# Patient Record
Sex: FEMALE | Race: WHITE | NOT HISPANIC OR LATINO | Employment: FULL TIME | URBAN - METROPOLITAN AREA
[De-identification: names, ages, dates, MRNs, and addresses within clinical notes are randomized per-mention and may not be internally consistent; named-entity substitution may affect disease eponyms.]

---

## 2017-01-24 ENCOUNTER — OFFICE VISIT (OUTPATIENT)
Dept: URGENT CARE | Facility: CLINIC | Age: 22
End: 2017-01-24
Payer: COMMERCIAL

## 2017-01-24 PROCEDURE — G0382 LEV 3 HOSP TYPE B ED VISIT: HCPCS

## 2017-01-24 PROCEDURE — 99283 EMERGENCY DEPT VISIT LOW MDM: CPT

## 2017-01-30 ENCOUNTER — HOSPITAL ENCOUNTER (EMERGENCY)
Facility: HOSPITAL | Age: 22
Discharge: HOME | End: 2017-01-31
Attending: EMERGENCY MEDICINE | Admitting: EMERGENCY MEDICINE
Payer: COMMERCIAL

## 2017-01-30 DIAGNOSIS — R19.7 DIARRHEA, UNSPECIFIED TYPE: ICD-10-CM

## 2017-01-30 DIAGNOSIS — R73.9 HYPERGLYCEMIA: Primary | ICD-10-CM

## 2017-01-30 LAB
ACETONE SERPL-MCNC: NEGATIVE MG/DL
ALBUMIN SERPL BCP-MCNC: 4 G/DL (ref 3.5–5)
ALP SERPL-CCNC: 109 U/L (ref 46–116)
ALT SERPL W P-5'-P-CCNC: 24 U/L (ref 12–78)
ANION GAP SERPL CALCULATED.3IONS-SCNC: 10 MMOL/L (ref 4–13)
AST SERPL W P-5'-P-CCNC: 11 U/L (ref 5–45)
BASOPHILS # BLD AUTO: 0.07 THOUSANDS/ΜL (ref 0–0.1)
BASOPHILS NFR BLD AUTO: 1 % (ref 0–1)
BILIRUB SERPL-MCNC: 0.5 MG/DL (ref 0.2–1)
BUN SERPL-MCNC: 15 MG/DL (ref 5–25)
CALCIUM SERPL-MCNC: 8.9 MG/DL (ref 8.3–10.1)
CHLORIDE SERPL-SCNC: 97 MMOL/L (ref 100–108)
CO2 SERPL-SCNC: 28 MMOL/L (ref 21–32)
CREAT SERPL-MCNC: 0.84 MG/DL (ref 0.6–1.3)
EOSINOPHIL # BLD AUTO: 0.08 THOUSAND/ΜL (ref 0–0.61)
EOSINOPHIL NFR BLD AUTO: 1 % (ref 0–6)
ERYTHROCYTE [DISTWIDTH] IN BLOOD BY AUTOMATED COUNT: 12.7 % (ref 11.6–15.1)
GFR SERPL CREATININE-BSD FRML MDRD: >60 ML/MIN/1.73SQ M
GLUCOSE SERPL-MCNC: 335 MG/DL (ref 65–140)
GLUCOSE SERPL-MCNC: 338 MG/DL (ref 65–140)
HCT VFR BLD AUTO: 44 % (ref 34.8–46.1)
HGB BLD-MCNC: 15.3 G/DL (ref 11.5–15.4)
LIPASE SERPL-CCNC: 173 U/L (ref 73–393)
LYMPHOCYTES # BLD AUTO: 3.37 THOUSANDS/ΜL (ref 0.6–4.47)
LYMPHOCYTES NFR BLD AUTO: 37 % (ref 14–44)
MCH RBC QN AUTO: 31.9 PG (ref 26.8–34.3)
MCHC RBC AUTO-ENTMCNC: 34.8 G/DL (ref 31.4–37.4)
MCV RBC AUTO: 92 FL (ref 82–98)
MONOCYTES # BLD AUTO: 0.53 THOUSAND/ΜL (ref 0.17–1.22)
MONOCYTES NFR BLD AUTO: 6 % (ref 4–12)
NEUTROPHILS # BLD AUTO: 5.12 THOUSANDS/ΜL (ref 1.85–7.62)
NEUTS SEG NFR BLD AUTO: 55 % (ref 43–75)
PLATELET # BLD AUTO: 331 THOUSANDS/UL (ref 149–390)
PMV BLD AUTO: 10.4 FL (ref 8.9–12.7)
POTASSIUM SERPL-SCNC: 3.8 MMOL/L (ref 3.5–5.3)
PROT SERPL-MCNC: 8.2 G/DL (ref 6.4–8.2)
RBC # BLD AUTO: 4.79 MILLION/UL (ref 3.81–5.12)
SODIUM SERPL-SCNC: 135 MMOL/L (ref 136–145)
WBC # BLD AUTO: 9.17 THOUSAND/UL (ref 4.31–10.16)

## 2017-01-30 PROCEDURE — 85025 COMPLETE CBC W/AUTO DIFF WBC: CPT | Performed by: EMERGENCY MEDICINE

## 2017-01-30 PROCEDURE — 82009 KETONE BODYS QUAL: CPT | Performed by: EMERGENCY MEDICINE

## 2017-01-30 PROCEDURE — 82948 REAGENT STRIP/BLOOD GLUCOSE: CPT

## 2017-01-30 PROCEDURE — 83690 ASSAY OF LIPASE: CPT | Performed by: EMERGENCY MEDICINE

## 2017-01-30 PROCEDURE — 36415 COLL VENOUS BLD VENIPUNCTURE: CPT | Performed by: EMERGENCY MEDICINE

## 2017-01-30 PROCEDURE — 96360 HYDRATION IV INFUSION INIT: CPT

## 2017-01-30 PROCEDURE — 80053 COMPREHEN METABOLIC PANEL: CPT | Performed by: EMERGENCY MEDICINE

## 2017-01-30 RX ADMIN — SODIUM CHLORIDE 2000 ML: 0.9 INJECTION, SOLUTION INTRAVENOUS at 21:37

## 2017-01-31 VITALS
HEART RATE: 90 BPM | WEIGHT: 120 LBS | SYSTOLIC BLOOD PRESSURE: 118 MMHG | RESPIRATION RATE: 18 BRPM | BODY MASS INDEX: 24.24 KG/M2 | OXYGEN SATURATION: 100 % | DIASTOLIC BLOOD PRESSURE: 68 MMHG | TEMPERATURE: 98.3 F

## 2017-01-31 PROCEDURE — 99283 EMERGENCY DEPT VISIT LOW MDM: CPT

## 2017-03-22 ENCOUNTER — GENERIC CONVERSION - ENCOUNTER (OUTPATIENT)
Dept: OTHER | Facility: OTHER | Age: 22
End: 2017-03-22

## 2017-03-22 LAB
LEFT EYE DIABETIC RETINOPATHY: NORMAL
RIGHT EYE DIABETIC RETINOPATHY: NORMAL

## 2017-05-30 ENCOUNTER — GENERIC CONVERSION - ENCOUNTER (OUTPATIENT)
Dept: OTHER | Facility: OTHER | Age: 22
End: 2017-05-30

## 2017-10-24 ENCOUNTER — HOSPITAL ENCOUNTER (EMERGENCY)
Facility: HOSPITAL | Age: 22
Discharge: HOME | End: 2017-10-24
Admitting: EMERGENCY MEDICINE
Payer: COMMERCIAL

## 2017-10-24 ENCOUNTER — APPOINTMENT (EMERGENCY)
Dept: CT IMAGING | Facility: HOSPITAL | Age: 22
End: 2017-10-24
Payer: COMMERCIAL

## 2017-10-24 ENCOUNTER — OFFICE VISIT (OUTPATIENT)
Dept: URGENT CARE | Facility: CLINIC | Age: 22
End: 2017-10-24
Payer: COMMERCIAL

## 2017-10-24 VITALS
DIASTOLIC BLOOD PRESSURE: 67 MMHG | HEIGHT: 59 IN | OXYGEN SATURATION: 100 % | RESPIRATION RATE: 18 BRPM | BODY MASS INDEX: 27.82 KG/M2 | WEIGHT: 138 LBS | TEMPERATURE: 97.9 F | HEART RATE: 80 BPM | SYSTOLIC BLOOD PRESSURE: 102 MMHG

## 2017-10-24 DIAGNOSIS — R51.9 HEADACHE: Primary | ICD-10-CM

## 2017-10-24 DIAGNOSIS — Z86.39 HISTORY OF HYPOGLYCEMIA: ICD-10-CM

## 2017-10-24 DIAGNOSIS — M79.10 MYALGIA: ICD-10-CM

## 2017-10-24 LAB
ACETONE SERPL-MCNC: NEGATIVE MG/DL
ALBUMIN SERPL BCP-MCNC: 3.9 G/DL (ref 3.5–5)
ALP SERPL-CCNC: 86 U/L (ref 46–116)
ALT SERPL W P-5'-P-CCNC: 23 U/L (ref 12–78)
ANION GAP SERPL CALCULATED.3IONS-SCNC: 10 MMOL/L (ref 4–13)
AST SERPL W P-5'-P-CCNC: 14 U/L (ref 5–45)
BASOPHILS # BLD AUTO: 0.04 THOUSANDS/ΜL (ref 0–0.1)
BASOPHILS NFR BLD AUTO: 1 % (ref 0–1)
BILIRUB SERPL-MCNC: 0.2 MG/DL (ref 0.2–1)
BUN SERPL-MCNC: 13 MG/DL (ref 5–25)
CALCIUM SERPL-MCNC: 8.7 MG/DL (ref 8.3–10.1)
CHLORIDE SERPL-SCNC: 101 MMOL/L (ref 100–108)
CLARITY, POC: CLEAR
CO2 SERPL-SCNC: 27 MMOL/L (ref 21–32)
COLOR, POC: YELLOW
CREAT SERPL-MCNC: 0.7 MG/DL (ref 0.6–1.3)
EOSINOPHIL # BLD AUTO: 0.13 THOUSAND/ΜL (ref 0–0.61)
EOSINOPHIL NFR BLD AUTO: 2 % (ref 0–6)
ERYTHROCYTE [DISTWIDTH] IN BLOOD BY AUTOMATED COUNT: 12.2 % (ref 11.6–15.1)
EXT BILIRUBIN, UA: NEGATIVE
EXT BLOOD URINE: NEGATIVE
EXT GLUCOSE, UA: NORMAL
EXT KETONES: NEGATIVE
EXT NITRITE, UA: NEGATIVE
EXT PH, UA: 7.5
EXT PREG TEST URINE: NEGATIVE
EXT PROTEIN, UA: NEGATIVE
EXT SPECIFIC GRAVITY, UA: 1
EXT UROBILINOGEN: 0.2
GFR SERPL CREATININE-BSD FRML MDRD: 122 ML/MIN/1.73SQ M
GLUCOSE SERPL-MCNC: 298 MG/DL (ref 65–140)
GLUCOSE SERPL-MCNC: 329 MG/DL (ref 65–140)
HCT VFR BLD AUTO: 41.6 % (ref 34.8–46.1)
HGB BLD-MCNC: 13.9 G/DL (ref 11.5–15.4)
LYMPHOCYTES # BLD AUTO: 2.84 THOUSANDS/ΜL (ref 0.6–4.47)
LYMPHOCYTES NFR BLD AUTO: 42 % (ref 14–44)
MCH RBC QN AUTO: 31.2 PG (ref 26.8–34.3)
MCHC RBC AUTO-ENTMCNC: 33.4 G/DL (ref 31.4–37.4)
MCV RBC AUTO: 93 FL (ref 82–98)
MONOCYTES # BLD AUTO: 0.43 THOUSAND/ΜL (ref 0.17–1.22)
MONOCYTES NFR BLD AUTO: 6 % (ref 4–12)
NEUTROPHILS # BLD AUTO: 3.34 THOUSANDS/ΜL (ref 1.85–7.62)
NEUTS SEG NFR BLD AUTO: 49 % (ref 43–75)
PLATELET # BLD AUTO: 283 THOUSANDS/UL (ref 149–390)
PMV BLD AUTO: 10.1 FL (ref 8.9–12.7)
POTASSIUM SERPL-SCNC: 4.1 MMOL/L (ref 3.5–5.3)
PROT SERPL-MCNC: 7.9 G/DL (ref 6.4–8.2)
RBC # BLD AUTO: 4.46 MILLION/UL (ref 3.81–5.12)
SODIUM SERPL-SCNC: 138 MMOL/L (ref 136–145)
WBC # BLD AUTO: 6.78 THOUSAND/UL (ref 4.31–10.16)
WBC # BLD EST: NEGATIVE 10*3/UL

## 2017-10-24 PROCEDURE — 82009 KETONE BODYS QUAL: CPT

## 2017-10-24 PROCEDURE — 80053 COMPREHEN METABOLIC PANEL: CPT

## 2017-10-24 PROCEDURE — 36415 COLL VENOUS BLD VENIPUNCTURE: CPT

## 2017-10-24 PROCEDURE — 70450 CT HEAD/BRAIN W/O DYE: CPT

## 2017-10-24 PROCEDURE — 99283 EMERGENCY DEPT VISIT LOW MDM: CPT

## 2017-10-24 PROCEDURE — 82948 REAGENT STRIP/BLOOD GLUCOSE: CPT

## 2017-10-24 PROCEDURE — 81025 URINE PREGNANCY TEST: CPT | Performed by: EMERGENCY MEDICINE

## 2017-10-24 PROCEDURE — 96374 THER/PROPH/DIAG INJ IV PUSH: CPT

## 2017-10-24 PROCEDURE — 81002 URINALYSIS NONAUTO W/O SCOPE: CPT | Performed by: EMERGENCY MEDICINE

## 2017-10-24 PROCEDURE — 99285 EMERGENCY DEPT VISIT HI MDM: CPT

## 2017-10-24 PROCEDURE — 85025 COMPLETE CBC W/AUTO DIFF WBC: CPT

## 2017-10-24 PROCEDURE — G0382 LEV 3 HOSP TYPE B ED VISIT: HCPCS

## 2017-10-24 RX ORDER — KETOROLAC TROMETHAMINE 30 MG/ML
30 INJECTION, SOLUTION INTRAMUSCULAR; INTRAVENOUS ONCE
Status: COMPLETED | OUTPATIENT
Start: 2017-10-24 | End: 2017-10-24

## 2017-10-24 RX ADMIN — KETOROLAC TROMETHAMINE 30 MG: 30 INJECTION, SOLUTION INTRAMUSCULAR at 18:45

## 2017-10-24 NOTE — DISCHARGE INSTRUCTIONS
Acute Headache   WHAT YOU NEED TO KNOW:   An acute headache is pain or discomfort that starts suddenly and gets worse quickly  You may have an acute headache only when you feel stress or eat certain foods  Other acute headache pain can happen every day, and sometimes several times a day  DISCHARGE INSTRUCTIONS:   Return to the emergency department if:   · You have severe pain  · You have numbness or weakness on one side of your face or body  · You have a headache that occurs after a blow to the head, a fall, or other trauma  · You have a headache, are forgetful or confused, or have trouble speaking  · You have a headache, stiff neck, and a fever  Contact your healthcare provider if:   · You have a constant headache and are vomiting  · You have a headache each day that does not get better, even after treatment  · You have changes in your headaches, or new symptoms that occur when you have a headache  · You have questions or concerns about your condition or care  Medicines: You may need any of the following:  · Prescription pain medicine  may be given  The medicine your healthcare provider recommends will depend on the kind of headaches you have  You will need to take prescription headache medicines as directed to prevent a problem called rebound headache  These headaches happen with regular use of pain relievers for headache disorders  · NSAIDs , such as ibuprofen, help decrease swelling, pain, and fever  This medicine is available with or without a doctor's order  NSAIDs can cause stomach bleeding or kidney problems in certain people  If you take blood thinner medicine, always ask your healthcare provider if NSAIDs are safe for you  Always read the medicine label and follow directions  · Acetaminophen  decreases pain and fever  It is available without a doctor's order  Ask how much to take and how often to take it  Follow directions   Read the labels of all other medicines you are using to see if they also contain acetaminophen, or ask your doctor or pharmacist  Acetaminophen can cause liver damage if not taken correctly  Do not use more than 3 grams (3,000 milligrams) total of acetaminophen in one day  · Antidepressants  may be given for some kinds of headaches  · Take your medicine as directed  Contact your healthcare provider if you think your medicine is not helping or if you have side effects  Tell him or her if you are allergic to any medicine  Keep a list of the medicines, vitamins, and herbs you take  Include the amounts, and when and why you take them  Bring the list or the pill bottles to follow-up visits  Carry your medicine list with you in case of an emergency  Manage your symptoms:   · Apply heat or ice  on the headache area  Use a heat or ice pack  For an ice pack, you can also put crushed ice in a plastic bag  Cover the pack or bag with a towel before you apply it to your skin  Ice and heat both help decrease pain, and heat also helps decrease muscle spasms  Apply heat for 20 to 30 minutes every 2 hours  Apply ice for 15 to 20 minutes every hour  Apply heat or ice for as long and for as many days as directed  You may alternate heat and ice  · Relax your muscles  Lie down in a comfortable position and close your eyes  Relax your muscles slowly  Start at your toes and work your way up your body  · Keep a record of your headaches  Write down when your headaches start and stop  Include your symptoms and what you were doing when the headache began  Record what you ate or drank for 24 hours before the headache started  Describe the pain and where it hurts  Keep track of what you did to treat your headache and if it worked  Prevent an acute headache:   · Avoid anything that triggers an acute headache  Examples include exposure to chemicals, going to high altitude, or not getting enough sleep  Create a regular sleep routine   Go to sleep at the same time and wake up at the same time each day  Do not use electronic devices before bedtime  These may trigger a headache or prevent you from sleeping well  · Do not smoke  Nicotine and other chemicals in cigarettes and cigars can trigger an acute headache or make it worse  Ask your healthcare provider for information if you currently smoke and need help to quit  E-cigarettes or smokeless tobacco still contain nicotine  Talk to your healthcare provider before you use these products  · Limit alcohol as directed  Alcohol can trigger an acute headache or make it worse  If you have cluster headaches, do not drink alcohol during an episode  For other types of headaches, ask your healthcare provider if it is safe for you to drink alcohol  Ask how much is safe for you to drink, and how often  · Exercise as directed  Exercise can reduce tension and help with headache pain  Aim for 30 minutes of physical activity on most days of the week  Your healthcare provider can help you create an exercise plan  · Eat a variety of healthy foods  Healthy foods include fruits, vegetables, low-fat dairy products, lean meats, fish, whole grains, and cooked beans  Your healthcare provider or dietitian can help you create meals plans if you need to avoid foods that trigger headaches  Follow up with your healthcare provider as directed:  Bring your headache record with you when you see your healthcare provider  Write down your questions so you remember to ask them during your visits  © 2017 [address] [facility] Information is for End User's use only and may not be sold, redistributed or otherwise used for commercial purposes  All illustrations and images included in CareNotes® are the copyrighted property of [employer]  or [employer]  The above information is an  only  It is not intended as medical advice for individual conditions or treatments   Talk to your doctor, nurse or pharmacist before following any medical regimen to see if it is safe and effective for you  Musculoskeletal Pain   WHAT YOU NEED TO KNOW:   Muscle pain can be dull, achy, or sharp  You may have pain and tenderness to the touch as well  It can occur anywhere on your body and is often brought on by exercise  Muscle pain may occur from an injury, such as a sprain, tendonitis, or bone fracture  Muscle pain can also be the result of medical conditions, such as polymyositis, fibromyalgia, and connective tissue disorders  DISCHARGE INSTRUCTIONS:   Self care:   · Rest  as directed and avoid activity that causes pain  You may be able to return to normal activity when you can move without pain  Follow directions for rest and activity  You are at risk for injury for 3 weeks after your symptoms go away  · Ice  your painful muscle area to decrease pain and swelling  Use an ice pack, or put ice in a plastic bag and cover it with a towel  Always  put a cloth between the ice and your skin  Apply the ice as often as directed for the first 24 to 48 hours  · Compression  with a splint, brace, or elastic bandage helps decrease pain and swelling  This may be needed for muscle pain in arms or legs  A splint, brace, or bandage will also help protect the painful area when you move around  · Elevate  a painful arm or leg to reduce swelling and pain  Elevate your limb while you are sitting or lying down  Prop a painful leg on pillows to keep it above the level of your heart  Medicines:   · NSAIDs  help decrease swelling and pain or fever  This medicine is available with or without a doctor's order  NSAIDs can cause stomach bleeding or kidney problems in certain people  If you take blood thinner medicine, always ask your healthcare provider if NSAIDs are safe for you  Always read the medicine label and follow directions  · Acetaminophen  is used to decrease pain  It is available without a doctor's order   Ask your healthcare provider how much to take and when to take it  Follow directions  Acetaminophen can cause liver damage if not taken correctly  Do not take more than one medicine that contains acetaminophen unless directed  · Muscle relaxers  help relax your muscles to decrease pain and muscle spasms  · Steroids  may be given to decrease redness, pain, and swelling  · Take your medicine as directed  Contact your healthcare provider if you think your medicine is not helping or if you have side effects  Tell him if you are allergic to any medicine  Keep a list of the medicines, vitamins, and herbs you take  Include the amounts, and when and why you take them  Bring the list or the pill bottles to follow-up visits  Carry your medicine list with you in case of an emergency  Follow up with your healthcare provider as directed: You may need more tests to help healthcare providers find the cause of your muscle pain  You may need physical therapy to learn muscle strengthening exercises  Write down your questions so you remember to ask them during your visits  Contact your healthcare provider if:   · You have a fever  · You have trouble sleeping because of your pain  · Your painful area becomes more tender, red, and warm to the touch  · You have decreased movement of the painful area  · You have questions or concerns about your condition or care  Return to the emergency department if:   · You have increased severe pain when you move the painful muscle area  · You lose feeling in your painful muscle area  · You have new or worse swelling in the painful area  Your skin may feel tight  · You have increased muscle pain or pain that does not improve with treatment  © 2017 [address] [employer] Information is for End User's use only and may not be sold, redistributed or otherwise used for commercial purposes   All illustrations and images included in CareNotes® are the copyrighted property of [employer]  or [employer]  The above information is an  only  It is not intended as medical advice for individual conditions or treatments  Talk to your doctor, nurse or pharmacist before following any medical regimen to see if it is safe and effective for you  Hypoglycemia in a Person with Diabetes   WHAT YOU NEED TO KNOW:   Hypoglycemia is a serious condition that happens when your blood glucose (sugar) level drops too low  The blood sugar level is usually too high in a person with diabetes, but the level can also drop too low  It is important to follow your diabetes management plan to keep your blood sugar level steady  DISCHARGE INSTRUCTIONS:   Call 911 for any of the following:   · You feel you are going to pass out  · You have a seizure or pass out  · You have trouble thinking clearly  Seek care immediately if:  · Your blood sugar is less than 50 mg/dL and does not respond to treatment  Contact your healthcare provider if:   · You have had symptoms of low blood sugar several times  · You have questions about the amount of insulin or diabetes medicine you are taking  · You have questions or concerns about your condition or care  Medicines:   · Insulin or diabetes medicine  help to keep your blood sugar under control  · Glucagon  may be needed if you have severe hypoglycemia  · Take your medicine as directed  Contact your healthcare provider if you think your medicine is not helping or if you have side effects  Tell him or her if you are allergic to any medicine  Keep a list of the medicines, vitamins, and herbs you take  Include the amounts, and when and why you take them  Bring the list or the pill bottles to follow-up visits  Carry your medicine list with you in case of an emergency  Follow up with your healthcare provider or specialist as directed: You may need dose changes to your insulin or oral diabetes medicine if you have hypoglycemia   Write down your questions so you remember to ask them during your visits  Manage hypoglycemia:   · Check your blood sugar level right away if you have symptoms of hypoglycemia  Hypoglycemia is usually 70 mg/dL or below  Ask your healthcare provider what blood sugar level is too low for you  · If your blood sugar level is too low, eat or drink 15 grams of fast-acting carbohydrate  Examples of this amount of fast-acting carbohydrate are 4 ounces (½ cup) of fruit juice or 4 ounces of regular soda  Other examples are 2 tablespoons of raisins or 3 to 4 glucose tablets  Check your blood sugar level 15 minutes later  If the level is still low (less than 100 mg/dL), have another 15 grams of carbohydrate  When the level returns to 100 mg/dL, eat a snack or meal that contains carbohydrates  This will help prevent another drop in blood sugar  Always carefully follow your healthcare provider's instructions on how to treat low blood sugar levels  · Always carry a source of fast-acting carbohydrate  If you have symptoms of hypoglycemia and you do not have a blood glucose meter, have a source of fast-acting carbohydrate anyway  Avoid carbohydrate foods that are high in fat  The fat content may make it take longer to increase your blood sugar level  Ask your healthcare provider if you should carry a glucagon kit  Glucagon is a medicine that is injected when you develop severe hypoglycemia and become unconscious  Check the expiration date every month and replace it before it expires  · Teach others how to help you if you have symptoms of hypoglycemia  Tell them about the symptoms of hypoglycemia  Ask them to give you a source of fast-acting carbohydrate if you cannot get it yourself  Ask them to give you a glucagon injection if you have symptoms of hypoglycemia and you become unconscious or have a seizure  Ask them to call 911   This is an emergency  Tell them never to try to make you swallow anything if you faint or have a seizure      · Wear medical alert jewelry  or carry a card that says you have diabetes  Ask where to get these items  Prevent hypoglycemia:   · Take diabetes medicine as directed  Take your medicine at the right time and in the right amount  Your healthcare provider may change your blood sugar goals if you get hypoglycemia often  · Eat regular meals and snacks  Talk to your dietitian or healthcare provider about a meal plan that is right for you  Do not skip meals  · Check your blood sugar level as directed  Ask your healthcare provider what your blood sugar levels should be before and after you eat  Ask when and how often to check your blood sugar level  You may need to check at least 3 times each day  Record your blood sugar level results and take the record with you when you see your healthcare provider  Your provider may use the record to make changes to your medicine, food, or exercise schedules  · Check your blood sugar level before you exercise  Exercise can decrease your blood sugar level  If your blood sugar level is less than 100 mg/dL, have a carbohydrate snack  Examples are 4 to 6 crackers, ½ banana, 8 ounces (1 cup) of nonfat or 1% milk, or 4 ounces (½ cup) of juice  If you will exercise for more than 1 hour, you may need to check your blood sugar level every 30 minutes  Your healthcare provider may also recommend that you check your blood sugar level after exercise  · Be aware of how alcohol affects your blood sugar level  Alcohol can cause your blood sugar level to drop for up to 12 hours after drinking  Ask your healthcare provider if alcohol is safe for you  If you drink alcohol, always have a snack or meal at the same time  Women should limit alcohol to 1 drink a day  Men should limit alcohol to 2 drinks a day  A drink of alcohol is 12 ounces of beer, 5 ounces of wine, or 1½ ounces of liquor    © 2017 [address] [employer] Information is for End User's use only and may not be sold,

## 2017-10-24 NOTE — ED PROVIDER NOTES
History  Chief Complaint   Patient presents with    Hypoglycemia - Symptomatic     Patient reports that she is a type 1 diabetic with an insulin pump and device that tests her blood sugar every 5 minutes  Patient reports that upon waking yesterday afternoon that she was diaphoretic, confused, with a "pounding headache " states that her glucose level did not rise above 50 until 1200  States that "blood sugar was between 40-50 " Reports continued headache and confusion today      Patient presents to the ED with a hypoglycemic episode that occurred during the night 2 nights ago  She states she has a monitor, the Dexacom, that monitors her BS every 5 minutes and alarms her if her BS is low  SHe states she did not hear the alarm, but woke up yesterday with a headache, muscle aches and nausea  Patient states she had a history of hypoglycemic seizure in the past   She states this morning her blood sugar was 68 in the morning and when she woke up it was 114  She states her BS normally runs around 280  Patient did call her endocrinologist and she was told to go to the ED for further evaluation  Patient state she has a generalized headache and took excedrin, but it didn't help  Patient denies any recent illnesses  She states she has only had the Dexacom for 2 weeks  Ezio Brown states she has had decreased concentration today           History provided by:  Patient  Hypoglycemia - Symptomatic   Initial blood sugar:  40  Blood sugar after intervention:  114  Severity:  Mild  Onset quality:  Sudden  Timing:  Intermittent  Progression:  Resolved  Chronicity:  Recurrent  Diabetic status:  Controlled with insulin  Current diabetic therapy:  Insulin pump and Dexacom monitor  Context: not decreased oral intake, not diet changes, not exercise and not new diabetes diagnosis    Associated symptoms: no altered mental status, no anxiety, no decreased responsiveness, no dizziness, no obesity, no shortness of breath, no speech difficulty, no tremors, no visual change and no vomiting        Prior to Admission Medications   Prescriptions Last Dose Informant Patient Reported? Taking?   insulin glulisine (APIDRA) 100 units/mL injection   Yes No   Sig: Up to 100 units daily via pump   levonorgestrel (MIRENA) 20 MCG/24HR IUD   Yes Yes   Si each by Intrauterine route once      Facility-Administered Medications: None       Past Medical History:   Diagnosis Date    Diabetes ([place] [address] )     Diabetes mellitus type 1 ([place] [address] )     Diabetic ketoacidosis without coma associated with type 1 diabetes mellitus ([place] [address] ) 2016    Gastroenteritis 2016    Psychiatric disorder        Past Surgical History:   Procedure Laterality Date    WISDOM TOOTH EXTRACTION Bilateral        Family History   Problem Relation Age of Onset    No Known Problems Mother     No Known Problems Father      I have reviewed and agree with the history as documented  Social History   Substance Use Topics    Smoking status: Former Smoker     Packs/day: 0 50     Years: 4 00     Types: Cigarettes    Smokeless tobacco: Never Used    Alcohol use Yes      Comment: rare         Review of Systems   Constitutional: Negative for chills, decreased responsiveness and fever  HENT: Negative for facial swelling and trouble swallowing  Eyes: Negative for visual disturbance  Respiratory: Negative for shortness of breath  Cardiovascular: Negative for chest pain and leg swelling  Gastrointestinal: Negative for diarrhea, nausea and vomiting  Endocrine: Negative for polydipsia, polyphagia and polyuria  Genitourinary: Negative for dysuria and urgency  Musculoskeletal: Positive for myalgias  Negative for back pain and neck pain  Skin: Negative for color change and rash  Neurological: Negative for dizziness, tremors and speech difficulty  Psychiatric/Behavioral: Positive for decreased concentration  The patient is not nervous/anxious      All other systems reviewed and are negative  Physical Exam  ED Triage Vitals [10/24/17 1812]   Temperature Pulse Respirations Blood Pressure SpO2   97 9 °F (36 6 °C) 82 18 126/76 100 %      Temp Source Heart Rate Source Patient Position - Orthostatic VS BP Location FiO2 (%)   Temporal Monitor Sitting Right arm --      Pain Score       7           Physical Exam   Constitutional: She is oriented to person, place, and time  She appears well-developed and well-nourished  She is active and cooperative  She does not appear ill  No distress  HENT:   Head: Normocephalic and atraumatic  Right Ear: Hearing and tympanic membrane normal    Left Ear: Hearing and tympanic membrane normal    Nose: Nose normal    Mouth/Throat: Oropharynx is clear and moist and mucous membranes are normal    Eyes: Conjunctivae, EOM and lids are normal  Pupils are equal, round, and reactive to light  Neck: Normal range of motion  Cardiovascular: Normal rate, regular rhythm and normal heart sounds  No murmur heard  Pulmonary/Chest: Effort normal and breath sounds normal  She has no wheezes  She has no rhonchi  She has no rales  Musculoskeletal: Normal range of motion  She exhibits no edema, tenderness or deformity  Neurological: She is alert and oriented to person, place, and time  She has normal strength  No cranial nerve deficit or sensory deficit  Gait normal    Skin: Skin is warm and dry  No rash noted  She is not diaphoretic  No pallor  Psychiatric: She has a normal mood and affect  Her speech is normal  Cognition and memory are normal    Nursing note and vitals reviewed        ED Medications  Medications   ketorolac (TORADOL) 30 mg/mL injection 30 mg (30 mg Intravenous Given 10/24/17 1845)       Diagnostic Studies  Labs Reviewed   COMPREHENSIVE METABOLIC PANEL - Abnormal        Result Value Ref Range Status    Glucose 329 (*) 65 - 140 mg/dL Final    Comment:   If the patient is fasting, the ADA then defines impaired fasting glucose as > 100 mg/dL and diabetes as > or equal to 123 mg/dL  Specimen collection should occur prior to Sulfasalazine administration due to the potential for falsely depressed results  Specimen collection should occur prior to Sulfapyridine administration due to the potential for falsely elevated results  Sodium 138  136 - 145 mmol/L Final    Potassium 4 1  3 5 - 5 3 mmol/L Final    Chloride 101  100 - 108 mmol/L Final    CO2 27  21 - 32 mmol/L Final    Anion Gap 10  4 - 13 mmol/L Final    BUN 13  5 - 25 mg/dL Final    Creatinine 0 70  0 60 - 1 30 mg/dL Final    Comment: Standardized to IDMS reference method    Calcium 8 7  8 3 - 10 1 mg/dL Final    AST 14  5 - 45 U/L Final    Comment:   Specimen collection should occur prior to Sulfasalazine administration due to the potential for falsely depressed results  ALT 23  12 - 78 U/L Final    Comment:   Specimen collection should occur prior to Sulfasalazine administration due to the potential for falsely depressed results  Alkaline Phosphatase 86  46 - 116 U/L Final    Total Protein 7 9  6 4 - 8 2 g/dL Final    Albumin 3 9  3 5 - 5 0 g/dL Final    Total Bilirubin 0 20  0 20 - 1 00 mg/dL Final    eGFR 122  ml/min/1 73sq m Final    Narrative:     National Kidney Disease Education Program recommendations are as follows:  GFR calculation is accurate only with a steady state creatinine  Chronic Kidney disease less than 60 ml/min/1 73 sq  meters  Kidney failure less than 15 ml/min/1 73 sq  meters     POCT GLUCOSE - Abnormal     POC Glucose 298 (*) 65 - 140 mg/dl Final   CBC AND DIFFERENTIAL - Normal    WBC 6 78  4 31 - 10 16 Thousand/uL Final    RBC 4 46  3 81 - 5 12 Million/uL Final    Hemoglobin 13 9  11 5 - 15 4 g/dL Final    Hematocrit 41 6  34 8 - 46 1 % Final    MCV 93  82 - 98 fL Final    MCH 31 2  26 8 - 34 3 pg Final    MCHC 33 4  31 4 - 37 4 g/dL Final    RDW 12 2  11 6 - 15 1 % Final    MPV 10 1  8 9 - 12 7 fL Final    Platelets 649  584 - 390 Thousands/uL Final    Neutrophils Relative 49  43 - 75 % Final    Lymphocytes Relative 42  14 - 44 % Final    Monocytes Relative 6  4 - 12 % Final    Eosinophils Relative 2  0 - 6 % Final    Basophils Relative 1  0 - 1 % Final    Neutrophils Absolute 3 34  1 85 - 7 62 Thousands/µL Final    Lymphocytes Absolute 2 84  0 60 - 4 47 Thousands/µL Final    Monocytes Absolute 0 43  0 17 - 1 22 Thousand/µL Final    Eosinophils Absolute 0 13  0 00 - 0 61 Thousand/µL Final    Basophils Absolute 0 04  0 00 - 0 10 Thousands/µL Final   ACETONE - Normal    Acetone, Bld Negative  Negative Final   POCT URINALYSIS DIPSTICK - Normal    Color, UA yellow   Final    Clarity, UA clear   Final    EXT Glucose, UA 2000 +++   Final    EXT Bilirubin, UA (Ref: Negative) negative   Final    EXT Ketones, UA (Ref: Negative) negative   Final    EXT Spec Grav, UA 1 005   Final    EXT Blood, UA (Ref: Negative) negative   Final    EXT pH, UA 7 5   Final    EXT Protein, UA (Ref: Negative) negative   Final    EXT Urobilinogen, UA (Ref: 0 2- 1 0) 0 2   Final    EXT Leukocytes, UA (Ref: Negative) negative   Final    EXT Nitrite, UA (Ref: Negative) negative   Final   POCT PREGNANCY, URINE - Normal    EXT PREG TEST UR (Ref: Negative) negative   Final       CT head without contrast   Final Result      No acute intracranial abnormality  Workstation performed: [id number]             Procedures  Procedures      Phone Contacts  ED Phone Contact    ED Course  ED Course                                MDM  Number of Diagnoses or Management Options  Headache: new and requires workup  History of hypoglycemia: new and requires workup  Myalgia: new and requires workup  Diagnosis management comments: Patient with history of hypoglycemia, will check labs  SHe also c/o headache that is not relieved with excedrin and has been constant for the past 2 days, will CT to r/o brain hemorrhage or tumor          Amount and/or Complexity of Data Reviewed  Clinical lab tests: ordered and reviewed  Tests in the radiology section of CPT®: ordered and reviewed    Patient Progress  Patient progress: stable    CritCare Time    Disposition  Final diagnoses:   Headache   Myalgia   History of hypoglycemia     ED Disposition     ED Disposition Condition Comment    Discharge  Ana Haynes discharge to home/self care  Condition at discharge: Stable        Follow-up Information     Follow up With Specialties Details Why Contact Info    your endocrinologist  Call in 1 day For recheck         Discharge Medication List as of 10/24/2017  7:20 PM      CONTINUE these medications which have NOT CHANGED    Details   levonorgestrel (MIRENA) 20 MCG/24HR IUD 1 each by Intrauterine route once, Historical Med      insulin glulisine (APIDRA) 100 units/mL injection Up to 100 units daily via pump, Historical Med           No discharge procedures on file      ED Provider  Electronically Signed by       Marta Turner PA-C  10/24/17 1945

## 2017-10-24 NOTE — ED NOTES
Discharge instructions reviewed with patient  All questions and concerns addressed        Waylon Varner RN  10/24/17 3998

## 2017-10-24 NOTE — ED NOTES
Patient turned insulin pump off located on left arm while in ED per instruction      Kike Hannon RN  10/24/17 4594

## 2017-10-24 NOTE — ED NOTES
Patient returned from [address] [place] [address], [address] [place] [address]  10/24/17 2847

## 2017-10-25 NOTE — PROGRESS NOTES
Assessment  1  Change in mental status (780 97) (R41 82)    Discussion/Summary  Discussion Summary:   Please get to the emergency room now for further evaluation  Chief Complaint  1  Headache  Chief Complaint Free Text Note Form: Pt c/o headache, confusion and slurred speech X 24 hours  States her blood sugar has fluctuated between 40 and 285 the last 24 hours  Her endocrinologist wanted her to be checked out  Pt states blood sugar on arrival is 285  History of Present Illness  HPI: Patient is a 70-year-old female who has previously had a seizure due to low blood sugars  She is a diabetic  She has had over 24 hours of severe headache which she rates as a 12  Her speech slurred worse yesterday  She's been confused and forgetful   she is alert and speech is not slurred  She did agree to go to the emergency room  Hospital Based Practices Required Assessment:   Pain Assessment   the patient states they have pain  (on a scale of 0 to 10, the patient rates the pain at 10 )   Abuse And Domestic Violence Screen    Yes, the patient is safe at home  Depression And Suicide Screen  No, the patient has not had thoughts of hurting themself  No, the patient has not felt depressed in the past 7 days  Prefered Language is  [place]  Primary Language is  English  Review of Systems  Focused-Female:   Constitutional: No fever, no chills, feels well, no tiredness, no recent weight gain or loss  Respiratory: no complaints of shortness of breath, no wheezing, no dyspnea on exertion, no orthopnea or PND  Musculoskeletal: no complaints of arthralgia, no myalgia, no joint swelling or stiffness, no limb pain or swelling  Integumentary: no complaints of skin rash or lesion, no itching or dry skin, no skin wounds  Neurological: as noted in HPI  Active Problems  1  Allergic rhinitis (477 9) (J30 9)   2  Anxiety, generalized (300 02) (F41 1)   3  Bursitis (727 3) (M71 9)   4   Bursitis, shoulder, right (726 10) (M75 51)   5  Cellulitis (682 9) (L03 90)   6  Diabetic ketoacidosis, type I (250 13) (E10 10)   7  DM type 1 (diabetes mellitus, type 1) (250 01) (E10 9)   8  Hyperlipidemia (272 4) (E78 5)   9  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)   10  Mild Intermittent Asthma (493 90)   11  Right shoulder pain (719 41) (M25 511)   12  Status post peripherally inserted central catheter (PICC) central line placement (V45 89)    (Z95 828)   13  Subluxation of right shoulder joint, initial encounter (831 00) (S43 001A)    Past Medical History  1  Acute bronchitis (466 0) (J20 9)   2  History of Acute bronchitis (466 0) (J20 9)   3  History of Acute bronchitis, unspecified organism (466 0) (J20 9)   4  History of Allergic dermatitis (692 9) (L23 9)   5  History of Asthma with acute exacerbation (493 92) (J45 901)   6  History of sinusitis (V12 69) (Z87 09)   7  History of varicella (V12 09) (Z86 19)   8  History of viral gastroenteritis (V12 09) (Z86 19)   9  History of Left ankle sprain (845 00) (S93 402A)   10  History of Left arm pain (729 5) (M79 602)   11  History of Left upper extremity numbness (782 0) (R20 0)    Family History  Mother    1  Family history of Anxiety  Sister    2  Family history of Anxiety  Maternal Grandmother    3  Family history of Anxiety  Maternal Aunt    4  Family history of Anxiety  Family History    5  Family history of Hyperlipidemia    Social History   · Current every day smoker (305 1) (F17 200)    Current Meds   1  Accu-Chek FastClix Lancets Miscellaneous; Therapy: 98PPE0620 to (0699 164 39 35) Recorded   2  Apidra SoloStar 100 UNIT/ML SOLN; inject subcutaneously as directed Recorded    Allergies  1   Reglan TABS    Vitals  Signs   Recorded: 14TOJ4710 05:11PM   Temperature: 99 1 F  Heart Rate: 94  Respiration: 16  Systolic: 761  Diastolic: 78  Height: 4 ft 11 in  Weight: 135 lb   BMI Calculated: 27 27  BSA Calculated: 1 56  O2 Saturation: 98  LMP: 24Oct2017  Pain Scale: 10    Physical Exam    Constitutional   General appearance: No acute distress, well appearing and well nourished  Eyes   Conjunctiva and lids: No swelling, erythema or discharge  Pulmonary   Respiratory effort: No increased work of breathing or signs of respiratory distress  Musculoskeletal   Gait and station: Normal     Digits and nails: Normal without clubbing or cyanosis  Inspection/palpation of joints, bones, and muscles: Normal     Skin   Skin and subcutaneous tissue: Normal without rashes or lesions         Signatures   Electronically signed by : Darin Hopkins MD; Oct 24 2017  6:17PM EST                       (Author)

## 2017-10-30 ENCOUNTER — GENERIC CONVERSION - ENCOUNTER (OUTPATIENT)
Dept: OTHER | Facility: OTHER | Age: 22
End: 2017-10-30

## 2018-01-10 NOTE — MISCELLANEOUS
Provider Comments  Provider Comments:   PT NO SHOW FOR MARTA ROSALES APPT      Signatures   Electronically signed by : Kwasi Morales DO; Oct  7 2016 11:22AM EST                       (Author)

## 2018-01-10 NOTE — MISCELLANEOUS
Message   Recorded as Task   Date: 02/22/2016 03:01 PM, Created By: Brinda Foster   Task Name: Medical Complaint Callback   Assigned To: Marianne Fine   Regarding Patient: Elvira Granger, Status: Active   Comment:    Brinda Foster - 22 Feb 2016 3:01 PM     TASK CREATED  Caller: Self; Medical Complaint; [phone] (Home); [phone] (Work)  PT WENT TO [facility] ER YESTERDAY FOR VOMITTING AND WAS GIVEN SCRIPT FOR Albino Flavors  IS STILL VOMITTING (NOT AS MUCH, BUT STILL VOMITTING ABOUT EVERY 2 HRS)  DID HAVE DENTAL WORK ON [facility] AND ER DIAGNOSED EHR WITH DRY SOCKET   NOT SURE WHAT TO DO AT THIS POINT  CAN CALL PT [phone]   Shahida Nguyen - 22 Feb 2016 3:52 PM     TASK EDITED  Pt called back and said she is vomiting hourly, feels weak and slightly confused  Sent her back to the ER to evaluate with her fiance at 3:52 PM    RODNEY        Active Problems    1  Acute bronchitis, unspecified organism (466 0) (J20 9)   2  Allergic dermatitis (692 9) (L23 9)   3  Allergic rhinitis (477 9) (J30 9)   4  Anxiety, generalized (300 02) (F41 1)   5  Asthma with acute exacerbation (493 92) (J45 901)   6  DM type 1 (diabetes mellitus, type 1) (250 01) (E10 9)   7  Glaucoma screening (V80 1) (Z13 5)   8  Hyperlipidemia (272 4) (E78 5)   9  Mild Intermittent Asthma (493 90)    Current Meds   1  Accu-Chek FastClix Lancets Miscellaneous; Therapy: 71KNZ6894 to (0699 164 39 35) Recorded   2  Albuterol Sulfate (2 5 MG/3ML) 0 083% Inhalation Nebulization Solution; USE 1 UNIT   DOSE IN NEBULIZER EVERY 4 TO 6 HOURS AS NEEDED; Therapy: 26VCM3493 to (Last PQ:25WRZ4900)  Requested for: 37MTX2861 Ordered   3  Alclometasone Dipropionate 0 05 % External Cream; APPLY SPARINGLY TO   AFFECTED AREA(S) TWICE DAILY; Therapy: 80NVL6494 to (MNHWDJDZ:69ERZ1453)  Requested for: 09VML0979; Last   Rx:08Jan2015 Ordered   4  Apidra SoloStar 100 UNIT/ML SOLN; inject subcutaneously as directed Recorded   5   Levocetirizine Dihydrochloride 5 MG Oral Tablet; Take 1 tablet daily; Therapy: 05UQS8020 to (Evaluate:35Tmk6962)  Requested for: 62DZH3699; Last   Rx:08Jan2015 Ordered   6  MethylPREDNISolone (Edwin) 4 MG Oral Tablet; TAKE AS DIRECTED ON PATIENT   INSTRUCTION CARD; Therapy: 91TLO6637 to (Last Rx:27Oct2015)  Requested for: 27Oct2015 Ordered   7  Qvar 40 MCG/ACT Inhalation Aerosol Solution; INHALE 2 PUFFS TWICE DAILY; Therapy: 11KSR1520 to (Last Rx:27Oct2015)  Requested for: 27Oct2015 Ordered   8  Simvastatin 20 MG Oral Tablet; TAKE 1 TABLET Bedtime; Therapy: 67GMC6548 to (Evaluate:08Jun2013)  Requested for: 27MXI6374; Last   Rx:38Lhh6449 Ordered   9  Ventolin  (90 Base) MCG/ACT Inhalation Aerosol Solution; INHALE 2 PUFFS   EVERY 4-6 HOURS AS NEEDED; Therapy: 21Jun2013 to (Evaluate:60Ros7388)  Requested for: 21Jun2013; Last   Rx:21Jun2013 Ordered    Allergies    1   Reglan TABS    Signatures   Electronically signed by : Krunal Howell DO; Feb 22 2016  3:55PM EST                       (Author)

## 2018-01-10 NOTE — MISCELLANEOUS
Provider Comments  Provider Comments:   10/30/17 NO SHOW HA NAUSEA DIARRHEA VF      Signatures   Electronically signed by : Kenya Cabral DO; Oct 30 2017  5:34PM EST                       (Author)

## 2018-01-11 NOTE — MISCELLANEOUS
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 TO CALL IN PREDNISONE BECAUSE SHE IS DIABETIC AND SHE IS NOT HER PATIENT BUT SAID SHE SHOULD TALK TO YOU AND ALSO POSSIBLY ELICON AND ORAL Rakana Lucks WHAT DR ESCALONA  IS SUGGESTING    PROF    [phone] OR  [phone] (W) AND ASK FOR HER   BrothJudy contreras - 22 Dec 2016 8:44 AM     TASK EDITED  PLEASE CHANGE PHARMACY TO    [facility]  [phone]   Neeraj Yao - 22 Dec 2016 1:14 PM     TASK EDITED  called and discussed  will send over script for steroid pack  may need to increase insulin while on steroid pack    will also do topical steroid cream  f/u as needed        Plan  Rash    · MethylPREDNISolone 4 MG Oral Tablet Therapy Pack; take as directed   · Mometasone Furoate 0 1 % External Cream; APPLY SPARINGLY TO AFFECTED  AREA(S) ONCE DAILY    Signatures   Electronically signed by : Jimenez Hyde DO; Dec 22 2016  1:15PM EST                       (Author)

## 2018-01-11 NOTE — MISCELLANEOUS
Message   Recorded as Task   Date: 11/10/2016 11:24 AM, Created By: Meek Wilson   Task Name: Go to Result   Assigned To: Nadir Bishop   Regarding Patient: Efra Tao, Status: Active   Comment:    Meek Wilson - 10 Nov 2016 11:24 AM     TASK CREATED  Please let the patient know that the MRI of the shoulder demonstrated that there is no tear  There is evidence of bursitis in the shoulder  I would recommend a trial of physical therapy downstairs- eval and treat for up to 9 visits  Jeannine,April - 10 Nov 2016 11:54 AM     TASK EDITED                 patient was informed and advised, script was printed and put at  for   Active Problems    1  Allergic rhinitis (477 9) (J30 9)   2  Anxiety, generalized (300 02) (F41 1)   3  Bursitis (727 3) (M71 9)   4  Bursitis, shoulder, right (726 10) (M75 51)   5  Diabetic ketoacidosis, type I (250 13) (E10 10)   6  DM type 1 (diabetes mellitus, type 1) (250 01) (E10 9)   7  Glaucoma screening (V80 1) (Z13 5)   8  Hyperlipidemia (272 4) (E78 5)   9  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)   10  Mild Intermittent Asthma (493 90)   11  Right shoulder pain (719 41) (M25 511)   12  Status post peripherally inserted central catheter (PICC) central line placement (V45 89)    (Z95 828)   13  Subluxation of right shoulder joint, initial encounter (831 00) (S43 001A)    Current Meds   1  Accu-Chek FastClix Lancets Miscellaneous; Therapy: 27VYD1186 to (0699 164 39 35) Recorded   2  Albuterol Sulfate (2 5 MG/3ML) 0 083% Inhalation Nebulization Solution; USE 1 UNIT   DOSE IN NEBULIZER EVERY 4 TO 6 HOURS AS NEEDED; Therapy: 53NAD8967 to (Last AR:56EAY6676)  Requested for: 52XRH8082 Ordered   3  Apidra SoloStar 100 UNIT/ML SOLN; inject subcutaneously as directed Recorded   4  Escitalopram Oxalate 10 MG Oral Tablet; Take 1 tablet daily; Therapy: 21SZI9547 to (Last Rx:60Qer0886)  Requested for: 49Fps9715 Ordered   5   Ventolin  (90 Base) MCG/ACT Inhalation Aerosol Solution; INHALE 2 PUFFS   EVERY 4-6 HOURS AS NEEDED; Therapy: 21Jun2013 to (Evaluate:09Xez4036)  Requested for: 21Jun2013; Last   Rx:21Jun2013 Ordered    Allergies    1   Reglan TABS    Plan  Bursitis, Bursitis, shoulder, right, Instability of right shoulder joint, Right shoulder pain,  Subluxation of right shoulder joint, initial encounter    · *1 - [facility] Physical Therapy  Consult  Status: Active - Retrospective  Authorization  Requested for: 58BQW3104  Care Summary provided  : Yes    Signatures   Electronically signed by : Gerry Harper, ; Nov 10 2016 11:54AM EST                       (Author)

## 2018-01-11 NOTE — MISCELLANEOUS
Message   Recorded as Task   Date: 11/03/2016 01:03 PM, Created By: Toño Anderson   Task Name: Call Back   Assigned To: Jennifer Melendez   Regarding Patient: Samuel Mustafa, Status: Active   CommentKyle Akiko - 03 Nov 2016 1:03 PM     TASK CREATED  Caller: Self; Results Inquiry; [phone] (Home); [phone] (Work)  RIGHT SHOULDER X-RAY RESULTS  DONE HERE YESTERDAY  ALSO WAITING TO HEAR ABOUT AUTH FOR HER MRI (WAS TOLD HAD TO HAVE X-RAY DONE FIRST, THEN COULD HAVE MRI)  MRI IS SET UP FOR 11/9  CAN CALL Baljeet Pina -[phone]   Rose Sylvester - 27 Nov 2016 5:13 PM     TASK REASSIGNED: Previously Assigned To Alphonsedemarcus Jose Carlos     Please let her know the x-ray was unremarkable- we had to fax additional info to the insurance for the prior auth today- so hopefully we will hear back soon  Jennifer Melendez - 03 Nov 2016 7:02 PM     TASK EDITED  Patient advised  PLM        Active Problems    1  Allergic rhinitis (477 9) (J30 9)   2  Anxiety, generalized (300 02) (F41 1)   3  Diabetic ketoacidosis, type I (250 13) (E10 10)   4  DM type 1 (diabetes mellitus, type 1) (250 01) (E10 9)   5  Glaucoma screening (V80 1) (Z13 5)   6  Hyperlipidemia (272 4) (E78 5)   7  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)   8  Mild Intermittent Asthma (493 90)   9  Right shoulder pain (719 41) (M25 511)   10  Status post peripherally inserted central catheter (PICC) central line placement (V45 89)    (Z95 828)   11  Subluxation of right shoulder joint, initial encounter (831 00) (S43 001A)    Current Meds   1  Accu-Chek FastClix Lancets Miscellaneous; Therapy: 97JCJ6389 to (0699 164 39 35) Recorded   2  Albuterol Sulfate (2 5 MG/3ML) 0 083% Inhalation Nebulization Solution; USE 1 UNIT   DOSE IN NEBULIZER EVERY 4 TO 6 HOURS AS NEEDED; Therapy: 44WXJ0904 to (Last YD:46PWZ4369)  Requested for: 35SEG5006 Ordered   3  Apidra SoloStar 100 UNIT/ML SOLN; inject subcutaneously as directed Recorded   4   Escitalopram Oxalate 10 MG Oral Tablet; Take 1 tablet daily; Therapy: 10JNM3913 to (Last Rx:53Trk4297)  Requested for: 08Sep2016 Ordered   5  Ventolin  (90 Base) MCG/ACT Inhalation Aerosol Solution; INHALE 2 PUFFS   EVERY 4-6 HOURS AS NEEDED; Therapy: 21Jun2013 to (Evaluate:62Jnx7898)  Requested for: 21Jun2013; Last   Rx:21Jun2013 Ordered    Allergies    1   Reglan TABS    Signatures   Electronically signed by : Clinton Awad, ; Nov  3 2016  7:02PM EST                       (Author)

## 2018-01-14 NOTE — PROGRESS NOTES
History of Present Illness  Care Coordination Encounter Information:   Type of Encounter: Telephonic   Contact: Initial Contact    Spoke to Patient   Lo Mcghee  Care Coordination SL Nurse [employer] On license of [facility]:   The reason for call is to discuss outreach for follow up/needed services  I reached out to Lo Mcghee as a follow up to her missed appointment and she stated that was called into work at the last minute and forgot her scheduled appointment  She states she feels great  She has verbalized that she has not experienced any nausea, vomiting and/or diarrhea and that her glucoses are being properly managed and within range  I told her that she can reach out to me at any time if she has any questions  Active Problems    1  Allergic rhinitis (477 9) (J30 9)   2  Anxiety, generalized (300 02) (F41 1)   3  Diabetic ketoacidosis, type I (250 13) (E10 10)   4  DM type 1 (diabetes mellitus, type 1) (250 01) (E10 9)   5  Glaucoma screening (V80 1) (Z13 5)   6  Hyperlipidemia (272 4) (E78 5)   7  Left arm pain (729 5) (M79 602)   8  Left upper extremity numbness (782 0) (R20 0)   9  Mild Intermittent Asthma (493 90)   10  Status post peripherally inserted central catheter (PICC) central line placement (V45 89)    (Z95 828)   11  Viral gastroenteritis (008 8) (A08 4)    Past Medical History    1  Acute bronchitis (466 0) (J20 9)   2  History of Acute bronchitis (466 0) (J20 9)   3  History of Acute bronchitis, unspecified organism (466 0) (J20 9)   4  History of Allergic dermatitis (692 9) (L23 9)   5  History of Asthma with acute exacerbation (493 92) (J45 901)   6  History of sinusitis (V12 69) (Z87 09)   7  History of varicella (V12 09) (Z86 19)   8  History of Left ankle sprain (845 00) (C34 975S)    Family History  Mother    1  Family history of Anxiety  Sister    2  Family history of Anxiety  Maternal Grandmother    3  Family history of Anxiety  Maternal Aunt    4  Family history of Anxiety  Family History    5   Family history of Hyperlipidemia    Social History    · Current every day smoker (305 1) (F17 200)    Current Meds    1  Escitalopram Oxalate 10 MG Oral Tablet; Take 1 tablet daily; Therapy: 38SYN7979 to (Last Rx:96Hmf6309)  Requested for: 83Jbp5364 Ordered    2  Apidra SoloStar 100 UNIT/ML SOLN; inject subcutaneously as directed Recorded    3  Ventolin  (90 Base) MCG/ACT Inhalation Aerosol Solution; INHALE 2 PUFFS   EVERY 4-6 HOURS AS NEEDED; Therapy: 21Jun2013 to (Evaluate:30Tyu8587)  Requested for: 21Jun2013; Last   ZY:12WMQ9348 Ordered    4  Albuterol Sulfate (2 5 MG/3ML) 0 083% Inhalation Nebulization Solution; USE 1 UNIT   DOSE IN NEBULIZER EVERY 4 TO 6 HOURS AS NEEDED; Therapy: 33ECD5986 to (Last IT:20BQH7583)  Requested for: 98MMY9806 Ordered    5  Promethazine HCl - 12 5 MG Oral Tablet; TAKE 1 TABLET EVERY 6 HOURS AS NEEDED   FOR NAUSEA; Therapy: 89SBO7541 to (Braxton Guadalupe)  Requested for: 75VEJ4651; Last   Rx:21Sep2016 Ordered    6  Accu-Chek FastClix Lancets Miscellaneous; Therapy: 16RGO3887 to (Evaluate:25Jan2015) Recorded    Allergies    1  Reglan TABS    End of Encounter Meds    1  Escitalopram Oxalate 10 MG Oral Tablet; Take 1 tablet daily; Therapy: 57HVN0817 to (Last Rx:63Ncl3988)  Requested for: 60Yjk9647 Ordered    2  Apidra SoloStar 100 UNIT/ML SOLN; inject subcutaneously as directed Recorded    3  Ventolin  (90 Base) MCG/ACT Inhalation Aerosol Solution; INHALE 2 PUFFS   EVERY 4-6 HOURS AS NEEDED; Therapy: 21Jun2013 to (Evaluate:33Pkm8279)  Requested for: 21Jun2013; Last   UD:14GCI8302 Ordered    4  Albuterol Sulfate (2 5 MG/3ML) 0 083% Inhalation Nebulization Solution; USE 1 UNIT   DOSE IN NEBULIZER EVERY 4 TO 6 HOURS AS NEEDED; Therapy: 69IBW1194 to (Last UN:98JIV4542)  Requested for: 94DOF5228 Ordered    5  Promethazine HCl - 12 5 MG Oral Tablet; TAKE 1 TABLET EVERY 6 HOURS AS NEEDED   FOR NAUSEA;    Therapy: 83ZUV3743 to (Braxton Guadalupe)  Requested for: 22XXV4389; Last   Rx:56Ruf3309 Ordered    6  Accu-Chek FastClix Lancets Miscellaneous;    Therapy: 19CKF2987 to (0699 164 39 35) Recorded    Signatures   Electronically signed by : Aleah Espinoza RN; Oct 14 2016 11:29AM EST                       (Author)

## 2018-01-17 NOTE — MISCELLANEOUS
Message   Recorded as Task   Date: 07/07/2016 11:54 AM, Created By: Marissa Piper   Task Name: Follow Up   Assigned To: Oralia Jackson   Regarding Patient: Bishop Trivedi, Status: Active   CommentLeodis Peto - 07 Jul 2016 11:54 AM     TASK CREATED  Caller: Self; General Medical Question; [phone] (Home); [phone] (Work)  PT IS CALLING AND IS APPLYING FOR MEDICAL ASSISTANCE  THEY ARE ASKING HER TO SUPPLY A LETTER FROM HER PHYSICIAN STATING THAT SHE HAS DIABETES AND REQUIRED MEDICATION FOR THIS  CAN FAX TO [place], FAX # [phone]  CAN CALL PT AT [address] [place] [address] - 07 Jul 2016 1:09 PM     TASK REASSIGNED: Previously Assigned To Vamshi Walter  There usually is a form that is completed for this-she should check with the social security office or whoever she is dealing with because usually we are given paperwork from the Social Security office to complete to request medical assistance -she should check for the form  Jeannine,April - 07 Jul 2016 1:52 PM     TASK EDITED  Patient was advised to seek out a form and to get back to our office with an update after she finds out more information  Active Problems    1  Allergic rhinitis (477 9) (J30 9)   2  Anxiety, generalized (300 02) (F41 1)   3  DM type 1 (diabetes mellitus, type 1) (250 01) (E10 9)   4  Glaucoma screening (V80 1) (Z13 5)   5  Hyperlipidemia (272 4) (E78 5)   6  Left arm pain (729 5) (M79 602)   7  Left upper extremity numbness (782 0) (R20 0)   8  Mild Intermittent Asthma (493 90)   9  Status post peripherally inserted central catheter (PICC) central line placement (V45 89)   (X92 688)    Current Meds   1  Accu-Chek FastClix Lancets Miscellaneous; Therapy: 54KSD4460 to (Haig Persons) Recorded   2  Albuterol Sulfate (2 5 MG/3ML) 0 083% Inhalation Nebulization Solution; USE 1 UNIT   DOSE IN NEBULIZER EVERY 4 TO 6 HOURS AS NEEDED; Therapy: 21HRW1355 to (Last UZ:02GTB3900)  Requested for: 47CIF1508 Ordered   3  Apidra SoloStar 100 UNIT/ML SOLN; inject subcutaneously as directed Recorded   4  Ventolin  (90 Base) MCG/ACT Inhalation Aerosol Solution; INHALE 2 PUFFS   EVERY 4-6 HOURS AS NEEDED; Therapy: 21Jun2013 to (Evaluate:22Fvb1659)  Requested for: 21Jun2013; Last   Rx:21Jun2013 Ordered    Allergies    1   Reglan TABS    Signatures   Electronically signed by : Bethany Paez, ; Jul 7 2016  1:52PM EST                       (Author)

## 2018-01-18 NOTE — MISCELLANEOUS
History of Present Illness  TCM Communication [facility]: The patient is being contacted for follow-up after hospitalization  Hospital records were reviewed  She was hospitalized at [facility]  The date of admission: 9/23/16, date of discharge: 9/24/16  Diagnosis: Diabetic Ketoacidosis associated with Type 1 Diabetes Mellitus / Dehydration / Gastroenteritis / Hyponatremia / Nausea / Depression  She was discharged to home  She scheduled a follow up appointment  Follow-up appointments with other specialists: Patient states she has been seeing [address] for insulin management  Faxed record request for last office note  Patient's nausea, vomiting has subsided and she feels that she has returned to normal status  Her blood sugar this morning was 120 and she is using the insulin pump  Counseling was provided to the patient  Topics counseled included instructions for management  Transition of Care appointment scheduled with Dr Cristian Short on 10/7/16  ADDENDUM: Patient was a No Show for her [facility] appointment on 10/7/16  Contacted patient and left voicemail message to return call, but patient did not call back to the office  Communication performed and completed by Carrie Hernandez RN      Active Problems    1  Allergic rhinitis (477 9) (J30 9)   2  Anxiety, generalized (300 02) (F41 1)   3  DM type 1 (diabetes mellitus, type 1) (250 01) (E10 9)   4  Glaucoma screening (V80 1) (Z13 5)   5  Hyperlipidemia (272 4) (E78 5)   6  Left arm pain (729 5) (M79 602)   7  Left upper extremity numbness (782 0) (R20 0)   8  Mild Intermittent Asthma (493 90)   9  Status post peripherally inserted central catheter (PICC) central line placement (V45 89)   (Z95 828)   10  Viral gastroenteritis (008 8) (A08 4)    Past Medical History    1  Acute bronchitis (466 0) (J20 9)   2  History of Acute bronchitis (466 0) (J20 9)   3  History of Acute bronchitis, unspecified organism (466 0) (J20 9)   4   History of Allergic dermatitis (692 9) (L23 9)   5  History of Asthma with acute exacerbation (493 92) (J45 901)   6  History of sinusitis (V12 69) (Z87 09)   7  History of varicella (V12 09) (Z86 19)   8  History of Left ankle sprain (845 00) (S65 500Y)    Family History  Mother    1  Family history of Anxiety  Sister    2  Family history of Anxiety  Maternal Grandmother    3  Family history of Anxiety  Maternal Aunt    4  Family history of Anxiety  Family History    5  Family history of Hyperlipidemia    Social History    · Current every day smoker (305 1) (F17 200)    Current Meds   1  Accu-Chek FastClix Lancets Miscellaneous; Therapy: 12DRO6818 to (0699 164 39 35) Recorded   2  Albuterol Sulfate (2 5 MG/3ML) 0 083% Inhalation Nebulization Solution; USE 1 UNIT   DOSE IN NEBULIZER EVERY 4 TO 6 HOURS AS NEEDED; Therapy: 86HOT4792 to (Last VB:81UHE3241)  Requested for: 98FOU8188 Ordered   3  Apidra SoloStar 100 UNIT/ML SOLN; inject subcutaneously as directed Recorded   4  Escitalopram Oxalate 10 MG Oral Tablet; Take 1 tablet daily; Therapy: 37KKE6998 to (Last Rx:89Ymb7450)  Requested for: 26Wnq5228 Ordered   5  Promethazine HCl - 12 5 MG Oral Tablet; TAKE 1 TABLET EVERY 6 HOURS AS NEEDED   FOR NAUSEA; Therapy: 40JVC4056 to (Anali Rain)  Requested for: 15DBK6525; Last   Rx:25Rrn4782 Ordered   6  Ventolin  (90 Base) MCG/ACT Inhalation Aerosol Solution; INHALE 2 PUFFS   EVERY 4-6 HOURS AS NEEDED; Therapy: 21Jun2013 to (Evaluate:03Chv7063)  Requested for: 21Jun2013; Last   Rx:34Hry7317 Ordered    Allergies    1   Reglan TABS    Signatures  Electronically signed by : Abdias Nance, ; Sep 26 2016 12:26PM EST                       (Author)

## 2018-02-10 ENCOUNTER — HOSPITAL ENCOUNTER (EMERGENCY)
Facility: HOSPITAL | Age: 23
Discharge: HOME | End: 2018-02-10
Attending: EMERGENCY MEDICINE | Admitting: EMERGENCY MEDICINE
Payer: COMMERCIAL

## 2018-02-10 ENCOUNTER — APPOINTMENT (EMERGENCY)
Dept: RADIOLOGY | Facility: HOSPITAL | Age: 23
End: 2018-02-10
Payer: COMMERCIAL

## 2018-02-10 VITALS
DIASTOLIC BLOOD PRESSURE: 55 MMHG | TEMPERATURE: 97.9 F | HEIGHT: 59 IN | RESPIRATION RATE: 20 BRPM | SYSTOLIC BLOOD PRESSURE: 103 MMHG | HEART RATE: 104 BPM | WEIGHT: 133 LBS | BODY MASS INDEX: 26.81 KG/M2 | OXYGEN SATURATION: 98 %

## 2018-02-10 DIAGNOSIS — R11.2 NAUSEA AND VOMITING: ICD-10-CM

## 2018-02-10 DIAGNOSIS — R73.9 HYPERGLYCEMIA: Primary | ICD-10-CM

## 2018-02-10 DIAGNOSIS — R19.7 DIARRHEA: ICD-10-CM

## 2018-02-10 DIAGNOSIS — E86.0 DEHYDRATION: ICD-10-CM

## 2018-02-10 LAB
ACETONE SERPL-MCNC: NEGATIVE MG/DL
ALBUMIN SERPL BCP-MCNC: 4.2 G/DL (ref 3.5–5)
ALP SERPL-CCNC: 82 U/L (ref 46–116)
ALT SERPL W P-5'-P-CCNC: 25 U/L (ref 12–78)
ANION GAP SERPL CALCULATED.3IONS-SCNC: 10 MMOL/L (ref 4–13)
AST SERPL W P-5'-P-CCNC: 17 U/L (ref 5–45)
BASE EXCESS BLDA CALC-SCNC: -2 MMOL/L (ref -2–3)
BASOPHILS # BLD MANUAL: 0 THOUSAND/UL (ref 0–0.1)
BASOPHILS NFR MAR MANUAL: 0 % (ref 0–1)
BILIRUB SERPL-MCNC: 0.9 MG/DL (ref 0.2–1)
BUN SERPL-MCNC: 20 MG/DL (ref 5–25)
CALCIUM SERPL-MCNC: 8.9 MG/DL (ref 8.3–10.1)
CHLORIDE SERPL-SCNC: 100 MMOL/L (ref 100–108)
CLARITY, POC: CLEAR
CO2 SERPL-SCNC: 28 MMOL/L (ref 21–32)
COLOR, POC: YELLOW
CREAT SERPL-MCNC: 0.74 MG/DL (ref 0.6–1.3)
EOSINOPHIL # BLD MANUAL: 0.11 THOUSAND/UL (ref 0–0.4)
EOSINOPHIL NFR BLD MANUAL: 1 % (ref 0–6)
ERYTHROCYTE [DISTWIDTH] IN BLOOD BY AUTOMATED COUNT: 12.7 % (ref 11.6–15.1)
EXT BILIRUBIN, UA: NORMAL
EXT BLOOD URINE: NEGATIVE
EXT GLUCOSE, UA: NORMAL
EXT KETONES: 80
EXT NITRITE, UA: NEGATIVE
EXT PH, UA: 5
EXT PREG TEST URINE: NEGATIVE
EXT PROTEIN, UA: NEGATIVE
EXT SPECIFIC GRAVITY, UA: 1.03
EXT UROBILINOGEN: 0.2
GFR SERPL CREATININE-BSD FRML MDRD: 114 ML/MIN/1.73SQ M
GLUCOSE SERPL-MCNC: 165 MG/DL (ref 65–140)
GLUCOSE SERPL-MCNC: 234 MG/DL (ref 65–140)
GLUCOSE SERPL-MCNC: 258 MG/DL (ref 65–140)
HCO3 BLDA-SCNC: 25.5 MMOL/L (ref 24–30)
HCT VFR BLD AUTO: 45.5 % (ref 34.8–46.1)
HGB BLD-MCNC: 15.2 G/DL (ref 11.5–15.4)
LYMPHOCYTES # BLD AUTO: 0.63 THOUSAND/UL (ref 0.6–4.47)
LYMPHOCYTES # BLD AUTO: 6 % (ref 14–44)
MCH RBC QN AUTO: 31.1 PG (ref 26.8–34.3)
MCHC RBC AUTO-ENTMCNC: 33.4 G/DL (ref 31.4–37.4)
MCV RBC AUTO: 93 FL (ref 82–98)
MONOCYTES # BLD AUTO: 0.53 THOUSAND/UL (ref 0–1.22)
MONOCYTES NFR BLD: 5 % (ref 4–12)
NEUTROPHILS # BLD MANUAL: 9.28 THOUSAND/UL (ref 1.85–7.62)
NEUTS BAND NFR BLD MANUAL: 3 % (ref 0–8)
NEUTS SEG NFR BLD AUTO: 85 % (ref 43–75)
PCO2 BLD: 27 MMOL/L (ref 21–32)
PCO2 BLD: 50.9 MM HG (ref 42–50)
PH BLD: 7.31 [PH] (ref 7.3–7.4)
PLATELET # BLD AUTO: 265 THOUSANDS/UL (ref 149–390)
PLATELET BLD QL SMEAR: ADEQUATE
PMV BLD AUTO: 10.2 FL (ref 8.9–12.7)
PO2 BLD: 25 MM HG (ref 35–45)
POTASSIUM SERPL-SCNC: 3.7 MMOL/L (ref 3.5–5.3)
PROT SERPL-MCNC: 8.7 G/DL (ref 6.4–8.2)
RBC # BLD AUTO: 4.89 MILLION/UL (ref 3.81–5.12)
RBC MORPH BLD: NORMAL
SAO2 % BLD FROM PO2: 38 % (ref 95–98)
SODIUM SERPL-SCNC: 138 MMOL/L (ref 136–145)
SPECIMEN SOURCE: ABNORMAL
TOTAL CELLS COUNTED SPEC: 100
WBC # BLD AUTO: 10.54 THOUSAND/UL (ref 4.31–10.16)
WBC # BLD EST: NEGATIVE 10*3/UL

## 2018-02-10 PROCEDURE — 96361 HYDRATE IV INFUSION ADD-ON: CPT

## 2018-02-10 PROCEDURE — 82009 KETONE BODYS QUAL: CPT | Performed by: EMERGENCY MEDICINE

## 2018-02-10 PROCEDURE — 82803 BLOOD GASES ANY COMBINATION: CPT

## 2018-02-10 PROCEDURE — 36415 COLL VENOUS BLD VENIPUNCTURE: CPT | Performed by: EMERGENCY MEDICINE

## 2018-02-10 PROCEDURE — 99284 EMERGENCY DEPT VISIT MOD MDM: CPT

## 2018-02-10 PROCEDURE — 82948 REAGENT STRIP/BLOOD GLUCOSE: CPT

## 2018-02-10 PROCEDURE — 85007 BL SMEAR W/DIFF WBC COUNT: CPT | Performed by: EMERGENCY MEDICINE

## 2018-02-10 PROCEDURE — 96360 HYDRATION IV INFUSION INIT: CPT

## 2018-02-10 PROCEDURE — 81025 URINE PREGNANCY TEST: CPT | Performed by: EMERGENCY MEDICINE

## 2018-02-10 PROCEDURE — 71046 X-RAY EXAM CHEST 2 VIEWS: CPT

## 2018-02-10 PROCEDURE — 85027 COMPLETE CBC AUTOMATED: CPT | Performed by: EMERGENCY MEDICINE

## 2018-02-10 PROCEDURE — 96375 TX/PRO/DX INJ NEW DRUG ADDON: CPT

## 2018-02-10 PROCEDURE — 80053 COMPREHEN METABOLIC PANEL: CPT | Performed by: EMERGENCY MEDICINE

## 2018-02-10 PROCEDURE — 81002 URINALYSIS NONAUTO W/O SCOPE: CPT | Performed by: EMERGENCY MEDICINE

## 2018-02-10 PROCEDURE — 96374 THER/PROPH/DIAG INJ IV PUSH: CPT

## 2018-02-10 RX ORDER — ONDANSETRON 2 MG/ML
4 INJECTION INTRAMUSCULAR; INTRAVENOUS ONCE
Status: COMPLETED | OUTPATIENT
Start: 2018-02-10 | End: 2018-02-10

## 2018-02-10 RX ORDER — KETOROLAC TROMETHAMINE 30 MG/ML
15 INJECTION, SOLUTION INTRAMUSCULAR; INTRAVENOUS ONCE
Status: COMPLETED | OUTPATIENT
Start: 2018-02-10 | End: 2018-02-10

## 2018-02-10 RX ORDER — ONDANSETRON 4 MG/1
4 TABLET, ORALLY DISINTEGRATING ORAL EVERY 8 HOURS PRN
Qty: 12 TABLET | Refills: 0 | Status: SHIPPED | OUTPATIENT
Start: 2018-02-10 | End: 2018-04-15

## 2018-02-10 RX ADMIN — ONDANSETRON 4 MG: 2 INJECTION INTRAMUSCULAR; INTRAVENOUS at 13:41

## 2018-02-10 RX ADMIN — SODIUM CHLORIDE 500 ML: 0.9 INJECTION, SOLUTION INTRAVENOUS at 16:29

## 2018-02-10 RX ADMIN — KETOROLAC TROMETHAMINE 15 MG: 30 INJECTION, SOLUTION INTRAMUSCULAR at 15:45

## 2018-02-10 RX ADMIN — SODIUM CHLORIDE 1000 ML: 0.9 INJECTION, SOLUTION INTRAVENOUS at 14:05

## 2018-02-10 RX ADMIN — SODIUM CHLORIDE 1000 ML: 0.9 INJECTION, SOLUTION INTRAVENOUS at 13:28

## 2018-02-10 NOTE — DISCHARGE INSTRUCTIONS
Acute Diarrhea   WHAT YOU NEED TO KNOW:   Acute diarrhea starts quickly and lasts a short time, usually 1 to 3 days  It can last up to 2 weeks  You may not be able to control your diarrhea  Acute diarrhea usually stops on its own  DISCHARGE INSTRUCTIONS:   Return to the emergency department if:   · You feel confused  · Your heartbeat is faster than normal      · Your eyes look deeply sunken, or you have no tears when you cry  · You urinate less than usual, or your urine is dark yellow  · You have blood or mucus in your stools  · You have severe abdominal pain  · You are unable to drink any liquids  Contact your healthcare provider if:   · Your symptoms do not get better with treatment  · You have a fever higher than 101 3°F (38 5°C)  · You have trouble eating and drinking because you are vomiting  · You are thirsty or have a dry mouth  · Your diarrhea does not get better in 7 days  · You have questions or concerns about your condition or care  Follow up with your healthcare provider as directed:  Write down your questions so you remember to ask them during your visits  Medicines:  · Diarrhea medicine  is an over-the-counter medicine that helps slow or stop your diarrhea  If you take other medicines, talk to your healthcare provider before you take diarrhea medicine  · Antibiotics  may be given to help treat an infection caused by bacteria  · Antiparasitics  may be given to treat an infection caused by parasites  · Take your medicine as directed  Contact your healthcare provider if you think your medicine is not helping or if you have side effects  Tell him of her if you are allergic to any medicine  Keep a list of the medicines, vitamins, and herbs you take  Include the amounts, and when and why you take them  Bring the list or the pill bottles to follow-up visits  Carry your medicine list with you in case of an emergency    Self-care:   · Drink liquids as directed  Liquids will help prevent dehydration caused by diarrhea  Ask your healthcare provider how much liquid to drink each day and which liquids are best for you  You may need to drink an oral rehydration solution (ORS)  An ORS has the right amounts of water, salts, and sugar you need to replace body fluids  You can buy an ORS at most grocery stores and pharmacies  · Eat foods that are easy to digest   Examples include rice, lentils, cereal, bananas, potatoes, and bread  It also includes some fruits (bananas, melon), well-cooked vegetables, and lean meats  Avoid foods high in fiber, fat, and sugar  Also avoid caffeine, alcohol, dairy, and red meat until your diarrhea is gone  Prevent acute diarrhea:   · Wash your hands often  Use soap and water  Wash your hands before you eat or prepare food  Also wash your hands after you use the bathroom  Use an alcohol-based hand gel when soap and water are not available  · Keep bathroom surfaces clean  This helps prevent the spread of germs that cause acute diarrhea  · Wash fruits and vegetables well before you eat them  This can help remove germs that cause diarrhea  If possible, remove the skin from fruits and vegetables, or cook them well before you eat them  · Cook meat as directed  ¨ Cook ground meat  to 160°F      ¨ Cook ground poultry, whole poultry, or cuts of poultry  to at least 165°F  Remove the meat from heat  Let it stand for 3 minutes before you eat it  ¨ Cook whole cuts of meat other than poultry  to at least 145°F  Remove the meat from heat  Let it stand for 3 minutes before you eat it  · Wash dishes that have touched raw meat with hot water and soap  This includes cutting boards, utensils, dishes, and serving containers  · Place raw or cooked meat in the refrigerator as soon as possible  Bacteria can grow in meat that is left at room temperature too long  · Do not eat raw or undercooked oysters, clams, or mussels  These foods may be contaminated and cause infection  · Drink filtered or treated water only when you travel  Do not put ice in your drinks  Drink bottled water whenever possible  © 2017 [address] Information is for End User's use only and may not be sold, redistributed or otherwise used for commercial purposes  All illustrations and images included in CareNotes® are the copyrighted property of [employer] by [employer]  or [place]   The above information is an  only  It is not intended as medical advice for individual conditions or treatments  Talk to your doctor, nurse or pharmacist before following any medical regimen to see if it is safe and effective for you  Acute Nausea and Vomiting, Ambulatory Care   GENERAL INFORMATION:   Acute nausea and vomiting  starts suddenly, gets worse quickly, and lasts a short time  Nausea and vomiting may be caused by pregnancy, alcohol, infection, or medicines  Common related symptoms include the following:   · Fever    · Abdominal swelling    · Pain, tenderness, or a lump in the abdomen    · Splashing sounds heard in your stomach when you move  Seek immediate care for the following symptoms:   · Blood in your vomit or bowel movements    · Sudden, severe pain in your chest and upper abdomen after hard vomiting    · Dizziness, dry mouth, and thirst    · Urinating very little or not at all    · Muscle weakness, leg cramps, and trouble breathing    · A heart beat that is faster than normal    · Vomiting for more than 48 hours  Treatment for acute nausea and vomiting  may include medicines to calm your stomach and stop the vomiting  You may need IV fluids if you are dehydrated  Manage your nausea and vomiting:   · Drink liquids as directed to prevent dehydration  Ask how much liquid to drink each day and which liquids are best for you  You may need to drink an oral rehydration solution (ORS)   ORS contains water, salts, and sugar that are needed to replace the lost body fluids  Ask what kind of ORS to use, how much to drink, and where to get it  · Eat smaller meals, more often  Eat small amounts of food every 2 to 3 hours, even if you are not hungry  Food in your stomach may help decrease your nausea  · Avoid stress  Find ways to relax and manage your stress  Headaches due to stress may cause nausea and vomiting  Get more rest and sleep  Follow up with your healthcare provider as directed:  Write down your questions so you remember to ask them during your visits  CARE AGREEMENT:   You have the right to help plan your care  Learn about your health condition and how it may be treated  Discuss treatment options with your caregivers to decide what care you want to receive  You always have the right to refuse treatment  The above information is an  only  It is not intended as medical advice for individual conditions or treatments  Talk to your doctor, nurse or pharmacist before following any medical regimen to see if it is safe and effective for you  © 2014 [address] is for End User's use only and may not be sold, redistributed or otherwise used for commercial purposes  All illustrations and images included in CareNotes® are the copyrighted property of [employer]  or [employer]

## 2018-02-10 NOTE — ED PROVIDER NOTES
History  Chief Complaint   Patient presents with    Vomiting     Vomiting and diarrhea since 5 am   Reports checking blood sugar in waiting room and , 5 minutes ago  Reports last vomiting 20 min  ago  Patient reports BS normally running in 200's at home  History provided by:  Patient   used: No         patient is a 57-year-old female presenting to emergency depart with nausea vomiting diarrhea  Started today morning  No abdominal pain  No chest pain  No shortness of breath  No fevers  No lightheadedness  No back pain  No pain with urination  Does not think she is pregnant  Patient is type 1 diabetic  MDM  Dehydrated, will evaluate for DKA, fluids, Zofran, re-evaluate     patient feels better, wants to go home, will discharge home with PCP follow-up      Prior to Admission Medications   Prescriptions Last Dose Informant Patient Reported? Taking?   insulin glulisine (APIDRA) 100 units/mL injection   Yes No   Sig: Up to 100 units daily via pump   levonorgestrel (MIRENA) 20 MCG/24HR IUD   Yes No   Si each by Intrauterine route once      Facility-Administered Medications: None       Past Medical History:   Diagnosis Date    Diabetes ([place] [address] )     [facility] mellitus type 1 ([facility] )     Diabetic ketoacidosis without coma associated with type 1 diabetes mellitus ([facility] ) 2016    Gastroenteritis 2016    Psychiatric disorder        Past Surgical History:   Procedure Laterality Date    WISDOM TOOTH EXTRACTION Bilateral        Family History   Problem Relation Age of Onset    No Known Problems Mother     No Known Problems Father      I have reviewed and agree with the history as documented      Social History   Substance Use Topics    Smoking status: Former Smoker     Packs/day: 0 50     Years: 4 00     Types: Cigarettes    Smokeless tobacco: Never Used    Alcohol use Yes      Comment: rare         Review of Systems   Constitutional: Negative for chills, diaphoresis and fever  HENT: Negative for congestion and sore throat  Respiratory: Negative for cough, shortness of breath, wheezing and stridor  Cardiovascular: Negative for chest pain, palpitations and leg swelling  Gastrointestinal: Positive for diarrhea, nausea and vomiting  Negative for abdominal pain and blood in stool  Genitourinary: Negative for dysuria, frequency and urgency  Musculoskeletal: Negative for neck pain and neck stiffness  Skin: Negative for pallor and rash  Neurological: Negative for dizziness, syncope, weakness, light-headedness and headaches  All other systems reviewed and are negative  Physical Exam  ED Triage Vitals [02/10/18 1134]   Temperature Pulse Respirations Blood Pressure SpO2   97 9 °F (36 6 °C) 97 18 109/66 99 %      Temp Source Heart Rate Source Patient Position - Orthostatic VS BP Location FiO2 (%)   Tympanic Monitor Sitting Right arm --      Pain Score       9           Orthostatic Vital Signs  Vitals:    02/10/18 1550 02/10/18 1630 02/10/18 1645 02/10/18 1647   BP: (!) 84/51 (!) 79/57 103/55 103/55   Pulse: 99 103 104    Patient Position - Orthostatic VS:           Physical Exam   Constitutional: She is oriented to person, place, and time  She appears well-developed and well-nourished  HENT:   Head: Normocephalic and atraumatic  Eyes: EOM are normal  Pupils are equal, round, and reactive to light  Neck: Normal range of motion  Neck supple  Cardiovascular: Normal rate, regular rhythm, normal heart sounds and intact distal pulses  Pulmonary/Chest: Effort normal and breath sounds normal  No respiratory distress  Abdominal: Soft  Bowel sounds are normal  There is no tenderness  Musculoskeletal: Normal range of motion  She exhibits no edema or tenderness  Neurological: She is alert and oriented to person, place, and time  Skin: Skin is warm and dry  Capillary refill takes less than 2 seconds  No rash noted  No erythema     Vitals reviewed        ED Medications  Medications   sodium chloride 0 9 % bolus 1,000 mL (0 mL Intravenous Stopped 2/10/18 1404)   sodium chloride 0 9 % bolus 1,000 mL (0 mL Intravenous Stopped 2/10/18 1546)   ondansetron (ZOFRAN) injection 4 mg (4 mg Intravenous Given 2/10/18 1341)   ketorolac (TORADOL) injection 15 mg (15 mg Intravenous Given 2/10/18 1545)   sodium chloride 0 9 % bolus 500 mL (0 mL Intravenous Stopped 2/10/18 1633)       Diagnostic Studies  Results Reviewed     Procedure Component Value Units Date/Time    Fingerstick Glucose (POCT) [[id number]]  (Abnormal) Collected:  02/10/18 1639    Lab Status:  Final result Updated:  02/10/18 1640     POC Glucose 165 (H) mg/dl     POCT urinalysis dipstick [[id number]]  (Normal) Resulted:  02/10/18 1451    Lab Status:  Final result Updated:  02/10/18 1456     Color, UA yellow     Clarity, UA clear     EXT Glucose, UA 2,000 or more     EXT Bilirubin, UA (Ref: Negative) small +     EXT Ketones, UA (Ref: Negative) 80     EXT Spec Grav, UA 1 030     EXT Blood, UA (Ref: Negative) negative     EXT pH, UA 5 0     EXT Protein, UA (Ref: Negative) negative     EXT Urobilinogen, UA (Ref: 0 2- 1 0) 0 2     EXT Leukocytes, UA (Ref: Negative) negative     EXT Nitrite, UA (Ref: Negative) negative    POCT pregnancy, urine [[id number]]  (Normal) Resulted:  02/10/18 1447    Lab Status:  Final result Updated:  02/10/18 1447     EXT PREG TEST UR (Ref: Negative) negative    Acetone [[id number]]  (Normal) Collected:  02/10/18 1330    Lab Status:  Final result Specimen:  Blood from Arm, Right Updated:  02/10/18 1354     Acetone, Bld Negative    CBC and differential [[id number]]  (Abnormal) Collected:  02/10/18 1312    Lab Status:  Final result Specimen:  Blood from Arm, Right Updated:  02/10/18 1352     WBC 10 54 (H) Thousand/uL      RBC 4 89 Million/uL      Hemoglobin 15 2 g/dL      Hematocrit 45 5 %      MCV 93 fL      MCH 31 1 pg      MCHC 33 4 g/dL      RDW 12 7 %      MPV 10 2 fL      Platelets 265 Thousands/uL     Narrative: This is an appended report  These results have been appended to a previously verified report  POCT Blood Gas (G3+) [[id number]]  (Abnormal) Collected:  02/10/18 1342    Lab Status:  Final result Updated:  02/10/18 1346     ph, Esteban ISTAT 7 307     pCO2, Esteban i-STAT 50 9 (H) mm HG      pO2, Esteban i-STAT 25 0 (L) mm HG      BE, i-STAT -2 mmol/L      HCO3, Esteban i-STAT 25 5 mmol/L      CO2, i-STAT 27 mmol/L      O2 Sat, i-STAT 38 (L) %      Specimen Type VENOUS    Comprehensive metabolic panel [[id number]]  (Abnormal) Collected:  02/10/18 1312    Lab Status:  Final result Specimen:  Blood from Arm, Right Updated:  02/10/18 1338     Sodium 138 mmol/L      Potassium 3 7 mmol/L      Chloride 100 mmol/L      CO2 28 mmol/L      Anion Gap 10 mmol/L      BUN 20 mg/dL      Creatinine 0 74 mg/dL      Glucose 258 (H) mg/dL      Calcium 8 9 mg/dL      AST 17 U/L      ALT 25 U/L      Alkaline Phosphatase 82 U/L      Total Protein 8 7 (H) g/dL      Albumin 4 2 g/dL      Total Bilirubin 0 90 mg/dL      eGFR 114 ml/min/1 73sq m     Narrative:         National Kidney Disease Education Program recommendations are as follows:  GFR calculation is accurate only with a steady state creatinine  Chronic Kidney disease less than 60 ml/min/1 73 sq  meters  Kidney failure less than 15 ml/min/1 73 sq  meters  Fingerstick Glucose (POCT) [[id number]]  (Abnormal) Collected:  02/10/18 1316    Lab Status:  Final result Updated:  02/10/18 1319     POC Glucose 234 (H) mg/dl                  XR chest 2 views    (Results Pending)              Procedures  Procedures       Phone Contacts  ED Phone Contact    ED Course  ED Course as of Feb 10 1701   Sat Feb 10, 2018   1503   Patient feels improved  1647   Patient states her blood pressure  is usually 25H and 71S systolic  Patient ambulated today difficulty  Not lightheaded  Not nauseous  Feels back to baseline    Will discharge with PCP follow-up MDM  CritCare Time    Disposition  Final diagnoses:   Hyperglycemia   Dehydration   Nausea and vomiting   Diarrhea     Time reflects when diagnosis was documented in both MDM as applicable and the Disposition within this note     Time User Action Codes Description Comment    2/10/2018  3:50 PM Tadevosyan, Roxanne Add [R73 9] Hyperglycemia     2/10/2018  3:50 PM Tadevosyan, Roxanne Add [E86 0] Dehydration     2/10/2018  3:50 PM Tadevosyan, Roxanne Add [R11 2] Nausea and vomiting     2/10/2018  3:51 PM Tadevosyan, Roxanne Add [R19 7] Diarrhea       ED Disposition     ED Disposition Condition Comment    Discharge  Adryan Resendez discharge to home/self care  Condition at discharge: Good        Follow-up Information     Follow up With Specialties Details Why Contact Info Additional Information    Miky Bunch, DO Family Medicine In 2 days  [facility] ([facility]  [facility]  [phone]       [facility] Emergency Department Emergency Medicine  As needed, If symptoms worsen [address] [facility] [address] [place] [address], [address], [facility], [address] [facility], [address]        Discharge Medication List as of 2/10/2018  3:51 PM      START taking these medications    Details   ondansetron (ZOFRAN-ODT) 4 mg disintegrating tablet Take 1 tablet (4 mg total) by mouth every 8 (eight) hours as needed for nausea or vomiting for up to 4 days, Starting Sat 2/10/2018, Until Wed 2/14/2018, Print         CONTINUE these medications which have NOT CHANGED    Details   insulin glulisine (APIDRA) 100 units/mL injection Up to 100 units daily via pump, Historical Med      levonorgestrel (MIRENA) 20 MCG/24HR IUD 1 each by Intrauterine route once, Historical Med           No discharge procedures on file      ED Provider  Electronically Signed by           Kael Dejesus MD  02/10/18 0704

## 2018-03-09 DIAGNOSIS — L60.0 INGROWING TOENAIL: Primary | ICD-10-CM

## 2018-04-15 ENCOUNTER — HOSPITAL ENCOUNTER (EMERGENCY)
Facility: HOSPITAL | Age: 23
Discharge: HOME | End: 2018-04-15
Attending: EMERGENCY MEDICINE | Admitting: EMERGENCY MEDICINE
Payer: COMMERCIAL

## 2018-04-15 VITALS
BODY MASS INDEX: 27.42 KG/M2 | HEART RATE: 91 BPM | WEIGHT: 136 LBS | RESPIRATION RATE: 18 BRPM | TEMPERATURE: 98.6 F | HEIGHT: 59 IN | DIASTOLIC BLOOD PRESSURE: 76 MMHG | SYSTOLIC BLOOD PRESSURE: 122 MMHG | OXYGEN SATURATION: 100 %

## 2018-04-15 DIAGNOSIS — R11.2 NAUSEA AND VOMITING: Primary | ICD-10-CM

## 2018-04-15 DIAGNOSIS — R19.7 DIARRHEA: ICD-10-CM

## 2018-04-15 LAB
ALBUMIN SERPL BCP-MCNC: 3.9 G/DL (ref 3.5–5)
ALP SERPL-CCNC: 80 U/L (ref 46–116)
ALT SERPL W P-5'-P-CCNC: 26 U/L (ref 12–78)
ANION GAP SERPL CALCULATED.3IONS-SCNC: 9 MMOL/L (ref 4–13)
AST SERPL W P-5'-P-CCNC: 25 U/L (ref 5–45)
BASOPHILS # BLD AUTO: 0.05 THOUSANDS/ΜL (ref 0–0.1)
BASOPHILS NFR BLD AUTO: 1 % (ref 0–1)
BILIRUB SERPL-MCNC: 0.6 MG/DL (ref 0.2–1)
BUN SERPL-MCNC: 14 MG/DL (ref 5–25)
CALCIUM SERPL-MCNC: 9.3 MG/DL
CHLORIDE SERPL-SCNC: 103 MMOL/L (ref 100–108)
CO2 SERPL-SCNC: 28 MMOL/L (ref 21–32)
CREAT SERPL-MCNC: 0.64 MG/DL (ref 0.6–1.3)
EOSINOPHIL # BLD AUTO: 0.05 THOUSAND/ΜL (ref 0–0.61)
EOSINOPHIL NFR BLD AUTO: 1 % (ref 0–6)
ERYTHROCYTE [DISTWIDTH] IN BLOOD BY AUTOMATED COUNT: 12.5 % (ref 11.6–15.1)
GFR SERPL CREATININE-BSD FRML MDRD: 125 ML/MIN/1.73SQ M
GLUCOSE SERPL-MCNC: 183 MG/DL (ref 65–140)
GLUCOSE SERPL-MCNC: 267 MG/DL (ref 65–140)
HCT VFR BLD AUTO: 43.2 % (ref 34.8–46.1)
HGB BLD-MCNC: 14.4 G/DL (ref 11.5–15.4)
LYMPHOCYTES # BLD AUTO: 1.95 THOUSANDS/ΜL (ref 0.6–4.47)
LYMPHOCYTES NFR BLD AUTO: 25 % (ref 14–44)
MCH RBC QN AUTO: 30.8 PG (ref 26.8–34.3)
MCHC RBC AUTO-ENTMCNC: 33.3 G/DL (ref 31.4–37.4)
MCV RBC AUTO: 93 FL (ref 82–98)
MONOCYTES # BLD AUTO: 0.49 THOUSAND/ΜL (ref 0.17–1.22)
MONOCYTES NFR BLD AUTO: 6 % (ref 4–12)
NEUTROPHILS # BLD AUTO: 5.17 THOUSANDS/ΜL (ref 1.85–7.62)
NEUTS SEG NFR BLD AUTO: 67 % (ref 43–75)
PLATELET # BLD AUTO: 295 THOUSANDS/UL (ref 149–390)
PMV BLD AUTO: 10 FL (ref 8.9–12.7)
POTASSIUM SERPL-SCNC: 4.4 MMOL/L (ref 3.5–5.3)
PROT SERPL-MCNC: 7.9 G/DL (ref 6.4–8.2)
RBC # BLD AUTO: 4.67 MILLION/UL (ref 3.81–5.12)
SODIUM SERPL-SCNC: 140 MMOL/L (ref 136–145)
WBC # BLD AUTO: 7.71 THOUSAND/UL (ref 4.31–10.16)

## 2018-04-15 PROCEDURE — 85025 COMPLETE CBC W/AUTO DIFF WBC: CPT | Performed by: PHYSICIAN ASSISTANT

## 2018-04-15 PROCEDURE — 80053 COMPREHEN METABOLIC PANEL: CPT | Performed by: PHYSICIAN ASSISTANT

## 2018-04-15 PROCEDURE — 82948 REAGENT STRIP/BLOOD GLUCOSE: CPT

## 2018-04-15 PROCEDURE — 96361 HYDRATE IV INFUSION ADD-ON: CPT

## 2018-04-15 PROCEDURE — 96374 THER/PROPH/DIAG INJ IV PUSH: CPT

## 2018-04-15 PROCEDURE — 36415 COLL VENOUS BLD VENIPUNCTURE: CPT | Performed by: PHYSICIAN ASSISTANT

## 2018-04-15 PROCEDURE — 99283 EMERGENCY DEPT VISIT LOW MDM: CPT

## 2018-04-15 RX ORDER — ONDANSETRON 4 MG/1
4 TABLET, ORALLY DISINTEGRATING ORAL EVERY 8 HOURS PRN
Qty: 15 TABLET | Refills: 0 | Status: SHIPPED | OUTPATIENT
Start: 2018-04-15 | End: 2018-10-17 | Stop reason: ALTCHOICE

## 2018-04-15 RX ORDER — ONDANSETRON 2 MG/ML
4 INJECTION INTRAMUSCULAR; INTRAVENOUS ONCE
Status: COMPLETED | OUTPATIENT
Start: 2018-04-15 | End: 2018-04-15

## 2018-04-15 RX ADMIN — ONDANSETRON HYDROCHLORIDE 4 MG: 2 INJECTION, SOLUTION INTRAMUSCULAR; INTRAVENOUS at 12:52

## 2018-04-15 RX ADMIN — SODIUM CHLORIDE 1000 ML: 0.9 INJECTION, SOLUTION INTRAVENOUS at 12:52

## 2018-04-15 NOTE — ED PROVIDER NOTES
History  Chief Complaint   Patient presents with    Vomiting     Pt was out drinking last night and cannot remember anything after 0100  States she has been vomiting today and has diarrhea  Patient presents to the ED with nausea, vomiting and diarrhea that started this morning  Patient states she was drinking alcohol last night and thought it was just a hangover, but continued to vomit for over 5 hours and she is diabetic  Patient states her blood sugar was over 200 at home and she gave herself insulin prior to arrival   Patient states she feels dizzy and lightheaded  Last time she vomited was 1 hour ago  History provided by:  Patient  Vomiting   Severity:  Moderate  Duration:  8 hours  Timing:  Constant  Progression:  Unchanged  Chronicity:  New  Relieved by:  Nothing  Worsened by:  Nothing  Ineffective treatments:  None tried  Associated symptoms: diarrhea    Associated symptoms: no abdominal pain, no chills, no cough, no fever, no headaches, no sore throat and no URI    Risk factors: alcohol use and diabetes    Risk factors: not pregnant, no sick contacts, no suspect food intake and no travel to endemic areas        Prior to Admission Medications   Prescriptions Last Dose Informant Patient Reported?  Taking?   insulin glulisine (APIDRA) 100 units/mL injection   Yes Yes   Sig: Up to 100 units daily via pump   levonorgestrel (MIRENA) 20 MCG/24HR IUD   Yes Yes   Si each by Intrauterine route once      Facility-Administered Medications: None       Past Medical History:   Diagnosis Date    Diabetes ([place] [address] )     [facility] mellitus type 1 ([facility] )     Diabetic ketoacidosis without coma associated with type 1 diabetes mellitus ([facility] ) 2016    Gastroenteritis 2016    Psychiatric disorder        Past Surgical History:   Procedure Laterality Date    WISDOM TOOTH EXTRACTION Bilateral        Family History   Problem Relation Age of Onset    No Known Problems Mother     No Known Problems Father I have reviewed and agree with the history as documented  Social History   Substance Use Topics    Smoking status: Former Smoker     Packs/day: 0 50     Years: 4 00     Types: Cigarettes    Smokeless tobacco: Never Used    Alcohol use Yes      Comment: rare         Review of Systems   Constitutional: Negative for chills and fever  HENT: Negative for sore throat  Respiratory: Negative for cough  Gastrointestinal: Positive for diarrhea, nausea and vomiting  Negative for abdominal pain  Skin: Negative for color change and rash  Neurological: Positive for dizziness and light-headedness  Negative for weakness and headaches  Psychiatric/Behavioral: Negative for confusion  All other systems reviewed and are negative  Physical Exam  ED Triage Vitals [04/15/18 1231]   Temperature Pulse Respirations Blood Pressure SpO2   98 6 °F (37 °C) 91 18 122/76 100 %      Temp Source Heart Rate Source Patient Position - Orthostatic VS BP Location FiO2 (%)   Temporal Monitor Sitting Right arm --      Pain Score       No Pain           Orthostatic Vital Signs  Vitals:    04/15/18 1231   BP: 122/76   Pulse: 91   Patient Position - Orthostatic VS: Sitting       Physical Exam   Constitutional: She is oriented to person, place, and time  She appears well-developed and well-nourished  She is active and cooperative  She does not appear ill  No distress  HENT:   Head: Normocephalic and atraumatic  Right Ear: Hearing normal    Left Ear: Hearing normal    Nose: Nose normal    Mouth/Throat: Mucous membranes are pale and dry  Eyes: Conjunctivae are normal    Neck: Normal range of motion  Cardiovascular: Regular rhythm and normal heart sounds  Tachycardia present  No murmur heard  Pulmonary/Chest: Effort normal and breath sounds normal  She has no wheezes  She has no rhonchi  She has no rales  Abdominal: Soft  Normal appearance and bowel sounds are normal  There is no tenderness     Musculoskeletal: Normal range of motion  She exhibits no edema or deformity  Neurological: She is alert and oriented to person, place, and time  She has normal strength  No sensory deficit  Gait normal    Skin: Skin is warm and dry  No rash noted  She is not diaphoretic  No pallor  Psychiatric: She has a normal mood and affect  Nursing note and vitals reviewed  ED Medications  Medications   sodium chloride 0 9 % bolus 1,000 mL (1,000 mL Intravenous New Bag 4/15/18 1252)   ondansetron (ZOFRAN) injection 4 mg (4 mg Intravenous Given 4/15/18 1252)       Diagnostic Studies  Results Reviewed     Procedure Component Value Units Date/Time    Fingerstick Glucose (POCT) [[id number]]  (Abnormal) Collected:  04/15/18 1523    Lab Status:  Final result Updated:  04/15/18 1524     POC Glucose 183 (H) mg/dl     Comprehensive metabolic panel [[id number]]  (Abnormal) Collected:  04/15/18 1252    Lab Status:  Final result Specimen:  Blood from Arm, Right Updated:  04/15/18 1333     Sodium 140 mmol/L      Potassium 4 4 mmol/L      Chloride 103 mmol/L      CO2 28 mmol/L      Anion Gap 9 mmol/L      BUN 14 mg/dL      Creatinine 0 64 mg/dL      Glucose 267 (H) mg/dL      Calcium 9 3 mg/dL      AST 25 U/L      ALT 26 U/L      Alkaline Phosphatase 80 U/L      Total Protein 7 9 g/dL      Albumin 3 9 g/dL      Total Bilirubin 0 60 mg/dL      eGFR 125 ml/min/1 73sq m     Narrative:         National Kidney Disease Education Program recommendations are as follows:  GFR calculation is accurate only with a steady state creatinine  Chronic Kidney disease less than 60 ml/min/1 73 sq  meters  Kidney failure less than 15 ml/min/1 73 sq  meters      CBC and differential [[id number]]  (Normal) Collected:  04/15/18 1252    Lab Status:  Final result Specimen:  Blood from Arm, Right Updated:  04/15/18 1314     WBC 7 71 Thousand/uL      RBC 4 67 Million/uL      Hemoglobin 14 4 g/dL      Hematocrit 43 2 %      MCV 93 fL      MCH 30 8 pg      MCHC 33 3 g/dL      RDW 12 5 % MPV 10 0 fL      Platelets 366 Thousands/uL      Neutrophils Relative 67 %      Lymphocytes Relative 25 %      Monocytes Relative 6 %      Eosinophils Relative 1 %      Basophils Relative 1 %      Neutrophils Absolute 5 17 Thousands/µL      Lymphocytes Absolute 1 95 Thousands/µL      Monocytes Absolute 0 49 Thousand/µL      Eosinophils Absolute 0 05 Thousand/µL      Basophils Absolute 0 05 Thousands/µL     POCT pregnancy, urine [[id number]]     Lab Status:  No result     POCT urinalysis dipstick [[id number]]     Lab Status:  No result Specimen:  Urine                  No orders to display              Procedures  Procedures       Phone Contacts  ED Phone Contact    ED Course  ED Course as of Apr 15 1531   Sun Apr 15, 2018   1354 Patient states she is feeling a little better  Patient did not receive her fluids yet due to IV not running, Mike Campos, patient's nurse aware  1524 Patient tolerating po, blood sugar improved  MDM  Number of Diagnoses or Management Options  Diarrhea: new and requires workup  Nausea and vomiting: new and requires workup  Diagnosis management comments: Patient with history of diabetes with nausea and vomiting, will check labs to r/o DKA, dehydration, electrolyte abnormality  Patient's blood sugar improved with IV fluids  Patient able to tolerte po          Amount and/or Complexity of Data Reviewed  Clinical lab tests: ordered and reviewed    Patient Progress  Patient progress: improved    CritCare Time    Disposition  Final diagnoses:   Nausea and vomiting   Diarrhea     Time reflects when diagnosis was documented in both MDM as applicable and the Disposition within this note     Time User Action Codes Description Comment    4/15/2018  3:25 PM Mike Lava Add [R11 2] Nausea and vomiting     4/15/2018  3:26 PM Mike Lava Add [R19 7] Diarrhea       ED Disposition     ED Disposition Condition Comment    Discharge  [facility] discharge to home/self care     Condition at discharge: Stable        Follow-up Information     Follow up With Specialties Details Why Contact Marcus France,  Family Medicine In 2 days As needed, For recheck [facility]  [phone]          Patient's Medications   Discharge Prescriptions    ONDANSETRON (ZOFRAN-ODT) 4 MG DISINTEGRATING TABLET    Take 1 tablet (4 mg total) by mouth every 8 (eight) hours as needed for vomiting       Start Date: 4/15/2018 End Date: --       Order Dose: 4 mg       Quantity: 15 tablet    Refills: 0     No discharge procedures on file      ED Provider  Electronically Signed by           Virgilio Maldonado PA-C  04/15/18 5321

## 2018-04-15 NOTE — DISCHARGE INSTRUCTIONS
Rest, increase fluids  Zofran as needed for nausea  Monitor your blood sugar closely  Acute Nausea and Vomiting, Ambulatory Care   GENERAL INFORMATION:   Acute nausea and vomiting  starts suddenly, gets worse quickly, and lasts a short time  Nausea and vomiting may be caused by pregnancy, alcohol, infection, or medicines  Common related symptoms include the following:   · Fever    · Abdominal swelling    · Pain, tenderness, or a lump in the abdomen    · Splashing sounds heard in your stomach when you move  Seek immediate care for the following symptoms:   · Blood in your vomit or bowel movements    · Sudden, severe pain in your chest and upper abdomen after hard vomiting    · Dizziness, dry mouth, and thirst    · Urinating very little or not at all    · Muscle weakness, leg cramps, and trouble breathing    · A heart beat that is faster than normal    · Vomiting for more than 48 hours  Treatment for acute nausea and vomiting  may include medicines to calm your stomach and stop the vomiting  You may need IV fluids if you are dehydrated  Manage your nausea and vomiting:   · Drink liquids as directed to prevent dehydration  Ask how much liquid to drink each day and which liquids are best for you  You may need to drink an oral rehydration solution (ORS)  ORS contains water, salts, and sugar that are needed to replace the lost body fluids  Ask what kind of ORS to use, how much to drink, and where to get it  · Eat smaller meals, more often  Eat small amounts of food every 2 to 3 hours, even if you are not hungry  Food in your stomach may help decrease your nausea  · Avoid stress  Find ways to relax and manage your stress  Headaches due to stress may cause nausea and vomiting  Get more rest and sleep  Follow up with your healthcare provider as directed:  Write down your questions so you remember to ask them during your visits  CARE AGREEMENT:   You have the right to help plan your care   Learn about your health condition and how it may be treated  Discuss treatment options with your caregivers to decide what care you want to receive  You always have the right to refuse treatment  The above information is an  only  It is not intended as medical advice for individual conditions or treatments  Talk to your doctor, nurse or pharmacist before following any medical regimen to see if it is safe and effective for you  © 2014 [address] is for End User's use only and may not be sold, redistributed or otherwise used for commercial purposes  All illustrations and images included in CareNotes® are the copyrighted property of [employer]  or Rhett Preston  Acute Diarrhea   WHAT YOU NEED TO KNOW:   Acute diarrhea starts quickly and lasts a short time, usually 1 to 3 days  It can last up to 2 weeks  You may not be able to control your diarrhea  Acute diarrhea usually stops on its own  DISCHARGE INSTRUCTIONS:   Return to the emergency department if:   · You feel confused  · Your heartbeat is faster than normal      · Your eyes look deeply sunken, or you have no tears when you cry  · You urinate less than usual, or your urine is dark yellow  · You have blood or mucus in your stools  · You have severe abdominal pain  · You are unable to drink any liquids  Contact your healthcare provider if:   · Your symptoms do not get better with treatment  · You have a fever higher than 101 3°F (38 5°C)  · You have trouble eating and drinking because you are vomiting  · You are thirsty or have a dry mouth  · Your diarrhea does not get better in 7 days  · You have questions or concerns about your condition or care  Follow up with your healthcare provider as directed:  Write down your questions so you remember to ask them during your visits     Medicines:  · Diarrhea medicine  is an over-the-counter medicine that helps slow or stop your diarrhea  If you take other medicines, talk to your healthcare provider before you take diarrhea medicine  · Antibiotics  may be given to help treat an infection caused by bacteria  · Antiparasitics  may be given to treat an infection caused by parasites  · Take your medicine as directed  Contact your healthcare provider if you think your medicine is not helping or if you have side effects  Tell him of her if you are allergic to any medicine  Keep a list of the medicines, vitamins, and herbs you take  Include the amounts, and when and why you take them  Bring the list or the pill bottles to follow-up visits  Carry your medicine list with you in case of an emergency  Self-care:   · Drink liquids as directed  Liquids will help prevent dehydration caused by diarrhea  Ask your healthcare provider how much liquid to drink each day and which liquids are best for you  You may need to drink an oral rehydration solution (ORS)  An ORS has the right amounts of water, salts, and sugar you need to replace body fluids  You can buy an ORS at most grocery stores and pharmacies  · Eat foods that are easy to digest   Examples include rice, lentils, cereal, bananas, potatoes, and bread  It also includes some fruits (bananas, melon), well-cooked vegetables, and lean meats  Avoid foods high in fiber, fat, and sugar  Also avoid caffeine, alcohol, dairy, and red meat until your diarrhea is gone  Prevent acute diarrhea:   · Wash your hands often  Use soap and water  Wash your hands before you eat or prepare food  Also wash your hands after you use the bathroom  Use an alcohol-based hand gel when soap and water are not available  · Keep bathroom surfaces clean  This helps prevent the spread of germs that cause acute diarrhea  · Wash fruits and vegetables well before you eat them  This can help remove germs that cause diarrhea   If possible, remove the skin from fruits and vegetables, or cook them well before you eat them  · Cook meat as directed  ¨ Cook ground meat  to 160°F      ¨ Cook ground poultry, whole poultry, or cuts of poultry  to at least 165°F  Remove the meat from heat  Let it stand for 3 minutes before you eat it  ¨ Cook whole cuts of meat other than poultry  to at least 145°F  Remove the meat from heat  Let it stand for 3 minutes before you eat it  · Wash dishes that have touched raw meat with hot water and soap  This includes cutting boards, utensils, dishes, and serving containers  · Place raw or cooked meat in the refrigerator as soon as possible  Bacteria can grow in meat that is left at room temperature too long  · Do not eat raw or undercooked oysters, clams, or mussels  These foods may be contaminated and cause infection  · Drink filtered or treated water only when you travel  Do not put ice in your drinks  Drink bottled water whenever possible  © 2017 [address] Information is for End User's use only and may not be sold, redistributed or otherwise used for commercial purposes  All illustrations and images included in CareNotes® are the copyrighted property of [employer]  or Rhett Preston  The above information is an  only  It is not intended as medical advice for individual conditions or treatments  Talk to your doctor, nurse or pharmacist before following any medical regimen to see if it is safe and effective for you

## 2018-04-17 ENCOUNTER — VBI (OUTPATIENT)
Dept: ADMINISTRATIVE | Facility: OTHER | Age: 23
End: 2018-04-17

## 2018-05-13 ENCOUNTER — HOSPITAL ENCOUNTER (EMERGENCY)
Facility: HOSPITAL | Age: 23
Discharge: HOME | End: 2018-05-13
Attending: EMERGENCY MEDICINE
Payer: COMMERCIAL

## 2018-05-13 VITALS
TEMPERATURE: 96.8 F | HEIGHT: 59 IN | RESPIRATION RATE: 20 BRPM | DIASTOLIC BLOOD PRESSURE: 75 MMHG | SYSTOLIC BLOOD PRESSURE: 116 MMHG | HEART RATE: 118 BPM | OXYGEN SATURATION: 100 % | WEIGHT: 130 LBS | BODY MASS INDEX: 26.21 KG/M2

## 2018-05-13 DIAGNOSIS — R11.10 VOMITING: Primary | ICD-10-CM

## 2018-05-13 LAB
ACETONE SERPL-MCNC: NEGATIVE MG/DL
ALBUMIN SERPL BCP-MCNC: 3.9 G/DL (ref 3.5–5)
ALP SERPL-CCNC: 74 U/L (ref 46–116)
ALT SERPL W P-5'-P-CCNC: 21 U/L (ref 12–78)
ANION GAP SERPL CALCULATED.3IONS-SCNC: 11 MMOL/L (ref 4–13)
AST SERPL W P-5'-P-CCNC: 20 U/L (ref 5–45)
BASOPHILS # BLD AUTO: 0.03 THOUSANDS/ΜL (ref 0–0.1)
BASOPHILS NFR BLD AUTO: 0 % (ref 0–1)
BILIRUB SERPL-MCNC: 0.5 MG/DL (ref 0.2–1)
BUN SERPL-MCNC: 11 MG/DL (ref 5–25)
CALCIUM SERPL-MCNC: 8.9 MG/DL (ref 8.3–10.1)
CHLORIDE SERPL-SCNC: 102 MMOL/L (ref 100–108)
CO2 SERPL-SCNC: 28 MMOL/L (ref 21–32)
CREAT SERPL-MCNC: 0.61 MG/DL (ref 0.6–1.3)
EOSINOPHIL # BLD AUTO: 0.17 THOUSAND/ΜL (ref 0–0.61)
EOSINOPHIL NFR BLD AUTO: 1 % (ref 0–6)
ERYTHROCYTE [DISTWIDTH] IN BLOOD BY AUTOMATED COUNT: 12.5 % (ref 11.6–15.1)
GFR SERPL CREATININE-BSD FRML MDRD: 127 ML/MIN/1.73SQ M
GLUCOSE SERPL-MCNC: 250 MG/DL (ref 65–140)
GLUCOSE SERPL-MCNC: 272 MG/DL (ref 65–140)
HCT VFR BLD AUTO: 44.1 % (ref 34.8–46.1)
HGB BLD-MCNC: 15.3 G/DL (ref 11.5–15.4)
LIPASE SERPL-CCNC: 150 U/L (ref 73–393)
LYMPHOCYTES # BLD AUTO: 1.49 THOUSANDS/ΜL (ref 0.6–4.47)
LYMPHOCYTES NFR BLD AUTO: 11 % (ref 14–44)
MCH RBC QN AUTO: 31.4 PG (ref 26.8–34.3)
MCHC RBC AUTO-ENTMCNC: 34.7 G/DL (ref 31.4–37.4)
MCV RBC AUTO: 91 FL (ref 82–98)
MONOCYTES # BLD AUTO: 0.81 THOUSAND/ΜL (ref 0.17–1.22)
MONOCYTES NFR BLD AUTO: 6 % (ref 4–12)
NEUTROPHILS # BLD AUTO: 11.29 THOUSANDS/ΜL (ref 1.85–7.62)
NEUTS SEG NFR BLD AUTO: 82 % (ref 43–75)
PLATELET # BLD AUTO: 315 THOUSANDS/UL (ref 149–390)
PMV BLD AUTO: 10 FL (ref 8.9–12.7)
POTASSIUM SERPL-SCNC: 4.4 MMOL/L (ref 3.5–5.3)
PROT SERPL-MCNC: 7.8 G/DL (ref 6.4–8.2)
RBC # BLD AUTO: 4.87 MILLION/UL (ref 3.81–5.12)
SODIUM SERPL-SCNC: 141 MMOL/L (ref 136–145)
WBC # BLD AUTO: 13.79 THOUSAND/UL (ref 4.31–10.16)

## 2018-05-13 PROCEDURE — 96374 THER/PROPH/DIAG INJ IV PUSH: CPT

## 2018-05-13 PROCEDURE — 99283 EMERGENCY DEPT VISIT LOW MDM: CPT

## 2018-05-13 PROCEDURE — 82948 REAGENT STRIP/BLOOD GLUCOSE: CPT

## 2018-05-13 PROCEDURE — 85025 COMPLETE CBC W/AUTO DIFF WBC: CPT | Performed by: EMERGENCY MEDICINE

## 2018-05-13 PROCEDURE — 96376 TX/PRO/DX INJ SAME DRUG ADON: CPT

## 2018-05-13 PROCEDURE — 83690 ASSAY OF LIPASE: CPT | Performed by: EMERGENCY MEDICINE

## 2018-05-13 PROCEDURE — 80053 COMPREHEN METABOLIC PANEL: CPT | Performed by: EMERGENCY MEDICINE

## 2018-05-13 PROCEDURE — 36415 COLL VENOUS BLD VENIPUNCTURE: CPT | Performed by: EMERGENCY MEDICINE

## 2018-05-13 PROCEDURE — 82009 KETONE BODYS QUAL: CPT | Performed by: EMERGENCY MEDICINE

## 2018-05-13 PROCEDURE — 96361 HYDRATE IV INFUSION ADD-ON: CPT

## 2018-05-13 RX ORDER — ONDANSETRON 2 MG/ML
4 INJECTION INTRAMUSCULAR; INTRAVENOUS ONCE
Status: COMPLETED | OUTPATIENT
Start: 2018-05-13 | End: 2018-05-13

## 2018-05-13 RX ORDER — ONDANSETRON 2 MG/ML
INJECTION INTRAMUSCULAR; INTRAVENOUS
Status: DISCONTINUED
Start: 2018-05-13 | End: 2018-05-13 | Stop reason: HOSPADM

## 2018-05-13 RX ADMIN — SODIUM CHLORIDE 1000 ML: 0.9 INJECTION, SOLUTION INTRAVENOUS at 09:01

## 2018-05-13 RX ADMIN — ONDANSETRON 4 MG: 2 INJECTION INTRAMUSCULAR; INTRAVENOUS at 10:29

## 2018-05-13 RX ADMIN — ONDANSETRON 4 MG: 2 INJECTION INTRAMUSCULAR; INTRAVENOUS at 09:00

## 2018-05-13 NOTE — ED PROVIDER NOTES
History  Chief Complaint   Patient presents with    Vomiting     Patient presents to [facility] stating she was drinking ETOH last night and now has a hangover and has been vomiting and she is a type 1 diabetic  This is a 20-year-old female who presents for evaluation of nausea vomiting she was drinking alcohol /vodka last evening denies any abdominal pain fevers or chills no diarrhea        History provided by:  Patient  Vomiting   Severity:  Moderate  Duration: This am   Quality:  Stomach contents  Progression:  Unchanged  Chronicity:  Recurrent  Relieved by:  Nothing  Associated symptoms: no abdominal pain and no fever    Risk factors: alcohol use        Prior to Admission Medications   Prescriptions Last Dose Informant Patient Reported? Taking?   insulin glulisine (APIDRA) 100 units/mL injection   Yes No   Sig: Up to 100 units daily via pump   levonorgestrel (MIRENA) 20 MCG/24HR IUD   Yes No   Si each by Intrauterine route once   ondansetron (ZOFRAN-ODT) 4 mg disintegrating tablet   No No   Sig: Take 1 tablet (4 mg total) by mouth every 8 (eight) hours as needed for vomiting      Facility-Administered Medications: None       Past Medical History:   Diagnosis Date    Diabetes ([place] [address] )     Diabetes mellitus type 1 ([facility] )     Diabetic ketoacidosis without coma associated with type 1 diabetes mellitus ([facility] ) 2016    Gastroenteritis 2016    Psychiatric disorder        Past Surgical History:   Procedure Laterality Date    WISDOM TOOTH EXTRACTION Bilateral        Family History   Problem Relation Age of Onset    No Known Problems Mother     No Known Problems Father      I have reviewed and agree with the history as documented  Social History   Substance Use Topics    Smoking status: Former Smoker     Packs/day: 0 50     Years: 4 00     Types: Cigarettes    Smokeless tobacco: Never Used    Alcohol use Yes      Comment: rare         Review of Systems   Constitutional: Negative for fever  Gastrointestinal: Positive for vomiting  Negative for abdominal pain  All other systems reviewed and are negative  Physical Exam  ED Triage Vitals [05/13/18 0855]   Temperature Pulse Respirations Blood Pressure SpO2   (!) 96 8 °F (36 °C) (!) 118 20 116/75 100 %      Temp Source Heart Rate Source Patient Position - Orthostatic VS BP Location FiO2 (%)   Temporal Monitor Lying Right arm --      Pain Score       No Pain           Orthostatic Vital Signs  Vitals:    05/13/18 0855   BP: 116/75   Pulse: (!) 118   Patient Position - Orthostatic VS: Lying       Physical Exam   Constitutional: She is oriented to person, place, and time  She appears well-developed and well-nourished  HENT:   Head: Normocephalic and atraumatic  Right Ear: External ear normal    Left Ear: External ear normal    Nose: Nose normal    Mouth/Throat: Oropharynx is clear and moist    Eyes: EOM are normal  Pupils are equal, round, and reactive to light  No scleral icterus  Neck: Normal range of motion  Neck supple  No tracheal deviation present  Cardiovascular: Regular rhythm and intact distal pulses  Exam reveals no gallop and no friction rub  No murmur heard  Pulmonary/Chest: Effort normal and breath sounds normal  No respiratory distress  She has no wheezes  She has no rales  Abdominal: Soft  Bowel sounds are normal  She exhibits no distension  There is no tenderness  Musculoskeletal: Normal range of motion  She exhibits no edema, tenderness or deformity  Neurological: She is alert and oriented to person, place, and time  No cranial nerve deficit  Skin: Skin is warm and dry  She is not diaphoretic  Psychiatric: She has a normal mood and affect  Her behavior is normal    Nursing note and vitals reviewed        ED Medications  Medications   sodium chloride 0 9 % bolus 1,000 mL (1,000 mL Intravenous New Bag 5/13/18 0901)   ondansetron (ZOFRAN) injection 4 mg (4 mg Intravenous Given 5/13/18 0900)       Diagnostic Studies  Results Reviewed     Procedure Component Value Units Date/Time    Comprehensive metabolic panel [[id number]]  (Abnormal) Collected:  05/13/18 0858    Lab Status:  Final result Specimen:  Blood from Arm, Left Updated:  05/13/18 7126     Sodium 141 mmol/L      Potassium 4 4 mmol/L      Chloride 102 mmol/L      CO2 28 mmol/L      Anion Gap 11 mmol/L      BUN 11 mg/dL      Creatinine 0 61 mg/dL      Glucose 272 (H) mg/dL      Calcium 8 9 mg/dL      AST 20 U/L      ALT 21 U/L      Alkaline Phosphatase 74 U/L      Total Protein 7 8 g/dL      Albumin 3 9 g/dL      Total Bilirubin 0 50 mg/dL      eGFR 127 ml/min/1 73sq m     Narrative:         National Kidney Disease Education Program recommendations are as follows:  GFR calculation is accurate only with a steady state creatinine  Chronic Kidney disease less than 60 ml/min/1 73 sq  meters  Kidney failure less than 15 ml/min/1 73 sq  meters      Lipase [[id number]]  (Normal) Collected:  05/13/18 0858    Lab Status:  Final result Specimen:  Blood from Arm, Left Updated:  05/13/18 0927     Lipase 150 u/L     Acetone [[id number]]  (Normal) Collected:  05/13/18 0858    Lab Status:  Final result Specimen:  Blood from Arm, Left Updated:  05/13/18 0920     Acetone, Bld Negative    CBC and differential [[id number]]  (Abnormal) Collected:  05/13/18 0858    Lab Status:  Final result Specimen:  Blood from Arm, Left Updated:  05/13/18 0906     WBC 13 79 (H) Thousand/uL      RBC 4 87 Million/uL      Hemoglobin 15 3 g/dL      Hematocrit 44 1 %      MCV 91 fL      MCH 31 4 pg      MCHC 34 7 g/dL      RDW 12 5 %      MPV 10 0 fL      Platelets 599 Thousands/uL      Neutrophils Relative 82 (H) %      Lymphocytes Relative 11 (L) %      Monocytes Relative 6 %      Eosinophils Relative 1 %      Basophils Relative 0 %      Neutrophils Absolute 11 29 (H) Thousands/µL      Lymphocytes Absolute 1 49 Thousands/µL      Monocytes Absolute 0 81 Thousand/µL      Eosinophils Absolute 0 17 Thousand/µL Basophils Absolute 0 03 Thousands/µL     POCT urinalysis dipstick [[id number]]     Lab Status:  No result Specimen:  Urine     POCT pregnancy, urine [[id number]]     Lab Status:  No result     Fingerstick Glucose (POCT) [[id number]]  (Abnormal) Collected:  05/13/18 0843    Lab Status:  Final result Updated:  05/13/18 0845     POC Glucose 250 (H) mg/dl                  No orders to display              Procedures  Procedures       Phone Contacts  ED Phone Contact    ED Course  ED Course as of May 13 1011   Sun May 13, 2018   1010  Patient feeling better no vomiting at this time  Stable for discharge                                MDM  Number of Diagnoses or Management Options  Diagnosis management comments:  Nausea vomiting differential includes alcohol-induced gastritis versus pancreatitis or gastroenteritis will check labs give IV fluids and antiemetics       Amount and/or Complexity of Data Reviewed  Clinical lab tests: ordered      CritCare Time    Disposition  Final diagnoses:   Vomiting     Time reflects when diagnosis was documented in both MDM as applicable and the Disposition within this note     Time User Action Codes Description Comment    5/13/2018 10:10 AM Berkley Speaker Add [R11 10] Vomiting       ED Disposition     ED Disposition Condition Comment    Discharge  [facility] discharge to home/self care  Condition at discharge: Stable        Follow-up Information     Follow up With Specialties Details Why Contact Info    Martín Howard DO Family Medicine In 2 days As needed [facility]  [phone]          Patient's Medications   Discharge Prescriptions    No medications on file     No discharge procedures on file      ED Provider  Electronically Signed by           Santos Palomo DO  05/13/18 1011

## 2018-05-13 NOTE — DISCHARGE INSTRUCTIONS

## 2018-05-16 ENCOUNTER — APPOINTMENT (EMERGENCY)
Dept: RADIOLOGY | Facility: HOSPITAL | Age: 23
End: 2018-05-16
Payer: COMMERCIAL

## 2018-05-16 ENCOUNTER — HOSPITAL ENCOUNTER (EMERGENCY)
Facility: HOSPITAL | Age: 23
Discharge: HOME | End: 2018-05-16
Attending: EMERGENCY MEDICINE | Admitting: EMERGENCY MEDICINE
Payer: COMMERCIAL

## 2018-05-16 VITALS
HEART RATE: 87 BPM | OXYGEN SATURATION: 100 % | DIASTOLIC BLOOD PRESSURE: 56 MMHG | RESPIRATION RATE: 18 BRPM | WEIGHT: 130 LBS | BODY MASS INDEX: 26.21 KG/M2 | HEIGHT: 59 IN | TEMPERATURE: 97 F | SYSTOLIC BLOOD PRESSURE: 101 MMHG

## 2018-05-16 DIAGNOSIS — R11.0 NAUSEA: ICD-10-CM

## 2018-05-16 DIAGNOSIS — R73.9 HYPERGLYCEMIA: ICD-10-CM

## 2018-05-16 DIAGNOSIS — R07.9 CHEST PAIN: Primary | ICD-10-CM

## 2018-05-16 LAB
ACETONE SERPL-MCNC: ABNORMAL MG/DL
ANION GAP SERPL CALCULATED.3IONS-SCNC: 24 MMOL/L (ref 4–13)
ATRIAL RATE: 97 BPM
BASE EXCESS BLDA CALC-SCNC: -10 MMOL/L (ref -2–3)
BASOPHILS # BLD AUTO: 0.07 THOUSANDS/ΜL (ref 0–0.1)
BASOPHILS NFR BLD AUTO: 1 % (ref 0–1)
BUN SERPL-MCNC: 18 MG/DL (ref 5–25)
CALCIUM SERPL-MCNC: 9.6 MG/DL (ref 8.3–10.1)
CHLORIDE SERPL-SCNC: 96 MMOL/L (ref 100–108)
CLARITY, POC: CLEAR
CO2 SERPL-SCNC: 16 MMOL/L (ref 21–32)
COLOR, POC: YELLOW
CREAT SERPL-MCNC: 0.89 MG/DL (ref 0.6–1.3)
EOSINOPHIL # BLD AUTO: 0.17 THOUSAND/ΜL (ref 0–0.61)
EOSINOPHIL NFR BLD AUTO: 2 % (ref 0–6)
ERYTHROCYTE [DISTWIDTH] IN BLOOD BY AUTOMATED COUNT: 12.3 % (ref 11.6–15.1)
EXT BILIRUBIN, UA: NORMAL
EXT BLOOD URINE: NORMAL
EXT GLUCOSE, UA: 1000
EXT KETONES: 160
EXT NITRITE, UA: NORMAL
EXT PH, UA: 6
EXT PREG TEST URINE: NORMAL
EXT PROTEIN, UA: NORMAL
EXT SPECIFIC GRAVITY, UA: 1.02
EXT UROBILINOGEN: 0.2
GFR SERPL CREATININE-BSD FRML MDRD: 90 ML/MIN/1.73SQ M
GLUCOSE SERPL-MCNC: 275 MG/DL (ref 65–140)
GLUCOSE SERPL-MCNC: 442 MG/DL (ref 65–140)
GLUCOSE SERPL-MCNC: 447 MG/DL (ref 65–140)
HCO3 BLDA-SCNC: 14 MMOL/L (ref 24–30)
HCT VFR BLD AUTO: 45.8 % (ref 34.8–46.1)
HGB BLD-MCNC: 15.5 G/DL (ref 11.5–15.4)
LYMPHOCYTES # BLD AUTO: 2.85 THOUSANDS/ΜL (ref 0.6–4.47)
LYMPHOCYTES NFR BLD AUTO: 29 % (ref 14–44)
MCH RBC QN AUTO: 30.9 PG (ref 26.8–34.3)
MCHC RBC AUTO-ENTMCNC: 33.8 G/DL (ref 31.4–37.4)
MCV RBC AUTO: 91 FL (ref 82–98)
MONOCYTES # BLD AUTO: 0.34 THOUSAND/ΜL (ref 0.17–1.22)
MONOCYTES NFR BLD AUTO: 3 % (ref 4–12)
NEUTROPHILS # BLD AUTO: 6.44 THOUSANDS/ΜL (ref 1.85–7.62)
NEUTS SEG NFR BLD AUTO: 65 % (ref 43–75)
P AXIS: 60 DEGREES
PCO2 BLD: 15 MMOL/L (ref 21–32)
PCO2 BLD: 26.5 MM HG (ref 42–50)
PH BLD: 7.33 [PH] (ref 7.3–7.4)
PLATELET # BLD AUTO: 308 THOUSANDS/UL (ref 149–390)
PMV BLD AUTO: 10.3 FL (ref 8.9–12.7)
PO2 BLD: 35 MM HG (ref 35–45)
POTASSIUM SERPL-SCNC: 4.5 MMOL/L (ref 3.5–5.3)
PR INTERVAL: 114 MS
QRS AXIS: 85 DEGREES
QRSD INTERVAL: 80 MS
QT INTERVAL: 346 MS
QTC INTERVAL: 439 MS
RBC # BLD AUTO: 5.01 MILLION/UL (ref 3.81–5.12)
SAO2 % BLD FROM PO2: 65 % (ref 95–98)
SODIUM SERPL-SCNC: 136 MMOL/L (ref 136–145)
SPECIMEN SOURCE: ABNORMAL
T WAVE AXIS: 9 DEGREES
TROPONIN I SERPL-MCNC: <0.02 NG/ML
VENTRICULAR RATE: 97 BPM
WBC # BLD AUTO: 9.87 THOUSAND/UL (ref 4.31–10.16)
WBC # BLD EST: NORMAL 10*3/UL

## 2018-05-16 PROCEDURE — 93005 ELECTROCARDIOGRAM TRACING: CPT

## 2018-05-16 PROCEDURE — 82948 REAGENT STRIP/BLOOD GLUCOSE: CPT

## 2018-05-16 PROCEDURE — 81025 URINE PREGNANCY TEST: CPT | Performed by: EMERGENCY MEDICINE

## 2018-05-16 PROCEDURE — 82009 KETONE BODYS QUAL: CPT | Performed by: EMERGENCY MEDICINE

## 2018-05-16 PROCEDURE — 84484 ASSAY OF TROPONIN QUANT: CPT | Performed by: EMERGENCY MEDICINE

## 2018-05-16 PROCEDURE — 96375 TX/PRO/DX INJ NEW DRUG ADDON: CPT

## 2018-05-16 PROCEDURE — 96374 THER/PROPH/DIAG INJ IV PUSH: CPT

## 2018-05-16 PROCEDURE — 81002 URINALYSIS NONAUTO W/O SCOPE: CPT | Performed by: EMERGENCY MEDICINE

## 2018-05-16 PROCEDURE — 85025 COMPLETE CBC W/AUTO DIFF WBC: CPT | Performed by: EMERGENCY MEDICINE

## 2018-05-16 PROCEDURE — 93010 ELECTROCARDIOGRAM REPORT: CPT | Performed by: INTERNAL MEDICINE

## 2018-05-16 PROCEDURE — 99285 EMERGENCY DEPT VISIT HI MDM: CPT

## 2018-05-16 PROCEDURE — 71046 X-RAY EXAM CHEST 2 VIEWS: CPT

## 2018-05-16 PROCEDURE — 96361 HYDRATE IV INFUSION ADD-ON: CPT

## 2018-05-16 PROCEDURE — 82803 BLOOD GASES ANY COMBINATION: CPT

## 2018-05-16 PROCEDURE — 80048 BASIC METABOLIC PNL TOTAL CA: CPT | Performed by: EMERGENCY MEDICINE

## 2018-05-16 PROCEDURE — 36415 COLL VENOUS BLD VENIPUNCTURE: CPT | Performed by: EMERGENCY MEDICINE

## 2018-05-16 RX ORDER — ONDANSETRON 4 MG/1
4 TABLET, ORALLY DISINTEGRATING ORAL EVERY 8 HOURS PRN
Qty: 12 TABLET | Refills: 0 | Status: SHIPPED | OUTPATIENT
Start: 2018-05-16 | End: 2018-10-17 | Stop reason: SDUPTHER

## 2018-05-16 RX ORDER — LORAZEPAM 2 MG/ML
1 INJECTION INTRAMUSCULAR ONCE
Status: COMPLETED | OUTPATIENT
Start: 2018-05-16 | End: 2018-05-16

## 2018-05-16 RX ORDER — SODIUM CHLORIDE 9 MG/ML
500 INJECTION, SOLUTION INTRAVENOUS ONCE
Status: COMPLETED | OUTPATIENT
Start: 2018-05-16 | End: 2018-05-16

## 2018-05-16 RX ORDER — KETOROLAC TROMETHAMINE 30 MG/ML
30 INJECTION, SOLUTION INTRAMUSCULAR; INTRAVENOUS ONCE
Status: COMPLETED | OUTPATIENT
Start: 2018-05-16 | End: 2018-05-16

## 2018-05-16 RX ADMIN — SODIUM CHLORIDE 1000 ML: 0.9 INJECTION, SOLUTION INTRAVENOUS at 09:01

## 2018-05-16 RX ADMIN — KETOROLAC TROMETHAMINE 30 MG: 30 INJECTION, SOLUTION INTRAMUSCULAR; INTRAVENOUS at 09:19

## 2018-05-16 RX ADMIN — INSULIN HUMAN 10 UNITS: 100 INJECTION, SOLUTION PARENTERAL at 09:47

## 2018-05-16 RX ADMIN — LORAZEPAM 1 MG: 2 INJECTION, SOLUTION INTRAMUSCULAR; INTRAVENOUS at 09:01

## 2018-05-16 RX ADMIN — SODIUM CHLORIDE 500 ML/HR: 0.9 INJECTION, SOLUTION INTRAVENOUS at 10:25

## 2018-05-16 NOTE — ED NOTES
Pt resting comfortably at this time, reports pain and anxiety have decreased, warm blanket provided for comfort, call bell in reach, will monitor        Mily Molina RN  05/16/18 0810

## 2018-05-16 NOTE — ED PROVIDER NOTES
History  Chief Complaint   Patient presents with    Chest Pain     Pt c/o constant sharp chest pain shoulder to shoulder and down left arm, began upon waking up this AM and has become progressively worse, reports was vomiting last, per EMS glucose 444     Hx of IDDM and DKA in the past  Over weekend was drinking and had a day of vomiting  Seemed better and was tolerating po  Vomited again last night  This am noted insulin pump empty and had no additional insulin at home  Upon awakening also noted a sharp pain in her chest going from shoulder to shoulder  Went to pharmacy to  insulin and pain got worse  Pt w/ severe anxiety and started to have a panic attack  Called ems  Denies recent illness, no f/c/s  Notes sharp cp going from shoulder to shoulder and into left arm  No sob/vaz, no diaphoresis  +nausea, no vomiting today  No le pain or swelling, no cough/congestion, no sig changes in activity        History provided by:  Patient and EMS personnel   used: No    Chest Pain   Pain location:  L chest and R chest  Pain quality: sharp    Pain radiates to:  L shoulder and L arm  Pain radiates to the back: no    Pain severity:  Moderate  Onset quality:  Sudden  Duration:  1 day  Timing:  Intermittent  Progression:  Waxing and waning  Chronicity:  New  Context: no stress and no trauma    Relieved by:  None tried  Worsened by:  Coughing, deep breathing, exertion and movement  Ineffective treatments:  None tried  Associated symptoms: anxiety, nausea and palpitations    Associated symptoms: no abdominal pain, no anorexia, no back pain, no cough, no diaphoresis, no fatigue, no fever, no headache, no heartburn, no orthopnea, no shortness of breath, not vomiting and no weakness    Risk factors: diabetes mellitus        Prior to Admission Medications   Prescriptions Last Dose Informant Patient Reported?  Taking?   insulin glulisine (APIDRA) 100 units/mL injection   Yes No   Sig: Up to 100 units daily via pump   levonorgestrel (MIRENA) 20 MCG/24HR IUD   Yes No   Si each by Intrauterine route once   ondansetron (ZOFRAN-ODT) 4 mg disintegrating tablet   No No   Sig: Take 1 tablet (4 mg total) by mouth every 8 (eight) hours as needed for vomiting      Facility-Administered Medications: None       Past Medical History:   Diagnosis Date    Diabetes ([facility] )     Diabetes mellitus type 1 ([place] [address] )     Diabetic ketoacidosis without coma associated with type 1 diabetes mellitus ([facility] ) 2016    Gastroenteritis 2016    Psychiatric disorder        Past Surgical History:   Procedure Laterality Date    WISDOM TOOTH EXTRACTION Bilateral        Family History   Problem Relation Age of Onset    No Known Problems Mother     No Known Problems Father      I have reviewed and agree with the history as documented  Social History   Substance Use Topics    Smoking status: Former Smoker     Packs/day: 0 50     Years: 4 00     Types: Cigarettes    Smokeless tobacco: Never Used    Alcohol use Yes      Comment: rare         Review of Systems   Constitutional: Negative for diaphoresis, fatigue and fever  Respiratory: Negative for cough and shortness of breath  Cardiovascular: Positive for chest pain and palpitations  Negative for orthopnea  Gastrointestinal: Positive for nausea  Negative for abdominal pain, anorexia, heartburn and vomiting  Musculoskeletal: Negative for back pain  Neurological: Negative for weakness and headaches  All other systems reviewed and are negative        Physical Exam  ED Triage Vitals [18 0844]   Temperature Pulse Respirations Blood Pressure SpO2   (!) 97 °F (36 1 °C) (!) 110 18 130/87 99 %      Temp Source Heart Rate Source Patient Position - Orthostatic VS BP Location FiO2 (%)   Tympanic Monitor Lying Left arm --      Pain Score       Worst Possible Pain           Orthostatic Vital Signs  Vitals:    18 0945 18 1000 18 1030 18 1045   BP: 105/56 92/55 102/55 101/56   Pulse: 92 89 98 87   Patient Position - Orthostatic VS: Lying Lying Lying Lying       Physical Exam   Constitutional: She is oriented to person, place, and time  She appears well-developed and well-nourished  Tearful and anxious   HENT:   Nose: Nose normal    Eyes: Conjunctivae are normal    Neck: Neck supple  Cardiovascular: Normal rate and regular rhythm  Exam reveals no gallop and no friction rub  No murmur heard  Pulmonary/Chest: Effort normal and breath sounds normal  No respiratory distress  She has no wheezes  She has no rales  Abdominal: Soft  There is no tenderness  Musculoskeletal: She exhibits no edema or deformity  Neurological: She is alert and oriented to person, place, and time  Skin: Skin is warm  Psychiatric: Thought content normal  Her mood appears anxious  Cognition and memory are normal    Nursing note and vitals reviewed        ED Medications  Medications   sodium chloride 0 9 % bolus 1,000 mL (0 mL Intravenous Stopped 5/16/18 1001)   LORazepam (ATIVAN) 2 mg/mL injection 1 mg (1 mg Intravenous Given 5/16/18 0901)   ketorolac (TORADOL) injection 30 mg (30 mg Intravenous Given 5/16/18 0919)   insulin regular (HumuLIN R,NovoLIN R) injection 10 Units (10 Units Intravenous Given 5/16/18 0947)   sodium chloride 0 9 % infusion (0 mL/hr Intravenous Stopped 5/16/18 1123)       Diagnostic Studies  Results Reviewed     Procedure Component Value Units Date/Time    Fingerstick Glucose (POCT) [[id number]]  (Abnormal) Collected:  05/16/18 1020    Lab Status:  Final result Updated:  05/16/18 1022     POC Glucose 275 (H) mg/dl     Troponin I [[id number]]  (Normal) Collected:  05/16/18 0857    Lab Status:  Final result Specimen:  Blood from Arm, Left Updated:  05/16/18 0944     Troponin I <0 02 ng/mL     Narrative:         [employer] Chemistry analyzer 99% cutoff is > 0 04 ng/mL in network labs    o cTnI 99% cutoff is useful only when applied to patients in the clinical setting of myocardial ischemia  o cTnI 99% cutoff should be interpreted in the context of clinical history, ECG findings and possibly cardiac imaging to establish correct diagnosis  o cTnI 99% cutoff may be suggestive but clearly not indicative of a coronary event without the clinical setting of myocardial ischemia  Basic metabolic panel [[id number]]  (Abnormal) Collected:  05/16/18 0857    Lab Status:  Final result Specimen:  Blood from Arm, Left Updated:  05/16/18 1068     Sodium 136 mmol/L      Potassium 4 5 mmol/L      Chloride 96 (L) mmol/L      CO2 16 (L) mmol/L      Anion Gap 24 (H) mmol/L      BUN 18 mg/dL      Creatinine 0 89 mg/dL      Glucose 442 (H) mg/dL      Calcium 9 6 mg/dL      eGFR 90 ml/min/1 73sq m     Narrative:         National Kidney Disease Education Program recommendations are as follows:  GFR calculation is accurate only with a steady state creatinine  Chronic Kidney disease less than 60 ml/min/1 73 sq  meters  Kidney failure less than 15 ml/min/1 73 sq  meters      Acetone [[id number]]  (Abnormal) Collected:  05/16/18 0858    Lab Status:  Final result Specimen:  Blood from Arm, Left Updated:  05/16/18 0936     Acetone, Bld Trace (A)    POCT Blood Gas (G3+) [[id number]]  (Abnormal) Collected:  05/16/18 0906    Lab Status:  Final result Updated:  05/16/18 0923     ph, Esteban ISTAT 7 331     pCO2, Esteban i-STAT 26 5 (LL) mm HG      pO2, Esteban i-STAT 35 0 mm HG      BE, i-STAT -10 (L) mmol/L      HCO3, Esteban i-STAT 14 0 (LL) mmol/L      CO2, i-STAT 15 (L) mmol/L      O2 Sat, i-STAT 65 (L) %      Specimen Type VENOUS    CBC and differential [[id number]]  (Abnormal) Collected:  05/16/18 0857    Lab Status:  Final result Specimen:  Blood from Arm, Left Updated:  05/16/18 0918     WBC 9 87 Thousand/uL      RBC 5 01 Million/uL      Hemoglobin 15 5 (H) g/dL      Hematocrit 45 8 %      MCV 91 fL      MCH 30 9 pg      MCHC 33 8 g/dL      RDW 12 3 %      MPV 10 3 fL      Platelets 467 Thousands/uL      Neutrophils Relative 65 % Lymphocytes Relative 29 %      Monocytes Relative 3 (L) %      Eosinophils Relative 2 %      Basophils Relative 1 %      Neutrophils Absolute 6 44 Thousands/µL      Lymphocytes Absolute 2 85 Thousands/µL      Monocytes Absolute 0 34 Thousand/µL      Eosinophils Absolute 0 17 Thousand/µL      Basophils Absolute 0 07 Thousands/µL     POCT urinalysis dipstick [[id number]]  (Normal) Resulted:  05/16/18 0855    Lab Status:  Final result Specimen:  Urine Updated:  05/16/18 0911     Color, UA YELLOW     Clarity, UA CLEAR     EXT Glucose, UA (Ref: Negative) 1,000     EXT Bilirubin, UA (Ref: Negative) NEG     EXT Ketones, UA (Ref: Negative) 160     EXT Spec Grav, UA 1 020     EXT Blood, UA (Ref: Negative) NEG     EXT pH, UA 6 0     EXT Protein, UA (Ref: Negative) TRACE     EXT Urobilinogen, UA (Ref: 0 2- 1 0) 0 2     EXT Leukocytes, UA (Ref: Negative) NEG     EXT Nitrite, UA (Ref: Negative) NEG    POCT pregnancy, urine [[id number]]  (Normal) Resulted:  05/16/18 0910    Lab Status:  Final result Updated:  05/16/18 0911     EXT PREG TEST UR (Ref: Negative) NEG    Fingerstick Glucose (POCT) [[id number]]  (Abnormal) Collected:  05/16/18 0849    Lab Status:  Final result Updated:  05/16/18 0902     POC Glucose 447 (H) mg/dl                  XR chest 2 views   Final Result by Jimy Streeter MD (05/16 0935)      No acute cardiopulmonary disease              Workstation performed: [id number]                    Procedures  ECG 12 Lead Documentation  Date/Time: 5/16/2018 9:00 AM  Performed by: Carrie Hinkle  Authorized by: Carrie Hinkle     ECG reviewed by me, the ED Provider: yes    Patient location:  ED  Interpretation:     Interpretation: normal    Rate:     ECG rate:  97    ECG rate assessment: normal    Rhythm:     Rhythm: sinus rhythm    Ectopy:     Ectopy: none    QRS:     QRS axis:  Normal  ST segments:     ST segments:  Normal  T waves:     T waves: normal             Phone Contacts  ED Phone Contact    ED Course  ED Course as of May 17 0826   Wed May 16, 2018   1030 Feels better  Blood sugar improved  Will give additional 500ml bolus and re-eval                                MDM  CritCare Time    Disposition  Final diagnoses:   Chest pain   Hyperglycemia   Nausea     Time reflects when diagnosis was documented in both MDM as applicable and the Disposition within this note     Time User Action Codes Description Comment    5/16/2018 11:16 AM LazcarokyDuyky L Add [R07 9] Chest pain     5/16/2018 11:17 AM LazanskyDuyky L Add [R73 9] Hyperglycemia     5/16/2018 11:17 AM Lazansky Geovanna L Add [R11 0] Nausea       ED Disposition     ED Disposition Condition Comment    Discharge  [place] Pall discharge to home/self care  Condition at discharge: Good        Follow-up Information     Follow up With Specialties Details Why Contact Info    Janie Sánchez DO Family Medicine  As needed [address] [facility]  [address] [address]  [facility]  [phone]          Discharge Medication List as of 5/16/2018 11:18 AM      START taking these medications    Details   !! ondansetron (ZOFRAN-ODT) 4 mg disintegrating tablet Take 1 tablet (4 mg total) by mouth every 8 (eight) hours as needed for nausea or vomiting, Starting Wed 5/16/2018, Normal       !! - Potential duplicate medications found  Please discuss with provider  CONTINUE these medications which have NOT CHANGED    Details   insulin glulisine (APIDRA) 100 units/mL injection Up to 100 units daily via pump, Historical Med      levonorgestrel (MIRENA) 20 MCG/24HR IUD 1 each by Intrauterine route once, Historical Med      !! ondansetron (ZOFRAN-ODT) 4 mg disintegrating tablet Take 1 tablet (4 mg total) by mouth every 8 (eight) hours as needed for vomiting, Starting Sun 4/15/2018, Normal       !! - Potential duplicate medications found  Please discuss with provider  No discharge procedures on file      ED Provider  Electronically Signed by           Desirae Squires DO  05/17/18 8235

## 2018-05-16 NOTE — DISCHARGE INSTRUCTIONS
Acute Nausea and Vomiting   WHAT YOU NEED TO KNOW:   Acute nausea and vomiting start suddenly, worsen quickly, and last a short time  DISCHARGE INSTRUCTIONS:   Return to the emergency department if:   · You see blood in your vomit or your bowel movements  · You have sudden, severe pain in your chest and upper abdomen after hard vomiting or retching  · You have swelling in your neck and chest      · You are dizzy, cold, and thirsty and your eyes and mouth are dry  · You are urinating very little or not at all  · You have muscle weakness, leg cramps, and trouble breathing  · Your heart is beating much faster than normal      · You continue to vomit for more than 48 hours  Contact your healthcare provider if:   · You have frequent dry heaves (vomiting but nothing comes out)  · Your nausea and vomiting does not get better or go away after you use medicine  · You have questions or concerns about your condition or treatment  Medicines: You may need any of the following:  · Medicines  may be given to calm your stomach and stop your vomiting  You may also need medicines to help you feel more relaxed or to stop nausea and vomiting caused by motion sickness  · Gastrointestinal stimulants  are used to help empty your stomach and bowels  This may help decrease nausea and vomiting  · Take your medicine as directed  Contact your healthcare provider if you think your medicine is not helping or if you have side effects  Tell him or her if you are allergic to any medicine  Keep a list of the medicines, vitamins, and herbs you take  Include the amounts, and when and why you take them  Bring the list or the pill bottles to follow-up visits  Carry your medicine list with you in case of an emergency  Prevent or manage acute nausea and vomiting:   · Do not drink alcohol  Alcohol may upset or irritate your stomach  Too much alcohol can also cause acute nausea and vomiting  · Control stress    Headaches due to stress may cause nausea and vomiting  Find ways to relax and manage your stress  Get more rest and sleep  · Drink more liquids as directed  Vomiting can lead to dehydration  It is important to drink more liquids to help replace lost body fluids  Ask your healthcare provider how much liquid to drink each day and which liquids are best for you  Your provider may recommend that you drink an oral rehydration solution (ORS)  ORS contains water, salts, and sugar that are needed to replace the lost body fluids  Ask what kind of ORS to use, how much to drink, and where to get it  · Eat smaller meals, more often  Eat small amounts of food every 2 to 3 hours, even if you are not hungry  Food in your stomach may decrease your nausea  · Talk to your healthcare provider before you take over-the-counter (OTC) medicines  These medicines can cause serious problems if you use certain other medicines, or you have a medical condition  You may have problems if you use too much or use them for longer than the label says  Follow directions on the label carefully  Follow up with your healthcare provider as directed:  Write down your questions so you remember to ask them during your follow-up visits  © 2017 [address] [employer] Information is for End User's use only and may not be sold, redistributed or otherwise used for commercial purposes  All illustrations and images included in CareNotes® are the copyrighted property of [employer]  or Rhett Preston  The above information is an  only  It is not intended as medical advice for individual conditions or treatments  Talk to your doctor, nurse or pharmacist before following any medical regimen to see if it is safe and effective for you  Chest Pain   WHAT YOU NEED TO KNOW:   Chest pain can be caused by a range of conditions, from not serious to life-threatening   Chest pain can be a symptom of a digestive problem, such as acid reflux or a stomach ulcer  An anxiety attack or a strong emotion, such as anger, can also cause chest pain  Infection, inflammation, or a fracture in the bones or cartilage in your chest can cause pain or discomfort  Sometimes chest pain or pressure is caused by poor blood flow to your heart (angina)  Chest pain may also be caused by life-threatening conditions such as a heart attack or blood clot in your lungs  DISCHARGE INSTRUCTIONS:   Call 911 if:   · You have any of the following signs of a heart attack:      ¨ Squeezing, pressure, or pain in your chest that lasts longer than 5 minutes or returns    ¨ Discomfort or pain in your back, neck, jaw, stomach, or arm     ¨ Trouble breathing    ¨ Nausea or vomiting    ¨ Lightheadedness or a sudden cold sweat, especially with chest pain or trouble breathing    Return to the emergency department if:   · You have chest discomfort that gets worse, even with medicine  · You cough or vomit blood  · Your bowel movements are black or bloody  · You cannot stop vomiting, or it hurts to swallow  Contact your healthcare provider if:   · You have questions or concerns about your condition or care  Medicines:   · Medicines  may be given to treat the cause of your chest pain  Examples include pain medicine, anxiety medicine, or medicines to increase blood flow to your heart  · Do not take certain medicines without asking your healthcare provider first   These include NSAIDs, herbal or vitamin supplements, or hormones (estrogen or progestin)  · Take your medicine as directed  Contact your healthcare provider if you think your medicine is not helping or if you have side effects  Tell him or her if you are allergic to any medicine  Keep a list of the medicines, vitamins, and herbs you take  Include the amounts, and when and why you take them  Bring the list or the pill bottles to follow-up visits  Carry your medicine list with you in case of an emergency    Follow up with your healthcare provider within 72 hours, or as directed: You may need to return for more tests to find the cause of your chest pain  You may be referred to a specialist, such as a cardiologist or gastroenterologist  Write down your questions so you remember to ask them during your visits  Healthy living tips: The following are general healthy guidelines  If your chest pain is caused by a heart problem, your healthcare provider will give you specific guidelines to follow  · Do not smoke  Nicotine and other chemicals in cigarettes and cigars can cause lung and heart damage  Ask your healthcare provider for information if you currently smoke and need help to quit  E-cigarettes or smokeless tobacco still contain nicotine  Talk to your healthcare provider before you use these products  · Eat a variety of healthy, low-fat foods  Healthy foods include fruits, vegetables, whole-grain breads, low-fat dairy products, beans, lean meats, and fish  Ask for more information about a heart healthy diet  · Ask about activity  Your healthcare provider will tell you which activities to limit or avoid  Ask when you can drive, return to work, and have sex  Ask about the best exercise plan for you  · Maintain a healthy weight  Ask your healthcare provider how much you should weigh  Ask him or her to help you create a weight loss plan if you are overweight  · Get the flu and pneumonia vaccines  All adults should get the influenza (flu) vaccine  Get it every year as soon as it becomes available  The pneumococcal vaccine is given to adults aged 72 years or older  The vaccine is given every 5 years to prevent pneumococcal disease, such as pneumonia  © 2017 [employer] Information is for End User's use only and may not be sold, redistributed or otherwise used for commercial purposes   All illustrations and images included in CareNotes® are the copyrighted property of A D A M [employer]  or [employer]  The above information is an  only  It is not intended as medical advice for individual conditions or treatments  Talk to your doctor, nurse or pharmacist before following any medical regimen to see if it is safe and effective for you  Diabetic Hyperglycemia   WHAT YOU NEED TO KNOW:   Diabetic hyperglycemia is a blood glucose (sugar) level that is higher than your healthcare provider recommends  You may have increased thirst and urinate more often than usual  Over time, uncontrolled diabetes can damage your nerves, blood vessels, tissues, and organs  That is why it is important to manage diabetic hyperglycemia  Without treatment, diabetic hyperglycemia can lead to diabetic ketoacidosis (DKA) or hyperglycemic hyperosmolar state (HHS)  These are serious conditions that can become life-threatening  DISCHARGE INSTRUCTIONS:   Return to the emergency department if:   · You have shortness of breath  · Your breath smells fruity  · You have nausea and vomiting  · You have symptoms of dehydration, such as dark yellow urine, dry mouth and lips, and dry skin  Contact your healthcare provider if:   · You continue to have higher blood sugar levels than your healthcare provider recommends  · Your blood sugar level is over 240 mg/dl and  you have ketones in your urine  · You have questions or concerns about your condition or care  Medicines:   · Medicines  such as insulin and hypoglycemic medicine decrease blood sugar levels  · Take your medicine as directed  Contact your healthcare provider if you think your medicine is not helping or if you have side effects  Tell him or her if you are allergic to any medicine  Keep a list of the medicines, vitamins, and herbs you take  Include the amounts, and when and why you take them  Bring the list or the pill bottles to follow-up visits  Carry your medicine list with you in case of an emergency    Follow up with your healthcare provider or specialist as directed: Your healthcare provider may refer you to a dietitian or diabetes specialist  Write down your questions so you remember to ask them during your visits  Manage diabetic hyperglycemia:   · If you take diabetes medicine or insulin, take it as directed  Missed or wrong doses can cause your blood sugar to go up  · Tell your healthcare provider if you continue to have trouble managing your blood sugar  He may change the type, amount, or timing of your diabetes medicine or insulin  If you do not take diabetes medicine or insulin, you may need to start  · Work with your healthcare provider to develop a sick day plan  Illness can cause your blood sugar to rise  A sick day plan helps you control your blood sugar level when you are sick  Prevent diabetic hyperglycemia:   · Check your blood sugar levels regularly  Ask your healthcare provider how often to check your blood sugar and what your levels should be  · Follow your meal plan  Your blood sugar can go up if you eat a large meal or you eat more carbohydrates than recommended  Work with a dietitian to develop a meal plan that is right for you  · Exercise regularly  to help lower your blood sugar when it is high  It can also keep your blood sugar levels steady over time  Exercise for at least 30 minutes, 5 days a week  Include muscle strengthening activities 2 days each week  Do not sit for longer than 90 minutes at a time  Work with your healthcare provider to create an exercise plan  Children should get at least 60 minutes of physical activity each day  · Check your ketones before exercise  if your blood sugar level is above 240 mg/dl  Do not exercise if you have ketones in your urine,  because your blood sugar level may rise even more  Ask your healthcare provider how to lower your blood sugar when you have ketones    © 2017 [address] [employer] Information is for End User's use only and may not be sold, redistributed or otherwise used for commercial purposes  All illustrations and images included in CareNotes® are the copyrighted property of A [employer] M [employer]  or [employer]tt Preston  The above information is an  only  It is not intended as medical advice for individual conditions or treatments  Talk to your doctor, nurse or pharmacist before following any medical regimen to see if it is safe and effective for you

## 2018-10-17 ENCOUNTER — OFFICE VISIT (OUTPATIENT)
Dept: FAMILY MEDICINE CLINIC | Facility: CLINIC | Age: 23
End: 2018-10-17
Payer: COMMERCIAL

## 2018-10-17 VITALS
TEMPERATURE: 98.2 F | WEIGHT: 137.4 LBS | DIASTOLIC BLOOD PRESSURE: 62 MMHG | SYSTOLIC BLOOD PRESSURE: 100 MMHG | BODY MASS INDEX: 27.7 KG/M2 | HEART RATE: 66 BPM | HEIGHT: 59 IN

## 2018-10-17 DIAGNOSIS — R11.2 NON-INTRACTABLE VOMITING WITH NAUSEA, UNSPECIFIED VOMITING TYPE: Primary | ICD-10-CM

## 2018-10-17 PROCEDURE — 99214 OFFICE O/P EST MOD 30 MIN: CPT | Performed by: NURSE PRACTITIONER

## 2018-10-17 NOTE — PROGRESS NOTES
Assessment/Plan   Diagnoses and all orders for this visit:    Non-intractable vomiting with nausea, unspecified vomiting type  -     Ambulatory referral to General Surgery; Future        Chief Complaint   Patient presents with    Nausea     went camping past 2 weekends and both x's became sick - went to ER 1st time was told has gallstones    Vomiting       Subjective   Patient ID: Annette White is a 20 y o  female  Vitals:    10/17/18 1316   BP: 100/62   Pulse: 66   Temp: 98 2 °F (36 8 °C)     Nausea   This is a recurrent problem  The current episode started 1 to 4 weeks ago (two weeks agowent camping and developed abdominal pain with vomiiting, insulin dependent diabetic with a pump, basal infusion with bolus, recent low sugars with hypoglycemic events which cause anxiety and distress )  The problem occurs intermittently (had a similar episode the following weekend and went to ED and had U/S of gallbladder which she was told has "gallestones" )  The problem has been unchanged  Associated symptoms include abdominal pain, fatigue, myalgias, nausea and vomiting  Pertinent negatives include no chills, congestion, coughing, diaphoresis, fever, headaches, joint swelling, neck pain, numbness, sore throat, urinary symptoms, vertigo, visual change or weakness  Nothing aggravates the symptoms  She has tried rest for the symptoms  The treatment provided no relief  Vomiting    Associated symptoms include abdominal pain and myalgias  Pertinent negatives include no chills, coughing, fever or headaches  Risk factors: none  She has tried bed rest and increased fluids for the symptoms  The treatment provided no relief  The following portions of the patient's history were reviewed and updated as appropriate: allergies, current medications, past family history, past social history, past surgical history and problem list     Review of Systems   Constitutional: Positive for fatigue   Negative for chills, diaphoresis and fever    HENT: Negative  Negative for congestion and sore throat  Eyes: Negative  Respiratory: Negative  Negative for cough  Cardiovascular: Negative  Gastrointestinal: Positive for abdominal pain, nausea and vomiting  Endocrine: Negative  Genitourinary: Negative  Musculoskeletal: Positive for myalgias  Negative for joint swelling and neck pain  Skin: Negative  Allergic/Immunologic: Negative  Neurological: Negative  Negative for vertigo, weakness, numbness and headaches  Hematological: Negative  Psychiatric/Behavioral: Negative  Objective     Physical Exam   Constitutional: She is oriented to person, place, and time  She appears well-developed and well-nourished  No distress  HENT:   Head: Normocephalic and atraumatic  Eyes: Pupils are equal, round, and reactive to light  Conjunctivae are normal  Right eye exhibits no discharge  Left eye exhibits no discharge  Neck: Normal range of motion  Neck supple  Cardiovascular: Normal rate, regular rhythm, normal heart sounds and intact distal pulses  Pulmonary/Chest: Effort normal and breath sounds normal  No respiratory distress  She has no wheezes  She has no rales  She exhibits no tenderness  Abdominal: Soft  Bowel sounds are normal  She exhibits no distension and no mass  There is tenderness (epigastric area)  There is no rebound and no guarding  Musculoskeletal: Normal range of motion  She exhibits no edema, tenderness or deformity  Neurological: She is alert and oriented to person, place, and time  Skin: Skin is warm and dry  Capillary refill takes less than 2 seconds  No rash noted  No erythema  Psychiatric: She has a normal mood and affect  Her behavior is normal  Judgment and thought content normal    Nursing note and vitals reviewed      Allergies   Allergen Reactions    Reglan [Metoclopramide] Anxiety     Patient Active Problem List   Diagnosis    [facility]    Dehydration    Diabetic ketoacidosis associated with type 1 diabetes mellitus (HCC)    Gastroenteritis    Hyponatremia    Nausea    Depression       Current Outpatient Prescriptions:     insulin glulisine (APIDRA) 100 units/mL injection, Up to 100 units daily via pump, Disp: , Rfl:   Social History     Social History    Marital status: Single     Spouse name: N/A    Number of children: N/A    Years of education: N/A     Occupational History    Not on file       Social History Main Topics    Smoking status: Former Smoker     Packs/day: 0 50     Years: 4 00     Types: Cigarettes    Smokeless tobacco: Never Used    Alcohol use Yes      Comment: rare     Drug use: No    Sexual activity: Not on file     Other Topics Concern    Not on file     Social History Narrative    Current every day smoker - as per [employer]      Family History   Problem Relation Age of Onset    Anxiety disorder Mother     No Known Problems Father     Anxiety disorder Sister     Anxiety disorder Maternal Grandmother     Anxiety disorder Maternal Aunt     Hyperlipidemia Family

## 2018-10-17 NOTE — PATIENT INSTRUCTIONS
Acute Nausea and Vomiting   WHAT YOU NEED TO KNOW:   Acute nausea and vomiting start suddenly, worsen quickly, and last a short time  DISCHARGE INSTRUCTIONS:   Return to the emergency department if:   · You see blood in your vomit or your bowel movements  · You have sudden, severe pain in your chest and upper abdomen after hard vomiting or retching  · You have swelling in your neck and chest      · You are dizzy, cold, and thirsty and your eyes and mouth are dry  · You are urinating very little or not at all  · You have muscle weakness, leg cramps, and trouble breathing  · Your heart is beating much faster than normal      · You continue to vomit for more than 48 hours  Contact your healthcare provider if:   · You have frequent dry heaves (vomiting but nothing comes out)  · Your nausea and vomiting does not get better or go away after you use medicine  · You have questions or concerns about your condition or treatment  Medicines: You may need any of the following:  · Medicines  may be given to calm your stomach and stop your vomiting  You may also need medicines to help you feel more relaxed or to stop nausea and vomiting caused by motion sickness  · Gastrointestinal stimulants  are used to help empty your stomach and bowels  This may help decrease nausea and vomiting  · Take your medicine as directed  Contact your healthcare provider if you think your medicine is not helping or if you have side effects  Tell him or her if you are allergic to any medicine  Keep a list of the medicines, vitamins, and herbs you take  Include the amounts, and when and why you take them  Bring the list or the pill bottles to follow-up visits  Carry your medicine list with you in case of an emergency  Prevent or manage acute nausea and vomiting:   · Do not drink alcohol  Alcohol may upset or irritate your stomach  Too much alcohol can also cause acute nausea and vomiting  · Control stress    Headaches due to stress may cause nausea and vomiting  Find ways to relax and manage your stress  Get more rest and sleep  · Drink more liquids as directed  Vomiting can lead to dehydration  It is important to drink more liquids to help replace lost body fluids  Ask your healthcare provider how much liquid to drink each day and which liquids are best for you  Your provider may recommend that you drink an oral rehydration solution (ORS)  ORS contains water, salts, and sugar that are needed to replace the lost body fluids  Ask what kind of ORS to use, how much to drink, and where to get it  · Eat smaller meals, more often  Eat small amounts of food every 2 to 3 hours, even if you are not hungry  Food in your stomach may decrease your nausea  · Talk to your healthcare provider before you take over-the-counter (OTC) medicines  These medicines can cause serious problems if you use certain other medicines, or you have a medical condition  You may have problems if you use too much or use them for longer than the label says  Follow directions on the label carefully  Follow up with your healthcare provider as directed:  Write down your questions so you remember to ask them during your follow-up visits  © 2017 [employer] Information is for End User's use only and may not be sold, redistributed or otherwise used for commercial purposes  All illustrations and images included in CareNotes® are the copyrighted property of [employer]  or [employer]tt [employer]  The above information is an  only  It is not intended as medical advice for individual conditions or treatments  Talk to your doctor, nurse or pharmacist before following any medical regimen to see if it is safe and effective for you

## 2018-10-24 ENCOUNTER — TELEPHONE (OUTPATIENT)
Dept: FAMILY MEDICINE CLINIC | Facility: CLINIC | Age: 23
End: 2018-10-24

## 2018-10-24 NOTE — TELEPHONE ENCOUNTER
Patient experiencing a lot of nausea but not vomiting as much slight pain in left side, has an appointment with surgeon tomorrow  She would like to know what to do about the pain in her left side

## 2018-10-25 ENCOUNTER — OFFICE VISIT (OUTPATIENT)
Dept: SURGERY | Facility: HOSPITAL | Age: 23
End: 2018-10-25
Payer: COMMERCIAL

## 2018-10-25 VITALS
DIASTOLIC BLOOD PRESSURE: 84 MMHG | SYSTOLIC BLOOD PRESSURE: 116 MMHG | HEART RATE: 64 BPM | WEIGHT: 136.2 LBS | TEMPERATURE: 99.1 F | BODY MASS INDEX: 27.46 KG/M2 | HEIGHT: 59 IN

## 2018-10-25 DIAGNOSIS — L91.8 SKIN TAGS, MULTIPLE ACQUIRED: ICD-10-CM

## 2018-10-25 DIAGNOSIS — K82.8 GALLBLADDER SLUDGE: ICD-10-CM

## 2018-10-25 DIAGNOSIS — R11.2 NON-INTRACTABLE VOMITING WITH NAUSEA, UNSPECIFIED VOMITING TYPE: ICD-10-CM

## 2018-10-25 DIAGNOSIS — R10.13 EPIGASTRIC PAIN: Primary | ICD-10-CM

## 2018-10-25 DIAGNOSIS — D22.9 MULTIPLE PIGMENTED NEVI: ICD-10-CM

## 2018-10-25 PROCEDURE — 99244 OFF/OP CNSLTJ NEW/EST MOD 40: CPT | Performed by: SURGERY

## 2018-10-25 NOTE — LETTER
October 29, 2018     Jennifer Ascencio, [address] [place] [address] [address] [place] [address]    Patient: Vero Hanna   YOB: 1995   Date of Visit: 10/25/2018       Dear Dr Patricia Amaya: Thank you for referring Vero Hanna to me for evaluation  Below are my notes for this consultation  If you have questions, please do not hesitate to call me  I look forward to following your patient along with you           Sincerely,        Terry Serrano MD        CC: Sarah Montoya, DO

## 2018-10-25 NOTE — PROGRESS NOTES
Assessment/Plan: Didi Webb is a 20year old female who presents today, per referral by Antonio Joseph, regarding gallbladder issues  Physical exam revealed some mild tenderness of the suprapubic region  She has sludge in her gallbladder, but this does not seem to be causing her symptoms  Recommend she has a HIDA scan to determine if her gallbladder is functioning normally and to see if her symptoms are reproduced  She will follow up after the HIDA scan  She knows to contact the office if any questions or concerns arise  Skin- Multiple pigmented nevi and small skin tag of back  Recommend she has her PCP monitor these annually  No problem-specific Assessment & Plan notes found for this encounter  Diagnoses and all orders for this visit:    Non-intractable vomiting with nausea, unspecified vomiting type  -     Ambulatory referral to General Surgery          Subjective:      Patient ID: Didi Webb is a 20 y o  female  Didi Webb is a 20year old female who presents today, per referral by Antonio Joseph, regarding gallbladder issues  She reports she ate tacos and then had nausea, but not pain  Since then, she has had pain almost every day, but notices it mostly with food  She also reports she ate tacos again, but it did not cause her the same symptoms  She feels nauseas even when she doesn't eat and says she frequently vomits food back up, but denies strong pain  Most of her discomfort is in her LUQ  When she feels nausea, she also feels as though she is bloated  She has tried to limit the amount of food she eats and the types of food she eats, but still has symptoms  She also reports she had sepsis after having her wisdom teeth out and says her stomach has been the same since          The following portions of the patient's history were reviewed and updated as appropriate: allergies, current medications, past family history, past medical history, past social history, past surgical history and problem list     Review of Systems   Constitutional: Negative  HENT: Negative  Eyes: Negative  Respiratory: Negative  Cardiovascular: Negative  Gastrointestinal: Positive for abdominal distention, abdominal pain, nausea and vomiting  Endocrine: Negative  Genitourinary: Negative  Musculoskeletal: Negative  Skin: Negative  Allergic/Immunologic: Negative  Neurological: Negative  Hematological: Negative  Psychiatric/Behavioral: Negative  All other systems reviewed and are negative  Objective:      /84   Pulse 64   Temp 99 1 °F (37 3 °C) (Tympanic)   Ht 4' 11" (1 499 m)   Wt 61 8 kg (136 lb 3 2 oz)   LMP 10/18/2018   BMI 27 51 kg/m²          Physical Exam   Constitutional: She is oriented to person, place, and time  She appears well-developed and well-nourished  No distress  HENT:   Head: Normocephalic and atraumatic  Right Ear: External ear normal    Left Ear: External ear normal    Nose: Nose normal    Mouth/Throat: Oropharynx is clear and moist  No oropharyngeal exudate  Eyes: Pupils are equal, round, and reactive to light  Conjunctivae and EOM are normal  Right eye exhibits no discharge  Left eye exhibits no discharge  No scleral icterus  Neck: Normal range of motion  Neck supple  No JVD present  No tracheal deviation present  No thyromegaly present  Cardiovascular: Normal rate, normal heart sounds and intact distal pulses  Exam reveals no gallop and no friction rub  No murmur heard  Pulmonary/Chest: Effort normal  No stridor  No respiratory distress  She has no wheezes  She has no rales  She exhibits no tenderness  Abdominal: Soft  Bowel sounds are normal  She exhibits no distension and no mass  There is tenderness (Mild, suprapubic)  There is no rebound and no guarding  Musculoskeletal: Normal range of motion  She exhibits no edema, tenderness or deformity  Lymphadenopathy:     She has no cervical adenopathy  Neurological: She is alert and oriented to person, place, and time  No cranial nerve deficit  Coordination normal    Skin: Skin is warm and dry  No rash noted  She is not diaphoretic  No erythema  No pallor  Multiple pigmented nevi and small skin tag of back  Psychiatric: She has a normal mood and affect  Her behavior is normal  Judgment and thought content normal    Nursing note and vitals reviewed  By signing my name below, I, Maria L Chong, attest that this documentation has been prepared under the direction and in the presence of Maria E Foy MD  Electronically Signed: Mustapha Delarosa  10/25/18  Jame Fischer, personally performed the services described in this documentation  All medical record entries made by the shannonibkole were at my direction and in my presence  I have reviewed the chart and discharge instructions and agree that the record reflects my personal performance and is accurate and complete  Maria E Foy MD  10/25/18

## 2018-10-30 ENCOUNTER — HOSPITAL ENCOUNTER (OUTPATIENT)
Dept: NUCLEAR MEDICINE | Facility: HOSPITAL | Age: 23
Discharge: HOME | End: 2018-10-30
Attending: SURGERY
Payer: COMMERCIAL

## 2018-10-30 DIAGNOSIS — R11.2 NON-INTRACTABLE VOMITING WITH NAUSEA, UNSPECIFIED VOMITING TYPE: ICD-10-CM

## 2018-10-30 PROCEDURE — 78227 HEPATOBIL SYST IMAGE W/DRUG: CPT

## 2018-10-30 PROCEDURE — A9537 TC99M MEBROFENIN: HCPCS

## 2018-10-30 RX ADMIN — SINCALIDE 1.2 MCG: 5 INJECTION, POWDER, LYOPHILIZED, FOR SOLUTION INTRAVENOUS at 09:29

## 2018-11-08 ENCOUNTER — OFFICE VISIT (OUTPATIENT)
Dept: FAMILY MEDICINE CLINIC | Facility: CLINIC | Age: 23
End: 2018-11-08
Payer: COMMERCIAL

## 2018-11-08 VITALS
HEIGHT: 59 IN | HEART RATE: 86 BPM | DIASTOLIC BLOOD PRESSURE: 72 MMHG | TEMPERATURE: 98.5 F | SYSTOLIC BLOOD PRESSURE: 110 MMHG | BODY MASS INDEX: 28.06 KG/M2 | WEIGHT: 139.2 LBS

## 2018-11-08 DIAGNOSIS — M25.512 CHRONIC LEFT SHOULDER PAIN: ICD-10-CM

## 2018-11-08 DIAGNOSIS — G89.29 CHRONIC LEFT SHOULDER PAIN: ICD-10-CM

## 2018-11-08 DIAGNOSIS — R11.0 NAUSEA: Primary | ICD-10-CM

## 2018-11-08 PROCEDURE — 99213 OFFICE O/P EST LOW 20 MIN: CPT | Performed by: NURSE PRACTITIONER

## 2018-11-08 RX ORDER — NAPROXEN 250 MG/1
250 TABLET ORAL 2 TIMES DAILY WITH MEALS
Qty: 20 TABLET | Refills: 0 | Status: SHIPPED | OUTPATIENT
Start: 2018-11-08 | End: 2018-11-16 | Stop reason: SINTOL

## 2018-11-08 RX ORDER — LORAZEPAM 1 MG/1
1 TABLET ORAL AS NEEDED
COMMUNITY
Start: 2018-09-22 | End: 2019-01-26

## 2018-11-08 NOTE — PATIENT INSTRUCTIONS
Shoulder Pain   AMBULATORY CARE:   Shoulder pain  is a common problem and can affect your daily activities  Pain can be caused by a problem within your shoulder  Shoulder pain may also be caused by pain that spreads to your shoulder from another part of your body  Seek care immediately if:   · You have severe pain  · You cannot move your arm or shoulder  · You have numbness or tingling in your shoulder or arm  Contact your healthcare provider if:   · Your pain gets worse or does not go away with treatment  · You have trouble moving your arm or shoulder  · You have questions or concerns about your condition or care  Treatment for shoulder pain  may include any of the following:  · Acetaminophen  decreases pain and fever  It is available without a doctor's order  Ask how much to take and how often to take it  Follow directions  Acetaminophen can cause liver damage if not taken correctly  · NSAIDs , such as ibuprofen, help decrease swelling, pain, and fever  This medicine is available with or without a doctor's order  NSAIDs can cause stomach bleeding or kidney problems in certain people  If you take blood thinner medicine, always ask your healthcare provider if NSAIDs are safe for you  Always read the medicine label and follow directions  · A steroid injection  may help decrease pain and swelling  · Surgery  may be needed for long-term pain and loss of function  Manage your symptoms:   · Apply ice  on your shoulder for 20 to 30 minutes every 2 hours or as directed  Use an ice pack, or put crushed ice in a plastic bag  Cover it with a towel  Ice helps prevent tissue damage and decreases swelling and pain  · Apply heat if ice does not help your symptoms  Apply heat on your shoulder for 20 to 30 minutes every 2 hours for as many days as directed  Heat helps decrease pain and muscle spasms  · Go to physical or occupational therapy as directed    A physical therapist teaches you exercises to help improve movement and strength, and to decrease pain  An occupational therapist teaches you skills to help with your daily activities  Prevent shoulder pain:   · Stretch and strengthen your shoulder  Use proper technique during exercises and sports  · Limit activities as directed  Try to avoid repeated overhead movements  Follow up with your healthcare provider or orthopedist as directed:  Write down your questions so you remember to ask them during your visits  © 2017 [facility] [facility] Information is for End User's use only and may not be sold, redistributed or otherwise used for commercial purposes  All illustrations and images included in CareNotes® are the copyrighted property of [employer]  or [employer]  The above information is an  only  It is not intended as medical advice for individual conditions or treatments  Talk to your doctor, nurse or pharmacist before following any medical regimen to see if it is safe and effective for you

## 2018-11-15 ENCOUNTER — TELEPHONE (OUTPATIENT)
Dept: FAMILY MEDICINE CLINIC | Facility: CLINIC | Age: 23
End: 2018-11-15

## 2018-11-15 ENCOUNTER — EVALUATION (OUTPATIENT)
Dept: PHYSICAL THERAPY | Facility: CLINIC | Age: 23
End: 2018-11-15
Payer: COMMERCIAL

## 2018-11-15 DIAGNOSIS — G89.29 CHRONIC LEFT SHOULDER PAIN: Primary | ICD-10-CM

## 2018-11-15 DIAGNOSIS — M25.512 CHRONIC LEFT SHOULDER PAIN: Primary | ICD-10-CM

## 2018-11-15 PROCEDURE — 97110 THERAPEUTIC EXERCISES: CPT | Performed by: PHYSICAL THERAPIST

## 2018-11-15 PROCEDURE — G8991 OTHER PT/OT GOAL STATUS: HCPCS | Performed by: PHYSICAL THERAPIST

## 2018-11-15 PROCEDURE — G8990 OTHER PT/OT CURRENT STATUS: HCPCS | Performed by: PHYSICAL THERAPIST

## 2018-11-15 PROCEDURE — 97161 PT EVAL LOW COMPLEX 20 MIN: CPT | Performed by: PHYSICAL THERAPIST

## 2018-11-15 NOTE — PROGRESS NOTES
PT Evaluation     Today's date: 11/15/2018  Patient name: Brandee Mccarthy  : 1995  MRN: [id number]  Referring provider: ADILENE Lackey  Dx:   Encounter Diagnosis     ICD-10-CM    1  Chronic left shoulder pain M25 512     G89 [id number]                   Assessment  Impairments: abnormal muscle firing, abnormal or restricted ROM, activity intolerance, impaired physical strength, lacks appropriate home exercise program and pain with function    Assessment details: Patient is a 20y o  year old female presenting to physical therapy with left shoulder pain  Patient presents with pain, decreased strength, decreased ROM, and decreased tolerance to activity  Patient would benefit from skilled physical therapy services to address these impairments and to maximize function  Thank you for the referral      Understanding of Dx/Px/POC: excellent  Goals  Impairment Goals:  1 ) Pt will have decreased sx at rest by 50% in 2-4 weeks  2 ) Pt will have improved shoulder range of motion by 10 degrees into flexion/abduction in 2-4 weeks  3 ) Pt will have improved shoulder strength by 1/2 MMT throughout without pain in 4-6 weeks  4 ) Pt will have decreased tenderness to palpation to anterolateral shoulder by 50% in 3-4 weeks  Functional Goals:  1 ) Pt will be independent in their home exercise program in 1 week  2 ) Pt will be able to complete overhead motions without pain in 5-6 weeks  3 ) Pt will be able to complete all ADLs without pain in 6-8 weeks  4 ) Pt will have an improved FOTO score of 67/100 in 6-8 weeks        Plan  Patient would benefit from: skilled PT  Planned modality interventions: TENS  Planned therapy interventions: joint mobilization, body mechanics training, balance, patient education, therapeutic exercise, home exercise program, neuromuscular re-education and strengthening  Frequency: 2x week (1-2x/week)  Duration in weeks: 8  Treatment plan discussed with: patient        Subjective Evaluation    History of Present Illness  Mechanism of injury: Patient is a 20 y o  female presenting to physical therapy with acute on chronic left shoulder pain  Pt reports hx of shoulder pain starting about 8 years ago, noted she was walking her large dog and it jolted her shoulder out of the socket and she ended up at the hospital  Has been to physical therapy about 5 years ago for continued shoulder pain  Recent JING is unknown, started about 3 months ago  She does report that she was working as a medical assistant requiring repetitive lifting, stopped at 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 and became a full time student  She describes point tenderness on the lateral aspect of her left shoulder  Pt is right hand dominant  She notes she feels clicking, popping out of the socket at times, and her sleep is very disturbed (gets about an hour a night)  Pt describes the pain as sharp when she actively moves her shoulder and a constant achy pain during her day  She reports some shooting and tingling sx that can radiate down towards her elbow during active motion  Aggs: reaching behind her back, reaching away from her body, bearing weight on L UE, getting dressed, driving, sleeping  Eases: Aleve, heat/cold compresses (minimal effect), Naproxen (not helping), Sitting down in a chair with supported arm  Pain  Current pain ratin  At best pain ratin  At worst pain rating: 10  Quality: sharp and dull ache  Relieving factors: ice, heat, medications and rest  Aggravating factors: overhead activity and lifting  Progression: worsening    Treatments  Previous treatment: physical therapy  Patient Goals  Patient goals for therapy: decreased pain and increased motion          Objective     Postural Observations  Seated posture: fair  Standing posture: fair  Correction of posture: makes symptoms better        Observations   Left Shoulder   Negative for deformity  Palpation   Left   No palpable tenderness to the anterior deltoid  Tenderness of the biceps, infraspinatus, levator scapulae, subscapularis, supraspinatus, teres major, teres minor and upper trapezius  Tenderness     Left Shoulder   Tenderness in the biceps tendon (proximal), bicipital groove, infraspinatus tendon, subscapularis tendon and supraspinatus tendon  No tenderness in the [facility] joint and acromion  Cervical/Thoracic Screen   Cervical range of motion within normal limits    Neurological Testing     Sensation     Shoulder   Left Shoulder   Intact: light touch    Right Shoulder   Intact: light touch    Active Range of Motion   Left Shoulder   Flexion: 30 degrees with pain  Abduction: 25 degrees with pain  External rotation 0°: 35 degrees   External rotation BTH: Active external rotation behind the head: Declined to attempt  with pain  Internal rotation 0°: Left shoulder active internal rotation at 0 degrees: to stomach  Internal rotation BTB: sacrum with pain    Right Shoulder   Normal active range of motion  Flexion: 180 degrees   Abduction: 180 degrees   External rotation BTH: C7   Internal rotation BTB: T12     Passive Range of Motion   Left Shoulder   Flexion: 76 degrees with pain  Abduction: 102 degrees with pain  External rotation 45°: 65 degrees   Internal rotation 0°: WFL    Additional Passive Range of Motion Details  Pt with much difficulty during PROM with muscle guarding throughout motion    Joint Play     Additional Joint Play Details  NT at this time due to pain levels    Strength/Myotome Testing     Left Shoulder     Planes of Motion   Flexion: 3+ (P!)   Extension: 4-   Abduction: 3+ (P!)   External rotation at 0°: 4- (P!)   Internal rotation at 0°: 4-     Isolated Muscles   Biceps: 3+ (P!)     Right Shoulder     Planes of Motion   Flexion: 4   Extension: 4   Abduction: 4-   External rotation at 0°: 4-   Internal rotation at 0°: 4-     Isolated Muscles   Biceps: 4     Tests     Left Shoulder   Positive crank, empty can, full can and Hawkin's     Negative belly press and laxity (step off)  Flowsheet Rows      Most Recent Value   PT/OT G-Codes   Current Score  37   Projected Score  67   FOTO information reviewed  Yes   Assessment Type  Evaluation   G code set  Other PT/OT Primary   Other PT Primary Current Status ()  CL   Other PT Primary Goal Status ()  CJ       Precautions: Type I DM, Depression    Daily Treatment Diary       Manuals 11/15            Gentle PROM L UE nv                                                   Exercise Diary              Scapular retractions 10x10''            Self UT/Lev 5x15''            Pendulums 3x30''                                                                                                                                                                                                                                                                   Modalities                                         **Pt did not show up for follow up PT appointments, DC from PT at this time  **

## 2018-11-15 NOTE — TELEPHONE ENCOUNTER
Patient was in to see Sayra Young last week and is still having bad shoulder pain and Aleve is not helping she would like to know what else she can do  She has been doing PT   Please call @ [phone]

## 2018-11-16 RX ORDER — DICLOFENAC SODIUM 75 MG/1
75 TABLET, DELAYED RELEASE ORAL 2 TIMES DAILY
Qty: 40 TABLET | Refills: 0 | Status: SHIPPED | OUTPATIENT
Start: 2018-11-16 | End: 2019-01-08 | Stop reason: ALTCHOICE

## 2018-11-16 NOTE — TELEPHONE ENCOUNTER
I called and spoke with the patient on [phone] at 5:14 p patrick Nunez She states that she is still having a lot of severe pain in her left shoulder since seeing our CRNP on 11/8/2018  She states that the naproxen is not helping her  She is starting physical therapy and will be seeing Orthopedics next week  She states she has not had any type of injury to her shoulder but she has had chronic pain in her left shoulder for years  I advised her that I will call in a more potent anti-inflammatory for her-I sent the prescription for the Voltaren as ordered  She will follow up with physical therapy and Ortho as scheduled  I advised her to call us with any additional questions that she may have  I advised her to discontinue the naproxen at this point

## 2018-11-20 ENCOUNTER — OFFICE VISIT (OUTPATIENT)
Dept: OBGYN CLINIC | Facility: CLINIC | Age: 23
End: 2018-11-20
Payer: COMMERCIAL

## 2018-11-20 ENCOUNTER — APPOINTMENT (OUTPATIENT)
Dept: RADIOLOGY | Facility: CLINIC | Age: 23
End: 2018-11-20
Payer: COMMERCIAL

## 2018-11-20 VITALS
WEIGHT: 139 LBS | BODY MASS INDEX: 28.02 KG/M2 | HEIGHT: 59 IN | HEART RATE: 78 BPM | SYSTOLIC BLOOD PRESSURE: 100 MMHG | DIASTOLIC BLOOD PRESSURE: 66 MMHG

## 2018-11-20 DIAGNOSIS — M25.512 CHRONIC LEFT SHOULDER PAIN: ICD-10-CM

## 2018-11-20 DIAGNOSIS — S43.432A LABRAL TEAR OF SHOULDER, LEFT, INITIAL ENCOUNTER: Primary | ICD-10-CM

## 2018-11-20 DIAGNOSIS — G89.29 CHRONIC LEFT SHOULDER PAIN: ICD-10-CM

## 2018-11-20 DIAGNOSIS — M25.512 LEFT SHOULDER PAIN, UNSPECIFIED CHRONICITY: ICD-10-CM

## 2018-11-20 PROCEDURE — 73030 X-RAY EXAM OF SHOULDER: CPT

## 2018-11-20 PROCEDURE — 99243 OFF/OP CNSLTJ NEW/EST LOW 30: CPT | Performed by: ORTHOPAEDIC SURGERY

## 2018-11-20 NOTE — PROGRESS NOTES
Ortho Sports Medicine Shoulder Visit     Assesment:   20year old right hand dominant female with left shoulder labral tear secondary to multiple dislocations  Plan:    Conservative treatment:    Ice to shoulder 1-2 times daily, for 20 minutes at a time  Imaging: All imaging from today was reviewed by myself and explained to the patient  We will obtain an MR Arthrogram of the shoulder to rule out labral tearing and injury to anterior shoulder capsule, as well as for possible shoulder surgery planning  Injection:    No Injection planned at this time  Surgery:     No surgery is recommended at this point, continue with conservative treatment plan as noted  Follow up: 1-2 weeks for MRI review  Will make a definitive treatment plan based on the results of the MRI  Chief Complaint   Patient presents with    Left Shoulder - Pain       History of Present Illness: The patient is a 20 y o , right hand dominant female whose occupation is unemployed, referred to me by their primary care physician, seen in clinic for consultation of left shoulder pain  The patient has a history of type I diabetes  The patient denies a history of thyroid disorder  The patient sustained an injury 7 years ago when she was walking her dog  The dog pulled forward and her shoulder dislocated  She went to the ED and it was reduced there  Her shoulder felt better following but would have intermittent pain  In 2016 her pain started to increase  She had an MRI of the shoulder which showed subacromial bursitis  She did physical therapy and began to feel better  About a week ago, the pain started again after she reached up to grab something and felt her shoulder subluxate  At today's visit, the pain is located anterior, posterior, lateral   The patient rates the pain as a 7/10  The pain has been present for 1 month  The pain is characterized as sharp, stabbing, dull, achy    The pain is present at all times  Pain is improved by rest, ice and physical therapy  Pain is aggravated by overhead activity, reaching back, lifting  and exercising  Symptoms include clicking, catching, popping, cracking and locking  The patient denies weakness  The patient denies numbness and tingling  The patient has tried rest, ice and physical therapy  Shoulder Surgical History:  None    Past Medical, Social and Family History:  Past Medical History:   Diagnosis Date    Allergic dermatitis     Resolved 3/2/2016     Diabetes ([place] [address] )     Diabetes mellitus type 1 ([place] [address] )     Diabetic ketoacidosis without coma associated with type 1 diabetes mellitus ([place] [address] ) 2/22/2016    Gastroenteritis 2/22/2016    Left upper extremity numbness     Resolved 11/1/2016     Psychiatric disorder     Varicella      Past Surgical History:   Procedure Laterality Date    WISDOM TOOTH EXTRACTION Bilateral      Allergies   Allergen Reactions    Reglan [Metoclopramide] Anxiety     Current Outpatient Prescriptions on File Prior to Visit   Medication Sig Dispense Refill    diclofenac (VOLTAREN) 75 mg EC tablet Take 1 tablet (75 mg total) by mouth 2 (two) times a day 40 tablet 0    insulin glulisine (APIDRA) 100 units/mL injection Up to 100 units daily via pump      LORazepam (ATIVAN) 1 mg tablet Take 1 mg by mouth       No current facility-administered medications on file prior to visit  Social History     Social History    Marital status: Single     Spouse name: N/A    Number of children: N/A    Years of education: N/A     Occupational History    Not on file       Social History Main Topics    Smoking status: Former Smoker     Packs/day: 0 50     Years: 4 00     Types: Cigarettes    Smokeless tobacco: Never Used    Alcohol use Yes      Comment: rare     Drug use: No    Sexual activity: Not on file     Other Topics Concern    Not on file     Social History Narrative    Current every day smoker - as per [employer]          I have reviewed the past medical, surgical, social and family history, medications and allergies as documented in the EMR  Review of systems: ROS is negative other than that noted in the HPI  Constitutional: Negative for fatigue and fever  HENT: Negative for sore throat  Respiratory: Negative for shortness of breath  Cardiovascular: Negative for chest pain  Gastrointestinal: Negative for abdominal pain  Endocrine: Negative for cold intolerance and heat intolerance  Genitourinary: Negative for flank pain  Musculoskeletal: Negative for back pain  Skin: Negative for rash  Allergic/Immunologic: Negative for immunocompromised state  Neurological: Negative for dizziness  Psychiatric/Behavioral: Negative for agitation  Physical Exam:    Blood pressure 100/66, pulse 78, height 4' 11" (1 499 m), weight 63 kg (139 lb), not currently breastfeeding  General/Constitutional: NAD, well developed, well nourished  HENT: Normocephalic, atraumatic  CV: Intact distal pulses, regular rate  Resp: No respiratory distress or labored breathing  Lymphatic: No lymphadenopathy palpated  Neuro: Alert and Oriented x 3, no focal deficits  Psych: Normal mood, normal affect, normal judgement, normal behavior  Skin: Warm, dry, no rashes, no erythema    Shoulder Exam (focused):     Shoulder focused exam:       RIGHT LEFT    Scapula Atrophy Negative Negative     Winging Negative Negative     Protraction Negative Negative    Rotator cuff SS 5/5 5/5     IS 5/5 5/5     SubS 5/5 5/5    AROM  40; limited to pain     ER0 60 60     ER90 90 90     IR90 40 40     IRb T6 Unable to assess    TTP: AC Negative Negative     Biceps Negative Positive     Coracoid Negative Negative    Special Tests: O'Briens Negative Negative     Vogt-shear Negative Negative     Cross body Adduction Negative Negative     Speeds  Negative Negative     Deandra's Negative Negative     Whipple Negative Negative       Neer Negative Negative     [place] Negative Negative    Instability: Apprehension & relocation negative positive     Load & shift negative positive    Other: Crank Negative Positive               UE NV Exam: +2 Radial pulses bilaterally  Sensation intact to light touch C5-T1 bilaterally, Radial/median/ulnar nerve motor intact    Bilateral elbow, wrist, and and forearm ROM full, painless with passive ROM, no ttp or crepitance throughout extremities below shoulder joint    Cervical ROM is full without pain, numbness or tingling      Shoulder Imaging    X-rays of the left shoulder were reviewed, which demonstrate no acute fracture, dislocation, or osseous abnormality  I have reviewed the radiology report and do not currently have a radiology reading from [facility], but will check the result once the reading is performed

## 2018-11-20 NOTE — LETTER
November 20, 2018     [address], [address] [address]    Patient: Jeff Huddleston   YOB: 1995   Date of Visit: 11/20/2018       Dear Dr Lisa Gamboa: Thank you for referring Jeff Huddleston to me for evaluation  Below are my notes for this consultation  If you have questions, please do not hesitate to call me  I look forward to following your patient along with you  Sincerely,        Kathy Tesfaye DO        CC: No Recipients  Kathy Tesfaye DO  11/20/2018  3:06 PM  Sign at close encounter  Ortho Sports Medicine Shoulder Visit     Assesment:   20year old right hand dominant female with left shoulder labral tear secondary to multiple dislocations  Plan:    Conservative treatment:    Ice to shoulder 1-2 times daily, for 20 minutes at a time  Imaging: All imaging from today was reviewed by myself and explained to the patient  We will obtain an MR Arthrogram of the shoulder to rule out labral tearing and injury to anterior shoulder capsule, as well as for possible shoulder surgery planning  Injection:    No Injection planned at this time  Surgery:     No surgery is recommended at this point, continue with conservative treatment plan as noted  Follow up: 1-2 weeks for MRI review  Will make a definitive treatment plan based on the results of the MRI  Chief Complaint   Patient presents with    Left Shoulder - Pain       History of Present Illness: The patient is a 20 y o , right hand dominant female whose occupation is unemployed, referred to me by their primary care physician, seen in clinic for consultation of left shoulder pain  The patient has a history of type I diabetes  The patient denies a history of thyroid disorder  The patient sustained an injury 7 years ago when she was walking her dog  The dog pulled forward and her shoulder dislocated  She went to the ED and it was reduced there   Her shoulder felt better following but would have intermittent pain  In 2016 her pain started to increase  She had an MRI of the shoulder which showed subacromial bursitis  She did physical therapy and began to feel better  About a week ago, the pain started again after she reached up to grab something and felt her shoulder subluxate  At today's visit, the pain is located anterior, posterior, lateral   The patient rates the pain as a 7/10  The pain has been present for 1 month  The pain is characterized as sharp, stabbing, dull, achy  The pain is present at all times  Pain is improved by rest, ice and physical therapy  Pain is aggravated by overhead activity, reaching back, lifting  and exercising  Symptoms include clicking, catching, popping, cracking and locking  The patient denies weakness  The patient denies numbness and tingling  The patient has tried rest, ice and physical therapy  Shoulder Surgical History:  None    Past Medical, Social and Family History:  Past Medical History:   Diagnosis Date    Allergic dermatitis     Resolved 3/2/2016     Diabetes ([facility] )     Diabetes mellitus type 1 ([facility] )     Diabetic ketoacidosis without coma associated with type 1 diabetes mellitus ([facility] ) 2/22/2016    Gastroenteritis 2/22/2016    Left upper extremity numbness     Resolved 11/1/2016     Psychiatric disorder     Varicella      Past Surgical History:   Procedure Laterality Date    WISDOM TOOTH EXTRACTION Bilateral      Allergies   Allergen Reactions    Reglan [Metoclopramide] Anxiety     Current Outpatient Prescriptions on File Prior to Visit   Medication Sig Dispense Refill    diclofenac (VOLTAREN) 75 mg EC tablet Take 1 tablet (75 mg total) by mouth 2 (two) times a day 40 tablet 0    insulin glulisine (APIDRA) 100 units/mL injection Up to 100 units daily via pump      LORazepam (ATIVAN) 1 mg tablet Take 1 mg by mouth       No current facility-administered medications on file prior to visit  Social History     Social History    Marital status: Single     Spouse name: N/A    Number of children: N/A    Years of education: N/A     Occupational History    Not on file  Social History Main Topics    Smoking status: Former Smoker     Packs/day: 0 50     Years: 4 00     Types: Cigarettes    Smokeless tobacco: Never Used    Alcohol use Yes      Comment: rare     Drug use: No    Sexual activity: Not on file     Other Topics Concern    Not on file     Social History Narrative    Current every day smoker - as per [employer]          I have reviewed the past medical, surgical, social and family history, medications and allergies as documented in the EMR  Review of systems: ROS is negative other than that noted in the HPI  Constitutional: Negative for fatigue and fever  HENT: Negative for sore throat  Respiratory: Negative for shortness of breath  Cardiovascular: Negative for chest pain  Gastrointestinal: Negative for abdominal pain  Endocrine: Negative for cold intolerance and heat intolerance  Genitourinary: Negative for flank pain  Musculoskeletal: Negative for back pain  Skin: Negative for rash  Allergic/Immunologic: Negative for immunocompromised state  Neurological: Negative for dizziness  Psychiatric/Behavioral: Negative for agitation  Physical Exam:    Blood pressure 100/66, pulse 78, height 4' 11" (1 499 m), weight 63 kg (139 lb), not currently breastfeeding  General/Constitutional: NAD, well developed, well nourished  HENT: Normocephalic, atraumatic  CV: Intact distal pulses, regular rate  Resp: No respiratory distress or labored breathing  Lymphatic: No lymphadenopathy palpated  Neuro: Alert and Oriented x 3, no focal deficits  Psych: Normal mood, normal affect, normal judgement, normal behavior  Skin: Warm, dry, no rashes, no erythema    Shoulder Exam (focused):     Shoulder focused exam:       RIGHT LEFT    Scapula Atrophy Negative Negative     Winging Negative Negative     Protraction Negative Negative    Rotator cuff SS 5/5 5/5     IS 5/5 5/5     SubS 5/5 5/5    AROM  40; limited to pain     ER0 60 60     ER90 90 90     IR90 40 40     IRb T6 Unable to assess    TTP: AC Negative Negative     Biceps Negative Positive     Coracoid Negative Negative    Special Tests: O'Briens Negative Negative     Vogt-shear Negative Negative     Cross body Adduction Negative Negative     Speeds  Negative Negative     Deandra's Negative Negative     Whipple Negative Negative       Neer Negative Negative     [place] Negative Negative    Instability: Apprehension & relocation negative positive     Load & shift negative positive    Other: Crank Negative Positive               UE NV Exam: +2 Radial pulses bilaterally  Sensation intact to light touch C5-T1 bilaterally, Radial/median/ulnar nerve motor intact    Bilateral elbow, wrist, and and forearm ROM full, painless with passive ROM, no ttp or crepitance throughout extremities below shoulder joint    Cervical ROM is full without pain, numbness or tingling      Shoulder Imaging    X-rays of the left shoulder were reviewed, which demonstrate no acute fracture, dislocation, or osseous abnormality  I have reviewed the radiology report and do not currently have a radiology reading from [facility], but will check the result once the reading is performed

## 2018-11-22 ENCOUNTER — HOSPITAL ENCOUNTER (EMERGENCY)
Facility: HOSPITAL | Age: 23
Discharge: HOME | End: 2018-11-22
Attending: EMERGENCY MEDICINE
Payer: COMMERCIAL

## 2018-11-22 VITALS
SYSTOLIC BLOOD PRESSURE: 142 MMHG | RESPIRATION RATE: 18 BRPM | OXYGEN SATURATION: 100 % | HEART RATE: 95 BPM | WEIGHT: 139 LBS | TEMPERATURE: 97.1 F | BODY MASS INDEX: 28.07 KG/M2 | DIASTOLIC BLOOD PRESSURE: 81 MMHG

## 2018-11-22 DIAGNOSIS — R11.10 VOMITING: Primary | ICD-10-CM

## 2018-11-22 LAB
ACETONE SERPL-MCNC: NEGATIVE MG/DL
ALBUMIN SERPL BCP-MCNC: 4 G/DL (ref 3.5–5)
ALP SERPL-CCNC: 82 U/L (ref 46–116)
ALT SERPL W P-5'-P-CCNC: 27 U/L (ref 12–78)
ANION GAP SERPL CALCULATED.3IONS-SCNC: 9 MMOL/L (ref 4–13)
AST SERPL W P-5'-P-CCNC: 23 U/L (ref 5–45)
BASE EXCESS BLDA CALC-SCNC: 5 MMOL/L (ref -2–3)
BASOPHILS # BLD AUTO: 0.07 THOUSANDS/ΜL (ref 0–0.1)
BASOPHILS NFR BLD AUTO: 0 % (ref 0–1)
BILIRUB SERPL-MCNC: 0.2 MG/DL (ref 0.2–1)
BUN SERPL-MCNC: 10 MG/DL (ref 5–25)
CALCIUM SERPL-MCNC: 9.3 MG/DL (ref 8.3–10.1)
CHLORIDE SERPL-SCNC: 101 MMOL/L (ref 100–108)
CLARITY, POC: CLEAR
CO2 SERPL-SCNC: 31 MMOL/L (ref 21–32)
COLOR, POC: YELLOW
CREAT SERPL-MCNC: 0.63 MG/DL (ref 0.6–1.3)
EOSINOPHIL # BLD AUTO: 0.1 THOUSAND/ΜL (ref 0–0.61)
EOSINOPHIL NFR BLD AUTO: 1 % (ref 0–6)
ERYTHROCYTE [DISTWIDTH] IN BLOOD BY AUTOMATED COUNT: 11.9 % (ref 11.6–15.1)
EXT BILIRUBIN, UA: NORMAL
EXT BLOOD URINE: NORMAL
EXT GLUCOSE, UA: NORMAL
EXT KETONES: NORMAL
EXT NITRITE, UA: NORMAL
EXT PH, UA: 7.5
EXT PREG TEST URINE: NORMAL
EXT PROTEIN, UA: NORMAL
EXT SPECIFIC GRAVITY, UA: 1
EXT UROBILINOGEN: 0.2
GFR SERPL CREATININE-BSD FRML MDRD: 125 ML/MIN/1.73SQ M
GLUCOSE SERPL-MCNC: 221 MG/DL (ref 65–140)
GLUCOSE SERPL-MCNC: 256 MG/DL (ref 65–140)
HCO3 BLDA-SCNC: 31.8 MMOL/L (ref 24–30)
HCT VFR BLD AUTO: 41.6 % (ref 34.8–46.1)
HGB BLD-MCNC: 14.1 G/DL (ref 11.5–15.4)
IMM GRANULOCYTES # BLD AUTO: 0.11 THOUSAND/UL (ref 0–0.2)
IMM GRANULOCYTES NFR BLD AUTO: 1 % (ref 0–2)
LYMPHOCYTES # BLD AUTO: 2.51 THOUSANDS/ΜL (ref 0.6–4.47)
LYMPHOCYTES NFR BLD AUTO: 16 % (ref 14–44)
MCH RBC QN AUTO: 31.4 PG (ref 26.8–34.3)
MCHC RBC AUTO-ENTMCNC: 33.9 G/DL (ref 31.4–37.4)
MCV RBC AUTO: 93 FL (ref 82–98)
MONOCYTES # BLD AUTO: 0.77 THOUSAND/ΜL (ref 0.17–1.22)
MONOCYTES NFR BLD AUTO: 5 % (ref 4–12)
NEUTROPHILS # BLD AUTO: 12.27 THOUSANDS/ΜL (ref 1.85–7.62)
NEUTS SEG NFR BLD AUTO: 77 % (ref 43–75)
NRBC BLD AUTO-RTO: 0 /100 WBCS
PCO2 BLD: 33 MMOL/L (ref 21–32)
PCO2 BLD: 53.2 MM HG (ref 42–50)
PH BLD: 7.38 [PH] (ref 7.3–7.4)
PLATELET # BLD AUTO: 357 THOUSANDS/UL (ref 149–390)
PMV BLD AUTO: 9.9 FL (ref 8.9–12.7)
PO2 BLD: 27 MM HG (ref 35–45)
POTASSIUM SERPL-SCNC: 4.3 MMOL/L (ref 3.5–5.3)
PROT SERPL-MCNC: 8.2 G/DL (ref 6.4–8.2)
RBC # BLD AUTO: 4.49 MILLION/UL (ref 3.81–5.12)
SAO2 % BLD FROM PO2: 47 % (ref 95–98)
SODIUM SERPL-SCNC: 141 MMOL/L (ref 136–145)
SPECIMEN SOURCE: ABNORMAL
WBC # BLD AUTO: 15.83 THOUSAND/UL (ref 4.31–10.16)
WBC # BLD EST: NORMAL 10*3/UL

## 2018-11-22 PROCEDURE — 81025 URINE PREGNANCY TEST: CPT | Performed by: EMERGENCY MEDICINE

## 2018-11-22 PROCEDURE — 82009 KETONE BODYS QUAL: CPT | Performed by: EMERGENCY MEDICINE

## 2018-11-22 PROCEDURE — 96375 TX/PRO/DX INJ NEW DRUG ADDON: CPT

## 2018-11-22 PROCEDURE — 82803 BLOOD GASES ANY COMBINATION: CPT

## 2018-11-22 PROCEDURE — 36415 COLL VENOUS BLD VENIPUNCTURE: CPT | Performed by: EMERGENCY MEDICINE

## 2018-11-22 PROCEDURE — 99284 EMERGENCY DEPT VISIT MOD MDM: CPT

## 2018-11-22 PROCEDURE — 96361 HYDRATE IV INFUSION ADD-ON: CPT

## 2018-11-22 PROCEDURE — 93005 ELECTROCARDIOGRAM TRACING: CPT

## 2018-11-22 PROCEDURE — 85025 COMPLETE CBC W/AUTO DIFF WBC: CPT | Performed by: EMERGENCY MEDICINE

## 2018-11-22 PROCEDURE — 81002 URINALYSIS NONAUTO W/O SCOPE: CPT | Performed by: EMERGENCY MEDICINE

## 2018-11-22 PROCEDURE — 96376 TX/PRO/DX INJ SAME DRUG ADON: CPT

## 2018-11-22 PROCEDURE — 82948 REAGENT STRIP/BLOOD GLUCOSE: CPT

## 2018-11-22 PROCEDURE — 80053 COMPREHEN METABOLIC PANEL: CPT | Performed by: EMERGENCY MEDICINE

## 2018-11-22 PROCEDURE — 96374 THER/PROPH/DIAG INJ IV PUSH: CPT

## 2018-11-22 RX ORDER — ONDANSETRON 4 MG/1
4 TABLET, FILM COATED ORAL EVERY 6 HOURS PRN
Qty: 15 TABLET | Refills: 0 | Status: SHIPPED | OUTPATIENT
Start: 2018-11-22 | End: 2019-01-08 | Stop reason: ALTCHOICE

## 2018-11-22 RX ORDER — SODIUM CHLORIDE 9 MG/ML
1000 INJECTION, SOLUTION INTRAVENOUS ONCE
Status: DISCONTINUED | OUTPATIENT
Start: 2018-11-22 | End: 2018-11-22

## 2018-11-22 RX ORDER — ONDANSETRON 2 MG/ML
4 INJECTION INTRAMUSCULAR; INTRAVENOUS ONCE
Status: COMPLETED | OUTPATIENT
Start: 2018-11-22 | End: 2018-11-22

## 2018-11-22 RX ORDER — PROMETHAZINE HYDROCHLORIDE 25 MG/ML
25 INJECTION, SOLUTION INTRAMUSCULAR; INTRAVENOUS ONCE
Status: COMPLETED | OUTPATIENT
Start: 2018-11-22 | End: 2018-11-22

## 2018-11-22 RX ORDER — ONDANSETRON 2 MG/ML
INJECTION INTRAMUSCULAR; INTRAVENOUS
Status: COMPLETED
Start: 2018-11-22 | End: 2018-11-22

## 2018-11-22 RX ORDER — LORAZEPAM 2 MG/ML
0.5 INJECTION INTRAMUSCULAR ONCE
Status: COMPLETED | OUTPATIENT
Start: 2018-11-22 | End: 2018-11-22

## 2018-11-22 RX ADMIN — ONDANSETRON 4 MG: 2 INJECTION, SOLUTION INTRAMUSCULAR; INTRAVENOUS at 08:02

## 2018-11-22 RX ADMIN — ONDANSETRON 4 MG: 2 INJECTION, SOLUTION INTRAMUSCULAR; INTRAVENOUS at 06:40

## 2018-11-22 RX ADMIN — PROMETHAZINE HYDROCHLORIDE 25 MG: 25 INJECTION INTRAMUSCULAR; INTRAVENOUS at 06:57

## 2018-11-22 RX ADMIN — LORAZEPAM 0.5 MG: 2 INJECTION INTRAMUSCULAR; INTRAVENOUS at 08:26

## 2018-11-22 RX ADMIN — ONDANSETRON 4 MG: 2 INJECTION INTRAMUSCULAR; INTRAVENOUS at 08:02

## 2018-11-22 RX ADMIN — SODIUM CHLORIDE 1000 ML: 0.9 INJECTION, SOLUTION INTRAVENOUS at 08:01

## 2018-11-22 RX ADMIN — SODIUM CHLORIDE 1000 ML: 0.9 INJECTION, SOLUTION INTRAVENOUS at 06:37

## 2018-11-22 NOTE — DISCHARGE INSTRUCTIONS
Acute Nausea and Vomiting   WHAT YOU NEED TO KNOW:   Acute nausea and vomiting start suddenly, worsen quickly, and last a short time  DISCHARGE INSTRUCTIONS:   Return to the emergency department if:   · You see blood in your vomit or your bowel movements  · You have sudden, severe pain in your chest and upper abdomen after hard vomiting or retching  · You have swelling in your neck and chest      · You are dizzy, cold, and thirsty and your eyes and mouth are dry  · You are urinating very little or not at all  · You have muscle weakness, leg cramps, and trouble breathing  · Your heart is beating much faster than normal      · You continue to vomit for more than 48 hours  Contact your healthcare provider if:   · You have frequent dry heaves (vomiting but nothing comes out)  · Your nausea and vomiting does not get better or go away after you use medicine  · You have questions or concerns about your condition or treatment  Medicines: You may need any of the following:  · Medicines  may be given to calm your stomach and stop your vomiting  You may also need medicines to help you feel more relaxed or to stop nausea and vomiting caused by motion sickness  · Gastrointestinal stimulants  are used to help empty your stomach and bowels  This may help decrease nausea and vomiting  · Take your medicine as directed  Contact your healthcare provider if you think your medicine is not helping or if you have side effects  Tell him or her if you are allergic to any medicine  Keep a list of the medicines, vitamins, and herbs you take  Include the amounts, and when and why you take them  Bring the list or the pill bottles to follow-up visits  Carry your medicine list with you in case of an emergency  Prevent or manage acute nausea and vomiting:   · Do not drink alcohol  Alcohol may upset or irritate your stomach  Too much alcohol can also cause acute nausea and vomiting  · Control stress    Headaches due to stress may cause nausea and vomiting  Find ways to relax and manage your stress  Get more rest and sleep  · Drink more liquids as directed  Vomiting can lead to dehydration  It is important to drink more liquids to help replace lost body fluids  Ask your healthcare provider how much liquid to drink each day and which liquids are best for you  Your provider may recommend that you drink an oral rehydration solution (ORS)  ORS contains water, salts, and sugar that are needed to replace the lost body fluids  Ask what kind of ORS to use, how much to drink, and where to get it  · Eat smaller meals, more often  Eat small amounts of food every 2 to 3 hours, even if you are not hungry  Food in your stomach may decrease your nausea  · Talk to your healthcare provider before you take over-the-counter (OTC) medicines  These medicines can cause serious problems if you use certain other medicines, or you have a medical condition  You may have problems if you use too much or use them for longer than the label says  Follow directions on the label carefully  Follow up with your healthcare provider as directed:  Write down your questions so you remember to ask them during your follow-up visits  © 2017 [employer] Information is for End User's use only and may not be sold, redistributed or otherwise used for commercial purposes  All illustrations and images included in CareNotes® are the copyrighted property of [employer]  or Rhett Preston  The above information is an  only  It is not intended as medical advice for individual conditions or treatments  Talk to your doctor, nurse or pharmacist before following any medical regimen to see if it is safe and effective for you

## 2018-11-22 NOTE — ED PROVIDER NOTES
History  Chief Complaint   Patient presents with    Vomiting     Per patient, "i've been having anxiety and vomiting since 3am, and my blood sugar dropped  the lowest was 31, took glucagon, and it went up to 12 "     20year old female with type 1 diabetes presents for evaluation of nausea and vomiting since 3 am this morning  Patient states that she was feeling shaky last night and checked her blood glucose level which had dropped as low as 31  She removed her insulin pump and gave herself a 1 mg IM glucagon injection with increase in blood glucose to 165  Patient states that she had been having increased anxiety due to her hypoglycemia and began having multiple episodes of emesis since giving herself glucagon  She took 2 mg zofran at home with no improvement in symptoms  Patient denies any chest pain, back pain or abdominal pain  No recent fevers, chills, cough or congestion  Patient states she had been in her normal state of health prior to the onset of these symptoms last night  History provided by:  Patient  Vomiting   Severity:  Severe  Duration:  4 hours  Timing:  Constant  Number of daily episodes:  Multiple  Quality:  Stomach contents  Progression:  Unchanged  Chronicity:  New  Relieved by:  Nothing  Ineffective treatments:  Antiemetics  Associated symptoms: no abdominal pain, no chills, no cough, no diarrhea, no fever, no headaches, no myalgias and no sore throat    Risk factors: diabetes        Prior to Admission Medications   Prescriptions Last Dose Informant Patient Reported? Taking?    LORazepam (ATIVAN) 1 mg tablet Unknown at Unknown time Self Yes No   Sig: Take 1 mg by mouth   diclofenac (VOLTAREN) 75 mg EC tablet Unknown at Unknown time  No No   Sig: Take 1 tablet (75 mg total) by mouth 2 (two) times a day   insulin glulisine (APIDRA) 100 units/mL injection 11/21/2018 at Unknown time Self Yes Yes   Sig: Up to 100 units daily via pump      Facility-Administered Medications: None       Past Medical History:   Diagnosis Date    Allergic dermatitis     Resolved 3/2/2016     Diabetes ([facility] )     Diabetes mellitus type 1 ([facility] )     Diabetic ketoacidosis without coma associated with type [facility] mellitus [facility] 2/22/2016    Gastroenteritis 2/22/2016    Left upper extremity numbness     Resolved 11/1/2016     Psychiatric disorder     Varicella        Past Surgical History:   Procedure Laterality Date    WISDOM TOOTH EXTRACTION Bilateral        Family History   Problem Relation Age of Onset    Anxiety disorder Mother     No Known Problems Father     Anxiety disorder Sister     Anxiety disorder Maternal Grandmother     Anxiety disorder Maternal Aunt     Hyperlipidemia Family      I have reviewed and agree with the history as documented  Social History   Substance Use Topics    Smoking status: Former Smoker     Packs/day: 0 50     Years: 4 00     Types: Cigarettes    Smokeless tobacco: Never Used    Alcohol use Yes      Comment: rare         Review of Systems   Constitutional: Negative for chills and fever  HENT: Negative for congestion, rhinorrhea and sore throat  Respiratory: Negative for cough, chest tightness and shortness of breath  Cardiovascular: Negative for chest pain, palpitations and leg swelling  Gastrointestinal: Positive for nausea and vomiting  Negative for abdominal pain, constipation and diarrhea  Genitourinary: Negative for dysuria, frequency and hematuria  Musculoskeletal: Negative for myalgias, neck pain and neck stiffness  Skin: Negative for pallor  Neurological: Negative for syncope, weakness and headaches  All other systems reviewed and are negative  Physical Exam  Physical Exam   Constitutional: She is oriented to person, place, and time  She appears well-developed and well-nourished  Non-toxic appearance  She appears distressed (secondary to nausea and vomiting)  HENT:   Head: Normocephalic and atraumatic     Eyes: Pupils are equal, round, and reactive to light  Conjunctivae and EOM are normal    Neck: Normal range of motion  Neck supple  No tracheal deviation present  No thyromegaly present  Cardiovascular: Regular rhythm, normal heart sounds and intact distal pulses  Tachycardia present  Pulmonary/Chest: Effort normal and breath sounds normal    Abdominal: Soft  Bowel sounds are normal  She exhibits no distension  There is no tenderness  Lymphadenopathy:     She has no cervical adenopathy  Neurological: She is alert and oriented to person, place, and time  Skin: Skin is warm and dry  She is not diaphoretic  Nursing note and vitals reviewed        Vital Signs  ED Triage Vitals [11/22/18 0628]   Temperature Pulse Respirations Blood Pressure SpO2   (!) 97 1 °F (36 2 °C) (!) 121 20 126/72 99 %      Temp Source Heart Rate Source Patient Position - Orthostatic VS BP Location FiO2 (%)   Temporal Monitor Sitting Right arm --      Pain Score       6           Vitals:    11/22/18 0628 11/22/18 0707 11/22/18 0815   BP: 126/72 142/81    Pulse: (!) 121 (!) 137 95   Patient Position - Orthostatic VS: Sitting Lying        Visual Acuity  Visual Acuity      Most Recent Value   L Pupil Size (mm)  4   R Pupil Size (mm)  4          ED Medications  Medications   sodium chloride 0 9 % bolus 1,000 mL (0 mL Intravenous Stopped 11/22/18 0801)   ondansetron (ZOFRAN) injection 4 mg (4 mg Intravenous Given 11/22/18 0640)   promethazine (PHENERGAN) injection 25 mg (25 mg Intravenous Given 11/22/18 0657)   sodium chloride 0 9 % bolus 1,000 mL (0 mL Intravenous Stopped 11/22/18 0847)   ondansetron (ZOFRAN) injection 4 mg (4 mg Intravenous Given 11/22/18 0802)   LORazepam (ATIVAN) 2 mg/mL injection 0 5 mg (0 5 mg Intravenous Given 11/22/18 0826)       Diagnostic Studies  Results Reviewed     Procedure Component Value Units Date/Time    Acetone [[id number]]  (Normal) Collected:  11/22/18 0630    Lab Status:  Final result Specimen:  Blood from Arm, Left Updated:  11/22/18 0748     Acetone, Bld Negative    POCT Blood Gas (G3+) [[id number]]  (Abnormal) Collected:  11/22/18 0736    Lab Status:  Final result Updated:  11/22/18 0740     ph, Esteban ISTAT 7 384     pCO2, Esteban i-STAT 53 2 (H) mm HG      pO2, Esteban i-STAT 27 0 (L) mm HG      BE, i-STAT 5 (H) mmol/L      HCO3, Esteban i-STAT 31 8 (H) mmol/L      CO2, i-STAT 33 (H) mmol/L      O2 Sat, i-STAT 47 (L) %      Specimen Type VENOUS    POCT urinalysis dipstick [[id number]]  (Normal) Resulted:  11/22/18 0736    Lab Status:  Final result Specimen:  Urine Updated:  11/22/18 0736     Color, UA yellow     Clarity, UA clear     Glucose, UA (Ref: Negative) large     Bilirubin, UA (Ref: Negative) neg     Ketones, UA (Ref: Negative) small     Spec Grav, UA (Ref:1 003-1 030) 1 005     Blood, UA (Ref: Negative) neg     pH, UA (Ref: 4 5-8 0) 7 5     Protein, UA (Ref: Negative) trace     Urobilinogen, UA (Ref: 0 2- 1 0) 0 2      Leukocytes, UA (Ref: Negative) neg     Nitrite, UA (Ref: Negative) neg    POCT pregnancy, urine [[id number]]  (Normal) Resulted:  11/22/18 0736    Lab Status:  Final result Updated:  11/22/18 0736     EXT PREG TEST UR (Ref: Negative) neg    Comprehensive metabolic panel [[id number]]  (Abnormal) Collected:  11/22/18 0637    Lab Status:  Final result Specimen:  Blood from Arm, Left Updated:  11/22/18 0717     Sodium 141 mmol/L      Potassium 4 3 mmol/L      Chloride 101 mmol/L      CO2 31 mmol/L      ANION GAP 9 mmol/L      BUN 10 mg/dL      Creatinine 0 63 mg/dL      Glucose 256 (H) mg/dL      Calcium 9 3 mg/dL      AST 23 U/L      ALT 27 U/L      Alkaline Phosphatase 82 U/L      Total Protein 8 2 g/dL      Albumin 4 0 g/dL      Total Bilirubin 0 20 mg/dL      eGFR 125 ml/min/1 73sq m     Narrative:         National Kidney Disease Education Program recommendations are as follows:  GFR calculation is accurate only with a steady state creatinine  Chronic Kidney disease less than 60 ml/min/1 73 sq  meters  Kidney failure less than 15 ml/min/1 73 sq  meters  CBC and differential [[id number]]  (Abnormal) Collected:  11/22/18 0637    Lab Status:  Final result Specimen:  Blood from Arm, Left Updated:  11/22/18 0659     WBC 15 83 (H) Thousand/uL      RBC 4 49 Million/uL      Hemoglobin 14 1 g/dL      Hematocrit 41 6 %      MCV 93 fL      MCH 31 4 pg      MCHC 33 9 g/dL      RDW 11 9 %      MPV 9 9 fL      Platelets 932 Thousands/uL      nRBC 0 /100 WBCs      Neutrophils Relative 77 (H) %      Immat GRANS % 1 %      Lymphocytes Relative 16 %      Monocytes Relative 5 %      Eosinophils Relative 1 %      Basophils Relative 0 %      Neutrophils Absolute 12 27 (H) Thousands/µL      Immature Grans Absolute 0 11 Thousand/uL      Lymphocytes Absolute 2 51 Thousands/µL      Monocytes Absolute 0 77 Thousand/µL      Eosinophils Absolute 0 10 Thousand/µL      Basophils Absolute 0 07 Thousands/µL     Fingerstick Glucose (POCT) [[id number]]  (Abnormal) Collected:  11/22/18 0630    Lab Status:  Final result Updated:  11/22/18 0631     POC Glucose 221 (H) mg/dl                  No orders to display              Procedures  ECG 12 Lead Documentation  Date/Time: 11/22/2018 7:21 AM  Performed by: Analisa Mukherjee by: Jessa Ruelas     Indications / Diagnosis:  Nausea and vomiting  ECG reviewed by me, the ED Provider: yes    Patient location:  ED  Previous ECG:     Previous ECG:  Unavailable  Interpretation:     Interpretation: abnormal    Rate:     ECG rate:  122    ECG rate assessment: tachycardic    Rhythm:     Rhythm: sinus tachycardia    Ectopy:     Ectopy: none    QRS:     QRS axis:  Normal    QRS intervals:  Normal  Conduction:     Conduction: normal    ST segments:     ST segments:  Normal  T waves:     T waves: flattening      Flattening:  V2           Phone Contacts  ED Phone Contact    ED Course  ED Course as of Nov 23 0009   u Nov 22, 2018   0724 Improvement in nausea and vomiting after phenergan, but now complaining of dizziness  MDM  Number of Diagnoses or Management Options  Vomiting: new and requires workup  Diagnosis management comments: 20year old female with type 1 diabetes presents with nausea and vomiting after hypoglycemic episode at 3 am last night  Patient given zofran on arrival with no improvement in symptoms  Phenergan given  2 L NS for concern for dehydration  Patient signed out to Dr Srinivasa Mars  Labs unremarkable  Patient discharged with prescription for zofran, PCP follow up and return precautions  Amount and/or Complexity of Data Reviewed  Clinical lab tests: ordered and reviewed    Patient Progress  Patient progress: stable    CritCare Time    Disposition  Final diagnoses:   Vomiting     Time reflects when diagnosis was documented in both MDM as applicable and the Disposition within this note     Time User Action Codes Description Comment    11/22/2018  8:02 AM Kierstenvita Navarro Add [R11 10] Vomiting       ED Disposition     ED Disposition Condition Comment    Discharge  [facility] discharge to home/self care      Condition at discharge: Stable        Follow-up Information     Follow up With Specialties Details Why Contact Info    Rae Haley DO Family Medicine In 1 day  [facility]  [phone]            Discharge Medication List as of 11/22/2018  8:04 AM      START taking these medications    Details   ondansetron (ZOFRAN) 4 mg tablet Take 1 tablet (4 mg total) by mouth every 6 (six) hours as needed for nausea or vomiting, Starting Thu 11/22/2018, Print         CONTINUE these medications which have NOT CHANGED    Details   diclofenac (VOLTAREN) 75 mg EC tablet Take 1 tablet (75 mg total) by mouth 2 (two) times a day, Starting Fri 11/16/2018, Normal      insulin glulisine (APIDRA) 100 units/mL injection Up to 100 units daily via pump, Historical Med      LORazepam (ATIVAN) 1 mg tablet Take 1 mg by mouth, Starting Sat 9/22/2018, Historical Med No discharge procedures on file      ED Provider  Electronically Signed by           Susan Zuñiga MD  11/23/18 0801

## 2018-11-22 NOTE — ED NOTES
Pt noted crying and moaning stating she if feeling very anxious, 1 to 1 performed, pt advised to take slow deep breaths, performed with slight effect, vitals as noted        Radhika Macedo RN  11/22/18 1099

## 2018-11-22 NOTE — ED NOTES
Pt reports no longer feeling nauseous and feeling better, second liter of fluids finished at this time        Kwabena Foreman, ANGELITA  11/22/18 2646

## 2018-11-22 NOTE — ED NOTES
Patient stated she felt nauseas and anxious, she feels like she is having an anxiety attack   MD notified     Pasquale Gomez  11/22/18 0093

## 2018-11-22 NOTE — ED CARE HANDOFF
Emergency Department Sign Out Note        Sign out and transfer of care from Dr Abdirizak Nam  See Separate Emergency Department note  The patient, Carly Vargas, was evaluated by the previous provider for vomiting  Workup Completed:  Blood work unremarkable no sign of DKA    ED Course / Workup Pending (followup): Discharge home with Zofran patient has decreased nausea she has occasional retching no active vomiting                             Procedures  MDM  CritCare Time      Disposition  Final diagnoses:   Vomiting     Time reflects when diagnosis was documented in both MDM as applicable and the Disposition within this note     Time User Action Codes Description Comment    11/22/2018  8:02 AM Anel Trievdi Add [R11 10] Vomiting       ED Disposition     ED Disposition Condition Comment    Discharge  Carly Vargas discharge to home/self care  Condition at discharge: Stable        Follow-up Information     Follow up With Specialties Details Why Contact Info    Pepper Quintero DO Family Medicine In 1 day  [facility]  [phone]          Patient's Medications   Discharge Prescriptions    ONDANSETRON (ZOFRAN) 4 MG TABLET    Take 1 tablet (4 mg total) by mouth every 6 (six) hours as needed for nausea or vomiting       Start Date: 11/22/2018End Date: --       Order Dose: 4 mg       Quantity: 15 tablet    Refills: 0     No discharge procedures on file         ED Provider  Electronically Signed by     Jeanette Hull DO  11/22/18 2181

## 2018-11-23 LAB
ATRIAL RATE: 122 BPM
P AXIS: 58 DEGREES
PR INTERVAL: 130 MS
QRS AXIS: 89 DEGREES
QRSD INTERVAL: 76 MS
QT INTERVAL: 320 MS
QTC INTERVAL: 456 MS
T WAVE AXIS: 18 DEGREES
VENTRICULAR RATE: 122 BPM

## 2018-11-23 PROCEDURE — 93010 ELECTROCARDIOGRAM REPORT: CPT | Performed by: INTERNAL MEDICINE

## 2018-11-27 ENCOUNTER — VBI (OUTPATIENT)
Dept: ADMINISTRATIVE | Facility: OTHER | Age: 23
End: 2018-11-27

## 2018-11-27 NOTE — TELEPHONE ENCOUNTER
Eligio Minor    ED Visit Information     Ed visit date: 11/22/2018  Diagnosis Description: Vomiting  In Network? Yes [address]  Discharge status: Home  Discharged with meds ? Yes  Number of ED visits to date: 5  ED Severity:3     Outreach Information    Outreach successful: No 2  Date letter mailed:N/a  Date Finalized: 11/29/2018    Care Coordination    Follow up appointment with pcp: no None scheduled  Transportation issues ? NA    [employer] type:  7 Day Outreach  Emergent necessity warranted by diagnosis:  No  [facility] PCP:  Yes  Transportation:  Self Transport  Called PCP first?:  No  Feels able to call PCP for urgent problems ?:  Yes  Understands what emergencies can be handled by PCP ?:  Yes  Ever any problems getting appointment with PCP for minor emergency/urgency problems?:  No  Practice Contacted Patient ?:  No  Pt had ED follow up with pcp/staff ?:  No    Seen for follow-up out of network ?:  No  Reason Patient went to ED instead of Urgent Care or PCP?:  Perceived Severity of Illness  11/27/2018 01:18 PM [facility] Tiffanie Zuniga (Self) [phone] (H)   Left Message - Requesting a call back    Unable to reach patient patient in regard to recent ED visit on 11/22 for Vomiting  11/29/2018 09:47 AM [facility] Tiffanie Zuniga (Self) [phone] (H)   Left Message - Requesting a call back    Unable to reach patient patient in regard to recent ED visit on 11/22 for Vomiting  Letter mailed    12/05/2018 11:05 AM Phone ([facility]) Tiffanie Zuniga (Self) [phone] (H)   Patient returned my call    Personal communication with patient in regard to her recent ED visit on 11/22 for Vomiting  Patient stated that she is doing well  Patient was discharged with medication ondansetron and advised to follow up with PCP within 1 day of visit  Annie Cortez declined to schedule a follow up appt with their PCP at this time   She does not meet OPCM, denies any transportation issues and is aware of her nearest [address] urgent care facility, education not given

## 2018-12-11 ENCOUNTER — HOSPITAL ENCOUNTER (OUTPATIENT)
Dept: MRI IMAGING | Facility: HOSPITAL | Age: 23
Discharge: HOME | End: 2018-12-11
Attending: ORTHOPAEDIC SURGERY
Payer: COMMERCIAL

## 2018-12-11 ENCOUNTER — HOSPITAL ENCOUNTER (OUTPATIENT)
Dept: RADIOLOGY | Facility: HOSPITAL | Age: 23
Discharge: HOME | End: 2018-12-11
Attending: ORTHOPAEDIC SURGERY | Admitting: RADIOLOGY
Payer: COMMERCIAL

## 2018-12-11 DIAGNOSIS — S43.432A LABRAL TEAR OF SHOULDER, LEFT, INITIAL ENCOUNTER: ICD-10-CM

## 2018-12-11 DIAGNOSIS — M25.512 LEFT SHOULDER PAIN, UNSPECIFIED CHRONICITY: ICD-10-CM

## 2018-12-11 PROCEDURE — A9585 GADOBUTROL INJECTION: HCPCS | Performed by: ORTHOPAEDIC SURGERY

## 2018-12-11 PROCEDURE — 73222 MRI JOINT UPR EXTREM W/DYE: CPT

## 2018-12-11 PROCEDURE — 23350 INJECTION FOR SHOULDER X-RAY: CPT

## 2018-12-11 PROCEDURE — 77002 NEEDLE LOCALIZATION BY XRAY: CPT

## 2018-12-11 RX ORDER — LIDOCAINE HYDROCHLORIDE 10 MG/ML
30 INJECTION, SOLUTION EPIDURAL; INFILTRATION; INTRACAUDAL; PERINEURAL ONCE
Status: COMPLETED | OUTPATIENT
Start: 2018-12-11 | End: 2018-12-11

## 2018-12-11 RX ADMIN — GADOBUTROL 0.2 ML: 604.72 INJECTION INTRAVENOUS at 10:51

## 2018-12-11 RX ADMIN — LIDOCAINE HYDROCHLORIDE 7 ML: 10 INJECTION, SOLUTION EPIDURAL; INFILTRATION; INTRACAUDAL; PERINEURAL at 10:51

## 2018-12-11 RX ADMIN — IOHEXOL 3 ML: 300 INJECTION, SOLUTION INTRAVENOUS at 10:51

## 2018-12-13 ENCOUNTER — OFFICE VISIT (OUTPATIENT)
Dept: OBGYN CLINIC | Facility: CLINIC | Age: 23
End: 2018-12-13
Payer: COMMERCIAL

## 2018-12-13 VITALS
SYSTOLIC BLOOD PRESSURE: 111 MMHG | HEART RATE: 81 BPM | DIASTOLIC BLOOD PRESSURE: 74 MMHG | WEIGHT: 138 LBS | HEIGHT: 59 IN | BODY MASS INDEX: 27.82 KG/M2

## 2018-12-13 DIAGNOSIS — S43.002A SHOULDER SUBLUXATION, LEFT, INITIAL ENCOUNTER: Primary | ICD-10-CM

## 2018-12-13 PROCEDURE — 99213 OFFICE O/P EST LOW 20 MIN: CPT | Performed by: ORTHOPAEDIC SURGERY

## 2018-12-13 NOTE — LETTER
December 13, 2018     Roxy Lang, [address]  [place] [address]    Patient: Anyi Francisco   YOB: 1995   Date of Visit: 12/13/2018       Dear Dr Brown Morris: Thank you for referring Anyi Francisco to me for evaluation  Below are my notes for this consultation  If you have questions, please do not hesitate to call me  I look forward to following your patient along with you  Sincerely,        Florentin Vieyra DO        CC: No Recipients  Florentin Vieyra DO  12/13/2018 11:34 AM  Sign at close encounter  Ortho Sports Medicine Shoulder Visit     Assesment:   20year old right hand dominant female with left shoulder subluxations with history of dislocation 7 years ago    Plan:    Conservative treatment:    Ice to shoulder 1-2 times daily, for 20 minutes at a time  PT to left shoulder for ROM and strengthening to shoulder and scapular stabilizers  Imaging: All imaging from today was reviewed by myself and explained to the patient  Injection:    No Injection planned at this time  Surgery:     No surgery is recommended at this point, continue with conservative treatment plan as noted  If she does not improve in the next 8 weeks, we can consider arthroscopic capsular plication with possible labral repair, possible arthroscopic biceps tenodesis  Follow up: 8 weeks for repeat eval      Chief Complaint   Patient presents with    Left Shoulder - Follow-up       History of Present Illness: The patient is returns for follow up of the left shoulder  Since the prior visit, She reports no change in symptoms  The patient has a history of type I diabetes  The patient denies a history of thyroid disorder  The patient sustained an injury 7 years ago when she was walking her dog  The dog pulled forward and her shoulder dislocated  She went to the ED and it was reduced there  Her shoulder felt better following but would have intermittent pain       In 2016 her pain started to increase  She had an MRI of the shoulder which showed subacromial bursitis  She did physical therapy and began to feel better  About a week ago, the pain started again after she reached up to grab something and felt her shoulder subluxate  At today's visit, the pain is located anterior, posterior, deep and lateral   The patient rates the pain as 6/10  The pain has been present for 1 5 month  The pain is characterized as sharp, stabbing, dull, achy  The pain is present daily    Pain is improved by rest, ice  Pain is aggravated by overhead activity, reaching back, lifting  and exercising  Symptoms include clicking, catching, popping, cracking and locking  The patient denies weakness  The patient denies numbness and tingling  The patient has tried rest, ice      Shoulder Surgical History:  None      I have reviewed the past medical, surgical, social and family history, medications and allergies as documented in the EMR  Review of systems: ROS is negative other than that noted in the HPI  Constitutional: Negative for fatigue and fever  Physical Exam:    Blood pressure 111/74, pulse 81, height 4' 11" (1 499 m), weight 62 6 kg (138 lb), last menstrual period 11/22/2018, not currently breastfeeding  General/Constitutional: NAD, well developed, well nourished    Shoulder Exam (focused):     Shoulder focused exam:       RIGHT LEFT    Scapula Atrophy Negative Negative     Winging Negative Negative     Protraction Negative Negative    Rotator cuff SS 5/5 5/5     IS 5/5 5/5     SubS 5/5 5/5    AROM  120     ER0 60 60     ER90 90 90     IR90 40 40     IRb T6 T10    TTP: AC Negative Negative     Biceps Negative Positive     Coracoid Negative Negative    Special Tests: O'Briens Negative Negative     Vogt-shear Negative Negative     Cross body Adduction Negative Negative     Speeds  Negative Negative     Deandra's Negative Negative     Whipple Negative Negative       Neer Negative Negative     Delaney Negative Negative    Instability: Apprehension & relocation negative positive     Load & shift negative positive    Other: Crank Negative Positive               UE NV Exam: +2 Radial pulses bilaterally  Sensation intact to light touch C5-T1 bilaterally, Radial/median/ulnar nerve motor intact    Bilateral elbow, wrist, and and forearm ROM full, painless with passive ROM, no ttp or crepitance throughout extremities below shoulder joint    Cervical ROM is full without pain, numbness or tingling      Shoulder Imaging    X-rays of the left shoulder were reviewed, which demonstrate no acute fracture, dislocation, or osseous abnormality  I have reviewed the radiology report and do not currently have a radiology reading from [facility], but will check the result once the reading is performed  MRI of the left shoulder was reviewed, which demonstrates no labral or rotator cuff tear  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

## 2018-12-13 NOTE — PROGRESS NOTES
Ortho Sports Medicine Shoulder Visit     Assesment:   20year old right hand dominant female with left shoulder subluxations with history of dislocation 7 years ago    Plan:    Conservative treatment:    Ice to shoulder 1-2 times daily, for 20 minutes at a time  PT to left shoulder for ROM and strengthening to shoulder and scapular stabilizers  Imaging: All imaging from today was reviewed by myself and explained to the patient  Injection:    No Injection planned at this time  Surgery:     No surgery is recommended at this point, continue with conservative treatment plan as noted  If she does not improve in the next 8 weeks, we can consider arthroscopic capsular plication with possible labral repair, possible arthroscopic biceps tenodesis  Follow up: 8 weeks for repeat eval      Chief Complaint   Patient presents with    Left Shoulder - Follow-up       History of Present Illness: The patient is returns for follow up of the left shoulder  Since the prior visit, She reports no change in symptoms  The patient has a history of type I diabetes  The patient denies a history of thyroid disorder  The patient sustained an injury 7 years ago when she was walking her dog  The dog pulled forward and her shoulder dislocated  She went to the ED and it was reduced there  Her shoulder felt better following but would have intermittent pain  In 2016 her pain started to increase  She had an MRI of the shoulder which showed subacromial bursitis  She did physical therapy and began to feel better  About a week ago, the pain started again after she reached up to grab something and felt her shoulder subluxate  At today's visit, the pain is located anterior, posterior, deep and lateral   The patient rates the pain as 6/10  The pain has been present for 1 5 month  The pain is characterized as sharp, stabbing, dull, achy  The pain is present daily    Pain is improved by rest, ice   Pain is aggravated by overhead activity, reaching back, lifting  and exercising  Symptoms include clicking, catching, popping, cracking and locking  The patient denies weakness  The patient denies numbness and tingling  The patient has tried rest, ice      Shoulder Surgical History:  None      I have reviewed the past medical, surgical, social and family history, medications and allergies as documented in the EMR  Review of systems: ROS is negative other than that noted in the HPI  Constitutional: Negative for fatigue and fever  Physical Exam:    Blood pressure 111/74, pulse 81, height 4' 11" (1 499 m), weight 62 6 kg (138 lb), last menstrual period 11/22/2018, not currently breastfeeding  General/Constitutional: NAD, well developed, well nourished    Shoulder Exam (focused):     Shoulder focused exam:       RIGHT LEFT    Scapula Atrophy Negative Negative     Winging Negative Negative     Protraction Negative Negative    Rotator cuff SS 5/5 5/5     IS 5/5 5/5     SubS 5/5 5/5    AROM  120     ER0 60 60     ER90 90 90     IR90 40 40     IRb T6 T10    TTP: AC Negative Negative     Biceps Negative Positive     Coracoid Negative Negative    Special Tests: O'Briens Negative Negative     Vogt-shear Negative Negative     Cross body Adduction Negative Negative     Speeds  Negative Negative     Deandra's Negative Negative     Whipple Negative Negative       Neer Negative Negative     Delaney Negative Negative    Instability: Apprehension & relocation negative positive     Load & shift negative positive    Other: Crank Negative Positive               UE NV Exam: +2 Radial pulses bilaterally  Sensation intact to light touch C5-T1 bilaterally, Radial/median/ulnar nerve motor intact    Bilateral elbow, wrist, and and forearm ROM full, painless with passive ROM, no ttp or crepitance throughout extremities below shoulder joint    Cervical ROM is full without pain, numbness or tingling      Shoulder Imaging    X-rays of the left shoulder were reviewed, which demonstrate no acute fracture, dislocation, or osseous abnormality  I have reviewed the radiology report and do not currently have a radiology reading from [address] [place] [address], but will check the result once the reading is performed  MRI of the left shoulder was reviewed, which demonstrates no labral or rotator cuff tear  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

## 2018-12-17 DIAGNOSIS — E10.10 TYPE 1 DIABETES MELLITUS WITH KETOACIDOSIS WITHOUT COMA (HCC): Primary | ICD-10-CM

## 2019-01-08 ENCOUNTER — OFFICE VISIT (OUTPATIENT)
Dept: FAMILY MEDICINE CLINIC | Facility: CLINIC | Age: 24
End: 2019-01-08
Payer: COMMERCIAL

## 2019-01-08 VITALS
BODY MASS INDEX: 27.82 KG/M2 | DIASTOLIC BLOOD PRESSURE: 70 MMHG | RESPIRATION RATE: 16 BRPM | HEIGHT: 59 IN | OXYGEN SATURATION: 99 % | HEART RATE: 89 BPM | TEMPERATURE: 98.4 F | WEIGHT: 138 LBS | SYSTOLIC BLOOD PRESSURE: 110 MMHG

## 2019-01-08 DIAGNOSIS — Z23 IMMUNIZATION DUE: ICD-10-CM

## 2019-01-08 DIAGNOSIS — Z00.00 WELL ADULT EXAM: Primary | ICD-10-CM

## 2019-01-08 DIAGNOSIS — Z23 NEED FOR IMMUNIZATION AGAINST INFLUENZA: ICD-10-CM

## 2019-01-08 PROCEDURE — 90471 IMMUNIZATION ADMIN: CPT

## 2019-01-08 PROCEDURE — 90715 TDAP VACCINE 7 YRS/> IM: CPT

## 2019-01-08 PROCEDURE — 99395 PREV VISIT EST AGE 18-39: CPT | Performed by: NURSE PRACTITIONER

## 2019-01-08 NOTE — PROGRESS NOTES
Assessment/Plan   Diagnoses and all orders for this visit:    Well adult exam    Need for immunization against influenza  -     Cancel: PREFERRED: influenza vaccine, [id number], quadrivalent, recombinant, PF, 0 5 mL (FLUBLOK)    Immunization due  -     TDAP VACCINE GREATER THAN OR EQUAL TO 8YO IM    BMI 27 0-27 9,adult  Comments:  Continue diabetic diet, insulin pump, regular exercise  BMI within normal range        Chief Complaint   Patient presents with    Physical Exam     work physical with PPD        Subjective   Patient ID: Felicity Galvez is a 20 y o  female  Vitals:    01/08/19 0844   BP: 110/70   Pulse: 89   Resp: 16   Temp: 98 4 °F (36 9 °C)   SpO2: 99%     Here today for a pre-employment physical, history of insulin dependent diabetic HA1C "much better" unavailable at this time  Regular GYN exams, monogamous relationship, denies domestic violence  No issues or concerns with health today  The following portions of the patient's history were reviewed and updated as appropriate: allergies, current medications, past family history, past social history, past surgical history and problem list     Review of Systems   Constitutional: Negative  Negative for chills, fatigue and fever  HENT: Negative  Negative for congestion and sinus pressure  Eyes: Negative  Negative for redness and visual disturbance  Respiratory: Negative  Negative for cough, shortness of breath and wheezing  Cardiovascular: Negative  Negative for chest pain and leg swelling  Gastrointestinal: Negative  Negative for abdominal pain, diarrhea and nausea  Endocrine: Negative  Genitourinary: Negative  Musculoskeletal: Negative  Negative for back pain and gait problem  Skin: Negative  Allergic/Immunologic: Negative  Neurological: Negative  Negative for seizures and headaches  Hematological: Negative  Psychiatric/Behavioral: Negative for sleep disturbance and suicidal ideas   The patient is nervous/anxious  Objective     Physical Exam   Constitutional: She is oriented to person, place, and time  She appears well-developed and well-nourished  No distress  HENT:   Head: Normocephalic and atraumatic  Right Ear: External ear normal    Left Ear: External ear normal    Nose: Nose normal    Mouth/Throat: Oropharynx is clear and moist  No oropharyngeal exudate  Eyes: Pupils are equal, round, and reactive to light  Conjunctivae and EOM are normal  Right eye exhibits no discharge  Left eye exhibits no discharge  No scleral icterus  Neck: Normal range of motion  Neck supple  No thyromegaly present  Cardiovascular: Normal rate, regular rhythm, normal heart sounds and intact distal pulses  Pulses are no weak pulses  No murmur heard  Pulses:       Dorsalis pedis pulses are 2+ on the right side, and 2+ on the left side  Posterior tibial pulses are 2+ on the right side, and 2+ on the left side  Pulmonary/Chest: Effort normal and breath sounds normal  No respiratory distress  Abdominal: Soft  Bowel sounds are normal    Musculoskeletal: Normal range of motion  She exhibits no edema, tenderness or deformity  Feet:   Right Foot:   Skin Integrity: Negative for ulcer, skin breakdown, erythema, warmth, callus or dry skin  Left Foot:   Skin Integrity: Negative for ulcer, skin breakdown, erythema, warmth, callus or dry skin  Lymphadenopathy:     She has no cervical adenopathy  Neurological: She is alert and oriented to person, place, and time  She displays normal reflexes  No cranial nerve deficit  She exhibits normal muscle tone  Coordination normal    Skin: Skin is warm and dry  Capillary refill takes less than 2 seconds  No rash noted  No erythema  Psychiatric: She has a normal mood and affect  Her behavior is normal  Judgment and thought content normal    Nursing note and vitals reviewed  Patient's shoes and socks removed  Right Foot/Ankle   Right Foot Inspection  Skin Exam: skin normal and skin intact no dry skin, no warmth, no callus, no erythema, no maceration, no abnormal color, no pre-ulcer, no ulcer and no callus                          Toe Exam: ROM and strength within normal limits    Vascular  Capillary refills: < 3 seconds  The right DP pulse is 2+  The right PT pulse is 2+  Left Foot/Ankle  Left Foot Inspection  Skin Exam: skin normal and skin intactno dry skin, no warmth, no erythema, no maceration, normal color, no pre-ulcer, no ulcer and no callus                         Toe Exam: ROM and strength within normal limits                     Vascular  Capillary refills: < 3 seconds  The left DP pulse is 2+  The left PT pulse is 2+  Assign Risk Category:  No deformity present; No loss of protective sensation; No weak pulses       Risk: 0     Allergies   Allergen Reactions    Reglan [Metoclopramide] Anxiety     Patient Active Problem List   Diagnosis    [facility]    Dehydration    Diabetic ketoacidosis associated with type 1 diabetes mellitus (HCC)    Gastroenteritis    Hyponatremia    Nausea    Depression       Current Outpatient Prescriptions:     insulin glulisine (APIDRA) 100 units/mL injection, Up to 100 units daily via pump, Disp: , Rfl:     LORazepam (ATIVAN) 1 mg tablet, Take 1 mg by mouth as needed  , Disp: , Rfl:   Social History     Social History    Marital status: Single     Spouse name: N/A    Number of children: N/A    Years of education: N/A     Occupational History    Not on file       Social History Main Topics    Smoking status: Former Smoker     Packs/day: 0 50     Years: 4 00     Types: Cigarettes    Smokeless tobacco: Never Used    Alcohol use Yes      Comment: rare     Drug use: No    Sexual activity: Not on file     Other Topics Concern    Not on file     Social History Narrative    Current every day smoker - as per [employer]      Family History   Problem Relation Age of Onset    Anxiety disorder Mother  No Known Problems Father     Anxiety disorder Sister     Anxiety disorder Maternal Grandmother     Anxiety disorder Maternal Aunt     Hyperlipidemia Family

## 2019-01-08 NOTE — PATIENT INSTRUCTIONS

## 2019-01-10 ENCOUNTER — TRANSCRIBE ORDERS (OUTPATIENT)
Dept: URGENT CARE | Facility: CLINIC | Age: 24
End: 2019-01-10

## 2019-01-10 ENCOUNTER — APPOINTMENT (OUTPATIENT)
Dept: LAB | Facility: CLINIC | Age: 24
End: 2019-01-10
Payer: COMMERCIAL

## 2019-01-10 DIAGNOSIS — Z02.1 PRE-EMPLOYMENT HEALTH SCREENING EXAMINATION: ICD-10-CM

## 2019-01-10 DIAGNOSIS — Z02.1 PRE-EMPLOYMENT HEALTH SCREENING EXAMINATION: Primary | ICD-10-CM

## 2019-01-10 PROCEDURE — 86480 TB TEST CELL IMMUN MEASURE: CPT

## 2019-01-10 PROCEDURE — 36415 COLL VENOUS BLD VENIPUNCTURE: CPT

## 2019-01-11 LAB
GAMMA INTERFERON BACKGROUND BLD IA-ACNC: 0.04 IU/ML
M TB IFN-G BLD-IMP: NEGATIVE
M TB IFN-G CD4+ BCKGRND COR BLD-ACNC: -0.01 IU/ML
M TB IFN-G CD4+ BCKGRND COR BLD-ACNC: 0 IU/ML
MITOGEN IGNF BCKGRD COR BLD-ACNC: >10 IU/ML

## 2019-01-16 ENCOUNTER — TELEPHONE (OUTPATIENT)
Dept: FAMILY MEDICINE CLINIC | Facility: CLINIC | Age: 24
End: 2019-01-16

## 2019-01-16 DIAGNOSIS — Z86.19 H/O VARICELLA: Primary | ICD-10-CM

## 2019-01-16 NOTE — TELEPHONE ENCOUNTER
PERNELL NEEDS A SCRIPT FOR A CHICKEN POX TITRE BECAUSE SHE EVIDENTLY HAD THE POX AND NOT THE VACCINE AND NEEDS TO HAVE THIS FOR HER NEW JOB PLEASE    AFTER THIS IS WRITTEN COULD IT PLEASE BE FAXED TO HER JOB AT [facility]  [phone]      ANY QUESTIONS YOU CAN REACH HER AT  [phone]

## 2019-01-18 ENCOUNTER — TRANSCRIBE ORDERS (OUTPATIENT)
Dept: ADMINISTRATIVE | Facility: HOSPITAL | Age: 24
End: 2019-01-18

## 2019-01-18 ENCOUNTER — APPOINTMENT (OUTPATIENT)
Dept: LAB | Facility: CLINIC | Age: 24
End: 2019-01-18

## 2019-01-18 DIAGNOSIS — Z01.84 IMMUNITY STATUS TESTING: ICD-10-CM

## 2019-01-18 DIAGNOSIS — Z01.84 IMMUNITY STATUS TESTING: Primary | ICD-10-CM

## 2019-01-18 PROCEDURE — 86787 VARICELLA-ZOSTER ANTIBODY: CPT | Performed by: FAMILY MEDICINE

## 2019-01-18 PROCEDURE — 36415 COLL VENOUS BLD VENIPUNCTURE: CPT | Performed by: FAMILY MEDICINE

## 2019-01-21 LAB — VZV IGG SER IA-ACNC: NORMAL

## 2019-01-26 ENCOUNTER — HOSPITAL ENCOUNTER (EMERGENCY)
Facility: HOSPITAL | Age: 24
Discharge: HOME | End: 2019-01-26
Attending: EMERGENCY MEDICINE
Payer: COMMERCIAL

## 2019-01-26 VITALS
RESPIRATION RATE: 22 BRPM | SYSTOLIC BLOOD PRESSURE: 119 MMHG | WEIGHT: 138.01 LBS | TEMPERATURE: 97.7 F | HEART RATE: 100 BPM | OXYGEN SATURATION: 98 % | DIASTOLIC BLOOD PRESSURE: 60 MMHG | BODY MASS INDEX: 27.82 KG/M2 | HEIGHT: 59 IN

## 2019-01-26 DIAGNOSIS — K52.9 GASTROENTERITIS: ICD-10-CM

## 2019-01-26 DIAGNOSIS — N39.0 UTI (URINARY TRACT INFECTION): Primary | ICD-10-CM

## 2019-01-26 LAB
ACETONE SERPL-MCNC: NEGATIVE MG/DL
ALBUMIN SERPL BCP-MCNC: 4 G/DL (ref 3.5–5)
ALP SERPL-CCNC: 75 U/L (ref 46–116)
ALT SERPL W P-5'-P-CCNC: 17 U/L (ref 12–78)
ANION GAP SERPL CALCULATED.3IONS-SCNC: 8 MMOL/L (ref 4–13)
AST SERPL W P-5'-P-CCNC: 17 U/L (ref 5–45)
BACTERIA UR QL AUTO: ABNORMAL /HPF
BASOPHILS # BLD AUTO: 0.06 THOUSANDS/ΜL (ref 0–0.1)
BASOPHILS NFR BLD AUTO: 1 % (ref 0–1)
BILIRUB SERPL-MCNC: 0.3 MG/DL (ref 0.2–1)
BILIRUB UR QL STRIP: NEGATIVE
BUN SERPL-MCNC: 9 MG/DL (ref 5–25)
CALCIUM SERPL-MCNC: 8.9 MG/DL (ref 8.3–10.1)
CHLORIDE SERPL-SCNC: 101 MMOL/L (ref 100–108)
CLARITY UR: ABNORMAL
CLARITY, POC: CLEAR
CO2 SERPL-SCNC: 31 MMOL/L (ref 21–32)
COLOR UR: YELLOW
COLOR, POC: YELLOW
CREAT SERPL-MCNC: 0.67 MG/DL (ref 0.6–1.3)
EOSINOPHIL # BLD AUTO: 0.12 THOUSAND/ΜL (ref 0–0.61)
EOSINOPHIL NFR BLD AUTO: 2 % (ref 0–6)
ERYTHROCYTE [DISTWIDTH] IN BLOOD BY AUTOMATED COUNT: 11.8 % (ref 11.6–15.1)
EXT BILIRUBIN, UA: NEGATIVE
EXT BLOOD URINE: NEGATIVE
EXT GLUCOSE, UA: NEGATIVE
EXT KETONES: NEGATIVE
EXT NITRITE, UA: NEGATIVE
EXT PH, UA: 8
EXT PREG TEST URINE: NEGATIVE
EXT PROTEIN, UA: NEGATIVE
EXT SPECIFIC GRAVITY, UA: 1.01
EXT UROBILINOGEN: 0.2
GFR SERPL CREATININE-BSD FRML MDRD: 123 ML/MIN/1.73SQ M
GLUCOSE SERPL-MCNC: 103 MG/DL (ref 65–140)
GLUCOSE SERPL-MCNC: 227 MG/DL (ref 65–140)
GLUCOSE SERPL-MCNC: 279 MG/DL (ref 65–140)
GLUCOSE SERPL-MCNC: 306 MG/DL (ref 65–140)
GLUCOSE SERPL-MCNC: 74 MG/DL (ref 65–140)
GLUCOSE UR STRIP-MCNC: NEGATIVE MG/DL
HCT VFR BLD AUTO: 40 % (ref 34.8–46.1)
HGB BLD-MCNC: 13.6 G/DL (ref 11.5–15.4)
HGB UR QL STRIP.AUTO: ABNORMAL
IMM GRANULOCYTES # BLD AUTO: 0.02 THOUSAND/UL (ref 0–0.2)
IMM GRANULOCYTES NFR BLD AUTO: 0 % (ref 0–2)
KETONES UR STRIP-MCNC: NEGATIVE MG/DL
LEUKOCYTE ESTERASE UR QL STRIP: ABNORMAL
LYMPHOCYTES # BLD AUTO: 3.02 THOUSANDS/ΜL (ref 0.6–4.47)
LYMPHOCYTES NFR BLD AUTO: 39 % (ref 14–44)
MCH RBC QN AUTO: 31.7 PG (ref 26.8–34.3)
MCHC RBC AUTO-ENTMCNC: 34 G/DL (ref 31.4–37.4)
MCV RBC AUTO: 93 FL (ref 82–98)
MONOCYTES # BLD AUTO: 0.52 THOUSAND/ΜL (ref 0.17–1.22)
MONOCYTES NFR BLD AUTO: 7 % (ref 4–12)
MUCOUS THREADS UR QL AUTO: ABNORMAL
NEUTROPHILS # BLD AUTO: 3.96 THOUSANDS/ΜL (ref 1.85–7.62)
NEUTS SEG NFR BLD AUTO: 51 % (ref 43–75)
NITRITE UR QL STRIP: NEGATIVE
NON-SQ EPI CELLS URNS QL MICRO: ABNORMAL /HPF
NRBC BLD AUTO-RTO: 0 /100 WBCS
PH UR STRIP.AUTO: 8.5 [PH] (ref 4.5–8)
PLATELET # BLD AUTO: 295 THOUSANDS/UL (ref 149–390)
PMV BLD AUTO: 10.5 FL (ref 8.9–12.7)
POTASSIUM SERPL-SCNC: 3.7 MMOL/L (ref 3.5–5.3)
PROT SERPL-MCNC: 7.9 G/DL (ref 6.4–8.2)
PROT UR STRIP-MCNC: NEGATIVE MG/DL
RBC # BLD AUTO: 4.29 MILLION/UL (ref 3.81–5.12)
RBC #/AREA URNS AUTO: ABNORMAL /HPF
SODIUM SERPL-SCNC: 140 MMOL/L (ref 136–145)
SP GR UR STRIP.AUTO: 1.01 (ref 1–1.03)
UROBILINOGEN UR QL STRIP.AUTO: 0.2 E.U./DL
WBC # BLD AUTO: 7.7 THOUSAND/UL (ref 4.31–10.16)
WBC # BLD EST: NORMAL 10*3/UL
WBC #/AREA URNS AUTO: ABNORMAL /HPF

## 2019-01-26 PROCEDURE — 81001 URINALYSIS AUTO W/SCOPE: CPT | Performed by: EMERGENCY MEDICINE

## 2019-01-26 PROCEDURE — 82009 KETONE BODYS QUAL: CPT | Performed by: EMERGENCY MEDICINE

## 2019-01-26 PROCEDURE — 96365 THER/PROPH/DIAG IV INF INIT: CPT

## 2019-01-26 PROCEDURE — 99284 EMERGENCY DEPT VISIT MOD MDM: CPT

## 2019-01-26 PROCEDURE — 96376 TX/PRO/DX INJ SAME DRUG ADON: CPT

## 2019-01-26 PROCEDURE — 82948 REAGENT STRIP/BLOOD GLUCOSE: CPT

## 2019-01-26 PROCEDURE — 80053 COMPREHEN METABOLIC PANEL: CPT | Performed by: EMERGENCY MEDICINE

## 2019-01-26 PROCEDURE — 96372 THER/PROPH/DIAG INJ SC/IM: CPT

## 2019-01-26 PROCEDURE — 93005 ELECTROCARDIOGRAM TRACING: CPT

## 2019-01-26 PROCEDURE — 96375 TX/PRO/DX INJ NEW DRUG ADDON: CPT

## 2019-01-26 PROCEDURE — 87086 URINE CULTURE/COLONY COUNT: CPT | Performed by: EMERGENCY MEDICINE

## 2019-01-26 PROCEDURE — 81025 URINE PREGNANCY TEST: CPT | Performed by: EMERGENCY MEDICINE

## 2019-01-26 PROCEDURE — 82803 BLOOD GASES ANY COMBINATION: CPT

## 2019-01-26 PROCEDURE — 85025 COMPLETE CBC W/AUTO DIFF WBC: CPT | Performed by: EMERGENCY MEDICINE

## 2019-01-26 PROCEDURE — 36415 COLL VENOUS BLD VENIPUNCTURE: CPT | Performed by: EMERGENCY MEDICINE

## 2019-01-26 PROCEDURE — 96361 HYDRATE IV INFUSION ADD-ON: CPT

## 2019-01-26 RX ORDER — ONDANSETRON 2 MG/ML
4 INJECTION INTRAMUSCULAR; INTRAVENOUS ONCE
Status: COMPLETED | OUTPATIENT
Start: 2019-01-26 | End: 2019-01-26

## 2019-01-26 RX ORDER — PROMETHAZINE HYDROCHLORIDE 25 MG/1
25 TABLET ORAL EVERY 6 HOURS PRN
Qty: 20 TABLET | Refills: 0 | Status: SHIPPED | OUTPATIENT
Start: 2019-01-26 | End: 2019-02-26 | Stop reason: SDUPTHER

## 2019-01-26 RX ORDER — CEFTRIAXONE 1 G/50ML
1000 INJECTION, SOLUTION INTRAVENOUS ONCE
Status: COMPLETED | OUTPATIENT
Start: 2019-01-26 | End: 2019-01-26

## 2019-01-26 RX ORDER — PROMETHAZINE HYDROCHLORIDE 25 MG/ML
25 INJECTION, SOLUTION INTRAMUSCULAR; INTRAVENOUS ONCE
Status: COMPLETED | OUTPATIENT
Start: 2019-01-26 | End: 2019-01-26

## 2019-01-26 RX ORDER — CEPHALEXIN 250 MG/1
500 CAPSULE ORAL ONCE
Status: COMPLETED | OUTPATIENT
Start: 2019-01-26 | End: 2019-01-26

## 2019-01-26 RX ORDER — CEPHALEXIN 500 MG/1
500 CAPSULE ORAL EVERY 8 HOURS SCHEDULED
Qty: 30 CAPSULE | Refills: 0 | Status: SHIPPED | OUTPATIENT
Start: 2019-01-26 | End: 2019-02-05

## 2019-01-26 RX ADMIN — ONDANSETRON 4 MG: 2 INJECTION, SOLUTION INTRAMUSCULAR; INTRAVENOUS at 04:13

## 2019-01-26 RX ADMIN — PROMETHAZINE HYDROCHLORIDE 25 MG: 25 INJECTION INTRAMUSCULAR; INTRAVENOUS at 05:41

## 2019-01-26 RX ADMIN — SODIUM CHLORIDE 1000 ML: 0.9 INJECTION, SOLUTION INTRAVENOUS at 04:14

## 2019-01-26 RX ADMIN — INSULIN HUMAN 10 UNITS: 100 INJECTION, SOLUTION PARENTERAL at 07:07

## 2019-01-26 RX ADMIN — SODIUM CHLORIDE 1000 ML: 0.9 INJECTION, SOLUTION INTRAVENOUS at 05:53

## 2019-01-26 RX ADMIN — ONDANSETRON 4 MG: 2 INJECTION, SOLUTION INTRAMUSCULAR; INTRAVENOUS at 04:46

## 2019-01-26 RX ADMIN — CEFTRIAXONE 1000 MG: 1 INJECTION, SOLUTION INTRAVENOUS at 05:41

## 2019-01-26 RX ADMIN — CEPHALEXIN 500 MG: 250 CAPSULE ORAL at 05:21

## 2019-01-26 RX ADMIN — SODIUM CHLORIDE 1000 ML: 0.9 INJECTION, SOLUTION INTRAVENOUS at 08:09

## 2019-01-26 NOTE — ED NOTES
Patient reportedly vomited out her medication  Dr Montserrat John notified        Marleny Childs, ANGELITA  01/26/19 8538

## 2019-01-26 NOTE — ED CARE HANDOFF
Emergency Department Sign Out Note        Sign out and transfer of care from **E Alec*  See Separate Emergency Department note  The patient, Guerrero Bradford, was evaluated by the previous provider for retching  Workup Completed:  **sugar was high - given insulin  UTI on urinalysis*    ED Course / Workup Pending (followup):  Getting ice chips now  If tolerated po, keflex and zofran at home for uti    Tolerated ice chips    Rechecked sugar elevated to 300 - will give more IVF and reassess  Tolerating po, BS back down to 227  Will discharge                           Procedures  MDM  Number of Diagnoses or Management Options  Gastroenteritis: new and requires workup  UTI (urinary tract infection): new and requires workup     Amount and/or Complexity of Data Reviewed  Clinical lab tests: reviewed  Tests in the radiology section of CPT®: reviewed  Obtain history from someone other than the patient: yes  Discuss the patient with other providers: yes    Patient Progress  Patient progress: improved    CritCare Time      Disposition  Final diagnoses:   UTI (urinary tract infection)   Gastroenteritis     Time reflects when diagnosis was documented in both MDM as applicable and the Disposition within this note     Time User Action Codes Description Comment    1/26/2019  5:19 AM Ange Michael Add [N39 0] UTI (urinary tract infection)     1/26/2019  5:19 AM Ange Lester Add [K52 [id number]] Gastroenteritis       ED Disposition     ED Disposition Condition Comment    Discharge  Guerrero Bradford discharge to home/self care      Condition at discharge: Good        Follow-up Information     Follow up With Specialties Details Why Contact Info Additional Information    Aneesh Akbar DO Family Medicine Schedule an appointment as soon as possible for a visit in 3 days if symptoms are not improving [facility]  [phone]       [facility] Emergency Department Emergency Medicine Go to If symptoms worsen [address] [facility]  [address] [facility] [address] ED, [place] [address], [place], [place], [address]        Discharge Medication List as of 1/26/2019  9:03 AM      START taking these medications    Details   cephalexin (KEFLEX) 500 mg capsule Take 1 capsule (500 mg total) by mouth every 8 (eight) hours for 10 days, Starting Sat 1/26/2019, Until Tue 2/5/2019, Normal      promethazine (PHENERGAN) 25 mg tablet Take 1 tablet (25 mg total) by mouth every 6 (six) hours as needed for nausea or vomiting, Starting Sat 1/26/2019, Normal         CONTINUE these medications which have NOT CHANGED    Details   insulin glulisine (APIDRA) 100 units/mL injection Up to 100 units daily via pump, Historical Med           No discharge procedures on file         ED Provider  Electronically Signed by     Violeta Rangel DO  01/26/19 2013

## 2019-01-26 NOTE — ED NOTES
Patient A&Ox4  Still feeling nauseous  Able to ambulate to [facility] with supervision  Significant other at bedside  Tolerating ice chips  Denies needing anything at this time        Pedro Galan RN  01/26/19 2756

## 2019-01-26 NOTE — DISCHARGE INSTRUCTIONS
Gastroenteritis   WHAT YOU NEED TO KNOW:   Gastroenteritis, or stomach flu, is an infection of the stomach and intestines  DISCHARGE INSTRUCTIONS:   Call 911 for any of the following:   · You have trouble breathing or a very fast pulse  Return to the emergency department if:   · You see blood in your diarrhea  · You cannot stop vomiting  · You have not urinated for 12 hours  · You feel like you are going to faint  Contact your healthcare provider if:   · You have a fever  · You continue to vomit or have diarrhea, even after treatment  · You see worms in your diarrhea  · Your mouth or eyes are dry  You are not urinating as much or as often  · You have questions or concerns about your condition or care  Medicines:   · Medicines  may be given to stop vomiting or diarrhea, decrease abdominal cramps, or treat an infection  · Take your medicine as directed  Contact your healthcare provider if you think your medicine is not helping or if you have side effects  Tell him or her if you are allergic to any medicine  Keep a list of the medicines, vitamins, and herbs you take  Include the amounts, and when and why you take them  Bring the list or the pill bottles to follow-up visits  Carry your medicine list with you in case of an emergency  Manage your symptoms:   · Drink liquids as directed  Ask your healthcare provider how much liquid to drink each day, and which liquids are best for you  You may also need to drink an oral rehydration solution (ORS)  An ORS has the right amounts of sugar, salt, and minerals in water to replace body fluids  · Eat bland foods  When you feel hungry, begin eating soft, bland foods  Examples are bananas, clear soup, potatoes, and applesauce  Do not have dairy products, alcohol, sugary drinks, or drinks with caffeine until you feel better  · Rest as much as possible  Slowly start to do more each day when you begin to feel better    Prevent the spread of gastroenteritis:  Gastroenteritis can spread easily  Keep yourself, your family, and your surroundings clean to help prevent the spread of gastroenteritis:  · Wash your hands often  Use soap and water  Wash your hands after you use the bathroom, change a child's diapers, or sneeze  Wash your hands before you prepare or eat food  · Clean surfaces and do laundry often  Wash your clothes and towels separately from the rest of the laundry  Clean surfaces in your home with antibacterial  or bleach  · Clean food thoroughly and cook safely  Wash raw vegetables before you cook  Cook meat, fish, and eggs fully  Do not use the same dishes for raw meat as you do for other foods  Refrigerate any leftover food immediately  · Be aware when you camp or travel  Drink only clean water  Do not drink from rivers or lakes unless you purify or boil the water first  When you travel, drink bottled water and do not add ice  Do not eat fruit that has not been peeled  Do not eat raw fish or meat that is not fully cooked  Follow up with your healthcare provider as directed:  Write down your questions so you remember to ask them during your visits  © 2017 [address] Information is for End User's use only and may not be sold, redistributed or otherwise used for commercial purposes  All illustrations and images included in CareNotes® are the copyrighted property of [employer]  or Rhett Preston  The above information is an  only  It is not intended as medical advice for individual conditions or treatments  Talk to your doctor, nurse or pharmacist before following any medical regimen to see if it is safe and effective for you  Urinary Tract Infection in Women   WHAT YOU NEED TO KNOW:   A urinary tract infection (UTI) is caused by bacteria that get inside your urinary tract  Most bacteria that enter your urinary tract come out when you urinate   If the bacteria stay in your urinary tract, you may get an infection  Your urinary tract includes your kidneys, ureters, bladder, and urethra  Urine is made in your kidneys, and it flows from the ureters to the bladder  Urine leaves the bladder through the urethra  A UTI is more common in your lower urinary tract, which includes your bladder and urethra  DISCHARGE INSTRUCTIONS:   Return to the emergency department if:   · You are urinating very little or not at all  · You have a high fever with shaking chills  · You have side or back pain that gets worse  Contact your healthcare provider if:   · You have a fever  · You do not feel better after 2 days of taking antibiotics  · You are vomiting  · You have questions or concerns about your condition or care  Medicines:   · Antibiotics  help fight a bacterial infection  · Medicines  may be given to decrease pain and burning when you urinate  They will also help decrease the feeling that you need to urinate often  These medicines will make your urine orange or red  · Take your medicine as directed  Contact your healthcare provider if you think your medicine is not helping or if you have side effects  Tell him or her if you are allergic to any medicine  Keep a list of the medicines, vitamins, and herbs you take  Include the amounts, and when and why you take them  Bring the list or the pill bottles to follow-up visits  Carry your medicine list with you in case of an emergency  Follow up with your healthcare provider as directed:  Write down your questions so you remember to ask them during your visits  Prevent another UTI:   · Empty your bladder often  Urinate and empty your bladder as soon as you feel the need  Do not hold your urine for long periods of time  · Wipe from front to back after you urinate or have a bowel movement  This will help prevent germs from getting into your urinary tract through your urethra      · Drink liquids as directed  Ask how much liquid to drink each day and which liquids are best for you  You may need to drink more liquids than usual to help flush out the bacteria  Do not drink alcohol, caffeine, or citrus juices  These can irritate your bladder and increase your symptoms  Your healthcare provider may recommend cranberry juice to help prevent a UTI  · Urinate after you have sex  This can help flush out bacteria passed during sex  · Do not douche or use feminine deodorants  These can change the chemical balance in your vagina  · Change sanitary pads or tampons often  This will help prevent germs from getting into your urinary tract  · Do pelvic muscle exercises often  Pelvic muscle exercises may help you start and stop urinating  Strong pelvic muscles may help you empty your bladder easier  Squeeze these muscles tightly for 5 seconds like you are trying to hold back urine  Then relax for 5 seconds  Gradually work up to squeezing for 10 seconds  Do 3 sets of 15 repetitions a day, or as directed  © 2017 [address] [employer]  Information is for End User's use only and may not be sold, redistributed or otherwise used for commercial purposes  All illustrations and images included in CareNotes® are the copyrighted property of A D A M , Inc  or Rhett Preston  The above information is an  only  It is not intended as medical advice for individual conditions or treatments  Talk to your doctor, nurse or pharmacist before following any medical regimen to see if it is safe and effective for you

## 2019-01-26 NOTE — ED PROVIDER NOTES
History  Chief Complaint   Patient presents with    Vomiting     patient presents to the ED with c/o worseing vomiting and diarrhea for the past three days  Patient states she is a type one diabetic and her sugars have been running low      20year old female with history of type 1 diabetes presents for evaluation of nausea, vomiting and diarrhea  Patient usually uses an insulin pump, but states that she disconnected it this evening due to low glucose level in the 60s  Patient had been nauseated with multiple episodes of nonbloody nonbilious emesis for the past 3 days  She then developed diarrheal illness with multiple loose stools over the past 2 days  She states that she has been unable to tolerate PO due to her symptoms with vomiting episodes minutes to hours after eating  She denies any cough, fevers or chills, but has had mild nasal congestion  She denies dysuria, but reports increased urinary frequency  Patient states that her boyfriend has similar symptoms  History provided by:  Patient  Vomiting   Severity:  Severe  Duration:  3 days  Timing:  Constant  Number of daily episodes:  Multiple  Quality:  Stomach contents  Progression:  Worsening  Chronicity:  New  Recent urination:  Normal  Relieved by:  Nothing  Exacerbated by: PO intake  Ineffective treatments:  None tried  Associated symptoms: diarrhea    Associated symptoms: no abdominal pain, no chills, no cough, no fever, no headaches, no myalgias and no sore throat    Diarrhea:     Diarrhea characteristics: loose  Number of occurrences:  Multiple    Severity:  Moderate    Duration:  2 days    Timing:  Constant    Progression:  Unchanged  Risk factors: diabetes    Risk factors: not pregnant        Prior to Admission Medications   Prescriptions Last Dose Informant Patient Reported?  Taking?   insulin glulisine (APIDRA) 100 units/mL injection  Self Yes No   Sig: Up to 100 units daily via pump      Facility-Administered Medications: None       Past Medical History:   Diagnosis Date    Allergic dermatitis     Resolved 3/2/2016     Diabetes [facility] [address] [facility]     Diabetes mellitus type 1 ([facility] [address] [facility]     Diabetic ketoacidosis without coma associated with type 1 diabetes mellitus ([facility] [address] [facility] 2/22/2016    Gastroenteritis 2/22/2016    Left upper extremity numbness     Resolved 11/1/2016     Psychiatric disorder     Varicella        Past Surgical History:   Procedure Laterality Date    FL INJECTION LEFT SHOULDER (ARTHROGRAM)  12/11/2018    WISDOM TOOTH EXTRACTION Bilateral        Family History   Problem Relation Age of Onset    Anxiety disorder Mother     No Known Problems Father     Anxiety disorder Sister     Anxiety disorder Maternal Grandmother     Anxiety disorder Maternal Aunt     Hyperlipidemia Family      I have reviewed and agree with the history as documented  Social History   Substance Use Topics    Smoking status: Former Smoker     Packs/day: 0 50     Years: 4 00     Types: Cigarettes    Smokeless tobacco: Never Used    Alcohol use Yes      Comment: rare         Review of Systems   Constitutional: Negative for appetite change, chills and fever  HENT: Negative for congestion, rhinorrhea and sore throat  Respiratory: Negative for cough, chest tightness and shortness of breath  Cardiovascular: Negative for chest pain, palpitations and leg swelling  Gastrointestinal: Positive for diarrhea, nausea and vomiting  Negative for abdominal pain and constipation  Genitourinary: Positive for frequency  Negative for dysuria and hematuria  Musculoskeletal: Negative for myalgias, neck pain and neck stiffness  Skin: Negative for pallor  Neurological: Negative for syncope, weakness and headaches  All other systems reviewed and are negative  Physical Exam  Physical Exam   Constitutional: She is oriented to person, place, and time  She appears well-developed and well-nourished  Non-toxic appearance  No distress     HENT:   Head: Normocephalic and atraumatic  Eyes: Pupils are equal, round, and reactive to light  Conjunctivae and EOM are normal    Neck: Normal range of motion  Neck supple  No tracheal deviation present  No thyromegaly present  Cardiovascular: Regular rhythm, normal heart sounds and intact distal pulses  Tachycardia present  Pulmonary/Chest: Effort normal and breath sounds normal    Abdominal: Soft  Bowel sounds are normal  She exhibits no distension  There is no tenderness  Lymphadenopathy:     She has no cervical adenopathy  Neurological: She is alert and oriented to person, place, and time  Skin: Skin is warm and dry  She is not diaphoretic  Nursing note and vitals reviewed        Vital Signs  ED Triage Vitals   Temperature Pulse Respirations Blood Pressure SpO2   01/26/19 0420 01/26/19 0400 01/26/19 0400 01/26/19 0400 01/26/19 0400   97 7 °F (36 5 °C) 104 12 125/67 100 %      Temp Source Heart Rate Source Patient Position - Orthostatic VS BP Location FiO2 (%)   01/26/19 0420 01/26/19 0420 01/26/19 0420 01/26/19 0420 --   Tympanic Monitor Sitting Right arm       Pain Score       01/26/19 0420       No Pain           Vitals:    01/26/19 0715 01/26/19 0800 01/26/19 0811 01/26/19 0900   BP:   119/60    Pulse: (!) 135 95 (!) 110 100   Patient Position - Orthostatic VS:           Visual Acuity      ED Medications  Medications   sodium chloride 0 9 % bolus 1,000 mL (0 mL Intravenous Stopped 1/26/19 0521)   ondansetron (ZOFRAN) injection 4 mg (4 mg Intravenous Given 1/26/19 0413)   ondansetron (ZOFRAN) injection 4 mg (4 mg Intravenous Given 1/26/19 0446)   cephalexin (KEFLEX) capsule 500 mg (500 mg Oral Given 1/26/19 0521)   cefTRIAXone (ROCEPHIN) IVPB (premix) 1,000 mg (0 mg Intravenous Stopped 1/26/19 0707)   promethazine (PHENERGAN) injection 25 mg (25 mg Intravenous Given 1/26/19 0541)   sodium chloride 0 9 % bolus 1,000 mL (0 mL Intravenous Stopped 1/26/19 0707)   insulin regular (HumuLIN R,NovoLIN R) injection 10 Units (10 Units Subcutaneous Given 1/26/19 0707)   sodium chloride 0 9 % bolus 1,000 mL (0 mL Intravenous Stopped 1/26/19 0900)       Diagnostic Studies  Results Reviewed     Procedure Component Value Units Date/Time    Fingerstick Glucose (POCT) [[id number]]  (Abnormal) Collected:  01/26/19 0858    Lab Status:  Final result Updated:  01/26/19 0859     POC Glucose 227 (H) mg/dl     Fingerstick Glucose (POCT) [[id number]]  (Abnormal) Collected:  01/26/19 0748    Lab Status:  Final result Updated:  01/26/19 0749     POC Glucose 306 (H) mg/dl     Fingerstick Glucose (POCT) [[id number]]  (Abnormal) Collected:  01/26/19 0548    Lab Status:  Final result Updated:  01/26/19 0549     POC Glucose 279 (H) mg/dl     Urine Microscopic [[id number]]  (Abnormal) Collected:  01/26/19 0428    Lab Status:  Final result Specimen:  Urine from Urine, Clean Catch Updated:  01/26/19 0510     RBC, UA 0-1 (A) /hpf      WBC, UA 10-20 (A) /hpf      Epithelial Cells Innumerable (A) /hpf      Bacteria, UA Innumerable (A) /hpf      MUCUS THREADS Occasional (A)    Urine culture [[id number]] Collected:  01/26/19 0428    Lab Status:   In process Specimen:  Urine from Urine, Clean Catch Updated:  01/26/19 0509    Acetone [[id number]]  (Normal) Collected:  01/26/19 0413    Lab Status:  Final result Specimen:  Blood from Arm, Left Updated:  01/26/19 0505     Acetone, Bld Negative    Comprehensive metabolic panel [[id number]] Collected:  01/26/19 0413    Lab Status:  Final result Specimen:  Blood from Arm, Left Updated:  01/26/19 0501     Sodium 140 mmol/L      Potassium 3 7 mmol/L      Chloride 101 mmol/L      CO2 31 mmol/L      ANION GAP 8 mmol/L      BUN 9 mg/dL      Creatinine 0 67 mg/dL      Glucose 103 mg/dL      Calcium 8 9 mg/dL      AST 17 U/L      ALT 17 U/L      Alkaline Phosphatase 75 U/L      Total Protein 7 9 g/dL      Albumin 4 0 g/dL      Total Bilirubin 0 30 mg/dL      eGFR 123 ml/min/1 73sq m     Narrative:         National Kidney Disease Education Program recommendations are as follows:  GFR calculation is accurate only with a steady state creatinine  Chronic Kidney disease less than 60 ml/min/1 73 sq  meters  Kidney failure less than 15 ml/min/1 73 sq  meters      CBC and differential [[id number]] Collected:  01/26/19 0413    Lab Status:  Final result Specimen:  Blood from Arm, Left Updated:  01/26/19 0450     WBC 7 70 Thousand/uL      RBC 4 29 Million/uL      Hemoglobin 13 6 g/dL      Hematocrit 40 0 %      MCV 93 fL      MCH 31 7 pg      MCHC 34 0 g/dL      RDW 11 8 %      MPV 10 5 fL      Platelets 874 Thousands/uL      nRBC 0 /100 WBCs      Neutrophils Relative 51 %      Immat GRANS % 0 %      Lymphocytes Relative 39 %      Monocytes Relative 7 %      Eosinophils Relative 2 %      Basophils Relative 1 %      Neutrophils Absolute 3 96 Thousands/µL      Immature Grans Absolute 0 02 Thousand/uL      Lymphocytes Absolute 3 02 Thousands/µL      Monocytes Absolute 0 52 Thousand/µL      Eosinophils Absolute 0 12 Thousand/µL      Basophils Absolute 0 06 Thousands/µL     UA w Reflex to Microscopic w Reflex to Culture [[id number]]  (Abnormal) Collected:  01/26/19 0428    Lab Status:  Final result Specimen:  Urine from Urine, Clean Catch Updated:  01/26/19 0444     Color, UA Yellow     Clarity, UA Cloudy     Specific Gravity, UA 1 015     pH, UA 8 5 (H)     Leukocytes, UA Large (A)     Nitrite, UA Negative     Protein, UA Negative mg/dl      Glucose, UA Negative mg/dl      Ketones, UA Negative mg/dl      Urobilinogen, UA 0 2 E U /dl      Bilirubin, UA Negative     Blood, UA Trace-Intact (A)    POCT urinalysis dipstick [[id number]]  (Normal) Resulted:  01/26/19 0427    Lab Status:  Final result Specimen:  Urine Updated:  01/26/19 0427     Color, UA yellow     Clarity, UA clear     Glucose, UA (Ref: Negative) negative     Bilirubin, UA (Ref: Negative) negative     Ketones, UA (Ref: Negative) negative     Spec Grav, UA (Ref:1 003-1 030) 1 010     Blood, UA (Ref: Negative) negative     pH, UA (Ref: 4 5-8 0) 8 0     Protein, UA (Ref: Negative) negative     Urobilinogen, UA (Ref: 0 2- 1 0) 0 2      Leukocytes, UA (Ref: Negative) MODERATE     Nitrite, UA (Ref: Negative) negative    POCT pregnancy, urine [[id number]]  (Normal) Resulted:  01/26/19 0426    Lab Status:  Final result Updated:  01/26/19 0426     EXT PREG TEST UR (Ref: Negative) negative    Fingerstick Glucose (POCT) [[id number]]  (Normal) Collected:  01/26/19 0350    Lab Status:  Final result Updated:  01/26/19 0356     POC Glucose 74 mg/dl                  No orders to display              Procedures  ECG 12 Lead Documentation  Date/Time: 1/26/2019 4:01 AM  Performed by: Shotlst  Authorized by: Shotlst     Indications / Diagnosis:  Nausea, vomiting  ECG reviewed by me, the ED Provider: yes    Patient location:  ED  Previous ECG:     Previous ECG:  Compared to current    Comparison ECG info:  11/22/2018 sinus tachycardia    Similarity:  No change  Interpretation:     Interpretation: non-specific    Rate:     ECG rate:  100    ECG rate assessment: tachycardic    Rhythm:     Rhythm: sinus tachycardia    Ectopy:     Ectopy: none    QRS:     QRS axis:  Right    QRS intervals:  Normal  Conduction:     Conduction: normal    ST segments:     ST segments:  Normal  T waves:     T waves: flattening      Flattening:  AVL and V2           Phone Contacts  ED Phone Contact    ED Course                               MDM  Number of Diagnoses or Management Options  Gastroenteritis: new and requires workup  UTI (urinary tract infection): new and requires workup  Diagnosis management comments: 20year old female presents with nausea, vomiting and diarrhea  Patient also reporting urinary frequency  Self discontinued insulin pump prior to arrival  Labs unremarkable with exception of UA consistent with UTI  Patient given 2 doses of zofran for nausea and vomiting with no improvement   Patient given dose of Keflex for her UTI, but promptly vomited the tablet  Phenergan given with improvement in nausea  1g Rocephin given as patient no currently tolerating PO  On recheck of patient's glucose, she became hyperglycemic  1 L NS given along with 10 units of SQ insulin  Repeat blood glucose level remained elevated  Additional 1 L NS ordered  Patient signed out to Dr Hilary Rodriguez  Amount and/or Complexity of Data Reviewed  Clinical lab tests: ordered and reviewed    Patient Progress  Patient progress: stable    CritCare Time    Disposition  Final diagnoses:   UTI (urinary tract infection)   Gastroenteritis     Time reflects when diagnosis was documented in both MDM as applicable and the Disposition within this note     Time User Action Codes Description Comment    1/26/2019  5:19 AM Escarcega, Claire Goodell Add [N39 0] UTI (urinary tract infection)     1/26/2019  5:19 AM Maximiano Ota, Claire Goodell Add [K52 9] Gastroenteritis       ED Disposition     ED Disposition Condition Comment    Discharge  [facility] discharge to home/self care      Condition at discharge: Good        Follow-up Information     Follow up With Specialties Details Why Contact Info Additional Information    Ketty Luna DO Family Medicine Schedule an appointment as soon as possible for a visit in 3 days if symptoms are not improving [address] [place] [address] Emergency Department Emergency Medicine Go to If symptoms worsen [address] [facility]  [address] [place] [address] [address] ED, [address], [address]          Discharge Medication List as of 1/26/2019  9:03 AM      START taking these medications    Details   cephalexin (KEFLEX) 500 mg capsule Take 1 capsule (500 mg total) by mouth every 8 (eight) hours for 10 days, Starting Sat 1/26/2019, Until Tue 2/5/2019, Normal      promethazine (PHENERGAN) 25 mg tablet Take 1 tablet (25 mg total) by mouth every 6 (six) hours as needed for nausea or vomiting, Starting Sat 1/26/2019, Normal         CONTINUE these medications which have NOT CHANGED    Details   insulin glulisine (APIDRA) 100 units/mL injection Up to 100 units daily via pump, Historical Med           No discharge procedures on file      ED Provider  Electronically Signed by           Lucas Kulkarni MD  01/26/19 2020

## 2019-01-27 LAB
ATRIAL RATE: 100 BPM
BACTERIA UR CULT: NORMAL
P AXIS: 79 DEGREES
PR INTERVAL: 116 MS
QRS AXIS: 91 DEGREES
QRSD INTERVAL: 88 MS
QT INTERVAL: 348 MS
QTC INTERVAL: 448 MS
T WAVE AXIS: 51 DEGREES
VENTRICULAR RATE: 100 BPM

## 2019-01-27 PROCEDURE — 93010 ELECTROCARDIOGRAM REPORT: CPT | Performed by: INTERNAL MEDICINE

## 2019-01-29 ENCOUNTER — VBI (OUTPATIENT)
Dept: ADMINISTRATIVE | Facility: OTHER | Age: 24
End: 2019-01-29

## 2019-01-29 NOTE — TELEPHONE ENCOUNTER
Adryan Resendez    ED Visit Information     Ed visit date: 1/26/2019  Diagnosis Description: UTI (urinary tract infection); Gastroenteritis  In Network? Yes [address]  Discharge status: Home  Discharged with meds ? Yes  Number of ED visits to date: 1  ED Severity:n/a     Outreach Information    Outreach successful: Yes 2  Date letter mailed:n/a  Date Finalized:1/30/2019    Care Coordination    Follow up appointment with pcp: no Declined to schedule  Transportation issues ? No    [employer] type:  7 Day Outreach  Emergent necessity warranted by diagnosis:  No  [facility] PCP:  Yes  Transportation:  Self Transport  Called PCP first?:  No  Feels able to call PCP for urgent problems ?:  Yes  Understands what emergencies can be handled by PCP ?:  Yes  Ever any problems getting appointment with PCP for minor emergency/urgency problems?:  No  Practice Contacted Patient ?:  No  Pt had ED follow up with pcp/staff ?:  No    Seen for follow-up out of network ?:  No  Reason Patient went to ED instead of Urgent Care or PCP?:  Perceived Severity of Illness  Urgent care Education?:  No  01/29/2019 11:01 AM Phone (Gerhardt Canny) Emanuel Wright (Self) [phone] (H)   Left Message - requesting a call back    Unable to reach patient regarding recent ED visit on 1/26 for UTI (urinary tract infection); Gastroenteritis  2nd attempt will be made on 1/30 01/30/2019 02:17 PM Phone (Gerhardt Canny) Emanuel Wright (Self) [phone] (H)   Call Complete    Personal communication with patient regarding her recent ED visit on 1/26/2019 for UTI (urinary tract infection); Gastroenteritis  Cedric Chacon stated that she is feeling much better  She was discharged with medication (cephalexin and promethazine) and was advised to follow up with her PCP within 3 days of visit, if not better  Cedric Chacon declined to schedule a follow up appt at this time   She does not meet OPCM criteria, denies any transportation issues and is aware of her nearest [place] [address] urgent care facility, education not given  She also stated that due to being a type 1 diabetic, she normally goes to the ED when she experiences vomiting

## 2019-01-31 LAB
BASE EXCESS BLDA CALC-SCNC: 6 MMOL/L (ref -2–3)
HCO3 BLDA-SCNC: 30.5 MMOL/L (ref 24–30)
PCO2 BLD: 32 MMOL/L (ref 21–32)
PCO2 BLD: 40.8 MM HG (ref 42–50)
PH BLD: 7.48 [PH] (ref 7.3–7.4)
PO2 BLD: 18 MM HG (ref 35–45)
SAO2 % BLD FROM PO2: 31 % (ref 95–98)
SPECIMEN SOURCE: ABNORMAL

## 2019-02-25 ENCOUNTER — TELEPHONE (OUTPATIENT)
Dept: FAMILY MEDICINE CLINIC | Facility: CLINIC | Age: 24
End: 2019-02-25

## 2019-02-26 ENCOUNTER — OFFICE VISIT (OUTPATIENT)
Dept: FAMILY MEDICINE CLINIC | Facility: CLINIC | Age: 24
End: 2019-02-26
Payer: COMMERCIAL

## 2019-02-26 ENCOUNTER — TELEPHONE (OUTPATIENT)
Dept: ENDOCRINOLOGY | Facility: CLINIC | Age: 24
End: 2019-02-26

## 2019-02-26 ENCOUNTER — TELEPHONE (OUTPATIENT)
Dept: FAMILY MEDICINE CLINIC | Facility: CLINIC | Age: 24
End: 2019-02-26

## 2019-02-26 VITALS
WEIGHT: 132 LBS | HEIGHT: 59 IN | BODY MASS INDEX: 26.61 KG/M2 | SYSTOLIC BLOOD PRESSURE: 110 MMHG | HEART RATE: 91 BPM | TEMPERATURE: 98.5 F | DIASTOLIC BLOOD PRESSURE: 82 MMHG

## 2019-02-26 DIAGNOSIS — F41.9 ANXIETY: ICD-10-CM

## 2019-02-26 DIAGNOSIS — R11.2 INTRACTABLE VOMITING WITH NAUSEA, UNSPECIFIED VOMITING TYPE: Primary | ICD-10-CM

## 2019-02-26 DIAGNOSIS — K52.9 GASTROENTERITIS: ICD-10-CM

## 2019-02-26 PROCEDURE — 1036F TOBACCO NON-USER: CPT | Performed by: NURSE PRACTITIONER

## 2019-02-26 PROCEDURE — 99214 OFFICE O/P EST MOD 30 MIN: CPT | Performed by: NURSE PRACTITIONER

## 2019-02-26 PROCEDURE — 3008F BODY MASS INDEX DOCD: CPT | Performed by: NURSE PRACTITIONER

## 2019-02-26 RX ORDER — LORAZEPAM 0.5 MG/1
0.5 TABLET ORAL EVERY 8 HOURS PRN
Qty: 30 TABLET | Refills: 0 | Status: SHIPPED | OUTPATIENT
Start: 2019-02-26 | End: 2020-02-04

## 2019-02-26 RX ORDER — PROMETHAZINE HYDROCHLORIDE 25 MG/1
25 TABLET ORAL EVERY 6 HOURS PRN
Qty: 30 TABLET | Refills: 0 | Status: SHIPPED | OUTPATIENT
Start: 2019-02-26 | End: 2019-05-27

## 2019-02-26 RX ORDER — SERTRALINE HYDROCHLORIDE 25 MG/1
50 TABLET, FILM COATED ORAL
Qty: 30 TABLET | Refills: 0 | Status: SHIPPED | OUTPATIENT
Start: 2019-02-26 | End: 2019-03-22 | Stop reason: SDUPTHER

## 2019-02-26 NOTE — TELEPHONE ENCOUNTER
Spoke to patient advised Dr Erin Cristobal office, Katie Mas, will be calling her in regards to her supplies, insulin pump, etc   trb

## 2019-02-26 NOTE — TELEPHONE ENCOUNTER
----- Message from [address] sent at 2/26/2019 11:36 AM EST -----  Can you please call Alana Buckley and let her know that we cannot pre-authorize her Freestyle Shayne and Omnipod  That it needs to be completed by endocrinology   Thanks

## 2019-02-26 NOTE — PATIENT INSTRUCTIONS
1  Schedule appointment with Gastro  2  Promethazine  For nausea  3  Zoloft and Anxiety for anxiety   4   OTC heartburn meds

## 2019-02-26 NOTE — TELEPHONE ENCOUNTER
Heidi Castaneda from [employer] called today  Pt recently started working for [employer] and is tranferring care to our office ov with Dr Amelia Durant 5/15/19  She is in need of supplies for pump and last seen her [place] endo doctor 1/19   Are we able to order supplies or diabetic meds for her before she is seen/

## 2019-02-26 NOTE — PROGRESS NOTES
Assessment/Plan   Diagnoses and all orders for this visit:    Intractable vomiting with nausea, unspecified vomiting type  -     Ambulatory referral to Gastroenterology; Future    Gastroenteritis  -     promethazine (PHENERGAN) 25 mg tablet; Take 1 tablet (25 mg total) by mouth every 6 (six) hours as needed for nausea or vomiting    Anxiety  -     sertraline (ZOLOFT) 25 mg tablet; Take 2 tablets (50 mg total) by mouth daily at bedtime  -     LORazepam (ATIVAN) 0 5 mg tablet; Take 1 tablet (0 5 mg total) by mouth every 8 (eight) hours as needed for anxiety        Chief Complaint   Patient presents with    Vomiting     After eating getting nausea and vomiting, the she get her Anxiety 6 months       Subjective   Patient ID: Linda Roberts is a 20 y o  female  Vitals:    02/26/19 0859   BP: 110/82   Pulse: 91   Temp: 98 5 °F (36 9 °C)     Nausea   This is a recurrent problem  The current episode started more than 1 month ago (Patient is an insulin dependent diabetic, I beleive previous diagnosis of gastroporesis, cannot use reglan, with nausea and vomiting intermittantly over past months, referral to gastro was given in past but was in middle insurance change and did not see )  The problem occurs intermittently  The problem has been waxing and waning  Associated symptoms include a change in bowel habit (occasional diarrhea), nausea and vomiting  Pertinent negatives include no abdominal pain, arthralgias, chest pain, chills, congestion, coughing, diaphoresis, fatigue or fever  The symptoms are aggravated by eating  Treatments tried: phenergan  The treatment provided mild relief  Anxiety   Presents for initial visit  Onset was at an unknown time (Reports has been on medications in the past for anxiety but went off on own because did not like "the feelin" on the meds)  The problem has been gradually worsening (with nausea and vomiting symptoms, a new job an drecent move there have been increased episodes of anxity)  Symptoms include compulsions, decreased concentration, excessive worry, irritability, nausea, nervous/anxious behavior and restlessness  Patient reports no chest pain, dizziness, dry mouth, feeling of choking, palpitations, shortness of breath or suicidal ideas  Symptoms occur most days  The severity of symptoms is moderate  The symptoms are aggravated by work stress (health concerns)  The quality of sleep is fair  Nighttime awakenings: one to two  There are no known risk factors  Her past medical history is significant for anxiety/panic attacks and depression  Past treatments include benzodiazephines and SSRIs  The treatment provided mild relief  Compliance with prior treatments has been variable  Prior compliance problems include insurance issues and medication issues  The following portions of the patient's history were reviewed and updated as appropriate: allergies, current medications, past medical history, past social history, past surgical history and problem list     Review of Systems   Constitutional: Positive for irritability  Negative for chills, diaphoresis, fatigue and fever  HENT: Negative  Negative for congestion  Eyes: Negative  Respiratory: Negative  Negative for cough and shortness of breath  Cardiovascular: Negative  Negative for chest pain and palpitations  Gastrointestinal: Positive for change in bowel habit (occasional diarrhea), nausea and vomiting  Negative for abdominal pain  Endocrine: Negative  Genitourinary: Negative  Musculoskeletal: Negative  Negative for arthralgias  Skin: Negative  Allergic/Immunologic: Negative  Neurological: Negative  Negative for dizziness  Hematological: Negative  Psychiatric/Behavioral: Positive for decreased concentration  Negative for suicidal ideas  The patient is nervous/anxious  Objective     Physical Exam   Constitutional: She is oriented to person, place, and time   She appears well-developed and well-nourished  No distress  HENT:   Head: Normocephalic and atraumatic  Right Ear: External ear normal    Left Ear: External ear normal    Nose: Nose normal    Mouth/Throat: Oropharynx is clear and moist  No oropharyngeal exudate  Eyes: Pupils are equal, round, and reactive to light  Conjunctivae are normal  Right eye exhibits no discharge  Left eye exhibits no discharge  Neck: Normal range of motion  Neck supple  No thyromegaly present  Cardiovascular: Normal rate, regular rhythm, normal heart sounds and intact distal pulses  Pulmonary/Chest: Effort normal and breath sounds normal  No respiratory distress  Abdominal: Soft  Bowel sounds are normal  She exhibits no distension and no mass  There is no tenderness  There is no guarding  Musculoskeletal: Normal range of motion  She exhibits no edema or tenderness  Lymphadenopathy:     She has no cervical adenopathy  Neurological: She is alert and oriented to person, place, and time  Skin: Skin is warm and dry  No rash noted  She is not diaphoretic  No erythema  Psychiatric: She has a normal mood and affect  Her behavior is normal  Judgment and thought content normal    Nursing note and vitals reviewed      Allergies   Allergen Reactions    Reglan [Metoclopramide] Anxiety       Current Outpatient Medications:     insulin glulisine (APIDRA) 100 units/mL injection, Up to 100 units daily via pump, Disp: , Rfl:     promethazine (PHENERGAN) 25 mg tablet, Take 1 tablet (25 mg total) by mouth every 6 (six) hours as needed for nausea or vomiting, Disp: 30 tablet, Rfl: 0    LORazepam (ATIVAN) 0 5 mg tablet, Take 1 tablet (0 5 mg total) by mouth every 8 (eight) hours as needed for anxiety, Disp: 30 tablet, Rfl: 0    sertraline (ZOLOFT) 25 mg tablet, Take 2 tablets (50 mg total) by mouth daily at bedtime, Disp: 30 tablet, Rfl: 0  Social History     Socioeconomic History    Marital status: Single     Spouse name: Not on file    Number of children: Not on file    Years of education: Not on file    Highest education level: Not on file   Occupational History    Not on file   Social Needs    Financial resource strain: Not on file    Food insecurity:     Worry: Not on file     Inability: Not on file    Transportation needs:     Medical: Not on file     Non-medical: Not on file   Tobacco Use    Smoking status: Former Smoker     Packs/day: 0 50     Years: 4 00     Pack years: 2 00     Types: Cigarettes    Smokeless tobacco: Never Used   Substance and Sexual Activity    Alcohol use: Yes     Comment: rare     Drug use: No    Sexual activity: Not on file   Lifestyle    Physical activity:     Days per week: Not on file     Minutes per session: Not on file    Stress: Not on file   Relationships    Social connections:     Talks on phone: Not on file     Gets together: Not on file     Attends Denominational service: Not on file     Active member of club or organization: Not on file     Attends meetings of clubs or organizations: Not on file     Relationship status: Not on file    Intimate partner violence:     Fear of current or ex partner: Not on file     Emotionally abused: Not on file     Physically abused: Not on file     Forced sexual activity: Not on file   Other Topics Concern    Not on file   Social History Narrative    Current every day smoker - as per [employer]      Family History   Problem Relation Age of Onset    Anxiety disorder Mother     No Known Problems Father     Anxiety disorder Sister     Anxiety disorder Maternal Grandmother     Anxiety disorder Maternal Aunt     Hyperlipidemia Family

## 2019-02-27 ENCOUNTER — TELEPHONE (OUTPATIENT)
Dept: ENDOCRINOLOGY | Facility: CLINIC | Age: 24
End: 2019-02-27

## 2019-02-27 NOTE — TELEPHONE ENCOUNTER
It is probably best if she gets the supplies from her previous endocrinologist who can still write the prescriptions for them  They would just need to go to home start for her medication and to the appropriate pump company for her supplies  She can certainly let us know if that is not feasible

## 2019-03-11 ENCOUNTER — TELEPHONE (OUTPATIENT)
Dept: FAMILY MEDICINE CLINIC | Facility: CLINIC | Age: 24
End: 2019-03-11

## 2019-03-22 ENCOUNTER — OFFICE VISIT (OUTPATIENT)
Dept: FAMILY MEDICINE CLINIC | Facility: CLINIC | Age: 24
End: 2019-03-22
Payer: COMMERCIAL

## 2019-03-22 VITALS
BODY MASS INDEX: 27.13 KG/M2 | WEIGHT: 134.6 LBS | TEMPERATURE: 98.6 F | HEIGHT: 59 IN | SYSTOLIC BLOOD PRESSURE: 116 MMHG | DIASTOLIC BLOOD PRESSURE: 80 MMHG | HEART RATE: 76 BPM

## 2019-03-22 DIAGNOSIS — F41.9 ANXIETY: ICD-10-CM

## 2019-03-22 PROCEDURE — 99213 OFFICE O/P EST LOW 20 MIN: CPT | Performed by: FAMILY MEDICINE

## 2019-03-22 PROCEDURE — 3008F BODY MASS INDEX DOCD: CPT | Performed by: FAMILY MEDICINE

## 2019-03-22 RX ORDER — IBUPROFEN 600 MG/1
TABLET ORAL
COMMUNITY
Start: 2019-02-21 | End: 2019-07-15 | Stop reason: SDUPTHER

## 2019-03-22 RX ORDER — SERTRALINE HYDROCHLORIDE 25 MG/1
50 TABLET, FILM COATED ORAL
Qty: 60 TABLET | Refills: 0 | Status: SHIPPED | OUTPATIENT
Start: 2019-03-22 | End: 2019-04-29 | Stop reason: SDUPTHER

## 2019-03-22 NOTE — PROGRESS NOTES
Assessment/Plan   Diagnoses and all orders for this visit:    Anxiety  Comments:  Increasing Zoloft to 50 mg PO daily, will call if any side effects and return to 25 mg PO daily  Orders:  -     sertraline (ZOLOFT) 25 mg tablet; Take 2 tablets (50 mg total) by mouth daily at bedtime    Other orders  -     glucose blood test strip;   test 8 TIMES DAILY AS DIRECTED E10 649  -     GLUCAGON EMERGENCY 1 MG injection        Chief Complaint   Patient presents with    Follow-up     medication check up for zoloft       Subjective   Patient ID: Sandee Judd is a 20 y o  female  Vitals:    03/22/19 1538   BP: 116/80   Pulse: 76   Temp: 98 6 °F (37 °C)     Anxiety   Presents for follow-up (Currently taking 25 mg Zoloft daily and reports a "big" decrease in her panic attacks, and has not needed to use any ativan, "able to talk myself down now", all nausea and vomiting has resolved) visit  Symptoms include nervous/anxious behavior  Patient reports no compulsions, decreased concentration, depressed mood, excessive worry, feeling of choking, insomnia, nausea, panic, restlessness or suicidal ideas  Symptoms occur occasionally  The severity of symptoms is mild  The quality of sleep is good  Nighttime awakenings: none  Compliance with medications is %  Treatment side effects: no side effects, would like to try increasing dose to 50 mg to elimate all panic attacks  The following portions of the patient's history were reviewed and updated as appropriate: allergies, current medications, past medical history, past social history, past surgical history and problem list     Review of Systems   Constitutional: Negative  HENT: Negative  Eyes: Negative  Respiratory: Negative  Cardiovascular: Negative  Gastrointestinal: Negative  Negative for nausea  Endocrine: Negative  Genitourinary: Negative  Musculoskeletal: Negative  Skin: Negative  Allergic/Immunologic: Negative      Neurological: Negative  Hematological: Negative  Psychiatric/Behavioral: Negative for decreased concentration and suicidal ideas  The patient is nervous/anxious  The patient does not have insomnia  Objective     Physical Exam   Constitutional: She is oriented to person, place, and time  She appears well-developed and well-nourished  No distress  HENT:   Head: Normocephalic and atraumatic  Eyes: Pupils are equal, round, and reactive to light  Conjunctivae are normal  Right eye exhibits no discharge  Left eye exhibits no discharge  Neck: Normal range of motion  Neck supple  Cardiovascular: Normal rate, regular rhythm, normal heart sounds and intact distal pulses  No murmur heard  Pulmonary/Chest: Effort normal and breath sounds normal    Abdominal: Soft  Musculoskeletal: Normal range of motion  She exhibits no edema or tenderness  Neurological: She is alert and oriented to person, place, and time  Skin: Skin is warm and dry  No rash noted  She is not diaphoretic  No erythema  Psychiatric: She has a normal mood and affect  Her behavior is normal  Judgment and thought content normal    Nursing note and vitals reviewed      Allergies   Allergen Reactions    Reglan [Metoclopramide] Anxiety     Patient Active Problem List   Diagnosis    Diabetes mellitus type 1 ([facility] )    Dehydration    Diabetic ketoacidosis associated with type 1 diabetes mellitus (HCC)    Gastroenteritis    Hyponatremia    Nausea    Depression       Current Outpatient Medications:     GLUCAGON EMERGENCY 1 MG injection, , Disp: , Rfl:     glucose blood test strip,   test 8 TIMES DAILY AS DIRECTED E10 649, Disp: , Rfl:     insulin glulisine (APIDRA) 100 units/mL injection, Up to 100 units daily via pump, Disp: , Rfl:     LORazepam (ATIVAN) 0 5 mg tablet, Take 1 tablet (0 5 mg total) by mouth every 8 (eight) hours as needed for anxiety, Disp: 30 tablet, Rfl: 0    promethazine (PHENERGAN) 25 mg tablet, Take 1 tablet (25 mg total) by mouth every 6 (six) hours as needed for nausea or vomiting, Disp: 30 tablet, Rfl: 0    sertraline (ZOLOFT) 25 mg tablet, Take 2 tablets (50 mg total) by mouth daily at bedtime, Disp: 60 tablet, Rfl: 0  Social History     Socioeconomic History    Marital status: Single     Spouse name: Not on file    Number of children: Not on file    Years of education: Not on file    Highest education level: Not on file   Occupational History    Not on file   Social Needs    Financial resource strain: Not on file    Food insecurity:     Worry: Not on file     Inability: Not on file    Transportation needs:     Medical: Not on file     Non-medical: Not on file   Tobacco Use    Smoking status: Former Smoker     Packs/day: 0 50     Years: 4 00     Pack years: 2 00     Types: Cigarettes    Smokeless tobacco: Never Used   Substance and Sexual Activity    Alcohol use: Yes     Comment: rare     Drug use: No    Sexual activity: Not on file   Lifestyle    Physical activity:     Days per week: Not on file     Minutes per session: Not on file    Stress: Not on file   Relationships    Social connections:     Talks on phone: Not on file     Gets together: Not on file     Attends Jew service: Not on file     Active member of club or organization: Not on file     Attends meetings of clubs or organizations: Not on file     Relationship status: Not on file    Intimate partner violence:     Fear of current or ex partner: Not on file     Emotionally abused: Not on file     Physically abused: Not on file     Forced sexual activity: Not on file   Other Topics Concern    Not on file   Social History Narrative    Current every day smoker - as per [employer]      Family History   Problem Relation Age of Onset    Anxiety disorder Mother     No Known Problems Father     Anxiety disorder Sister     Anxiety disorder Maternal Grandmother     Anxiety disorder Maternal Aunt     Hyperlipidemia Family

## 2019-03-29 ENCOUNTER — OFFICE VISIT (OUTPATIENT)
Dept: GASTROENTEROLOGY | Facility: CLINIC | Age: 24
End: 2019-03-29
Payer: COMMERCIAL

## 2019-03-29 VITALS
WEIGHT: 135 LBS | HEART RATE: 78 BPM | DIASTOLIC BLOOD PRESSURE: 66 MMHG | HEIGHT: 59 IN | BODY MASS INDEX: 27.21 KG/M2 | SYSTOLIC BLOOD PRESSURE: 94 MMHG

## 2019-03-29 DIAGNOSIS — E10.9 TYPE 1 DIABETES MELLITUS WITHOUT COMPLICATION (HCC): ICD-10-CM

## 2019-03-29 DIAGNOSIS — R11.2 INTRACTABLE VOMITING WITH NAUSEA, UNSPECIFIED VOMITING TYPE: Primary | ICD-10-CM

## 2019-03-29 DIAGNOSIS — F41.9 ANXIETY: ICD-10-CM

## 2019-03-29 DIAGNOSIS — R19.4 CHANGE IN BOWEL HABIT: ICD-10-CM

## 2019-03-29 PROCEDURE — 99204 OFFICE O/P NEW MOD 45 MIN: CPT | Performed by: INTERNAL MEDICINE

## 2019-03-29 RX ORDER — PANTOPRAZOLE SODIUM 40 MG/1
40 TABLET, DELAYED RELEASE ORAL DAILY
Qty: 30 TABLET | Refills: 5 | Status: SHIPPED | OUTPATIENT
Start: 2019-03-29 | End: 2019-11-13 | Stop reason: ALTCHOICE

## 2019-03-29 RX ORDER — METOCLOPRAMIDE 10 MG/1
10 TABLET ORAL
Qty: 90 TABLET | Refills: 3 | Status: SHIPPED | OUTPATIENT
Start: 2019-03-29 | End: 2019-03-29

## 2019-03-29 RX ORDER — PANTOPRAZOLE SODIUM 20 MG/1
40 TABLET, DELAYED RELEASE ORAL DAILY
Qty: 30 TABLET | Refills: 4 | Status: SHIPPED | OUTPATIENT
Start: 2019-03-29 | End: 2019-03-29

## 2019-03-29 NOTE — LETTER
March 31, 2019     Sameer Leyva, [address] [facility] [address] [facility]  [facility] [id number]    Patient: Felicity Galvez   YOB: 1995   Date of Visit: 3/29/2019       Dear Dr Catina Thorpe: Thank you for referring Felicity Galvez to me for evaluation  Below are my notes for this consultation  If you have questions, please do not hesitate to call me  I look forward to following your patient along with you  Sincerely,        Eloina Bocanegra MD        CC: ADILENE Nolen MD  3/31/2019  4:43 PM  Sign at close encounter  [facility] - Outpatient Follow-up Note  Felicity Galvez 20 y o  female MRN: [id number]  Encounter: [id number]    ASSESSMENT AND PLAN:      1  Intractable vomiting with nausea, unspecified vomiting type  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)  - Dietary consult - 4 small per meals per day- higher protein diet --  Gastroparesis diet       2  Type 1 diabetes mellitus without complication (HCC)  Has had DKA in remote past   Had DM at age 6  - Celiac Disease Panel; Future  Could be a risk for gastroparesis with long standing Type 1 DM    3  Change in bowel habit  Has had had over the last 2 years - since Sepsis in 2017    4  Anxiety   Doing better on the Zoloft    5  GERD--patient has had increasing issues with heartburn during the time interval   She has not been on a PPI or H2 blocker  As above we did prescribe pantoprazole  He EGD will evaluate this situation as well        Followup Appointment[de-identified] 2 mo  ______________________________________________________________________    Chief Complaint   Patient presents with    Nausea/Vomiting     x 4-5 months; change in bowel habits     HPI:  The patient comes to the office for evaluation for evaluation of recurrent episodes of nausea dry heaving and some vomiting over the last 3 months  Patient was well until November of 2018 when she was away on a camping trip  She seemed to come down with what appeared to be an acute gastroenteritis  This was characterized by nausea and vomiting which was fairly profuse  She had not up in a local emergency room and was given IV fluids  Her diarrhea gradually subsided but the patient has had persistent issues with recurrent nausea fullness and vomiting  This occurs almost on a daily basis  The patient has to be quite careful as she is a type 1 diabetic and is on an insulin pump  Patient was given of trial of Reglan but this aggravated problems with anxiety so the medication was discontinued  She does have fairly frequent heartburn at this time and is not on any medications for this issue  The patient has been a diabetic since age 6  Her control previously had not been all that could but recently her control of pretty stable  She reports having a a hemoglobin A1c in the 7 range  He does not have significant problems with neuropathy or retinopathy  The patient has been somewhat anxious about the whole situation  There was a feeling that perhaps the anxiety was aggravating the problem  She has been on Zoloft and this seems to help somewhat            Historical Information   Past Medical History:   Diagnosis Date    Allergic dermatitis     Resolved 3/2/2016     Diabetes [facility] )     Diabetes mellitus type 1 ([facility] )     Diabetic ketoacidosis without coma associated with type 1 diabetes mellitus ([facility] ) 2/22/2016    Gastroenteritis 2/22/2016    Left upper extremity numbness     Resolved 11/1/2016     Psychiatric disorder     Varicella      Past Surgical History:   Procedure Laterality Date    FL INJECTION LEFT SHOULDER (ARTHROGRAM)  12/11/2018    WISDOM TOOTH EXTRACTION Bilateral      Social History     Substance and Sexual Activity   Alcohol Use Yes    Comment: rare      Social History     Substance and Sexual Activity   Drug Use No     Social History     Tobacco Use   Smoking Status Former Smoker    Packs/day: 0 50    Years: 4 00    Pack years: 2 00    Types: Cigarettes   Smokeless Tobacco Never Used     Family History   Problem Relation Age of Onset    Anxiety disorder Mother     No Known Problems Father     Anxiety disorder Sister     Anxiety disorder Maternal Grandmother     Anxiety disorder Maternal Aunt     Hyperlipidemia Family          Current Outpatient Medications:     GLUCAGON EMERGENCY 1 MG injection    glucose blood test strip    insulin glulisine (APIDRA) 100 units/mL injection    LORazepam (ATIVAN) 0 5 mg tablet    promethazine (PHENERGAN) 25 mg tablet    sertraline (ZOLOFT) 25 mg tablet    pantoprazole (PROTONIX) 20 mg tablet  Allergies   Allergen Reactions    Reglan [Metoclopramide] Anxiety       10 Point REVIEW OF SYSTEMS  General negative for fatigue fever or weight loss  ENT negative for hoarseness sinus pains sore throat  Respiratory negative for shortness of breath cough wheezing  Cardiovascular negative for chest pain irregular heartbeat or palpitations  GI please see above  Musculoskeletal negative for painful joints swollen joints like g joint stiffness muscle aches  Skin negative for rashes edema discoloration  Neurologic negative for seizures involuntary movements tremor tingling numbness  Psychiatric positive for anxiety positive for difficulty sleeping positive for loss of appetite     PHYSICAL EXAM:    Blood pressure 94/66, pulse 78, height 4' 11" (1 499 m), weight 61 2 kg (135 lb), not currently breastfeeding  Body mass index is 27 27 kg/m²  General Appearance:  Alert, cooperative, no distress  HEENT:  Normocephalic, atraumatic, anicteric  Neck: Supple, symmetrical, trachea midline  Lungs: Clear to auscultation bilaterally; no rales, rhonchi or wheezing; respirations unlabored   Heart: Regular rate and rhythm; no murmur, rub, or gallop    Abdomen:   Soft, non-tender, non-distended; normal bowel sounds; no masses, no organomegaly   Rectal:  Deferred   Extremities:  No cyanosis, clubbing or edema   Skin:  No jaundice, rashes, or lesions   Lymph nodes: No palpable cervical lymphadenopathy     Lab Results:   Lab Results   Component Value Date    WBC 7 70 01/26/2019    HGB 13 6 01/26/2019    HCT 40 0 01/26/2019    MCV 93 01/26/2019     01/26/2019     Lab Results   Component Value Date     05/15/2014    K 3 7 01/26/2019     01/26/2019    CO2 32 01/26/2019    ANIONGAP 4 05/15/2014    BUN 9 01/26/2019    CREATININE 0 67 01/26/2019    GLUCOSE 111 05/15/2014    CALCIUM 8 9 01/26/2019    AST 17 01/26/2019    ALT 17 01/26/2019    ALKPHOS 75 01/26/2019    PROT 7 6 05/15/2014    BILITOT 0 5 05/15/2014    EGFR 123 01/26/2019     No results found for: IRON, TIBC, FERRITIN  Lab Results   Component Value Date    LIPASE 150 05/13/2018       Radiology Results:   No results found

## 2019-03-29 NOTE — PATIENT INSTRUCTIONS
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  - pantoprazole (PROTONIX) 20 mg tablet; Take 2 tablets (40 mg total) by mouth daily  Dispense: 30 tablet; Refill: 4  - NM gastric emptying; Future  - Reglan had given anxiety  - has GERD symptoms as well  -EGD at [facility])  - Frequent small meal - dietary consult  - Gastroparesis  Diet       2  Type 1 diabetes mellitus without complication (HCC)  Has had DKA in remote past   Had DM at age 6  - Celiac Disease Panel; Future  Could be a risk for gastroparesis with long standing Type 1 DM    3  Change in bowel habit  Has had had over the last 2 years - since Sepsis in 2017    4   Anxiety   Doing better on the Zoloft      Followup Appointment[de-identified] 2 mo

## 2019-03-29 NOTE — PROGRESS NOTES
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  Protonix 40 mg 1 tab daily  - dispense 30    - NM gastric emptying; Future  - Reglan had given anxiety  - has GERD symptoms as well  -EGD at [facility])  - Dietary consult - 4 small per meals per day- higher protein diet --  Gastroparesis diet       2  Type 1 diabetes mellitus without complication (HCC)  Has had DKA in remote past   Had DM at age 6  - Celiac Disease Panel; Future  Could be a risk for gastroparesis with long standing Type 1 DM    3  Change in bowel habit  Has had had over the last 2 years - since Sepsis in 2017    4  Anxiety   Doing better on the Zoloft    5  GERD--patient has had increasing issues with heartburn during the time interval   She has not been on a PPI or H2 blocker  As above we did prescribe pantoprazole  He EGD will evaluate this situation as well  Followup Appointment[de-identified] 2 mo  ______________________________________________________________________    Chief Complaint   Patient presents with    Nausea/Vomiting     x 4-5 months; change in bowel habits     HPI:  The patient comes to the office for evaluation for evaluation of recurrent episodes of nausea dry heaving and some vomiting over the last 3 months  Patient was well until November of 2018 when she was away on a camping trip  She seemed to come down with what appeared to be an acute gastroenteritis  This was characterized by nausea and vomiting which was fairly profuse  She had not up in a local emergency room and was given IV fluids  Her diarrhea gradually subsided but the patient has had persistent issues with recurrent nausea fullness and vomiting  This occurs almost on a daily basis    The patient has to be quite careful as she is a type 1 diabetic and is on an insulin pump  Patient was given of trial of Reglan but this aggravated problems with anxiety so the medication was discontinued  She does have fairly frequent heartburn at this time and is not on any medications for this issue  The patient has been a diabetic since age 6  Her control previously had not been all that could but recently her control of pretty stable  She reports having a a hemoglobin A1c in the 7 range  He does not have significant problems with neuropathy or retinopathy  The patient has been somewhat anxious about the whole situation  There was a feeling that perhaps the anxiety was aggravating the problem  She has been on Zoloft and this seems to help somewhat            Historical Information   Past Medical History:   Diagnosis Date    Allergic dermatitis     Resolved 3/2/2016     Diabetes ([facility] )     Diabetes mellitus type 1 ([facility] )     Diabetic ketoacidosis without coma associated with type [facility] mellitus [facility] ) 2/22/2016    DKA [facility]diabetic k[facility]acidoses) [facility]     Gastroenteritis 2/22/2016    [facility]ogly[facility] to [facility] mellitus [facility]     Left upper extremity numbness     Resolved 11/1/2016     Psychiatric disorder     Sepsis [facility] ) 02/2017    [facility] 02/2017    Varicella      Past Surgical History:   Procedure Laterality Date    FL INJECTION LEFT SHOULDER (ARTHROGRAM)  12/11/2018    WISDOM TOOTH EXTRACTION Bilateral      Social History     Substance and Sexual Activity   Alcohol Use Yes    Frequency: 2-4 times a month    Drinks per session: 1 or 2    Comment: rare      Social History     Substance and Sexual Activity   Drug Use No     Social History     Tobacco Use   Smoking Status Former Smoker    Packs/day: 0 50    Years: 4 00    Pack years: 2 00    Types: Cigarettes   Smokeless Tobacco Never Used     Family History   Problem Relation Age of Onset    Anxiety disorder Mother     No Known Problems Father     Anxiety disorder Sister     Anxiety disorder Maternal Grandmother     Anxiety disorder Maternal Aunt     Hyperlipidemia Family          Current Outpatient Medications:     GLUCAGON EMERGENCY 1 MG injection    glucose blood test strip    insulin glulisine (APIDRA) 100 units/mL injection    LORazepam (ATIVAN) 0 5 mg tablet    promethazine (PHENERGAN) 25 mg tablet    sertraline (ZOLOFT) 25 mg tablet    pantoprazole (PROTONIX) 40 mg tablet  Allergies   Allergen Reactions    Reglan [Metoclopramide] Anxiety       10 Point REVIEW OF SYSTEMS  General negative for fatigue fever or weight loss  ENT negative for hoarseness sinus pains sore throat  Respiratory negative for shortness of breath cough wheezing  Cardiovascular negative for chest pain irregular heartbeat or palpitations  GI please see above  Musculoskeletal negative for painful joints swollen joints like g joint stiffness muscle aches  Skin negative for rashes edema discoloration  Neurologic negative for seizures involuntary movements tremor tingling numbness  Psychiatric positive for anxiety positive for difficulty sleeping positive for loss of appetite     PHYSICAL EXAM:    Blood pressure 94/66, pulse 78, height 4' 11" (1 499 m), weight 61 2 kg (135 lb), not currently breastfeeding  Body mass index is 27 27 kg/m²  General Appearance:  Alert, cooperative, no distress  HEENT:  Normocephalic, atraumatic, anicteric  Neck: Supple, symmetrical, trachea midline  Lungs: Clear to auscultation bilaterally; no rales, rhonchi or wheezing; respirations unlabored   Heart: Regular rate and rhythm; no murmur, rub, or gallop    Abdomen:   Soft, non-tender, non-distended; normal bowel sounds; no masses, no organomegaly   Rectal:  Deferred   Extremities:  No cyanosis, clubbing or edema   Skin:  No jaundice, rashes, or lesions   Lymph nodes: No palpable cervical lymphadenopathy     Lab Results:   Lab Results   Component Value Date    WBC 7 70 01/26/2019    HGB 13 6 01/26/2019    HCT 40 0 01/26/2019 MCV 93 01/26/2019     01/26/2019     Lab Results   Component Value Date     05/15/2014    K 3 7 01/26/2019     01/26/2019    CO2 32 01/26/2019    ANIONGAP 4 05/15/2014    BUN 9 01/26/2019    CREATININE 0 67 01/26/2019    GLUCOSE 111 05/15/2014    CALCIUM 8 9 01/26/2019    AST 17 01/26/2019    ALT 17 01/26/2019    ALKPHOS 75 01/26/2019    PROT 7 6 05/15/2014    BILITOT 0 5 05/15/2014    EGFR 123 01/26/2019     No results found for: IRON, TIBC, FERRITIN  Lab Results   Component Value Date    LIPASE 150 05/13/2018       Radiology Results:   No results found

## 2019-04-02 ENCOUNTER — TELEPHONE (OUTPATIENT)
Dept: GASTROENTEROLOGY | Facility: CLINIC | Age: 24
End: 2019-04-02

## 2019-04-02 ENCOUNTER — PREP FOR PROCEDURE (OUTPATIENT)
Dept: GASTROENTEROLOGY | Facility: CLINIC | Age: 24
End: 2019-04-02

## 2019-04-02 DIAGNOSIS — K21.9 CHRONIC GERD: ICD-10-CM

## 2019-04-02 DIAGNOSIS — R11.2 INTRACTABLE VOMITING WITH NAUSEA, UNSPECIFIED VOMITING TYPE: Primary | ICD-10-CM

## 2019-04-14 ENCOUNTER — OFFICE VISIT (OUTPATIENT)
Dept: URGENT CARE | Facility: CLINIC | Age: 24
End: 2019-04-14
Payer: COMMERCIAL

## 2019-04-14 ENCOUNTER — APPOINTMENT (OUTPATIENT)
Dept: RADIOLOGY | Facility: CLINIC | Age: 24
End: 2019-04-14
Payer: COMMERCIAL

## 2019-04-14 VITALS
OXYGEN SATURATION: 98 % | SYSTOLIC BLOOD PRESSURE: 122 MMHG | HEART RATE: 91 BPM | TEMPERATURE: 98.3 F | WEIGHT: 135 LBS | BODY MASS INDEX: 27.21 KG/M2 | DIASTOLIC BLOOD PRESSURE: 76 MMHG | RESPIRATION RATE: 16 BRPM | HEIGHT: 59 IN

## 2019-04-14 DIAGNOSIS — M79.672 LEFT FOOT PAIN: Primary | ICD-10-CM

## 2019-04-14 DIAGNOSIS — M79.672 LEFT FOOT PAIN: ICD-10-CM

## 2019-04-14 PROCEDURE — 73630 X-RAY EXAM OF FOOT: CPT

## 2019-04-14 PROCEDURE — S9088 SERVICES PROVIDED IN URGENT: HCPCS | Performed by: PHYSICIAN ASSISTANT

## 2019-04-14 PROCEDURE — 99213 OFFICE O/P EST LOW 20 MIN: CPT | Performed by: PHYSICIAN ASSISTANT

## 2019-04-24 ENCOUNTER — ANESTHESIA (OUTPATIENT)
Dept: PERIOP | Facility: HOSPITAL | Age: 24
End: 2019-04-24
Payer: COMMERCIAL

## 2019-04-24 ENCOUNTER — HOSPITAL ENCOUNTER (OUTPATIENT)
Facility: HOSPITAL | Age: 24
Setting detail: OUTPATIENT SURGERY
Discharge: HOME | End: 2019-04-24
Attending: INTERNAL MEDICINE | Admitting: INTERNAL MEDICINE
Payer: COMMERCIAL

## 2019-04-24 ENCOUNTER — ANESTHESIA EVENT (OUTPATIENT)
Dept: PERIOP | Facility: HOSPITAL | Age: 24
End: 2019-04-24
Payer: COMMERCIAL

## 2019-04-24 VITALS
BODY MASS INDEX: 27.17 KG/M2 | WEIGHT: 134.8 LBS | DIASTOLIC BLOOD PRESSURE: 61 MMHG | OXYGEN SATURATION: 98 % | RESPIRATION RATE: 16 BRPM | HEART RATE: 66 BPM | HEIGHT: 59 IN | SYSTOLIC BLOOD PRESSURE: 93 MMHG | TEMPERATURE: 98.3 F

## 2019-04-24 DIAGNOSIS — R11.2 INTRACTABLE VOMITING WITH NAUSEA, UNSPECIFIED VOMITING TYPE: ICD-10-CM

## 2019-04-24 DIAGNOSIS — K21.9 CHRONIC GERD: ICD-10-CM

## 2019-04-24 LAB
EXT PREGNANCY TEST URINE: NEGATIVE
GLUCOSE SERPL-MCNC: 246 MG/DL (ref 65–140)

## 2019-04-24 PROCEDURE — 88305 TISSUE EXAM BY PATHOLOGIST: CPT | Performed by: PATHOLOGY

## 2019-04-24 PROCEDURE — 43239 EGD BIOPSY SINGLE/MULTIPLE: CPT | Performed by: INTERNAL MEDICINE

## 2019-04-24 PROCEDURE — 87077 CULTURE AEROBIC IDENTIFY: CPT | Performed by: INTERNAL MEDICINE

## 2019-04-24 PROCEDURE — 81025 URINE PREGNANCY TEST: CPT | Performed by: INTERNAL MEDICINE

## 2019-04-24 PROCEDURE — 82948 REAGENT STRIP/BLOOD GLUCOSE: CPT

## 2019-04-24 RX ORDER — SODIUM CHLORIDE 9 MG/ML
20 INJECTION, SOLUTION INTRAVENOUS ONCE
Status: DISCONTINUED | OUTPATIENT
Start: 2019-04-24 | End: 2019-04-24 | Stop reason: HOSPADM

## 2019-04-24 RX ORDER — PROPOFOL 10 MG/ML
INJECTION, EMULSION INTRAVENOUS AS NEEDED
Status: DISCONTINUED | OUTPATIENT
Start: 2019-04-24 | End: 2019-04-24 | Stop reason: SURG

## 2019-04-24 RX ORDER — SODIUM CHLORIDE 9 MG/ML
75 INJECTION, SOLUTION INTRAVENOUS CONTINUOUS
Status: DISCONTINUED | OUTPATIENT
Start: 2019-04-24 | End: 2019-04-24 | Stop reason: HOSPADM

## 2019-04-24 RX ORDER — SODIUM CHLORIDE 9 MG/ML
INJECTION, SOLUTION INTRAVENOUS CONTINUOUS PRN
Status: DISCONTINUED | OUTPATIENT
Start: 2019-04-24 | End: 2019-04-24 | Stop reason: SURG

## 2019-04-24 RX ORDER — ONDANSETRON 2 MG/ML
4 INJECTION INTRAMUSCULAR; INTRAVENOUS ONCE AS NEEDED
Status: DISCONTINUED | OUTPATIENT
Start: 2019-04-24 | End: 2019-04-24 | Stop reason: HOSPADM

## 2019-04-24 RX ADMIN — SODIUM CHLORIDE: 0.9 INJECTION, SOLUTION INTRAVENOUS at 07:00

## 2019-04-24 RX ADMIN — PROPOFOL 50 MG: 10 INJECTION, EMULSION INTRAVENOUS at 07:25

## 2019-04-24 RX ADMIN — PROPOFOL 150 MG: 10 INJECTION, EMULSION INTRAVENOUS at 07:22

## 2019-04-25 LAB — UREASE TISS QL: NEGATIVE

## 2019-04-29 DIAGNOSIS — F41.9 ANXIETY: ICD-10-CM

## 2019-04-29 DIAGNOSIS — K52.9 GASTROENTERITIS: ICD-10-CM

## 2019-04-29 RX ORDER — PROMETHAZINE HYDROCHLORIDE 25 MG/1
25 TABLET ORAL EVERY 6 HOURS PRN
Qty: 30 TABLET | Refills: 0 | Status: CANCELLED | OUTPATIENT
Start: 2019-04-29

## 2019-04-29 RX ORDER — SERTRALINE HYDROCHLORIDE 25 MG/1
50 TABLET, FILM COATED ORAL
Qty: 60 TABLET | Refills: 0 | Status: SHIPPED | OUTPATIENT
Start: 2019-04-29 | End: 2019-05-17

## 2019-05-15 ENCOUNTER — OFFICE VISIT (OUTPATIENT)
Dept: ENDOCRINOLOGY | Facility: CLINIC | Age: 24
End: 2019-05-15
Payer: COMMERCIAL

## 2019-05-15 VITALS
BODY MASS INDEX: 27.62 KG/M2 | HEART RATE: 68 BPM | WEIGHT: 137 LBS | HEIGHT: 59 IN | SYSTOLIC BLOOD PRESSURE: 110 MMHG | DIASTOLIC BLOOD PRESSURE: 78 MMHG

## 2019-05-15 DIAGNOSIS — E10.65 TYPE 1 DIABETES MELLITUS WITH HYPERGLYCEMIA (HCC): Primary | ICD-10-CM

## 2019-05-15 PROBLEM — R11.2 INTRACTABLE VOMITING WITH NAUSEA: Status: RESOLVED | Noted: 2019-04-02 | Resolved: 2019-05-15

## 2019-05-15 PROCEDURE — 99245 OFF/OP CONSLTJ NEW/EST HI 55: CPT | Performed by: INTERNAL MEDICINE

## 2019-05-16 ENCOUNTER — OFFICE VISIT (OUTPATIENT)
Dept: URGENT CARE | Facility: CLINIC | Age: 24
End: 2019-05-16
Payer: COMMERCIAL

## 2019-05-16 VITALS
OXYGEN SATURATION: 98 % | HEIGHT: 59 IN | WEIGHT: 137 LBS | TEMPERATURE: 98.2 F | DIASTOLIC BLOOD PRESSURE: 75 MMHG | BODY MASS INDEX: 27.62 KG/M2 | SYSTOLIC BLOOD PRESSURE: 120 MMHG | HEART RATE: 88 BPM | RESPIRATION RATE: 16 BRPM

## 2019-05-16 DIAGNOSIS — B37.3 VAGINAL CANDIDIASIS: Primary | ICD-10-CM

## 2019-05-16 PROCEDURE — S9088 SERVICES PROVIDED IN URGENT: HCPCS | Performed by: PHYSICIAN ASSISTANT

## 2019-05-16 PROCEDURE — 99213 OFFICE O/P EST LOW 20 MIN: CPT | Performed by: PHYSICIAN ASSISTANT

## 2019-05-16 RX ORDER — FLUCONAZOLE 150 MG/1
TABLET ORAL
Qty: 2 TABLET | Refills: 0 | Status: SHIPPED | OUTPATIENT
Start: 2019-05-16 | End: 2019-05-20

## 2019-05-17 ENCOUNTER — OFFICE VISIT (OUTPATIENT)
Dept: FAMILY MEDICINE CLINIC | Facility: CLINIC | Age: 24
End: 2019-05-17
Payer: COMMERCIAL

## 2019-05-17 VITALS
BODY MASS INDEX: 27.5 KG/M2 | SYSTOLIC BLOOD PRESSURE: 116 MMHG | OXYGEN SATURATION: 99 % | HEART RATE: 79 BPM | HEIGHT: 59 IN | WEIGHT: 136.4 LBS | RESPIRATION RATE: 16 BRPM | TEMPERATURE: 98.2 F | DIASTOLIC BLOOD PRESSURE: 72 MMHG

## 2019-05-17 DIAGNOSIS — F41.9 ANXIETY: Primary | ICD-10-CM

## 2019-05-17 PROCEDURE — 99213 OFFICE O/P EST LOW 20 MIN: CPT | Performed by: NURSE PRACTITIONER

## 2019-05-17 PROCEDURE — 3008F BODY MASS INDEX DOCD: CPT | Performed by: NURSE PRACTITIONER

## 2019-05-27 ENCOUNTER — APPOINTMENT (EMERGENCY)
Dept: CT IMAGING | Facility: HOSPITAL | Age: 24
End: 2019-05-27
Payer: COMMERCIAL

## 2019-05-27 ENCOUNTER — HOSPITAL ENCOUNTER (EMERGENCY)
Facility: HOSPITAL | Age: 24
Discharge: HOME | End: 2019-05-27
Attending: EMERGENCY MEDICINE | Admitting: EMERGENCY MEDICINE
Payer: COMMERCIAL

## 2019-05-27 VITALS
DIASTOLIC BLOOD PRESSURE: 58 MMHG | HEART RATE: 92 BPM | SYSTOLIC BLOOD PRESSURE: 127 MMHG | BODY MASS INDEX: 27.42 KG/M2 | TEMPERATURE: 98.3 F | HEIGHT: 59 IN | OXYGEN SATURATION: 96 % | WEIGHT: 136 LBS | RESPIRATION RATE: 18 BRPM

## 2019-05-27 DIAGNOSIS — R10.9 LEFT FLANK PAIN: Primary | ICD-10-CM

## 2019-05-27 DIAGNOSIS — T14.8XXA MUSCLE STRAIN: ICD-10-CM

## 2019-05-27 LAB
ALBUMIN SERPL BCP-MCNC: 3.7 G/DL (ref 3.5–5)
ALP SERPL-CCNC: 89 U/L (ref 46–116)
ALT SERPL W P-5'-P-CCNC: 21 U/L (ref 12–78)
ANION GAP SERPL CALCULATED.3IONS-SCNC: 9 MMOL/L (ref 4–13)
AST SERPL W P-5'-P-CCNC: 13 U/L (ref 5–45)
BASOPHILS # BLD AUTO: 0.07 THOUSANDS/ΜL (ref 0–0.1)
BASOPHILS NFR BLD AUTO: 1 % (ref 0–1)
BILIRUB SERPL-MCNC: 0.2 MG/DL (ref 0.2–1)
BUN SERPL-MCNC: 10 MG/DL (ref 5–25)
CALCIUM SERPL-MCNC: 8.9 MG/DL (ref 8.3–10.1)
CHLORIDE SERPL-SCNC: 102 MMOL/L (ref 100–108)
CLARITY, POC: CLEAR
CO2 SERPL-SCNC: 29 MMOL/L (ref 21–32)
COLOR, POC: YELLOW
CREAT SERPL-MCNC: 0.68 MG/DL (ref 0.6–1.3)
EOSINOPHIL # BLD AUTO: 0.12 THOUSAND/ΜL (ref 0–0.61)
EOSINOPHIL NFR BLD AUTO: 2 % (ref 0–6)
ERYTHROCYTE [DISTWIDTH] IN BLOOD BY AUTOMATED COUNT: 12.1 % (ref 11.6–15.1)
EXT BILIRUBIN, UA: NEGATIVE
EXT BLOOD URINE: NEGATIVE
EXT GLUCOSE, UA: NEGATIVE
EXT KETONES: NEGATIVE
EXT NITRITE, UA: NEGATIVE
EXT PH, UA: 8
EXT PREG TEST URINE: NORMAL
EXT PROTEIN, UA: NEGATIVE
EXT SPECIFIC GRAVITY, UA: 1.01
EXT UROBILINOGEN: NEGATIVE
GFR SERPL CREATININE-BSD FRML MDRD: 121 ML/MIN/1.73SQ M
GLUCOSE SERPL-MCNC: 250 MG/DL (ref 65–140)
HCT VFR BLD AUTO: 38.1 % (ref 34.8–46.1)
HGB BLD-MCNC: 12.7 G/DL (ref 11.5–15.4)
IMM GRANULOCYTES # BLD AUTO: 0.02 THOUSAND/UL (ref 0–0.2)
IMM GRANULOCYTES NFR BLD AUTO: 0 % (ref 0–2)
LYMPHOCYTES # BLD AUTO: 2.98 THOUSANDS/ΜL (ref 0.6–4.47)
LYMPHOCYTES NFR BLD AUTO: 36 % (ref 14–44)
MCH RBC QN AUTO: 30.9 PG (ref 26.8–34.3)
MCHC RBC AUTO-ENTMCNC: 33.3 G/DL (ref 31.4–37.4)
MCV RBC AUTO: 93 FL (ref 82–98)
MONOCYTES # BLD AUTO: 0.61 THOUSAND/ΜL (ref 0.17–1.22)
MONOCYTES NFR BLD AUTO: 7 % (ref 4–12)
NEUTROPHILS # BLD AUTO: 4.43 THOUSANDS/ΜL (ref 1.85–7.62)
NEUTS SEG NFR BLD AUTO: 54 % (ref 43–75)
NRBC BLD AUTO-RTO: 0 /100 WBCS
PLATELET # BLD AUTO: 286 THOUSANDS/UL (ref 149–390)
PMV BLD AUTO: 10.1 FL (ref 8.9–12.7)
POTASSIUM SERPL-SCNC: 3.8 MMOL/L (ref 3.5–5.3)
PROT SERPL-MCNC: 7.1 G/DL (ref 6.4–8.2)
RBC # BLD AUTO: 4.11 MILLION/UL (ref 3.81–5.12)
SODIUM SERPL-SCNC: 140 MMOL/L (ref 136–145)
WBC # BLD AUTO: 8.23 THOUSAND/UL (ref 4.31–10.16)
WBC # BLD EST: NEGATIVE 10*3/UL

## 2019-05-27 PROCEDURE — 85025 COMPLETE CBC W/AUTO DIFF WBC: CPT | Performed by: PHYSICIAN ASSISTANT

## 2019-05-27 PROCEDURE — 80053 COMPREHEN METABOLIC PANEL: CPT | Performed by: PHYSICIAN ASSISTANT

## 2019-05-27 PROCEDURE — 81025 URINE PREGNANCY TEST: CPT | Performed by: PHYSICIAN ASSISTANT

## 2019-05-27 PROCEDURE — 96360 HYDRATION IV INFUSION INIT: CPT

## 2019-05-27 PROCEDURE — 99284 EMERGENCY DEPT VISIT MOD MDM: CPT

## 2019-05-27 PROCEDURE — 36415 COLL VENOUS BLD VENIPUNCTURE: CPT | Performed by: PHYSICIAN ASSISTANT

## 2019-05-27 PROCEDURE — 74176 CT ABD & PELVIS W/O CONTRAST: CPT

## 2019-05-27 PROCEDURE — 99284 EMERGENCY DEPT VISIT MOD MDM: CPT | Performed by: PHYSICIAN ASSISTANT

## 2019-05-27 RX ORDER — SODIUM CHLORIDE 9 MG/ML
1000 INJECTION, SOLUTION INTRAVENOUS ONCE
Status: COMPLETED | OUTPATIENT
Start: 2019-05-27 | End: 2019-05-27

## 2019-05-27 RX ORDER — METHOCARBAMOL 500 MG/1
500 TABLET, FILM COATED ORAL 3 TIMES DAILY
Qty: 15 TABLET | Refills: 0 | Status: SHIPPED | OUTPATIENT
Start: 2019-05-27 | End: 2019-11-13 | Stop reason: ALTCHOICE

## 2019-05-27 RX ADMIN — SODIUM CHLORIDE 1000 ML/HR: 0.9 INJECTION, SOLUTION INTRAVENOUS at 17:51

## 2019-05-31 ENCOUNTER — VBI (OUTPATIENT)
Dept: ADMINISTRATIVE | Facility: OTHER | Age: 24
End: 2019-05-31

## 2019-06-25 ENCOUNTER — TELEPHONE (OUTPATIENT)
Dept: ENDOCRINOLOGY | Facility: CLINIC | Age: 24
End: 2019-06-25

## 2019-06-25 ENCOUNTER — LAB (OUTPATIENT)
Dept: LAB | Facility: CLINIC | Age: 24
End: 2019-06-25
Payer: COMMERCIAL

## 2019-06-25 DIAGNOSIS — E10.65 TYPE 1 DIABETES MELLITUS WITH HYPERGLYCEMIA (HCC): ICD-10-CM

## 2019-06-25 LAB
ALBUMIN SERPL BCP-MCNC: 3.8 G/DL (ref 3.5–5)
ALP SERPL-CCNC: 68 U/L (ref 46–116)
ALT SERPL W P-5'-P-CCNC: 19 U/L (ref 12–78)
ANION GAP SERPL CALCULATED.3IONS-SCNC: 4 MMOL/L (ref 4–13)
AST SERPL W P-5'-P-CCNC: 11 U/L (ref 5–45)
BILIRUB SERPL-MCNC: 0.49 MG/DL (ref 0.2–1)
BUN SERPL-MCNC: 11 MG/DL (ref 5–25)
CALCIUM SERPL-MCNC: 9 MG/DL (ref 8.3–10.1)
CHLORIDE SERPL-SCNC: 104 MMOL/L (ref 100–108)
CHOLEST SERPL-MCNC: 176 MG/DL (ref 50–200)
CO2 SERPL-SCNC: 27 MMOL/L (ref 21–32)
CREAT SERPL-MCNC: 0.61 MG/DL (ref 0.6–1.3)
CREAT UR-MCNC: 100 MG/DL
EST. AVERAGE GLUCOSE BLD GHB EST-MCNC: 189 MG/DL
GFR SERPL CREATININE-BSD FRML MDRD: 126 ML/MIN/1.73SQ M
GLUCOSE P FAST SERPL-MCNC: 135 MG/DL (ref 65–99)
HBA1C MFR BLD: 8.2 % (ref 4.2–6.3)
HDLC SERPL-MCNC: 52 MG/DL (ref 40–60)
LDLC SERPL CALC-MCNC: 105 MG/DL (ref 0–100)
MICROALBUMIN UR-MCNC: 6.2 MG/L (ref 0–20)
MICROALBUMIN/CREAT 24H UR: 6 MG/G CREATININE (ref 0–30)
NONHDLC SERPL-MCNC: 124 MG/DL
POTASSIUM SERPL-SCNC: 4.1 MMOL/L (ref 3.5–5.3)
PROT SERPL-MCNC: 7.4 G/DL (ref 6.4–8.2)
SODIUM SERPL-SCNC: 135 MMOL/L (ref 136–145)
T4 FREE SERPL-MCNC: 0.86 NG/DL (ref 0.76–1.46)
TRIGL SERPL-MCNC: 95 MG/DL
TSH SERPL DL<=0.05 MIU/L-ACNC: 0.92 UIU/ML (ref 0.36–3.74)

## 2019-06-25 PROCEDURE — 82570 ASSAY OF URINE CREATININE: CPT

## 2019-06-25 PROCEDURE — 84439 ASSAY OF FREE THYROXINE: CPT

## 2019-06-25 PROCEDURE — 80061 LIPID PANEL: CPT

## 2019-06-25 PROCEDURE — 82043 UR ALBUMIN QUANTITATIVE: CPT

## 2019-06-25 PROCEDURE — 84443 ASSAY THYROID STIM HORMONE: CPT

## 2019-06-25 PROCEDURE — 3061F NEG MICROALBUMINURIA REV: CPT | Performed by: NURSE PRACTITIONER

## 2019-06-25 PROCEDURE — 36415 COLL VENOUS BLD VENIPUNCTURE: CPT

## 2019-06-25 PROCEDURE — 83036 HEMOGLOBIN GLYCOSYLATED A1C: CPT

## 2019-06-25 PROCEDURE — 80053 COMPREHEN METABOLIC PANEL: CPT

## 2019-06-26 ENCOUNTER — TRANSCRIBE ORDERS (OUTPATIENT)
Dept: ADMISSIONS | Facility: HOSPITAL | Age: 24
End: 2019-06-26

## 2019-06-27 LAB
LEFT EYE DIABETIC RETINOPATHY: NORMAL
RIGHT EYE DIABETIC RETINOPATHY: NORMAL

## 2019-06-27 PROCEDURE — 3072F LOW RISK FOR RETINOPATHY: CPT | Performed by: NURSE PRACTITIONER

## 2019-07-07 ENCOUNTER — HOSPITAL ENCOUNTER (EMERGENCY)
Facility: HOSPITAL | Age: 24
Discharge: HOME | End: 2019-07-08
Attending: EMERGENCY MEDICINE | Admitting: EMERGENCY MEDICINE
Payer: COMMERCIAL

## 2019-07-07 DIAGNOSIS — R11.2 NAUSEA VOMITING AND DIARRHEA: Primary | ICD-10-CM

## 2019-07-07 DIAGNOSIS — R19.7 NAUSEA VOMITING AND DIARRHEA: Primary | ICD-10-CM

## 2019-07-07 DIAGNOSIS — E86.0 DEHYDRATION: ICD-10-CM

## 2019-07-07 DIAGNOSIS — R73.9 HYPERGLYCEMIA: ICD-10-CM

## 2019-07-07 LAB
ALBUMIN SERPL BCP-MCNC: 3.7 G/DL (ref 3.5–5)
ALP SERPL-CCNC: 71 U/L (ref 46–116)
ALT SERPL W P-5'-P-CCNC: 22 U/L (ref 12–78)
ANION GAP SERPL CALCULATED.3IONS-SCNC: 10 MMOL/L (ref 4–13)
AST SERPL W P-5'-P-CCNC: 44 U/L (ref 5–45)
BASE EXCESS BLDA CALC-SCNC: 4 MMOL/L (ref -2–3)
BASOPHILS # BLD AUTO: 0.05 THOUSANDS/ΜL (ref 0–0.1)
BASOPHILS NFR BLD AUTO: 0 % (ref 0–1)
BETA-HYDROXYBUTYRATE: 0.6 MMOL/L
BILIRUB SERPL-MCNC: 0.5 MG/DL (ref 0.2–1)
BUN SERPL-MCNC: 14 MG/DL (ref 5–25)
CALCIUM SERPL-MCNC: 8.2 MG/DL (ref 8.3–10.1)
CHLORIDE SERPL-SCNC: 98 MMOL/L (ref 100–108)
CLARITY, POC: NORMAL
CO2 SERPL-SCNC: 26 MMOL/L (ref 21–32)
COLOR, POC: YELLOW
CREAT SERPL-MCNC: 0.82 MG/DL (ref 0.6–1.3)
EOSINOPHIL # BLD AUTO: 0.11 THOUSAND/ΜL (ref 0–0.61)
EOSINOPHIL NFR BLD AUTO: 1 % (ref 0–6)
ERYTHROCYTE [DISTWIDTH] IN BLOOD BY AUTOMATED COUNT: 12.3 % (ref 11.6–15.1)
EXT BILIRUBIN, UA: NEGATIVE
EXT BLOOD URINE: NEGATIVE
EXT GLUCOSE, UA: NORMAL
EXT KETONES: NORMAL
EXT NITRITE, UA: NEGATIVE
EXT PH, UA: 5
EXT PREG TEST URINE: NEGATIVE
EXT PROTEIN, UA: NEGATIVE
EXT SPECIFIC GRAVITY, UA: 1.01
EXT UROBILINOGEN: 0.2
EXT. CONTROL ED NAV: NORMAL
GFR SERPL CREATININE-BSD FRML MDRD: 99 ML/MIN/1.73SQ M
GLUCOSE SERPL-MCNC: 333 MG/DL (ref 65–140)
GLUCOSE SERPL-MCNC: 353 MG/DL (ref 65–140)
HCO3 BLDA-SCNC: 28.6 MMOL/L (ref 24–30)
HCT VFR BLD AUTO: 39.4 % (ref 34.8–46.1)
HGB BLD-MCNC: 13.1 G/DL (ref 11.5–15.4)
IMM GRANULOCYTES # BLD AUTO: 0.03 THOUSAND/UL (ref 0–0.2)
IMM GRANULOCYTES NFR BLD AUTO: 0 % (ref 0–2)
LACTATE SERPL-SCNC: 1.2 MMOL/L (ref 0.5–2)
LIPASE SERPL-CCNC: 250 U/L (ref 73–393)
LYMPHOCYTES # BLD AUTO: 3.56 THOUSANDS/ΜL (ref 0.6–4.47)
LYMPHOCYTES NFR BLD AUTO: 32 % (ref 14–44)
MAGNESIUM SERPL-MCNC: 1.5 MG/DL (ref 1.6–2.6)
MCH RBC QN AUTO: 30.7 PG (ref 26.8–34.3)
MCHC RBC AUTO-ENTMCNC: 33.2 G/DL (ref 31.4–37.4)
MCV RBC AUTO: 92 FL (ref 82–98)
MONOCYTES # BLD AUTO: 0.76 THOUSAND/ΜL (ref 0.17–1.22)
MONOCYTES NFR BLD AUTO: 7 % (ref 4–12)
NEUTROPHILS # BLD AUTO: 6.8 THOUSANDS/ΜL (ref 1.85–7.62)
NEUTS SEG NFR BLD AUTO: 60 % (ref 43–75)
NRBC BLD AUTO-RTO: 0 /100 WBCS
PCO2 BLD: 30 MMOL/L (ref 21–32)
PCO2 BLD: 41 MM HG (ref 42–50)
PH BLD: 7.45 [PH] (ref 7.3–7.4)
PLATELET # BLD AUTO: 330 THOUSANDS/UL (ref 149–390)
PMV BLD AUTO: 10.2 FL (ref 8.9–12.7)
PO2 BLD: 34 MM HG (ref 35–45)
POTASSIUM SERPL-SCNC: 5.3 MMOL/L (ref 3.5–5.3)
PROT SERPL-MCNC: 8 G/DL (ref 6.4–8.2)
RBC # BLD AUTO: 4.27 MILLION/UL (ref 3.81–5.12)
SAO2 % BLD FROM PO2: 69 % (ref 95–98)
SODIUM SERPL-SCNC: 134 MMOL/L (ref 136–145)
SPECIMEN SOURCE: ABNORMAL
WBC # BLD AUTO: 11.31 THOUSAND/UL (ref 4.31–10.16)
WBC # BLD EST: NEGATIVE 10*3/UL

## 2019-07-07 PROCEDURE — 83735 ASSAY OF MAGNESIUM: CPT | Performed by: EMERGENCY MEDICINE

## 2019-07-07 PROCEDURE — 83605 ASSAY OF LACTIC ACID: CPT | Performed by: EMERGENCY MEDICINE

## 2019-07-07 PROCEDURE — 82803 BLOOD GASES ANY COMBINATION: CPT

## 2019-07-07 PROCEDURE — 80053 COMPREHEN METABOLIC PANEL: CPT | Performed by: EMERGENCY MEDICINE

## 2019-07-07 PROCEDURE — 82948 REAGENT STRIP/BLOOD GLUCOSE: CPT

## 2019-07-07 PROCEDURE — 83690 ASSAY OF LIPASE: CPT | Performed by: EMERGENCY MEDICINE

## 2019-07-07 PROCEDURE — 99284 EMERGENCY DEPT VISIT MOD MDM: CPT

## 2019-07-07 PROCEDURE — 96374 THER/PROPH/DIAG INJ IV PUSH: CPT

## 2019-07-07 PROCEDURE — 36415 COLL VENOUS BLD VENIPUNCTURE: CPT | Performed by: EMERGENCY MEDICINE

## 2019-07-07 PROCEDURE — 99284 EMERGENCY DEPT VISIT MOD MDM: CPT | Performed by: EMERGENCY MEDICINE

## 2019-07-07 PROCEDURE — 96372 THER/PROPH/DIAG INJ SC/IM: CPT

## 2019-07-07 PROCEDURE — 82010 KETONE BODYS QUAN: CPT | Performed by: EMERGENCY MEDICINE

## 2019-07-07 PROCEDURE — 93005 ELECTROCARDIOGRAM TRACING: CPT

## 2019-07-07 PROCEDURE — 81002 URINALYSIS NONAUTO W/O SCOPE: CPT | Performed by: EMERGENCY MEDICINE

## 2019-07-07 PROCEDURE — 96361 HYDRATE IV INFUSION ADD-ON: CPT

## 2019-07-07 PROCEDURE — 85025 COMPLETE CBC W/AUTO DIFF WBC: CPT | Performed by: EMERGENCY MEDICINE

## 2019-07-07 PROCEDURE — 81025 URINE PREGNANCY TEST: CPT | Performed by: EMERGENCY MEDICINE

## 2019-07-07 RX ORDER — PROMETHAZINE HYDROCHLORIDE 25 MG/ML
25 INJECTION, SOLUTION INTRAMUSCULAR; INTRAVENOUS ONCE
Status: COMPLETED | OUTPATIENT
Start: 2019-07-07 | End: 2019-07-07

## 2019-07-07 RX ORDER — ONDANSETRON 2 MG/ML
4 INJECTION INTRAMUSCULAR; INTRAVENOUS ONCE
Status: COMPLETED | OUTPATIENT
Start: 2019-07-07 | End: 2019-07-07

## 2019-07-07 RX ADMIN — SODIUM CHLORIDE 1000 ML: 0.9 INJECTION, SOLUTION INTRAVENOUS at 22:52

## 2019-07-07 RX ADMIN — PROMETHAZINE HYDROCHLORIDE 25 MG: 25 INJECTION INTRAMUSCULAR; INTRAVENOUS at 23:29

## 2019-07-07 RX ADMIN — ONDANSETRON 4 MG: 2 INJECTION INTRAMUSCULAR; INTRAVENOUS at 22:50

## 2019-07-08 VITALS
OXYGEN SATURATION: 97 % | RESPIRATION RATE: 29 BRPM | WEIGHT: 136 LBS | TEMPERATURE: 97.8 F | HEART RATE: 92 BPM | BODY MASS INDEX: 27.42 KG/M2 | SYSTOLIC BLOOD PRESSURE: 98 MMHG | HEIGHT: 59 IN | DIASTOLIC BLOOD PRESSURE: 53 MMHG

## 2019-07-08 LAB
ANION GAP BLD CALC-SCNC: 19 MMOL/L (ref 4–13)
ATRIAL RATE: 88 BPM
BUN BLD-MCNC: 12 MG/DL (ref 5–25)
CA-I BLD-SCNC: 1.04 MMOL/L (ref 1.12–1.32)
CHLORIDE BLD-SCNC: 104 MMOL/L (ref 100–108)
CREAT BLD-MCNC: 0.6 MG/DL (ref 0.6–1.3)
GFR SERPL CREATININE-BSD FRML MDRD: 126 ML/MIN/1.73SQ M
GLUCOSE SERPL-MCNC: 177 MG/DL (ref 65–140)
HCT VFR BLD CALC: 33 % (ref 34.8–46.1)
HGB BLDA-MCNC: 11.2 G/DL (ref 11.5–15.4)
P AXIS: 55 DEGREES
PCO2 BLD: 23 MMOL/L (ref 21–32)
POTASSIUM BLD-SCNC: 3.4 MMOL/L (ref 3.5–5.3)
PR INTERVAL: 126 MS
QRS AXIS: 87 DEGREES
QRSD INTERVAL: 86 MS
QT INTERVAL: 376 MS
QTC INTERVAL: 454 MS
SODIUM BLD-SCNC: 141 MMOL/L (ref 136–145)
SPECIMEN SOURCE: ABNORMAL
T WAVE AXIS: 37 DEGREES
VENTRICULAR RATE: 88 BPM

## 2019-07-08 PROCEDURE — 80047 BASIC METABLC PNL IONIZED CA: CPT

## 2019-07-08 PROCEDURE — 93010 ELECTROCARDIOGRAM REPORT: CPT | Performed by: INTERNAL MEDICINE

## 2019-07-08 PROCEDURE — 85014 HEMATOCRIT: CPT

## 2019-07-08 PROCEDURE — 96361 HYDRATE IV INFUSION ADD-ON: CPT

## 2019-07-08 RX ORDER — PROMETHAZINE HYDROCHLORIDE 25 MG/1
25 TABLET ORAL EVERY 6 HOURS PRN
Qty: 20 TABLET | Refills: 0 | Status: SHIPPED | OUTPATIENT
Start: 2019-07-08 | End: 2020-01-14

## 2019-07-08 RX ORDER — POTASSIUM CHLORIDE 20 MEQ/1
20 TABLET, EXTENDED RELEASE ORAL ONCE
Status: COMPLETED | OUTPATIENT
Start: 2019-07-08 | End: 2019-07-08

## 2019-07-08 RX ADMIN — SODIUM CHLORIDE 1000 ML: 0.9 INJECTION, SOLUTION INTRAVENOUS at 00:57

## 2019-07-08 RX ADMIN — POTASSIUM CHLORIDE 20 MEQ: 20 TABLET, EXTENDED RELEASE ORAL at 01:40

## 2019-07-08 NOTE — ED PROVIDER NOTES
History  Chief Complaint   Patient presents with    Vomiting     Presents with C/O dizziness and vomiting x 1 1/2 hrs  Blood sugar was 345, had insulin via pump   Diarrhea     Assoc with diarrhea       History provided by:  Patient and medical records   used: No    Vomiting   Severity:  Moderate  Timing:  Constant  Quality:  Stomach contents  Progression:  Unchanged  Chronicity:  New  Recent urination:  Normal  Relieved by:  Nothing  Worsened by:  Nothing  Ineffective treatments:  Antiemetics  Associated symptoms: chills and diarrhea    Associated symptoms: no abdominal pain, no arthralgias, no cough, no fever, no headaches, no myalgias, no sore throat and no URI    Risk factors: diabetes and suspect food intake    Risk factors: no alcohol use, not pregnant, no prior abdominal surgery, no sick contacts and no travel to endemic areas    Diarrhea   Associated symptoms: chills and vomiting    Associated symptoms: no abdominal pain, no arthralgias, no fever, no headaches, no myalgias and no URI        Prior to Admission Medications   Prescriptions Last Dose Informant Patient Reported? Taking?    GLUCAGON EMERGENCY 1 MG injection  Self Yes No   LORazepam (ATIVAN) 0 5 mg tablet  Self No No   Sig: Take 1 tablet (0 5 mg total) by mouth every 8 (eight) hours as needed for anxiety   glucose blood test strip  Self Yes No   Sig:   test 8 TIMES DAILY AS DIRECTED E10 649   insulin aspart (NovoLOG) 100 units/mL injection  Self No No   Sig: Use 70 units per day via insulin pump   methocarbamol (ROBAXIN) 500 mg tablet   No No   Sig: Take 1 tablet (500 mg total) by mouth 3 (three) times a day   pantoprazole (PROTONIX) 40 mg tablet  Self No No   Sig: Take 1 tablet (40 mg total) by mouth daily   sertraline (ZOLOFT) 50 mg tablet   No No   Sig: Take 1 tablet (50 mg total) by mouth daily for 90 days      Facility-Administered Medications: None       Past Medical History:   Diagnosis Date    Allergic dermatitis Resolved 3/2/2016     Diabetes ([facility] )     Diabetes mellitus type 1 ([facility] )     Diabetic ketoacidosis without coma [facility] with type [facility] 2016    DKA (diabetic ketoacidoses) ([facility] )     Gastroenteritis 2016    Hypoglycemia due to type 1 diabetes mellitus ([facility] )     Left upper extremity numbness     Resolved 2016     Psychiatric disorder     [facility]epsis [facility] ) 2017    Sepsis ([facility] ) 2017    Varicella        Past Surgical History:   Procedure Laterality Date    FL INJECTION LEFT SHOULDER (ARTHROGRAM)  2018    AK ESOPHAGOGASTRODUODENOSCOPY TRANSORAL DIAGNOSTIC N/A 2019    Procedure: ESOPHAGOGASTRODUODENOSCOPY (EGD) with biopsy;  Surgeon: Venita Metcalf DO;  Location:  MAIN OR;  Service: Gastroenterology    WISDOM TOOTH EXTRACTION Bilateral        Family History   Problem Relation Age of Onset    Anxiety disorder Mother     No Known Problems Father     Anxiety disorder Sister     Anxiety disorder Maternal Grandmother     Cancer Maternal Grandmother     Anxiety disorder Maternal Aunt     Hyperlipidemia Family      I have reviewed and agree with the history as documented  Social History     Tobacco Use    Smoking status: Former Smoker     Packs/day: 0 50     Years: 4 00     Pack years: 2 00     Types: Cigarettes     Last attempt to quit: 2017     Years since quittin 2    Smokeless tobacco: Never Used   Substance Use Topics    Alcohol use: Yes     Frequency: 2-4 times a month     Drinks per session: 1 or 2     Comment: rare     Drug use: No        Review of Systems   Constitutional: Positive for chills  Negative for appetite change, fatigue and fever  HENT: Negative for congestion, ear pain, rhinorrhea, sore throat, trouble swallowing and voice change  Eyes: Negative for pain and visual disturbance  Respiratory: Negative for cough, chest tightness and shortness of breath      Cardiovascular: Negative for chest pain, palpitations and leg swelling  Gastrointestinal: Positive for diarrhea, nausea and vomiting  Negative for abdominal pain, blood in stool and constipation  Genitourinary: Negative for difficulty urinating and hematuria  Musculoskeletal: Negative for arthralgias, back pain, myalgias, neck pain and neck stiffness  Skin: Negative for rash  Neurological: Negative for dizziness, syncope, speech difficulty, light-headedness and headaches  Psychiatric/Behavioral: Negative for confusion and suicidal ideas  Physical Exam  Physical Exam   Constitutional: She is oriented to person, place, and time  She appears well-developed and well-nourished  No distress  HENT:   Head: Normocephalic and atraumatic  Right Ear: External ear normal    Left Ear: External ear normal    Nose: Nose normal    Mouth/Throat: Oropharynx is clear and moist    Mild dry MM   Eyes: Pupils are equal, round, and reactive to light  Conjunctivae and EOM are normal  Right eye exhibits no discharge  Left eye exhibits no discharge  No scleral icterus  Neck: Normal range of motion  Neck supple  No tracheal deviation present  Cardiovascular: Normal rate, regular rhythm, normal heart sounds and intact distal pulses  Exam reveals no gallop and no friction rub  No murmur heard  Pulmonary/Chest: Effort normal and breath sounds normal  No stridor  No respiratory distress  She exhibits no tenderness  Abdominal: Soft  Bowel sounds are normal  There is no tenderness  There is no rebound and no guarding  Musculoskeletal: Normal range of motion  She exhibits no edema or deformity  Lymphadenopathy:     She has no cervical adenopathy  Neurological: She is alert and oriented to person, place, and time  No cranial nerve deficit or sensory deficit  Coordination normal    Skin: Skin is warm and dry  No rash noted  She is not diaphoretic  Psychiatric: She has a normal mood and affect  Her behavior is normal    Nursing note and vitals reviewed        Vital Signs  ED Triage Vitals [07/07/19 2243]   Temperature Pulse Respirations Blood Pressure SpO2   97 8 °F (36 6 °C) 96 20 125/74 100 %      Temp Source Heart Rate Source Patient Position - Orthostatic VS BP Location FiO2 (%)   Temporal Monitor Sitting Right arm --      Pain Score       No Pain           Vitals:    07/08/19 0145 07/08/19 0200 07/08/19 0215 07/08/19 0230   BP: 99/61 93/53 91/52 98/53   Pulse: 89 79 78 92   Patient Position - Orthostatic VS:             Visual Acuity      ED Medications  Medications   sodium chloride 0 9 % bolus 1,000 mL (0 mL Intravenous Stopped 7/8/19 0056)   ondansetron (ZOFRAN) injection 4 mg (4 mg Intravenous Given 7/7/19 2250)   promethazine (PHENERGAN) injection 25 mg (25 mg Intramuscular Given 7/7/19 2329)   sodium chloride 0 9 % bolus 1,000 mL (0 mL Intravenous Stopped 7/8/19 0140)   potassium chloride (K-DUR,KLOR-CON) CR tablet 20 mEq (20 mEq Oral Given 7/8/19 0140)       Diagnostic Studies  Results Reviewed     Procedure Component Value Units Date/Time    POCT Chem 8+ [[id number]]  (Abnormal) Collected:  07/08/19 0119    Lab Status:  Final result Specimen:  Venous Updated:  07/08/19 0123     SODIUM, I-STAT 141 mmol/l      Potassium, i-STAT 3 4 mmol/L      Chloride, istat 104 mmol/L      CO2, i-STAT 23 mmol/L      Anion Gap, i-STAT 19 mmol/L      Calcium, Ionized i-STAT 1 04 mmol/L      BUN, I-STAT 12 mg/dl      Creatinine, i-STAT 0 6 mg/dl      eGFR 126 ml/min/1 73sq m      Glucose, i-STAT 177 mg/dl      Hct, i-STAT 33 %      Hgb, i-STAT 11 2 g/dl      Specimen Type VENOUS    Narrative:       National Kidney Disease Foundation guidelines for Chronic Kidney Disease (CKD):     Stage 1 with normal or high GFR (GFR > 90 mL/min/1 73 square meters)    Stage 2 Mild CKD (GFR = 60-89 mL/min/1 73 square meters)    Stage 3A Moderate CKD (GFR = 45-59 mL/min/1 73 square meters)    Stage 3B Moderate CKD (GFR = 30-44 mL/min/1 73 square meters)    Stage 4 Severe CKD (GFR = 15-29 mL/min/1 73 square meters)    Stage 5 End Stage CKD (GFR <15 mL/min/1 73 square meters)  Note: GFR calculation is accurate only with a steady state creatinine    Comprehensive metabolic panel [[id number]]  (Abnormal) Collected:  07/07/19 2252    Lab Status:  Final result Specimen:  Blood from Arm, Right Updated:  07/07/19 2339     Sodium 134 mmol/L      Potassium 5 3 mmol/L      Chloride 98 mmol/L      CO2 26 mmol/L      ANION GAP 10 mmol/L      BUN 14 mg/dL      Creatinine 0 82 mg/dL      Glucose 353 mg/dL      Calcium 8 2 mg/dL      AST 44 U/L      ALT 22 U/L      Alkaline Phosphatase 71 U/L      Total Protein 8 0 g/dL      Albumin 3 7 g/dL      Total Bilirubin 0 50 mg/dL      eGFR 99 ml/min/1 73sq m     Narrative:       [place] guidelines for Chronic Kidney Disease (CKD):     Stage 1 with normal or high GFR (GFR > 90 mL/min/1 73 square meters)    Stage 2 Mild CKD (GFR = 60-89 mL/min/1 73 square meters)    Stage 3A Moderate CKD (GFR = 45-59 mL/min/1 73 square meters)    Stage 3B Moderate CKD (GFR = 30-44 mL/min/1 73 square meters)    Stage 4 Severe CKD (GFR = 15-29 mL/min/1 73 square meters)    Stage 5 End Stage CKD (GFR <15 mL/min/1 73 square meters)  Note: GFR calculation is accurate only with a steady state creatinine    Magnesium [[id number]]  (Abnormal) Collected:  07/07/19 2252    Lab Status:  Final result Specimen:  Blood from Arm, Right Updated:  07/07/19 2339     Magnesium 1 5 mg/dL     Lipase [[id number]]  (Normal) Collected:  07/07/19 2252    Lab Status:  Final result Specimen:  Blood from Arm, Right Updated:  07/07/19 2339     Lipase 250 u/L     Lactic acid, plasma [[id number]]  (Normal) Collected:  07/07/19 2252    Lab Status:  Final result Specimen:  Blood from Arm, Right Updated:  07/07/19 2333     LACTIC ACID 1 2 mmol/L     Narrative:       Result may be elevated if tourniquet was used during collection      POCT urinalysis dipstick [[id number]]  (Normal) Resulted:  07/07/19 2317    Lab Status:  Final result Specimen:  Urine Updated:  07/07/19 2317     Color, UA yellow     Clarity, UA clearq     Glucose, UA (Ref: Negative) 2,000+     Bilirubin, UA (Ref: Negative) negative     Ketones, UA (Ref: Negative) LARGE 80     Spec Grav, UA (Ref:1 003-1 030) 1 015     Blood, UA (Ref: Negative) negative     pH, UA (Ref: 4 5-8 0) 5 0     Protein, UA (Ref: Negative) negative     Urobilinogen, UA (Ref: 0 2- 1 0) 0 2      Leukocytes, UA (Ref: Negative) negative     Nitrite, UA (Ref: Negative) negative    POCT pregnancy, urine [[id number]]  (Normal) Resulted:  07/07/19 2316    Lab Status:  Final result Updated:  07/07/19 2317     EXT PREG TEST UR (Ref: Negative) negative     Control valid    Beta Hydroxybutyrate [[id number]]  (Abnormal) Collected:  07/07/19 2252    Lab Status:  Final result Specimen:  Blood from Arm, Right Updated:  07/07/19 2314     BETA-HYDROXYBUTYRATE 0 6 mmol/L     CBC and differential [[id number]]  (Abnormal) Collected:  07/07/19 2252    Lab Status:  Final result Specimen:  Blood from Arm, Right Updated:  07/07/19 2313     WBC 11 31 Thousand/uL      RBC 4 27 Million/uL      Hemoglobin 13 1 g/dL      Hematocrit 39 4 %      MCV 92 fL      MCH 30 7 pg      MCHC 33 2 g/dL      RDW 12 3 %      MPV 10 2 fL      Platelets 083 Thousands/uL      nRBC 0 /100 WBCs      Neutrophils Relative 60 %      Immat GRANS % 0 %      Lymphocytes Relative 32 %      Monocytes Relative 7 %      Eosinophils Relative 1 %      Basophils Relative 0 %      Neutrophils Absolute 6 80 Thousands/µL      Immature Grans Absolute 0 03 Thousand/uL      Lymphocytes Absolute 3 56 Thousands/µL      Monocytes Absolute 0 76 Thousand/µL      Eosinophils Absolute 0 11 Thousand/µL      Basophils Absolute 0 05 Thousands/µL     POCT Blood Gas (G3+) [[id number]]  (Abnormal) Collected:  07/07/19 2303    Lab Status:  Final result Specimen:  Venous Updated:  07/07/19 2308     ph, Esteban ISTAT 7 451     pCO2, Esteban i-STAT 41 0 mm HG      pO2, Esteban i-STAT 34 0 mm HG      BE, i-STAT 4 mmol/L      HCO3, Esteban i-STAT 28 6 mmol/L      CO2, i-STAT 30 mmol/L      O2 Sat, i-STAT 69 %      Specimen Type VENOUS    Fingerstick Glucose (POCT) [[id number]]  (Abnormal) Collected:  07/07/19 2304    Lab Status:  Final result Updated:  07/07/19 2304     POC Glucose 333 mg/dl                  No orders to display              Procedures  ECG 12 Lead Documentation Only  Date/Time: 7/7/2019 11:42 PM  Performed by: Eugenia Simeon MD  Authorized by: Eugenia Simeon MD     Indications / Diagnosis:  Diabetic  ECG reviewed by me, the ED Provider: yes    Patient location:  ED  Previous ECG:     Previous ECG:  Compared to current    Similarity:  No change    Comparison to cardiac monitor: Yes    Interpretation:     Interpretation: normal    Rate:     ECG rate:  88    ECG rate assessment: normal    Rhythm:     Rhythm: sinus rhythm    Ectopy:     Ectopy: none    QRS:     QRS axis:  Normal  Conduction:     Conduction: normal    ST segments:     ST segments:  Normal  T waves:     T waves: normal             ED Course  ED Course as of Jul 08 0302   Sun Jul 07, 2019   2308 Pump disconnected      2340 BETA-HYDROXYBUTYRATE(!): 0 6   2352 Pt not in DKA  Started with N/V/diarrhea shortly after eating scallops/clams at a seafood bake with friends/family  Nobody else got sick  No blood  No abd pain  No fevers, has chills  No recent travel  Was rafting down river today  Mon Jul 08, 2019   0142 Pt feeling improved  K given  If tolerates, will PO trial        0231 Pt feeling improved  Tolerated Po  Will d/c home  Discussed that some of her labs were borderline, though she was not acidotic  Recommend if she starts to feel worse, needs to RTED  Pt agrees with plan                                     MDM  Number of Diagnoses or Management Options  Dehydration: new and requires workup  Hyperglycemia: new and requires workup  Nausea vomiting and diarrhea: new and requires workup     Amount and/or Complexity of Data Reviewed  Tests in the radiology section of CPT®: ordered and reviewed  Tests in the medicine section of CPT®: ordered and reviewed  Review and summarize past medical records: yes  Independent visualization of images, tracings, or specimens: yes    Risk of Complications, Morbidity, and/or Mortality  Presenting problems: moderate  Diagnostic procedures: low  Management options: low    Patient Progress  Patient progress: improved      Disposition  Final diagnoses:   Nausea vomiting and diarrhea   Hyperglycemia   Dehydration     Time reflects when diagnosis was documented in both MDM as applicable and the Disposition within this note     Time User Action Codes Description Comment    7/8/2019  2:32 AM Zee Loft Add [R11 2,  R19 7] Nausea vomiting and diarrhea     7/8/2019  2:32 AM Margarita Batty S Add [R73 9] Hyperglycemia     7/8/2019  2:33 AM Zee Loft Add [E86 0] Dehydration       ED Disposition     ED Disposition Condition Date/Time Comment    Discharge Stable Mon Jul 8, 2019  2:32 AM Magdalena Menendez discharge to home/self care              Follow-up Information     Follow up With Specialties Details Why Contact Info Additional [address], [address], Nurse Practitioner Schedule an appointment as soon as possible for a visit   [address] [facility] [place] [facility] [place] [address] [facility]  [phone]       [address] [place] [address] Emergency Department Emergency Medicine  If symptoms worsen [address] ED, [address], [address]          Discharge Medication List as of 7/8/2019  2:34 AM      START taking these medications    Details   promethazine (PHENERGAN) 25 mg tablet Take 1 tablet (25 mg total) by mouth every 6 (six) hours as needed for nausea or vomiting, Starting Mon 7/8/2019, Print         CONTINUE these medications which have NOT CHANGED    Details   GLUCAGON EMERGENCY 1 MG injection Starting Thu 2/21/2019, Historical Med      glucose blood test strip   test 8 TIMES DAILY AS DIRECTED E10 649, Historical Med      insulin aspart (NovoLOG) 100 units/mL injection Use 70 units per day via insulin pump, Normal      LORazepam (ATIVAN) 0 5 mg tablet Take 1 tablet (0 5 mg total) by mouth every 8 (eight) hours as needed for anxiety, Starting Tue 2/26/2019, Normal      methocarbamol (ROBAXIN) 500 mg tablet Take 1 tablet (500 mg total) by mouth 3 (three) times a day, Starting Mon 5/27/2019, Print      pantoprazole (PROTONIX) 40 mg tablet Take 1 tablet (40 mg total) by mouth daily, Starting Fri 3/29/2019, Normal      sertraline (ZOLOFT) 50 mg tablet Take 1 tablet (50 mg total) by mouth daily for 90 days, Starting Fri 5/17/2019, Until Thu 8/15/2019, Normal           No discharge procedures on file      ED Provider  Electronically Signed by           Jovan Bush MD  07/08/19 4621

## 2019-07-10 ENCOUNTER — VBI (OUTPATIENT)
Dept: ADMINISTRATIVE | Facility: OTHER | Age: 24
End: 2019-07-10

## 2019-07-12 DIAGNOSIS — E10.65 TYPE 1 DIABETES MELLITUS WITH HYPERGLYCEMIA (HCC): Primary | ICD-10-CM

## 2019-07-12 RX ORDER — IBUPROFEN 600 MG/1
TABLET ORAL
Status: CANCELLED | OUTPATIENT
Start: 2019-07-12

## 2019-07-12 NOTE — TELEPHONE ENCOUNTER
Nessa Mccartney    ED Visit Information     Ed visit date: 7/7/2019  Diagnosis Description: Nausea vomiting and diarrhea; Hyperglycemia; Dehydration  In Network? Yes Oral [facility]  Discharge status: Home  Discharged with meds ? Yes  Number of ED visits to date: 3  ED Severity:n/a     Outreach Information    Outreach successful: Yes 2  Date letter mailed:7/12/2019  Date Finalized:7/12/2019    Care Coordination    Follow up appointment with pcp: yes No ED f/u appt scheduled  Transportation issues ? NA    [employer] Outreach    Outreach type:  7 Day Outreach  Emergent necessity warranted by diagnosis:  No  [facility] PCP:  Yes  Transportation:  Friend/Family Transport  Urgent care Education?:  Yes  07/10/2019 04:31 PM Phone (RingMD) Katja Ventura (Self) [phone] (H)   Left Message - Requesting a call back  Unable to reach patient regarding recent ED visit on 7/7 for Nausea vomiting and diarrhea; Hyperglycemia; Dehydration  Patient was discharged with medication (phenergan) and advised to follow up with PCP  2nd attempt will be made on 7/12 07/12/2019 02:37 PM Phone (RingMD) Katja Ventura (Self) [phone] (H)   Left Message - requesting a call back  Unable to reach patient regarding recent ED visit on 7/7 for Nausea vomiting and diarrhea; Hyperglycemia; Dehydration  Patient was discharged with medication (phenergan) and advised to follow up with PCP  Letter generated and mailed

## 2019-07-15 DIAGNOSIS — E10.65 TYPE 1 DIABETES MELLITUS WITH HYPERGLYCEMIA (HCC): Primary | ICD-10-CM

## 2019-07-15 RX ORDER — IBUPROFEN 600 MG/1
1 TABLET ORAL ONCE
Qty: 1 MG | Refills: 3 | Status: SHIPPED | OUTPATIENT
Start: 2019-07-15 | End: 2019-08-19 | Stop reason: SDUPTHER

## 2019-07-16 PROBLEM — Z96.41 INSULIN PUMP IN PLACE: Status: ACTIVE | Noted: 2019-07-16

## 2019-07-18 ENCOUNTER — TELEPHONE (OUTPATIENT)
Dept: ENDOCRINOLOGY | Facility: CLINIC | Age: 24
End: 2019-07-18

## 2019-08-16 ENCOUNTER — HOSPITAL ENCOUNTER (EMERGENCY)
Facility: HOSPITAL | Age: 24
Discharge: HOME | End: 2019-08-16
Attending: EMERGENCY MEDICINE | Admitting: EMERGENCY MEDICINE
Payer: COMMERCIAL

## 2019-08-16 VITALS
HEIGHT: 59 IN | RESPIRATION RATE: 24 BRPM | SYSTOLIC BLOOD PRESSURE: 132 MMHG | OXYGEN SATURATION: 97 % | HEART RATE: 114 BPM | DIASTOLIC BLOOD PRESSURE: 63 MMHG | WEIGHT: 138 LBS | BODY MASS INDEX: 27.82 KG/M2 | TEMPERATURE: 97.8 F

## 2019-08-16 DIAGNOSIS — R11.2 NAUSEA AND VOMITING: Primary | ICD-10-CM

## 2019-08-16 DIAGNOSIS — E10.65 POORLY CONTROLLED TYPE 1 DIABETES MELLITUS (HCC): ICD-10-CM

## 2019-08-16 DIAGNOSIS — E10.65 TYPE 1 DIABETES MELLITUS WITH HYPERGLYCEMIA (HCC): ICD-10-CM

## 2019-08-16 LAB
ALBUMIN SERPL BCP-MCNC: 4.1 G/DL (ref 3.5–5)
ALP SERPL-CCNC: 81 U/L (ref 46–116)
ALT SERPL W P-5'-P-CCNC: 19 U/L (ref 12–78)
ANION GAP SERPL CALCULATED.3IONS-SCNC: 8 MMOL/L (ref 4–13)
AST SERPL W P-5'-P-CCNC: 22 U/L (ref 5–45)
ATRIAL RATE: 84 BPM
BASOPHILS # BLD AUTO: 0.08 THOUSANDS/ΜL (ref 0–0.1)
BASOPHILS NFR BLD AUTO: 1 % (ref 0–1)
BETA-HYDROXYBUTYRATE: 0.2 MMOL/L
BILIRUB SERPL-MCNC: 0.3 MG/DL (ref 0.2–1)
BUN SERPL-MCNC: 17 MG/DL (ref 5–25)
CALCIUM SERPL-MCNC: 9.3 MG/DL (ref 8.3–10.1)
CHLORIDE SERPL-SCNC: 102 MMOL/L (ref 100–108)
CO2 SERPL-SCNC: 30 MMOL/L (ref 21–32)
CREAT SERPL-MCNC: 0.59 MG/DL (ref 0.6–1.3)
EOSINOPHIL # BLD AUTO: 0.14 THOUSAND/ΜL (ref 0–0.61)
EOSINOPHIL NFR BLD AUTO: 1 % (ref 0–6)
ERYTHROCYTE [DISTWIDTH] IN BLOOD BY AUTOMATED COUNT: 12.1 % (ref 11.6–15.1)
EXT PREG TEST URINE: NEGATIVE
EXT. CONTROL ED NAV: NORMAL
GFR SERPL CREATININE-BSD FRML MDRD: 127 ML/MIN/1.73SQ M
GLUCOSE SERPL-MCNC: 131 MG/DL (ref 65–140)
GLUCOSE SERPL-MCNC: 171 MG/DL (ref 65–140)
GLUCOSE SERPL-MCNC: 63 MG/DL (ref 65–140)
GLUCOSE SERPL-MCNC: 80 MG/DL (ref 65–140)
HCT VFR BLD AUTO: 43.6 % (ref 34.8–46.1)
HGB BLD-MCNC: 14.5 G/DL (ref 11.5–15.4)
IMM GRANULOCYTES # BLD AUTO: 0.04 THOUSAND/UL (ref 0–0.2)
IMM GRANULOCYTES NFR BLD AUTO: 0 % (ref 0–2)
LYMPHOCYTES # BLD AUTO: 2.57 THOUSANDS/ΜL (ref 0.6–4.47)
LYMPHOCYTES NFR BLD AUTO: 22 % (ref 14–44)
MCH RBC QN AUTO: 30.5 PG (ref 26.8–34.3)
MCHC RBC AUTO-ENTMCNC: 33.3 G/DL (ref 31.4–37.4)
MCV RBC AUTO: 92 FL (ref 82–98)
MONOCYTES # BLD AUTO: 0.69 THOUSAND/ΜL (ref 0.17–1.22)
MONOCYTES NFR BLD AUTO: 6 % (ref 4–12)
NEUTROPHILS # BLD AUTO: 8.2 THOUSANDS/ΜL (ref 1.85–7.62)
NEUTS SEG NFR BLD AUTO: 70 % (ref 43–75)
NRBC BLD AUTO-RTO: 0 /100 WBCS
P AXIS: 67 DEGREES
PLATELET # BLD AUTO: 302 THOUSANDS/UL (ref 149–390)
PMV BLD AUTO: 9.9 FL (ref 8.9–12.7)
POTASSIUM SERPL-SCNC: 4.1 MMOL/L (ref 3.5–5.3)
PR INTERVAL: 124 MS
PROT SERPL-MCNC: 7.9 G/DL (ref 6.4–8.2)
QRS AXIS: 85 DEGREES
QRSD INTERVAL: 86 MS
QT INTERVAL: 376 MS
QTC INTERVAL: 444 MS
RBC # BLD AUTO: 4.76 MILLION/UL (ref 3.81–5.12)
SODIUM SERPL-SCNC: 140 MMOL/L (ref 136–145)
T WAVE AXIS: 50 DEGREES
VENTRICULAR RATE: 84 BPM
WBC # BLD AUTO: 11.72 THOUSAND/UL (ref 4.31–10.16)

## 2019-08-16 PROCEDURE — 93010 ELECTROCARDIOGRAM REPORT: CPT | Performed by: INTERNAL MEDICINE

## 2019-08-16 PROCEDURE — 93005 ELECTROCARDIOGRAM TRACING: CPT

## 2019-08-16 PROCEDURE — 82948 REAGENT STRIP/BLOOD GLUCOSE: CPT

## 2019-08-16 PROCEDURE — 81025 URINE PREGNANCY TEST: CPT | Performed by: EMERGENCY MEDICINE

## 2019-08-16 PROCEDURE — 36415 COLL VENOUS BLD VENIPUNCTURE: CPT | Performed by: EMERGENCY MEDICINE

## 2019-08-16 PROCEDURE — 82010 KETONE BODYS QUAN: CPT | Performed by: EMERGENCY MEDICINE

## 2019-08-16 PROCEDURE — 80053 COMPREHEN METABOLIC PANEL: CPT | Performed by: EMERGENCY MEDICINE

## 2019-08-16 PROCEDURE — 85025 COMPLETE CBC W/AUTO DIFF WBC: CPT | Performed by: EMERGENCY MEDICINE

## 2019-08-16 PROCEDURE — 96374 THER/PROPH/DIAG INJ IV PUSH: CPT

## 2019-08-16 PROCEDURE — 96361 HYDRATE IV INFUSION ADD-ON: CPT

## 2019-08-16 PROCEDURE — 99284 EMERGENCY DEPT VISIT MOD MDM: CPT | Performed by: EMERGENCY MEDICINE

## 2019-08-16 PROCEDURE — 99283 EMERGENCY DEPT VISIT LOW MDM: CPT

## 2019-08-16 PROCEDURE — 96375 TX/PRO/DX INJ NEW DRUG ADDON: CPT

## 2019-08-16 RX ORDER — IBUPROFEN 600 MG/1
1 TABLET ORAL ONCE
Qty: 1 MG | Refills: 0 | Status: CANCELLED | OUTPATIENT
Start: 2019-08-17 | End: 2019-08-17

## 2019-08-16 RX ORDER — ONDANSETRON 2 MG/ML
4 INJECTION INTRAMUSCULAR; INTRAVENOUS ONCE
Status: COMPLETED | OUTPATIENT
Start: 2019-08-16 | End: 2019-08-16

## 2019-08-16 RX ORDER — PROMETHAZINE HYDROCHLORIDE 25 MG/1
25 SUPPOSITORY RECTAL EVERY 6 HOURS PRN
Qty: 12 SUPPOSITORY | Refills: 0 | Status: SHIPPED | OUTPATIENT
Start: 2019-08-16 | End: 2019-11-13 | Stop reason: ALTCHOICE

## 2019-08-16 RX ORDER — PROMETHAZINE HYDROCHLORIDE 25 MG/ML
25 INJECTION, SOLUTION INTRAMUSCULAR; INTRAVENOUS ONCE
Status: COMPLETED | OUTPATIENT
Start: 2019-08-16 | End: 2019-08-16

## 2019-08-16 RX ADMIN — SODIUM CHLORIDE 1000 ML: 0.9 INJECTION, SOLUTION INTRAVENOUS at 04:45

## 2019-08-16 RX ADMIN — ONDANSETRON 4 MG: 2 INJECTION INTRAMUSCULAR; INTRAVENOUS at 04:43

## 2019-08-16 RX ADMIN — PROMETHAZINE HYDROCHLORIDE 25 MG: 25 INJECTION INTRAMUSCULAR; INTRAVENOUS at 05:05

## 2019-08-16 NOTE — DISCHARGE INSTRUCTIONS
Acute Nausea and Vomiting   WHAT YOU NEED TO KNOW:   Acute nausea and vomiting start suddenly, worsen quickly, and last a short time  DISCHARGE INSTRUCTIONS:   Return to the emergency department if:   · You see blood in your vomit or your bowel movements  · You have sudden, severe pain in your chest and upper abdomen after hard vomiting or retching  · You have swelling in your neck and chest      · You are dizzy, cold, and thirsty and your eyes and mouth are dry  · You are urinating very little or not at all  · You have muscle weakness, leg cramps, and trouble breathing  · Your heart is beating much faster than normal      · You continue to vomit for more than 48 hours  Contact your healthcare provider if:   · You have frequent dry heaves (vomiting but nothing comes out)  · Your nausea and vomiting does not get better or go away after you use medicine  · You have questions or concerns about your condition or treatment  Medicines: You may need any of the following:  · Medicines  may be given to calm your stomach and stop your vomiting  You may also need medicines to help you feel more relaxed or to stop nausea and vomiting caused by motion sickness  · Gastrointestinal stimulants  are used to help empty your stomach and bowels  This may help decrease nausea and vomiting  · Take your medicine as directed  Contact your healthcare provider if you think your medicine is not helping or if you have side effects  Tell him or her if you are allergic to any medicine  Keep a list of the medicines, vitamins, and herbs you take  Include the amounts, and when and why you take them  Bring the list or the pill bottles to follow-up visits  Carry your medicine list with you in case of an emergency  Prevent or manage acute nausea and vomiting:   · Do not drink alcohol  Alcohol may upset or irritate your stomach  Too much alcohol can also cause acute nausea and vomiting  · Control stress  Headaches due to stress may cause nausea and vomiting  Find ways to relax and manage your stress  Get more rest and sleep  · Drink more liquids as directed  Vomiting can lead to dehydration  It is important to drink more liquids to help replace lost body fluids  Ask your healthcare provider how much liquid to drink each day and which liquids are best for you  Your provider may recommend that you drink an oral rehydration solution (ORS)  ORS contains water, salts, and sugar that are needed to replace the lost body fluids  Ask what kind of ORS to use, how much to drink, and where to get it  · Eat smaller meals, more often  Eat small amounts of food every 2 to 3 hours, even if you are not hungry  Food in your stomach may decrease your nausea  · Talk to your healthcare provider before you take over-the-counter (OTC) medicines  These medicines can cause serious problems if you use certain other medicines, or you have a medical condition  You may have problems if you use too much or use them for longer than the label says  Follow directions on the label carefully  Follow up with your healthcare provider as directed:  Write down your questions so you remember to ask them during your follow-up visits  © 2017 [address] [employer] Information is for End User's use only and may not be sold, redistributed or otherwise used for commercial purposes  All illustrations and images included in CareNotes® are the copyrighted property of [employer]  or Rhett Preston  The above information is an  only  It is not intended as medical advice for individual conditions or treatments  Talk to your doctor, nurse or pharmacist before following any medical regimen to see if it is safe and effective for you  Managing Diabetes During Sick Days   WHAT YOU NEED TO KNOW:   Sick day management is a plan you develop with healthcare providers to control your blood sugar level when you are sick   Your blood sugar level can rise because of stress from illness, surgery, or injury  Your plan will help prevent high blood sugar levels and other serious health conditions  DISCHARGE INSTRUCTIONS:   Call 911 for any of the following:   · You have trouble breathing  Return to the emergency department if:   · You cannot keep food and liquids down at all for a few hours  · You have trouble breathing  · You are drowsy or confused  · You are breathing faster than normal      · Your heartbeat is faster than normal, or your heart is pounding  · You are weak or dizzy  Contact your healthcare provider if:   · You have leg cramps  · Your mouth or eyes are dry  · You are vomiting or have diarrhea  · You have a fever  · Your ketone level is higher than healthcare providers have told you it should be  · Your blood sugar level is higher than healthcare providers have told you it should be  · You have questions or concerns about your condition or care  Medicines:   · Insulin or diabetes medicine  help to keep your blood sugar under control  Your healthcare provider will tell you if you need to make changes to how you use your diabetes medicine or insulin  · Take your medicine as directed  Contact your healthcare provider if you think your medicine is not helping or if you have side effects  Tell him of her if you are allergic to any medicine  Keep a list of the medicines, vitamins, and herbs you take  Include the amounts, and when and why you take them  Bring the list or the pill bottles to follow-up visits  Carry your medicine list with you in case of an emergency  Follow up with your healthcare provider as directed:  Write down your questions so you remember to ask them during your visits  What to do during sick days:   · Check your blood sugar level more often than usual   If you have type 2 diabetes, check at least 4 times each day   If you have type 1 diabetes, check every 4 hours            · Check your urine or blood for ketones  Ask your healthcare provider which type of ketone testing is best for you  Ketone urine test kits are sold in pharmacies and some stores  You can also buy a meter to check the amount of ketones in your blood  Ask when and how often to check ketones  Do not exercise if you have ketones in your urine or blood  · Drink liquids as directed  You may need to drink about 8 ounces (1 cup) of liquid each hour  Drink liquids that do not contain sugar  Ask your healthcare provider which liquids are best for you  · Follow your usual meal plan as closely as possible  If you cannot follow your meal plan, eat other foods that are easy for your body to digest  If you are eating less food than normal or cannot eat any foods, drink liquids that contain calories  · Tell others about your sick day plan  Tell others who help you while you are sick about your sick day plan  Put your plan in a place that is easy to find  Your sick day plan may change over time based on your needs  What to drink and eat while you are sick: If your stomach is upset or you are vomiting, the following may be easier to drink and eat  Each of the foods listed below has about 10 to 15 grams of carbohydrate:  · Liquids:      ¨ ? to ½ cup of fruit juice     ¨ ½ cup of regular soda     ¨ 1 cup of milk     ¨ 1 double-stick popsicle     ¨ 1 cup of a sports drink    · Foods:      ¨ ½ cup of regular gelatin or cooked, hot cereal     ¨ ½ cup of sugar-free pudding or ¼ cup of regular pudding     ¨ ½ cup of mashed potatoes, macaroni, or noodles     ¨ ¼ cup of sherbet     ¨ ½ cup of regular ice cream     ¨ 1 slice of dry toast, 6 saltine crackers, or 3 diana crackers  © 2017 [employer] [employer] Information is for End User's use only and may not be sold, redistributed or otherwise used for commercial purposes   All illustrations and images included in CareNotes® are the copyrighted property of A  [employer] , Inc  or Rhett Preston  The above information is an  only  It is not intended as medical advice for individual conditions or treatments  Talk to your doctor, nurse or pharmacist before following any medical regimen to see if it is safe and effective for you

## 2019-08-16 NOTE — ED PROVIDER NOTES
History  Chief Complaint   Patient presents with    Vomiting     Pt wiht type one diabteies c/o vomiting for the last three hours  Pt blood sugar was 60 and she took glucagon and it came back back up to 21      30year old female presents for evaluation of nausea and vomiting which awoke her from sleep at 1 am this morning  Patient states that she has been having difficulty with glucose control and had been experiencing episodes of hypoglycemia  This evening, around 11 pm, she had a blood glucose level of 59  She disconnected her insulin pump and gave herself glucagon with improvement in blood glucose level to the 200s  She then went to bed and awoke with these symptoms at 1 am  She checked her blood glucose level again and found it to be 61  She tried to take phenergan; however, she vomited after taking the medication  Patient gave herself another glucagon at 2 am after being unable to tolerate PO  She reports multiple episodes of emesis since symptoms began  No known sick contacts  Patient's significant other ate the same meal last night and has no symptoms  History provided by:  Patient  Vomiting   Severity:  Severe  Duration:  3 hours  Timing:  Constant  Number of daily episodes:  Multiple  Quality:  Bilious material and stomach contents  Progression:  Unchanged  Chronicity:  New  Relieved by:  Nothing  Exacerbated by: attempts at PO intake  Ineffective treatments: phenergan  Associated symptoms: no abdominal pain, no chills, no cough, no diarrhea, no fever, no headaches, no myalgias and no sore throat    Risk factors: diabetes    Risk factors: not pregnant and no sick contacts        Prior to Admission Medications   Prescriptions Last Dose Informant Patient Reported? Taking?    GLUCAGON EMERGENCY 1 MG injection   No Yes   Sig: Inject 1 mg into a muscle once for 1 dose   LORazepam (ATIVAN) 0 5 mg tablet  Self No Yes   Sig: Take 1 tablet (0 5 mg total) by mouth every 8 (eight) hours as needed for anxiety glucose blood test strip   No Yes   Sig: Check BG 6x per day Use as instructed   glucose blood test strip   No Yes   Si each by Other route 6 (six) times a day Use as instructed   insulin aspart (NovoLOG) 100 units/mL injection  Self No Yes   Sig: Use 70 units per day via insulin pump   methocarbamol (ROBAXIN) 500 mg tablet Not Taking at Unknown time  No No   Sig: Take 1 tablet (500 mg total) by mouth 3 (three) times a day   Patient not taking: Reported on 2019   pantoprazole (PROTONIX) 40 mg tablet  Self No Yes   Sig: Take 1 tablet (40 mg total) by mouth daily   promethazine (PHENERGAN) 25 mg tablet   No Yes   Sig: Take 1 tablet (25 mg total) by mouth every 6 (six) hours as needed for nausea or vomiting   sertraline (ZOLOFT) 50 mg tablet   No Yes   Sig: Take 1 tablet (50 mg total) by mouth daily for 90 days      Facility-Administered Medications: None       Past Medical History:   Diagnosis Date    Allergic dermatitis     Resolved 3/2/2016     Diabetes ([facility] )     Diabetes mellitus type 1 ([facility] )     Diabetic ketoacidosis without coma associated with type 1 diabetes mellitus ([facility] ) 2016    DKA (diabetic ketoacidoses) ([facility] )     Gastroenteritis 2016    Hypoglycemia due to type 1 diabetes mellitus ([facility] )     Left upper extremity numbness     Resolved 2016     Psychiatric disorder     Sepsis ([facility] ) 2017    Sepsis ([facility] ) 2017    Varicella        Past Surgical History:   Procedure Laterality Date    FL INJECTION LEFT SHOULDER (ARTHROGRAM)  2018    PA ESOPHAGOGASTRODUODENOSCOPY TRANSORAL DIAGNOSTIC N/A 2019    Procedure: ESOPHAGOGASTRODUODENOSCOPY (EGD) with biopsy;  Surgeon: Michelle Mix DO;  Location:  MAIN OR;  Service: Gastroenterology    WISDOM TOOTH EXTRACTION Bilateral        Family History   Problem Relation Age of Onset    Anxiety disorder Mother     No Known Problems Father     Anxiety disorder Sister     Anxiety disorder Maternal Grandmother     Cancer Maternal Grandmother     Anxiety disorder Maternal Aunt     Hyperlipidemia Family      I have reviewed and agree with the history as documented  Social History     Tobacco Use    Smoking status: Former Smoker     Packs/day: 0 50     Years: 4 00     Pack years: 2 00     Types: Cigarettes     Last attempt to quit: 2017     Years since quittin 3    Smokeless tobacco: Never Used   Substance Use Topics    Alcohol use: Yes     Frequency: 2-4 times a month     Drinks per session: 1 or 2     Comment: rare     Drug use: No        Review of Systems   Constitutional: Negative for appetite change, chills and fever  HENT: Negative for congestion, rhinorrhea and sore throat  Respiratory: Negative for cough, chest tightness and shortness of breath  Cardiovascular: Negative for chest pain, palpitations and leg swelling  Gastrointestinal: Positive for nausea and vomiting  Negative for abdominal pain, constipation and diarrhea  Genitourinary: Negative for dysuria, frequency and hematuria  Musculoskeletal: Negative for myalgias, neck pain and neck stiffness  Skin: Negative for pallor  Neurological: Negative for syncope, weakness and headaches  All other systems reviewed and are negative  Physical Exam  Physical Exam   Constitutional: She is oriented to person, place, and time  She appears well-developed and well-nourished  Non-toxic appearance  No distress  HENT:   Head: Normocephalic and atraumatic  Eyes: Pupils are equal, round, and reactive to light  Conjunctivae and EOM are normal    Neck: Normal range of motion  Neck supple  No tracheal deviation present  No thyromegaly present  Cardiovascular: Normal rate, regular rhythm, normal heart sounds and intact distal pulses  Pulmonary/Chest: Effort normal and breath sounds normal    Abdominal: Soft  Bowel sounds are normal  She exhibits no distension  There is no tenderness  Lymphadenopathy:     She has no cervical adenopathy  Neurological: She is alert and oriented to person, place, and time  Skin: Skin is warm and dry  She is not diaphoretic  Nursing note and vitals reviewed        Vital Signs  ED Triage Vitals [08/16/19 0423]   Temperature Pulse Respirations Blood Pressure SpO2   97 8 °F (36 6 °C) 101 18 118/73 99 %      Temp Source Heart Rate Source Patient Position - Orthostatic VS BP Location FiO2 (%)   Tympanic Monitor Lying Right arm --      Pain Score       No Pain           Vitals:    08/16/19 0423 08/16/19 0445   BP: 118/73 132/63   Pulse: 101 94   Patient Position - Orthostatic VS: Lying          Visual Acuity  Visual Acuity      Most Recent Value   L Pupil Size (mm)  3   R Pupil Size (mm)  3          ED Medications  Medications   sodium chloride 0 9 % bolus 1,000 mL (1,000 mL Intravenous New Bag 8/16/19 0445)   ondansetron (ZOFRAN) injection 4 mg (4 mg Intravenous Given 8/16/19 0443)   promethazine (PHENERGAN) injection 25 mg (25 mg Intravenous Given 8/16/19 0505)       Diagnostic Studies  Results Reviewed     Procedure Component Value Units Date/Time    Fingerstick Glucose (POCT) [[id number]]     Lab Status:  No result     Fingerstick Glucose (POCT) [[id number]]  (Abnormal) Collected:  08/16/19 0541    Lab Status:  Final result Updated:  08/16/19 0542     POC Glucose 63 mg/dl     Comprehensive metabolic panel [[id number]]  (Abnormal) Collected:  08/16/19 0437    Lab Status:  Final result Specimen:  Blood from Arm, Right Updated:  08/16/19 0509     Sodium 140 mmol/L      Potassium 4 1 mmol/L      Chloride 102 mmol/L      CO2 30 mmol/L      ANION GAP 8 mmol/L      BUN 17 mg/dL      Creatinine 0 59 mg/dL      Glucose 131 mg/dL      Calcium 9 3 mg/dL      AST 22 U/L      ALT 19 U/L      Alkaline Phosphatase 81 U/L      Total Protein 7 9 g/dL      Albumin 4 1 g/dL      Total Bilirubin 0 30 mg/dL      eGFR 127 ml/min/1 73sq m     Narrative:       [place] guidelines for Chronic Kidney Disease (CKD):    Stage 1 with normal or high GFR (GFR > 90 mL/min/1 73 square meters)    Stage 2 Mild CKD (GFR = 60-89 mL/min/1 73 square meters)    Stage 3A Moderate CKD (GFR = 45-59 mL/min/1 73 square meters)    Stage 3B Moderate CKD (GFR = 30-44 mL/min/1 73 square meters)    Stage 4 Severe CKD (GFR = 15-29 mL/min/1 73 square meters)    Stage 5 End Stage CKD (GFR <15 mL/min/1 73 square meters)  Note: GFR calculation is accurate only with a steady state creatinine    POCT pregnancy, urine [[id number]]  (Normal) Resulted:  08/16/19 0459    Lab Status:  Final result Updated:  08/16/19 0500     EXT PREG TEST UR (Ref: Negative) NEGATIVE     Control VALID    Beta Hydroxybutyrate [[id number]]  (Normal) Collected:  08/16/19 0437    Lab Status:  Final result Specimen:  Blood from Arm, Right Updated:  08/16/19 0456     BETA-HYDROXYBUTYRATE 0 2 mmol/L     CBC and differential [[id number]]  (Abnormal) Collected:  08/16/19 0437    Lab Status:  Final result Specimen:  Blood from Arm, Right Updated:  08/16/19 0450     WBC 11 72 Thousand/uL      RBC 4 76 Million/uL      Hemoglobin 14 5 g/dL      Hematocrit 43 6 %      MCV 92 fL      MCH 30 5 pg      MCHC 33 3 g/dL      RDW 12 1 %      MPV 9 9 fL      Platelets 599 Thousands/uL      nRBC 0 /100 WBCs      Neutrophils Relative 70 %      Immat GRANS % 0 %      Lymphocytes Relative 22 %      Monocytes Relative 6 %      Eosinophils Relative 1 %      Basophils Relative 1 %      Neutrophils Absolute 8 20 Thousands/µL      Immature Grans Absolute 0 04 Thousand/uL      Lymphocytes Absolute 2 57 Thousands/µL      Monocytes Absolute 0 69 Thousand/µL      Eosinophils Absolute 0 14 Thousand/µL      Basophils Absolute 0 08 Thousands/µL     Fingerstick Glucose (POCT) [[id number]]  (Abnormal) Collected:  08/16/19 0425    Lab Status:  Final result Updated:  08/16/19 0426     POC Glucose 171 mg/dl                  No orders to display              Procedures  ECG 12 Lead Documentation Only  Date/Time: 8/16/2019 4:44 AM  Performed by: Stephen Wells MD  Authorized by: Stephen Wells MD     Indications / Diagnosis:  Nausea, qt prolongating medications  ECG reviewed by me, the ED Provider: yes    Patient location:  ED  Previous ECG:     Previous ECG:  Compared to current    Comparison ECG info:  7/7/2019 normal ekg    Similarity:  No change  Interpretation:     Interpretation: normal    Rate:     ECG rate:  84    ECG rate assessment: normal    Rhythm:     Rhythm: sinus rhythm    Ectopy:     Ectopy: none    QRS:     QRS axis:  Normal    QRS intervals:  Normal  Conduction:     Conduction: normal    ST segments:     ST segments:  Normal  T waves:     T waves: normal             ED Course                               MDM  Number of Diagnoses or Management Options  Nausea and vomiting: new and requires workup  Poorly controlled type [facility] diabetes mellitus ([facility] ): new and requires workup  Diagnosis management comments: 30year old female presents with nausea and vomiting  Patient had episodes of hypoglycemia at home and had given herself glucagon prior to arrival as she was unable to tolerate PO intake  Labs unremarkable  IV fluids  Zofran given for nausea with slight improvement  IV phenergan given with resolution of vomiting and significant improvement in nausea  Patient able to tolerate PO intake in the ED  Prescription for phenergan suppositories provided  Patient to follow with her endocrinologist  Discussed return precautions with patient          Amount and/or Complexity of Data Reviewed  Clinical lab tests: ordered and reviewed    Patient Progress  Patient progress: stable      Disposition  Final diagnoses:   Nausea and vomiting   Poorly controlled type 1 diabetes mellitus ([facility] )     Time reflects when diagnosis was documented in both MDM as applicable and the Disposition within this note     Time User Action Codes Description Comment    8/16/2019  4:57 AM Michael Michael Add [R11 2] Nausea and vomiting     8/16/2019 4: 62 AM Favian MOSQUERA Add [E10 65] Poorly controlled type 1 diabetes mellitus [facility])       ED Disposition     ED Disposition Condition Date/Time Comment    Discharge Stable Fri Aug 16, 2019  5:57 AM Curtis Brown discharge to home/self care  Follow-up Information     Follow up With Specialties Details Why Contact Info Additional Information    Link Croft MD Endocrinology Schedule an appointment as soon as possible for a visit in 3 days to discuss your diabetes regimen and episodes of hypoglycemia [address] [facility]  [address] [facility] Emergency Department Emergency Medicine Go to  If symptoms worsen [address] [facility]  [address] [place] [address] [address] ED, [facility], [address] [place], [place], [address]          Patient's Medications   Discharge Prescriptions    PROMETHAZINE (PHENERGAN) 25 MG SUPPOSITORY    Insert 1 suppository (25 mg total) into the rectum every 6 (six) hours as needed for nausea or vomiting       Start Date: 8/16/2019 End Date: --       Order Dose: 25 mg       Quantity: 12 suppository    Refills: 0     No discharge procedures on file      ED Provider  Electronically Signed by           Marah Penaloza MD  08/16/19 9482

## 2019-08-18 DIAGNOSIS — E10.65 TYPE 1 DIABETES MELLITUS WITH HYPERGLYCEMIA (HCC): ICD-10-CM

## 2019-08-18 RX ORDER — IBUPROFEN 600 MG/1
1 TABLET ORAL ONCE
Qty: 1 MG | Refills: 0 | Status: CANCELLED | OUTPATIENT
Start: 2019-08-18 | End: 2019-08-18

## 2019-08-19 ENCOUNTER — VBI (OUTPATIENT)
Dept: ADMINISTRATIVE | Facility: OTHER | Age: 24
End: 2019-08-19

## 2019-08-19 DIAGNOSIS — E10.65 TYPE 1 DIABETES MELLITUS WITH HYPERGLYCEMIA (HCC): ICD-10-CM

## 2019-08-19 RX ORDER — IBUPROFEN 600 MG/1
1 TABLET ORAL ONCE
Qty: 1 MG | Refills: 3 | Status: SHIPPED | OUTPATIENT
Start: 2019-08-19 | End: 2020-04-08 | Stop reason: SDUPTHER

## 2019-08-19 RX ORDER — IBUPROFEN 600 MG/1
1 TABLET ORAL ONCE
Qty: 1 MG | Refills: 3 | Status: CANCELLED | OUTPATIENT
Start: 2019-08-19 | End: 2019-08-19

## 2019-08-19 NOTE — TELEPHONE ENCOUNTER
Mary Hurd    ED Visit Information     Ed visit date:8/16/2019  Diagnosis Description: Nausea and vomiting; Poorly controlled type 1 diabetes mellitus ([address] )  In Network? Yes [address]  Discharge status: Home  Discharged with meds ? Yes  Number of ED visits to date: 4  ED Severity:n/a     Outreach Information    Outreach successful: Yes 1  Date letter mailed:n/a  Date [address] [facility] [address] Coordination    Follow up appointment with pcp: no Declined  Transportation issues ? No    [employer] type:  7 Day Outreach  Emergent necessity warranted by diagnosis:  No  [facility] PCP:  Yes  Transportation:  Friend/Family Transport  Called PCP first?:  No  Feels able to call PCP for urgent problems ?:  Yes  Understands what emergencies can be handled by PCP ?:  Yes  Ever any problems getting appointment with PCP for minor emergency/urgency problems?:  No  Practice Contacted Patient ?:  No  Pt had ED follow up with pcp/staff ?:  No    Seen for follow-up out of network ?:  No  Reason Patient went to ED instead of Urgent Care or PCP?:  Perceived Severity of Illness  Urgent care Education?:  No  08/19/2019 12:18 PM Phone (Anabela Foster) Norberto Saldaña (Self) [phone] (H)   Left Message  Unable to reach patient regarding recent ED visit on 8/16 for Nausea and vomiting; Poorly controlled type 1 diabetes mellitus ([address] )  Patient was discharged with medication (promethazine) and advised to follow up with PCP within 1 week  2nd attempt will be made on 8/16 08/19/2019 12:28 PM Phone (Mohit) Norberto Saldaña (Self) [phone] (H)  Return Call  Personal communication with patient regarding recent ED visit on 8/16 for Nausea and vomiting; Poorly controlled type 1 diabetes mellitus ([address] )  Patient was discharged with medication (promethazine) and was advised to follow up with PCP within 1 week  Patient stated that she is feeling much better today   She declined to schedule a follow up appt with her PCP and stated that she won't follow up with endocrinology at this time  Patient does not meet OPCM criteria  She is aware of PCP after hours on call service,  her nearest [place] [id number] urgent care facility and what conditions can be treated there as she is a  employee

## 2019-09-10 DIAGNOSIS — F41.9 ANXIETY: ICD-10-CM

## 2019-09-12 ENCOUNTER — TELEPHONE (OUTPATIENT)
Dept: ENDOCRINOLOGY | Facility: CLINIC | Age: 24
End: 2019-09-12

## 2019-09-12 DIAGNOSIS — E10.65 TYPE 1 DIABETES MELLITUS WITH HYPERGLYCEMIA (HCC): Primary | ICD-10-CM

## 2019-09-12 NOTE — TELEPHONE ENCOUNTER
Pt called and she said she picked up her basaglar insulin but from now on they need novalog that is on her formulary

## 2019-09-13 DIAGNOSIS — E10.65 TYPE 1 DIABETES MELLITUS WITH HYPERGLYCEMIA (HCC): ICD-10-CM

## 2019-09-27 DIAGNOSIS — E10.65 TYPE 1 DIABETES MELLITUS WITH HYPERGLYCEMIA (HCC): Primary | ICD-10-CM

## 2019-10-08 ENCOUNTER — OFFICE VISIT (OUTPATIENT)
Dept: URGENT CARE | Facility: CLINIC | Age: 24
End: 2019-10-08
Payer: COMMERCIAL

## 2019-10-08 VITALS
SYSTOLIC BLOOD PRESSURE: 110 MMHG | HEIGHT: 59 IN | OXYGEN SATURATION: 99 % | WEIGHT: 143 LBS | RESPIRATION RATE: 16 BRPM | HEART RATE: 85 BPM | DIASTOLIC BLOOD PRESSURE: 64 MMHG | BODY MASS INDEX: 28.83 KG/M2 | TEMPERATURE: 97.5 F

## 2019-10-08 DIAGNOSIS — G43.909 MIGRAINE WITHOUT STATUS MIGRAINOSUS, NOT INTRACTABLE, UNSPECIFIED MIGRAINE TYPE: Primary | ICD-10-CM

## 2019-10-08 PROCEDURE — 99213 OFFICE O/P EST LOW 20 MIN: CPT | Performed by: FAMILY MEDICINE

## 2019-10-08 RX ORDER — NAPROXEN 500 MG/1
500 TABLET ORAL 2 TIMES DAILY WITH MEALS
Qty: 20 TABLET | Refills: 0 | Status: SHIPPED | OUTPATIENT
Start: 2019-10-08 | End: 2019-11-15

## 2019-10-08 NOTE — PROGRESS NOTES
Assessment/Plan:      Diagnoses and all orders for this visit:    Migraine without status migrainosus, not intractable, unspecified migraine type  -     naproxen (EC NAPROSYN) 500 MG EC tablet; Take 1 tablet (500 mg total) by mouth 2 (two) times a day with meals for 10 days          Subjective:     Patient ID: Sarah Bonilla is a 30 y o  female  Patient is a type 1 diabetic  Yesterday she woke up with the a headache and the checked her sugar which was low  Apparently low sugars trigger migraine on her period she has continued with a right-sided headache since  She has missed work  She has not had any nausea or vomiting  Review of Systems   Unable to perform ROS: Other         Objective:     Physical Exam   Constitutional: She is oriented to person, place, and time  She appears well-developed and well-nourished  HENT:   Head: Normocephalic and atraumatic  Eyes: EOM are normal    Pulmonary/Chest: Effort normal    Musculoskeletal: Normal range of motion  Neurological: She is alert and oriented to person, place, and time  There are no focal deficits

## 2019-10-08 NOTE — LETTER
October 8, 2019     Patient: Alberto Primrose   YOB: 1995   Date of Visit: 10/8/2019       To Whom It May Concern: It is my medical opinion that Alberto Primrose may return to work on 10/08/2019  Please excuse her from work on 10/07/2019  If you have any questions or concerns, please don't hesitate to call           Sincerely,        Jeanne Barlow MD    CC: Eduardo Primrose

## 2019-10-12 ENCOUNTER — OFFICE VISIT (OUTPATIENT)
Dept: URGENT CARE | Facility: CLINIC | Age: 24
End: 2019-10-12
Payer: COMMERCIAL

## 2019-10-12 VITALS
RESPIRATION RATE: 16 BRPM | HEIGHT: 59 IN | WEIGHT: 144 LBS | HEART RATE: 73 BPM | DIASTOLIC BLOOD PRESSURE: 75 MMHG | TEMPERATURE: 97.6 F | OXYGEN SATURATION: 98 % | SYSTOLIC BLOOD PRESSURE: 123 MMHG | BODY MASS INDEX: 29.03 KG/M2

## 2019-10-12 DIAGNOSIS — H01.00A BLEPHARITIS OF UPPER AND LOWER EYELIDS OF BOTH EYES, UNSPECIFIED TYPE: Primary | ICD-10-CM

## 2019-10-12 DIAGNOSIS — H01.00B BLEPHARITIS OF UPPER AND LOWER EYELIDS OF BOTH EYES, UNSPECIFIED TYPE: Primary | ICD-10-CM

## 2019-10-12 PROCEDURE — 99213 OFFICE O/P EST LOW 20 MIN: CPT | Performed by: PREVENTIVE MEDICINE

## 2019-10-12 RX ORDER — TRIAMCINOLONE ACETONIDE 0.25 MG/G
CREAM TOPICAL
Qty: 15 G | Refills: 0 | Status: SHIPPED | OUTPATIENT
Start: 2019-10-12 | End: 2019-11-13 | Stop reason: ALTCHOICE

## 2019-10-12 NOTE — PROGRESS NOTES
[email or web address]        NAME: Manfred Champion is a 30 y o  female  : 1995    MRN: [id number]  DATE: 2019  TIME: 9:50 AM    Assessment and Plan   Blepharitis of upper and lower eyelids of both eyes, unspecified type [H01 00A, H01 00B]  1  Blepharitis of upper and lower eyelids of both eyes, unspecified type           Patient Instructions       Follow up with PCP in 3-5 days  Proceed to  ER if symptoms worsen  Chief Complaint     Chief Complaint   Patient presents with    Eye Swelling     Pt awoke this am with her B/L eyes swollen, right > left  Pt denies itching and drainage  Right eye pain reported 1/10  History of Present Illness       Yesterday the right lower lid began the swell it was mildly itchy and also the left upper lid also began to swell  She reports nothing in the eye no redness no discharge in irritation in the eye itself      Review of Systems   Review of Systems   Eyes: Positive for itching  Negative for discharge           Current Medications       Current Outpatient Medications:     glucose blood test strip, Check BG 6x per day Use as instructed, Disp: 600 each, Rfl: 1    glucose blood test strip, 1 each by Other route 6 (six) times a day Use as instructed, Disp: 600 each, Rfl: 3    insulin glargine (BASAGLAR KWIKPEN) 100 units/mL injection pen, 30 units daily while off pump , Disp: 5 pen, Rfl: 1    insulin lispro (HumaLOG) 100 units/mL injection, Use 70 units per day via insulin pump, Disp: 20 mL, Rfl: 2    LORazepam (ATIVAN) 0 5 mg tablet, Take 1 tablet (0 5 mg total) by mouth every 8 (eight) hours as needed for anxiety, Disp: 30 tablet, Rfl: 0    naproxen (EC NAPROSYN) 500 MG EC tablet, Take 1 tablet (500 mg total) by mouth 2 (two) times a day with meals for 10 days, Disp: 20 tablet, Rfl: 0    pantoprazole (PROTONIX) 40 mg tablet, Take 1 tablet (40 mg total) by mouth daily, Disp: 30 tablet, Rfl: 5    promethazine (PHENERGAN) 25 mg suppository, Insert 1 suppository (25 mg total) into the rectum every 6 (six) hours as needed for nausea or vomiting, Disp: 12 suppository, Rfl: 0    promethazine (PHENERGAN) 25 mg tablet, Take 1 tablet (25 mg total) by mouth every 6 (six) hours as needed for nausea or vomiting, Disp: 20 tablet, Rfl: 0    sertraline (ZOLOFT) 50 mg tablet, Take 1 tablet (50 mg total) by mouth daily, Disp: 90 tablet, Rfl: 0    GLUCAGON EMERGENCY 1 MG injection, Inject 1 mg into a muscle once for 1 dose, Disp: 1 mg, Rfl: 3    methocarbamol (ROBAXIN) 500 mg tablet, Take 1 tablet (500 mg total) by mouth 3 (three) times a day (Patient not taking: Reported on 10/12/2019), Disp: 15 tablet, Rfl: 0    Current Allergies     Allergies as of 10/12/2019 - Reviewed 10/12/2019   Allergen Reaction Noted    Reglan [metoclopramide] Anxiety 02/21/2016            The following portions of the patient's history were reviewed and updated as appropriate: allergies, current medications, past family history, past medical history, past social history, past surgical history and problem list      Past Medical History:   Diagnosis Date    Allergic dermatitis     Resolved 3/2/2016     [facility]     [facility]     Diabetic ketoacidosis without coma associated with type 1 diabetes mellitus ([facility] ) 2/22/2016    DKA (diabetic ketoacidoses) ([facility] )     Gastroenteritis 2/22/2016    Hypoglycemia due to type 1 diabetes mellitus ([facility] )     Left upper extremity numbness     Resolved 11/1/2016     Psychiatric disorder     Sepsis ([facility] ) 02/2017    Sepsis ([facility] ) 02/2017    Varicella        Past Surgical History:   Procedure Laterality Date    FL INJECTION LEFT SHOULDER (ARTHROGRAM)  12/11/2018    HI ESOPHAGOGASTRODUODENOSCOPY TRANSORAL DIAGNOSTIC N/A 4/24/2019    Procedure: ESOPHAGOGASTRODUODENOSCOPY (EGD) with biopsy;  Surgeon: Janett Carrillo DO;  Location:  MAIN OR;  Service: Gastroenterology    WISDOM TOOTH EXTRACTION Bilateral        Family History Problem Relation Age of Onset    Anxiety disorder Mother     No Known Problems Father     Anxiety disorder Sister     Anxiety disorder Maternal Grandmother     Cancer Maternal Grandmother     Anxiety disorder Maternal Aunt     Hyperlipidemia Family          Medications have been verified  Objective   /75 (BP Location: Right arm, Patient Position: Sitting)   Pulse 73   Temp 97 6 °F (36 4 °C) (Tympanic)   Resp 16   Ht 4' 11" (1 499 m)   Wt 65 3 kg (144 lb)   SpO2 98%   BMI 29 08 kg/m²        Physical Exam     Physical Exam   Eyes:   The right lower lid is mildly erythematous and mildly swollen  No evidence of the sty  The eye itself is perfectly normal with a white sclera and pink conjunctiva, no injection  The left upper eyelid is very mildly swollen but it is colorless and not warm

## 2019-10-12 NOTE — PATIENT INSTRUCTIONS
I believe this is due to an allergic swelling  Check U soaps you makeup few shampoos to see if anything is new    This is not improving in several days she must be recheck

## 2019-11-08 ENCOUNTER — OFFICE VISIT (OUTPATIENT)
Dept: URGENT CARE | Facility: CLINIC | Age: 24
End: 2019-11-08
Payer: COMMERCIAL

## 2019-11-08 VITALS
WEIGHT: 144 LBS | DIASTOLIC BLOOD PRESSURE: 56 MMHG | HEIGHT: 59 IN | HEART RATE: 87 BPM | BODY MASS INDEX: 29.03 KG/M2 | OXYGEN SATURATION: 99 % | TEMPERATURE: 98.4 F | SYSTOLIC BLOOD PRESSURE: 128 MMHG | RESPIRATION RATE: 16 BRPM

## 2019-11-08 DIAGNOSIS — L03.116 CELLULITIS OF LEFT LOWER EXTREMITY: Primary | ICD-10-CM

## 2019-11-08 PROCEDURE — 99213 OFFICE O/P EST LOW 20 MIN: CPT | Performed by: EMERGENCY MEDICINE

## 2019-11-08 RX ORDER — CEPHALEXIN 500 MG/1
500 CAPSULE ORAL EVERY 6 HOURS SCHEDULED
Qty: 40 CAPSULE | Refills: 0 | Status: SHIPPED | OUTPATIENT
Start: 2019-11-08 | End: 2019-11-18

## 2019-11-08 NOTE — PROGRESS NOTES
Assessment/Plan:    No problem-specific Assessment & Plan notes found for this encounter  Diagnoses and all orders for this visit:    Cellulitis of left lower extremity  -     cephalexin (KEFLEX) 500 mg capsule; Take 1 capsule (500 mg total) by mouth every 6 (six) hours for 10 days          Subjective:      Patient ID: Raina Durant is a 30 y o  female  Pt c/o redness, swelling L anterior thigh at site of prior insulin pump  Pt moved pump to abdomen    Leg Pain    The incident occurred 12 to 24 hours ago  The incident occurred at home  Injury mechanism: at needle insertion  The pain is present in the left leg and left thigh  The pain is at a severity of 3/10  The pain is mild  She reports no foreign bodies present  Nothing aggravates the symptoms  She has tried heat for the symptoms  The treatment provided mild relief  The following portions of the patient's history were reviewed and updated as appropriate: allergies, current medications, past family history, past medical history, past social history, past surgical history and problem list     Review of Systems   Musculoskeletal: Positive for myalgias (L thigh)  All other systems reviewed and are negative  Objective:      /56 (BP Location: Left arm, Patient Position: Sitting)   Pulse 87   Temp 98 4 °F (36 9 °C) (Tympanic)   Resp 16   Ht 4' 11" (1 499 m)   Wt 65 3 kg (144 lb)   SpO2 99%   BMI 29 08 kg/m²          Physical Exam   Constitutional: She is oriented to person, place, and time  She appears well-developed and well-nourished  HENT:   Nose: Nose normal    Eyes: Pupils are equal, round, and reactive to light  Neck: Normal range of motion  Cardiovascular: Normal rate  Pulmonary/Chest: Effort normal    Abdominal: Soft  Musculoskeletal:        Legs:  Neurological: She is alert and oriented to person, place, and time  Skin: Skin is warm and dry  Psychiatric: She has a normal mood and affect   Her behavior is normal  Judgment and thought content normal    Nursing note and vitals reviewed

## 2019-11-11 ENCOUNTER — HOSPITAL ENCOUNTER (EMERGENCY)
Facility: HOSPITAL | Age: 24
Discharge: HOME | End: 2019-11-11
Attending: EMERGENCY MEDICINE | Admitting: EMERGENCY MEDICINE
Payer: COMMERCIAL

## 2019-11-11 ENCOUNTER — OFFICE VISIT (OUTPATIENT)
Dept: URGENT CARE | Facility: CLINIC | Age: 24
End: 2019-11-11
Payer: COMMERCIAL

## 2019-11-11 VITALS
HEIGHT: 59 IN | TEMPERATURE: 97.8 F | RESPIRATION RATE: 19 BRPM | DIASTOLIC BLOOD PRESSURE: 71 MMHG | HEART RATE: 75 BPM | BODY MASS INDEX: 29.03 KG/M2 | WEIGHT: 144 LBS | SYSTOLIC BLOOD PRESSURE: 121 MMHG | OXYGEN SATURATION: 98 %

## 2019-11-11 VITALS
OXYGEN SATURATION: 97 % | SYSTOLIC BLOOD PRESSURE: 127 MMHG | TEMPERATURE: 98.5 F | DIASTOLIC BLOOD PRESSURE: 76 MMHG | RESPIRATION RATE: 16 BRPM | HEART RATE: 82 BPM

## 2019-11-11 DIAGNOSIS — L03.116 CELLULITIS OF LEFT THIGH: Primary | ICD-10-CM

## 2019-11-11 DIAGNOSIS — L02.416 ABSCESS OF LEFT THIGH: Primary | ICD-10-CM

## 2019-11-11 LAB
ALBUMIN SERPL BCP-MCNC: 3.5 G/DL (ref 3.5–5)
ALP SERPL-CCNC: 73 U/L (ref 46–116)
ALT SERPL W P-5'-P-CCNC: 20 U/L (ref 12–78)
ANION GAP SERPL CALCULATED.3IONS-SCNC: 10 MMOL/L (ref 4–13)
AST SERPL W P-5'-P-CCNC: 16 U/L (ref 5–45)
BASOPHILS # BLD AUTO: 0.04 THOUSANDS/ΜL (ref 0–0.1)
BASOPHILS NFR BLD AUTO: 1 % (ref 0–1)
BILIRUB SERPL-MCNC: 0.3 MG/DL (ref 0.2–1)
BUN SERPL-MCNC: 8 MG/DL (ref 5–25)
CALCIUM SERPL-MCNC: 8.7 MG/DL (ref 8.3–10.1)
CHLORIDE SERPL-SCNC: 103 MMOL/L (ref 100–108)
CO2 SERPL-SCNC: 27 MMOL/L (ref 21–32)
CREAT SERPL-MCNC: 0.62 MG/DL (ref 0.6–1.3)
EOSINOPHIL # BLD AUTO: 0.07 THOUSAND/ΜL (ref 0–0.61)
EOSINOPHIL NFR BLD AUTO: 1 % (ref 0–6)
ERYTHROCYTE [DISTWIDTH] IN BLOOD BY AUTOMATED COUNT: 12.4 % (ref 11.6–15.1)
GFR SERPL CREATININE-BSD FRML MDRD: 125 ML/MIN/1.73SQ M
GLUCOSE SERPL-MCNC: 50 MG/DL (ref 65–140)
GLUCOSE SERPL-MCNC: 77 MG/DL (ref 65–140)
HCT VFR BLD AUTO: 38.6 % (ref 34.8–46.1)
HGB BLD-MCNC: 12.8 G/DL (ref 11.5–15.4)
IMM GRANULOCYTES # BLD AUTO: 0.01 THOUSAND/UL (ref 0–0.2)
IMM GRANULOCYTES NFR BLD AUTO: 0 % (ref 0–2)
LYMPHOCYTES # BLD AUTO: 2.56 THOUSANDS/ΜL (ref 0.6–4.47)
LYMPHOCYTES NFR BLD AUTO: 31 % (ref 14–44)
MCH RBC QN AUTO: 30.6 PG (ref 26.8–34.3)
MCHC RBC AUTO-ENTMCNC: 33.2 G/DL (ref 31.4–37.4)
MCV RBC AUTO: 92 FL (ref 82–98)
MONOCYTES # BLD AUTO: 0.64 THOUSAND/ΜL (ref 0.17–1.22)
MONOCYTES NFR BLD AUTO: 8 % (ref 4–12)
NEUTROPHILS # BLD AUTO: 5.05 THOUSANDS/ΜL (ref 1.85–7.62)
NEUTS SEG NFR BLD AUTO: 59 % (ref 43–75)
NRBC BLD AUTO-RTO: 0 /100 WBCS
PLATELET # BLD AUTO: 282 THOUSANDS/UL (ref 149–390)
PMV BLD AUTO: 10.1 FL (ref 8.9–12.7)
POTASSIUM SERPL-SCNC: 3.2 MMOL/L (ref 3.5–5.3)
PROT SERPL-MCNC: 7.6 G/DL (ref 6.4–8.2)
RBC # BLD AUTO: 4.18 MILLION/UL (ref 3.81–5.12)
SODIUM SERPL-SCNC: 140 MMOL/L (ref 136–145)
WBC # BLD AUTO: 8.37 THOUSAND/UL (ref 4.31–10.16)

## 2019-11-11 PROCEDURE — 85025 COMPLETE CBC W/AUTO DIFF WBC: CPT | Performed by: PHYSICIAN ASSISTANT

## 2019-11-11 PROCEDURE — 80053 COMPREHEN METABOLIC PANEL: CPT | Performed by: PHYSICIAN ASSISTANT

## 2019-11-11 PROCEDURE — 99284 EMERGENCY DEPT VISIT MOD MDM: CPT | Performed by: PHYSICIAN ASSISTANT

## 2019-11-11 PROCEDURE — 99282 EMERGENCY DEPT VISIT SF MDM: CPT

## 2019-11-11 PROCEDURE — 96365 THER/PROPH/DIAG IV INF INIT: CPT

## 2019-11-11 PROCEDURE — 82948 REAGENT STRIP/BLOOD GLUCOSE: CPT

## 2019-11-11 PROCEDURE — 99213 OFFICE O/P EST LOW 20 MIN: CPT | Performed by: PHYSICIAN ASSISTANT

## 2019-11-11 PROCEDURE — 36415 COLL VENOUS BLD VENIPUNCTURE: CPT | Performed by: PHYSICIAN ASSISTANT

## 2019-11-11 RX ORDER — POTASSIUM CHLORIDE 20 MEQ/1
40 TABLET, EXTENDED RELEASE ORAL ONCE
Status: COMPLETED | OUTPATIENT
Start: 2019-11-11 | End: 2019-11-11

## 2019-11-11 RX ORDER — CEFAZOLIN SODIUM 2 G/50ML
2000 SOLUTION INTRAVENOUS ONCE
Status: COMPLETED | OUTPATIENT
Start: 2019-11-11 | End: 2019-11-11

## 2019-11-11 RX ADMIN — CEFAZOLIN SODIUM 2000 MG: 2 SOLUTION INTRAVENOUS at 14:04

## 2019-11-11 RX ADMIN — POTASSIUM CHLORIDE 40 MEQ: 1500 TABLET, EXTENDED RELEASE ORAL at 14:51

## 2019-11-11 NOTE — ED PROVIDER NOTES
History  Chief Complaint   Patient presents with    Wound Check     Pt reports wound on L thigh from her insulin pump  Seen at care now on Friday and put on Keflex, states she has no improvement  Patient is a 31 y/o F with h/o DM type 1 that presents to the ED with cellulitis left thigh  She states it started 5 days ago after switching her insulin pump  She was seen at urgent care 4 days ago and started on keflex  She has only been taking it twice a day  The cellulitis is not worse, but not better  She denies fevers, chills  SHe states her blood sugars have been on the higher side  History provided by:  Patient  Wound Check    She was treated in the ED 2 to 3 days ago  Previous treatment in the ED includes oral antibiotics  Treatments since wound repair include oral antibiotics  There has been no drainage from the wound  The redness has not changed  The swelling has not changed  The pain has not changed  She has no difficulty moving the affected extremity or digit  Prior to Admission Medications   Prescriptions Last Dose Informant Patient Reported? Taking? GLUCAGON EMERGENCY 1 MG injection   No No   Sig: Inject 1 mg into a muscle once for 1 dose   LORazepam (ATIVAN) 0 5 mg tablet  Self No No   Sig: Take 1 tablet (0 5 mg total) by mouth every 8 (eight) hours as needed for anxiety   cephalexin (KEFLEX) 500 mg capsule   No No   Sig: Take 1 capsule (500 mg total) by mouth every 6 (six) hours for 10 days   glucose blood test strip   No No   Sig: Check BG 6x per day Use as instructed   glucose blood test strip   No No   Si each by Other route 6 (six) times a day Use as instructed   insulin glargine (BASAGLAR KWIKPEN) 100 units/mL injection pen   No No   Si units daily while off pump     insulin lispro (HumaLOG) 100 units/mL injection   No No   Sig: Use 70 units per day via insulin pump   methocarbamol (ROBAXIN) 500 mg tablet Not Taking at Unknown time  No No   Sig: Take 1 tablet (500 mg total) by mouth 3 (three) times a day   Patient not taking: Reported on 11/11/2019   naproxen (EC NAPROSYN) 500 MG EC tablet   No No   Sig: Take 1 tablet (500 mg total) by mouth 2 (two) times a day with meals for 10 days   pantoprazole (PROTONIX) 40 mg tablet Not Taking at Unknown time Self No No   Sig: Take 1 tablet (40 mg total) by mouth daily   Patient not taking: Reported on 11/11/2019   promethazine (PHENERGAN) 25 mg suppository Not Taking at Unknown time  No No   Sig: Insert 1 suppository (25 mg total) into the rectum every 6 (six) hours as needed for nausea or vomiting   Patient not taking: Reported on 11/11/2019   promethazine (PHENERGAN) 25 mg tablet   No No   Sig: Take 1 tablet (25 mg total) by mouth every 6 (six) hours as needed for nausea or vomiting   sertraline (ZOLOFT) 50 mg tablet   No No   Sig: Take 1 tablet (50 mg total) by mouth daily   triamcinolone (KENALOG) 0 025 % cream Not Taking at Unknown time  No No   Sig: Apply very thinly twice a day for swelling or itching  Do not use more than 4 days in a row on the face     Patient not taking: Reported on 11/11/2019      Facility-Administered Medications: None       Past Medical History:   Diagnosis Date    Allergic dermatitis     Resolved 3/2/2016     Diabetes ([facility] )     Diabetes mellitus type 1 ([facility] )     Diabetic ketoacidosis without coma associated with type [facility] mellitus [facility] ) 2/22/2016    DKA (diabetic ketoacidoses) ([facility] )     Gastroenteritis 2/22/2016    Hypoglycemia due to type 1 diabetes mellitus [facility] )     Left upper extremity numbness     Resolved 11/1/2016     Psychiatric disorder     Sepsis [facility] ) 02/2017    Sepsis ([facility] ) 02/2017    Varicella        Past Surgical History:   Procedure Laterality Date    FL INJECTION LEFT SHOULDER (ARTHROGRAM)  12/11/2018    GA ESOPHAGOGASTRODUODENOSCOPY TRANSORAL DIAGNOSTIC N/A 4/24/2019    Procedure: ESOPHAGOGASTRODUODENOSCOPY (EGD) with biopsy;  Surgeon: Sudarshan Vidal DO;  Location: [facility];  Service: Gastroenterology    WISDOM TOOTH EXTRACTION Bilateral        Family History   Problem Relation Age of Onset    Anxiety disorder Mother     No Known Problems Father     Anxiety disorder Sister     Anxiety disorder Maternal Grandmother     Cancer Maternal Grandmother     Anxiety disorder Maternal Aunt     Hyperlipidemia Family      I have reviewed and agree with the history as documented  Social History     Tobacco Use    Smoking status: Former Smoker     Packs/day: 0 50     Years: 4 00     Pack years: 2 00     Types: Cigarettes     Last attempt to quit: 2017     Years since quittin 5    Smokeless tobacco: Never Used   Substance Use Topics    Alcohol use: Yes     Frequency: 2-4 times a month     Drinks per session: 1 or 2     Comment: rare     Drug use: No        Review of Systems   Constitutional: Negative for chills and fever  Skin: Positive for color change  Neurological: Negative for dizziness, weakness and numbness  Psychiatric/Behavioral: Negative for confusion  All other systems reviewed and are negative  Physical Exam  Physical Exam   Constitutional: She is oriented to person, place, and time  She appears well-developed and well-nourished  She is cooperative  She does not appear ill  No distress  HENT:   Head: Normocephalic and atraumatic  Right Ear: Hearing normal    Left Ear: Hearing normal    Nose: Nose normal    Mouth/Throat: Uvula is midline, oropharynx is clear and moist and mucous membranes are normal    Eyes: Pupils are equal, round, and reactive to light  Conjunctivae are normal    Neck: Normal range of motion  Cardiovascular: Normal rate, regular rhythm and normal heart sounds  No murmur heard  Pulmonary/Chest: Effort normal and breath sounds normal  She has no wheezes  She has no rhonchi  She has no rales  Musculoskeletal:        Legs:  Neurological: She is alert and oriented to person, place, and time  She has normal strength   No sensory deficit  Skin: Skin is warm and dry  She is not diaphoretic  There is erythema  No pallor  Nursing note and vitals reviewed            Vital Signs  ED Triage Vitals [11/11/19 1308]   Temperature Pulse Respirations Blood Pressure SpO2   98 5 °F (36 9 °C) 93 16 127/76 97 %      Temp src Heart Rate Source Patient Position - Orthostatic VS BP Location FiO2 (%)   -- Monitor Sitting Right arm --      Pain Score       8           Vitals:    11/11/19 1308 11/11/19 1315   BP: 127/76 127/76   Pulse: 93 82   Patient Position - Orthostatic VS: Sitting          Visual Acuity      ED Medications  Medications   potassium chloride (K-DUR,KLOR-CON) CR tablet 40 mEq (has no administration in time range)   ceFAZolin (ANCEF) IVPB (premix) 2,000 mg (0 mg Intravenous Stopped 11/11/19 1449)       Diagnostic Studies  Results Reviewed     Procedure Component Value Units Date/Time    Fingerstick Glucose (POCT) [[id number]]  (Normal) Collected:  11/11/19 1442    Lab Status:  Final result Updated:  11/11/19 1443     POC Glucose 77 mg/dl     Comprehensive metabolic panel [[id number]]  (Abnormal) Collected:  11/11/19 1403    Lab Status:  Final result Specimen:  Blood from Arm, Right Updated:  11/11/19 1429     Sodium 140 mmol/L      Potassium 3 2 mmol/L      Chloride 103 mmol/L      CO2 27 mmol/L      ANION GAP 10 mmol/L      BUN 8 mg/dL      Creatinine 0 62 mg/dL      Glucose 50 mg/dL      Calcium 8 7 mg/dL      AST 16 U/L      ALT 20 U/L      Alkaline Phosphatase 73 U/L      Total Protein 7 6 g/dL      Albumin 3 5 g/dL      Total Bilirubin 0 30 mg/dL      eGFR 125 ml/min/1 73sq m     Narrative:       [place] guidelines for Chronic Kidney Disease (CKD):     Stage 1 with normal or high GFR (GFR > 90 mL/min/1 73 square meters)    Stage 2 Mild CKD (GFR = 60-89 mL/min/1 73 square meters)    Stage 3A Moderate CKD (GFR = 45-59 mL/min/1 73 square meters)    Stage 3B Moderate CKD (GFR = 30-44 mL/min/1 73 square meters)    Stage 4 Severe CKD (GFR = 15-29 mL/min/1 73 square meters)    Stage 5 End Stage CKD (GFR <15 mL/min/1 73 square meters)  Note: GFR calculation is accurate only with a steady state creatinine    CBC and differential [[id number]] Collected:  11/11/19 1403    Lab Status:  Final result Specimen:  Blood from Arm, Right Updated:  11/11/19 1411     WBC 8 37 Thousand/uL      RBC 4 18 Million/uL      Hemoglobin 12 8 g/dL      Hematocrit 38 6 %      MCV 92 fL      MCH 30 6 pg      MCHC 33 2 g/dL      RDW 12 4 %      MPV 10 1 fL      Platelets 563 Thousands/uL      nRBC 0 /100 WBCs      Neutrophils Relative 59 %      Immat GRANS % 0 %      Lymphocytes Relative 31 %      Monocytes Relative 8 %      Eosinophils Relative 1 %      Basophils Relative 1 %      Neutrophils Absolute 5 05 Thousands/µL      Immature Grans Absolute 0 01 Thousand/uL      Lymphocytes Absolute 2 56 Thousands/µL      Monocytes Absolute 0 64 Thousand/µL      Eosinophils Absolute 0 07 Thousand/µL      Basophils Absolute 0 04 Thousands/µL                  No orders to display              Procedures  Procedures       ED Course                               MDM  Number of Diagnoses or Management Options  Cellulitis of left thigh: established and improving  Diagnosis management comments: Patient with erythema to left thigh, not improving  Patient was only taking antibiotic twice a day instead of 4 times a day  Will check labs to r/o hyperglycemia or elevated WBC  Patient found to be hypoglycemia, she was given crackers and juice  Her blood sugar improved after food  Patient instructed to monitor BS closely           Amount and/or Complexity of Data Reviewed  Clinical lab tests: reviewed and ordered    Patient Progress  Patient progress: stable      Disposition  Final diagnoses:   Cellulitis of left thigh     Time reflects when diagnosis was documented in both MDM as applicable and the Disposition within this note     Time User Action Codes Description Comment    11/11/2019  2:44 PM Mumtaz Orozco [H56 181] Cellulitis of left thigh       ED Disposition     ED Disposition Condition Date/Time Comment    Discharge Stable Mon Nov 11, 2019  2:44 PM Alishageovanny David discharge to home/self care  Follow-up Information     Follow up With Specialties Details Why [phone], [phone] Nurse Practitioner Call in 2 days For recheck [address] [place] [phone]            Patient's Medications   Discharge Prescriptions    No medications on file     No discharge procedures on file      ED Provider  Electronically Signed by           Taty Vera PA-C  11/11/19 4628

## 2019-11-11 NOTE — DISCHARGE INSTRUCTIONS
Warm soaks to leg 4-5 times a day for 30 minutes  Take keflex 4 times a day  REturn to ER if symptoms worsen  Follow up with family doctor in 2-3 days for recheck  Monitor your blood sugars closely

## 2019-11-11 NOTE — LETTER
November 11, 2019     Patient: Yue Booker   YOB: 1995   Date of Visit: 11/11/2019       To Whom it May Concern:    Yue Booker was seen in my clinic on 11/11/2019  She requires further evaluation in the emergency department and cannot return to work today  If you have any questions or concerns, please don't hesitate to call           Sincerely,          Vale Ayoub PA-C        CC: Yue Booker

## 2019-11-11 NOTE — PROGRESS NOTES
[address] [facility]        NAME: Yue Booker is a 30 y o  female  : 1995    MRN: [id number]  DATE: 2019  TIME: 12:42 PM    Assessment and Plan   Abscess of left thigh [L02 416]  1  Abscess of left thigh  Transfer to other facility     Discussed with patient treatment options including additional antibiotic and continued warm compresses but recommend ER evaluation given history of type 1 diabetes, elevated blood sugars and nausea  Patient states that she will go to [facility] emergency department following this visit  Patient Instructions   Patient Instructions   Go to ER for further evaluation of abscess on thigh        Proceed to  ER if symptoms worsen  Chief Complaint     Chief Complaint   Patient presents with    Leg Pain     Left Leg - Patient was here prior on friday, , for a skin infection from pump; warm to touch, appears red, and has a swelling on the left thigh  History of Present Illness       Patient is a 42-year-old female with type 1 diabetes who presents for evaluation of a mass on the left thigh  She was evaluated at Urgent Care this past Friday for an infection at the site of her insulin pump  She was started on Keflex and warm compresses  She states that her pain has worsened although the redness seems to have improved somewhat  She feels a large lump in the center of the redness and states that she could not sleep last night due to the pain  She denies any fevers or chills and she has been taking her temperature regularly  She does report that her blood sugars have been elevated into the 300s  She also reports nausea today but no vomiting  Review of Systems   Review of Systems   Constitutional: Negative for chills and fever  Gastrointestinal: Positive for nausea  Negative for vomiting  Musculoskeletal: Positive for myalgias  Skin: Positive for color change and rash  Hematological: Negative            Current Medications Current Outpatient Medications:     cephalexin (KEFLEX) 500 mg capsule, Take 1 capsule (500 mg total) by mouth every 6 (six) hours for 10 days, Disp: 40 capsule, Rfl: 0    glucose blood test strip, Check BG 6x per day Use as instructed, Disp: 600 each, Rfl: 1    glucose blood test strip, 1 each by Other route 6 (six) times a day Use as instructed, Disp: 600 each, Rfl: 3    insulin glargine (BASAGLAR KWIKPEN) 100 units/mL injection pen, 30 units daily while off pump , Disp: 5 pen, Rfl: 1    insulin lispro (HumaLOG) 100 units/mL injection, Use 70 units per day via insulin pump, Disp: 20 mL, Rfl: 2    LORazepam (ATIVAN) 0 5 mg tablet, Take 1 tablet (0 5 mg total) by mouth every 8 (eight) hours as needed for anxiety, Disp: 30 tablet, Rfl: 0    methocarbamol (ROBAXIN) 500 mg tablet, Take 1 tablet (500 mg total) by mouth 3 (three) times a day, Disp: 15 tablet, Rfl: 0    pantoprazole (PROTONIX) 40 mg tablet, Take 1 tablet (40 mg total) by mouth daily, Disp: 30 tablet, Rfl: 5    promethazine (PHENERGAN) 25 mg suppository, Insert 1 suppository (25 mg total) into the rectum every 6 (six) hours as needed for nausea or vomiting, Disp: 12 suppository, Rfl: 0    promethazine (PHENERGAN) 25 mg tablet, Take 1 tablet (25 mg total) by mouth every 6 (six) hours as needed for nausea or vomiting, Disp: 20 tablet, Rfl: 0    sertraline (ZOLOFT) 50 mg tablet, Take 1 tablet (50 mg total) by mouth daily, Disp: 90 tablet, Rfl: 0    triamcinolone (KENALOG) 0 025 % cream, Apply very thinly twice a day for swelling or itching    Do not use more than 4 days in a row on the face , Disp: 15 g, Rfl: 0    GLUCAGON EMERGENCY 1 MG injection, Inject 1 mg into a muscle once for 1 dose, Disp: 1 mg, Rfl: 3    naproxen (EC NAPROSYN) 500 MG EC tablet, Take 1 tablet (500 mg total) by mouth 2 (two) times a day with meals for 10 days, Disp: 20 tablet, Rfl: 0    Current Allergies     Allergies as of 11/11/2019 - Reviewed 11/11/2019   Allergen Reaction Noted    Reglan [metoclopramide] Anxiety 02/21/2016            The following portions of the patient's history were reviewed and updated as appropriate: allergies, current medications, past family history, past medical history, past social history, past surgical history and problem list      Past Medical History:   Diagnosis Date    Allergic dermatitis     Resolved 3/2/2016     Diabetes [facility] [address] [facility]     Diabetes mellitus type 1 ([facility] [facility]     Diabetic ketoacidosis without coma associated with type 1 diabetes mellitus ([facility] [address] [facility] ) 2/22/2016    DKA (diabetic ketoacidoses) ([facility] [address] [facility]     Gastroenteritis 2/22/2016    Hypoglycemia due to type 1 diabetes mellitus ([facility] [address] [facility] )     Left upper extremity numbness     Resolved 11/1/2016     Psychiatric disorder     Sepsis ([facility] [address] [facility] ) 02/2017    Sepsis ([facility] [address] ) 02/2017    Varicella        Past Surgical History:   Procedure Laterality Date    FL INJECTION LEFT SHOULDER (ARTHROGRAM)  12/11/2018    IA ESOPHAGOGASTRODUODENOSCOPY TRANSORAL DIAGNOSTIC N/A 4/24/2019    Procedure: ESOPHAGOGASTRODUODENOSCOPY (EGD) with biopsy;  Surgeon: Bebeto Garcia DO;  Location:  MAIN OR;  Service: Gastroenterology    WISDOM TOOTH EXTRACTION Bilateral        Family History   Problem Relation Age of Onset    Anxiety disorder Mother     No Known Problems Father     Anxiety disorder Sister     Anxiety disorder Maternal Grandmother     Cancer Maternal Grandmother     Anxiety disorder Maternal Aunt     Hyperlipidemia Family          Medications have been verified  Objective   /71   Pulse 75   Temp 97 8 °F (36 6 °C) (Tympanic)   Resp 19   Ht 4' 11" (1 499 m)   Wt 65 3 kg (144 lb)   SpO2 98%   BMI 29 08 kg/m²        Physical Exam     Physical Exam   Constitutional: No distress  Lymphadenopathy:        Left: Inguinal adenopathy present  Skin:        Erythematous area on anterior left upper leg with central area of induration  Appears as if a pustule is forming    Area is exquisitely tender    No drainage

## 2019-11-13 ENCOUNTER — OFFICE VISIT (OUTPATIENT)
Dept: FAMILY MEDICINE CLINIC | Facility: CLINIC | Age: 24
End: 2019-11-13
Payer: COMMERCIAL

## 2019-11-13 VITALS
HEIGHT: 59 IN | WEIGHT: 141.2 LBS | SYSTOLIC BLOOD PRESSURE: 104 MMHG | TEMPERATURE: 98.5 F | HEART RATE: 86 BPM | BODY MASS INDEX: 28.47 KG/M2 | OXYGEN SATURATION: 98 % | DIASTOLIC BLOOD PRESSURE: 64 MMHG

## 2019-11-13 DIAGNOSIS — L08.9 SKIN INFECTION: Primary | ICD-10-CM

## 2019-11-13 PROCEDURE — 99213 OFFICE O/P EST LOW 20 MIN: CPT | Performed by: NURSE PRACTITIONER

## 2019-11-13 PROCEDURE — 87205 SMEAR GRAM STAIN: CPT | Performed by: NURSE PRACTITIONER

## 2019-11-13 PROCEDURE — 87070 CULTURE OTHR SPECIMN AEROBIC: CPT | Performed by: NURSE PRACTITIONER

## 2019-11-13 PROCEDURE — 87186 SC STD MICRODIL/AGAR DIL: CPT | Performed by: NURSE PRACTITIONER

## 2019-11-13 PROCEDURE — 1036F TOBACCO NON-USER: CPT | Performed by: NURSE PRACTITIONER

## 2019-11-13 PROCEDURE — 87147 CULTURE TYPE IMMUNOLOGIC: CPT | Performed by: NURSE PRACTITIONER

## 2019-11-13 RX ORDER — SULFAMETHOXAZOLE AND TRIMETHOPRIM 800; 160 MG/1; MG/1
1 TABLET ORAL 2 TIMES DAILY
Qty: 14 TABLET | Refills: 0 | Status: SHIPPED | OUTPATIENT
Start: 2019-11-13 | End: 2019-11-20

## 2019-11-13 NOTE — PATIENT INSTRUCTIONS
Cellulitis   WHAT YOU NEED TO KNOW:   Cellulitis is a skin infection caused by bacteria  Cellulitis may go away on its own or you may need treatment  Your healthcare provider may draw a [place] around the outside edges of your cellulitis  If your cellulitis spreads, your healthcare provider will see it outside of the [place]  DISCHARGE INSTRUCTIONS:   Call 911 if:   · You have sudden trouble breathing or chest pain  Return to the emergency department if:   · Your wound gets larger and more painful  · You feel a crackling under your skin when you touch it  · You have purple dots or bumps on your skin, or you see bleeding under your skin  · You have new swelling and pain in your legs  · The red, warm, swollen area gets larger  · You see red streaks coming from the infected area  Contact your healthcare provider if:   · You have a fever  · Your fever or pain does not go away or gets worse  · The area does not get smaller after 2 days of antibiotics  · Your skin is flaking or peeling off  · You have questions or concerns about your condition or care  Medicines:   · Antibiotics  help treat the bacterial infection  · NSAIDs , such as ibuprofen, help decrease swelling, pain, and fever  NSAIDs can cause stomach bleeding or kidney problems in certain people  If you take blood thinner medicine, always ask if NSAIDs are safe for you  Always read the medicine label and follow directions  Do not give these medicines to children under 10months of age without direction from your child's healthcare provider  · Acetaminophen  decreases pain and fever  It is available without a doctor's order  Ask how much to take and how often to take it  Follow directions  Read the labels of all other medicines you are using to see if they also contain acetaminophen, or ask your doctor or pharmacist  Acetaminophen can cause liver damage if not taken correctly   Do not use more than 4 grams (4,000 milligrams) total of acetaminophen in one day  · Take your medicine as directed  Contact your healthcare provider if you think your medicine is not helping or if you have side effects  Tell him or her if you are allergic to any medicine  Keep a list of the medicines, vitamins, and herbs you take  Include the amounts, and when and why you take them  Bring the list or the pill bottles to follow-up visits  Carry your medicine list with you in case of an emergency  Self-care:   · Elevate the area above the level of your heart  as often as you can  This will help decrease swelling and pain  Prop the area on pillows or blankets to keep it elevated comfortably  · Clean the area daily until the wound scabs over  Gently wash the area with soap and water  Pat dry  Use dressings as directed  · Place cool or warm, wet cloths on the area as directed  Use clean cloths and clean water  Leave it on the area until the cloth is room temperature  Pat the area dry with a clean, dry cloth  The cloths may help decrease pain  Prevent cellulitis:   · Do not scratch bug bites or areas of injury  You increase your risk for cellulitis by scratching these areas  · Do not share personal items, such as towels, clothing, and razors  · Clean exercise equipment  with germ-killing  before and after you use it  · Wash your hands often  Use soap and water  Wash your hands after you use the bathroom, change a child's diapers, or sneeze  Wash your hands before you prepare or eat food  Use lotion to prevent dry, cracked skin  · Wear pressure stockings as directed  You may be told to wear the stockings if you have peripheral edema  The stockings improve blood flow and decrease swelling  · Treat athlete's foot  This can help prevent the spread of a bacterial skin infection  Follow up with your healthcare provider within 3 days, or as directed:   Your healthcare provider will check if your cellulitis is getting better  You may need different medicine  Write down your questions so you remember to ask them during your visits  © 2017 [employer] Information is for End User's use only and may not be sold, redistributed or otherwise used for commercial purposes  All illustrations and images included in CareNotes® are the copyrighted property of A D [employer]  or [employer]  The above information is an  only  It is not intended as medical advice for individual conditions or treatments  Talk to your doctor, nurse or pharmacist before following any medical regimen to see if it is safe and effective for you

## 2019-11-13 NOTE — PROGRESS NOTES
Assessment/Plan   Problem List Items Addressed This Visit     None      Visit Diagnoses     Skin infection    -  Primary    Relevant Medications    sulfamethoxazole-trimethoprim (BACTRIM DS) 800-160 mg per tablet    Other Relevant Orders    Wound culture and Gram stain                Chief Complaint   Patient presents with    Follow-up     f/u from ED       Subjective   Patient ID: Oscar Salmeron is a 30 y o  female  Vitals:    11/13/19 1021   BP: 104/64   Pulse: 86   Temp: 98 5 °F (36 9 °C)   SpO2: 98%     Patient here today with c/o abscess on left upper thigh that began 5 days ago, site of insulin pump needle, pain and redness at  Site initially, examined at urgent care on 11/8 and was started on Keflex 500 mg QID, miscommunication and patient was only taking BID, returned to urgent care on 11/11 due to increased size of abscess and was sent to ED for further treatment  11/11/2019-CBC - WBC  8 37, H/H 12 8/388 6,platelets 952  Glucose 50, BUN/Creatine 8/0 62   Today she has increased pain and redness at the site, although with the warm compresses have opened the abcsess    There is thick white/yellow drainage noted - culture collected, adding Bactrim to Keflex and will reevaluate in 2-3 days  Rolf Yuan was instructed on when to return to the ED       The following portions of the patient's history were reviewed and updated as appropriate: allergies, current medications, past family history, past medical history, past surgical history and problem list     Review of Systems   Constitutional: Negative  HENT: Negative  Eyes: Negative  Respiratory: Negative  Cardiovascular: Negative  Gastrointestinal: Negative  Endocrine: Negative  Genitourinary: Negative  Musculoskeletal: Negative  Skin: Positive for wound  Allergic/Immunologic: Negative  Neurological: Negative  Hematological: Negative  Psychiatric/Behavioral: Negative          Objective     Physical Exam   Constitutional: She is oriented to person, place, and time  She appears well-developed and well-nourished  No distress  HENT:   Head: Normocephalic and atraumatic  Eyes: Conjunctivae are normal  Right eye exhibits no discharge  Left eye exhibits no discharge  Neck: Normal range of motion  Neck supple  Cardiovascular: Normal rate  Pulmonary/Chest: Effort normal and breath sounds normal    Abdominal: Soft  Musculoskeletal: Normal range of motion  She exhibits tenderness  She exhibits no edema  Legs:  Neurological: She is alert and oriented to person, place, and time  Skin: Skin is warm and dry  Capillary refill takes less than 2 seconds  She is not diaphoretic  Psychiatric: She has a normal mood and affect  Her behavior is normal  Judgment and thought content normal    Nursing note and vitals reviewed      Allergies   Allergen Reactions    Reglan [Metoclopramide] Anxiety     Patient Active Problem List   Diagnosis    Diabetes mellitus type 1 (HCC)    Depression    Chronic GERD    Insulin pump in place       Current Outpatient Medications:     cephalexin (KEFLEX) 500 mg capsule, Take 1 capsule (500 mg total) by mouth every 6 (six) hours for 10 days, Disp: 40 capsule, Rfl: 0    GLUCAGON EMERGENCY 1 MG injection, Inject 1 mg into a muscle once for 1 dose, Disp: 1 mg, Rfl: 3    glucose blood test strip, Check BG 6x per day Use as instructed, Disp: 600 each, Rfl: 1    glucose blood test strip, 1 each by Other route 6 (six) times a day Use as instructed, Disp: 600 each, Rfl: 3    insulin glargine (BASAGLAR KWIKPEN) 100 units/mL injection pen, 30 units daily while off pump , Disp: 5 pen, Rfl: 1    insulin lispro (HumaLOG) 100 units/mL injection, Use 70 units per day via insulin pump, Disp: 20 mL, Rfl: 2    LORazepam (ATIVAN) 0 5 mg tablet, Take 1 tablet (0 5 mg total) by mouth every 8 (eight) hours as needed for anxiety, Disp: 30 tablet, Rfl: 0    naproxen (EC NAPROSYN) 500 MG EC tablet, Take 1 tablet (500 mg total) by mouth 2 (two) times a day with meals for 10 days, Disp: 20 tablet, Rfl: 0    promethazine (PHENERGAN) 25 mg tablet, Take 1 tablet (25 mg total) by mouth every 6 (six) hours as needed for nausea or vomiting, Disp: 20 tablet, Rfl: 0    sertraline (ZOLOFT) 50 mg tablet, Take 1 tablet (50 mg total) by mouth daily, Disp: 90 tablet, Rfl: 0    sulfamethoxazole-trimethoprim (BACTRIM DS) 800-160 mg per tablet, Take 1 tablet by mouth 2 (two) times a day for 7 days, Disp: 14 tablet, Rfl: 0  Social History     Socioeconomic History    Marital status: /Civil Union     Spouse name: Not on file    Number of children: Not on file    Years of education: Not on file    Highest education level: Not on file   Occupational History    Not on file   Social Needs    Financial resource strain: Not on file    Food insecurity:     Worry: Not on file     Inability: Not on file    Transportation needs:     Medical: Not on file     Non-medical: Not on file   Tobacco Use    Smoking status: Former Smoker     Packs/day: 0 50     Years: 4 00     Pack years: 2 00     Types: Cigarettes     Last attempt to quit: 2017     Years since quittin 5    Smokeless tobacco: Never Used   Substance and Sexual Activity    Alcohol use: Yes     Frequency: 2-4 times a month     Drinks per session: 1 or 2     Comment: rare     Drug use: No    Sexual activity: Yes   Lifestyle    Physical activity:     Days per week: Not on file     Minutes per session: Not on file    Stress: Not on file   Relationships    Social connections:     Talks on phone: Not on file     Gets together: Not on file     Attends [employer] service: Not on file     Active member of club or organization: Not on file     Attends meetings of clubs or organizations: Not on file     Relationship status: Not on file    Intimate partner violence:     Fear of current or ex partner: Not on file     Emotionally abused: Not on file     Physically abused: Not on file     Forced sexual activity: Not on file   Other Topics Concern    Not on file   Social History Narrative    Current every day smoker - as per [employer]      Family History   Problem Relation Age of Onset    Anxiety disorder Mother     No Known Problems Father     Anxiety disorder Sister     Anxiety disorder Maternal Grandmother     Cancer Maternal Grandmother     Anxiety disorder Maternal Aunt     Hyperlipidemia Family

## 2019-11-15 DIAGNOSIS — F41.9 ANXIETY: ICD-10-CM

## 2019-11-15 LAB
BACTERIA WND AEROBE CULT: ABNORMAL
GRAM STN SPEC: ABNORMAL
GRAM STN SPEC: ABNORMAL

## 2020-01-14 ENCOUNTER — OFFICE VISIT (OUTPATIENT)
Dept: OBGYN CLINIC | Facility: CLINIC | Age: 25
End: 2020-01-14
Payer: COMMERCIAL

## 2020-01-14 VITALS — DIASTOLIC BLOOD PRESSURE: 80 MMHG | WEIGHT: 141.4 LBS | SYSTOLIC BLOOD PRESSURE: 118 MMHG | BODY MASS INDEX: 28.56 KG/M2

## 2020-01-14 DIAGNOSIS — O20.9 BLEEDING IN EARLY PREGNANCY: Primary | ICD-10-CM

## 2020-01-14 DIAGNOSIS — O46.90 VAGINAL BLEEDING DURING PREGNANCY: ICD-10-CM

## 2020-01-14 LAB — SL AMB POCT URINE HCG: ABNORMAL

## 2020-01-14 PROCEDURE — 99203 OFFICE O/P NEW LOW 30 MIN: CPT | Performed by: STUDENT IN AN ORGANIZED HEALTH CARE EDUCATION/TRAINING PROGRAM

## 2020-01-14 PROCEDURE — 81025 URINE PREGNANCY TEST: CPT | Performed by: STUDENT IN AN ORGANIZED HEALTH CARE EDUCATION/TRAINING PROGRAM

## 2020-01-14 PROCEDURE — 76817 TRANSVAGINAL US OBSTETRIC: CPT | Performed by: STUDENT IN AN ORGANIZED HEALTH CARE EDUCATION/TRAINING PROGRAM

## 2020-01-15 NOTE — PROGRESS NOTES
EARLY PREGNANCY ULTRASOUND    Ultrasound Probe Disinfection    A transvaginal ultrasound was performed  Prior to use, disinfection was performed with High Level Disinfection Process (sabio labson)  Probe serial number SLOGA-B: [id number] was used    Sergei Toney MD  01/15/20  10:08 AM    SUBJECTIVE    Pregnancy Problem (Pt reports that on Wednesday she took three preg test and all came back positive  she then urinated and saw blood in the toilet and was on there for at least 10 min or longer  She noted the blood was clotty  She has her Early US sched for 2020  She also has been having cramping of the past couple of days but was informed that this was normal  She confirmed her last LMP was 19  this would make her 5w6d today  MODE: 2020  )      HPI: Sophia Herbert is a 30 y o   female here today for early pregnancy ultrasound  Patient's last menstrual period was 2019 (exact date)    Menses are regular  This pregnancy was planned  She is accompanied by her   OB history is significant for:  1  First pregnancy  Medical history is significant for:  1  Type 1 diabetes  Taking a prenatal vitamin       Allergies   Allergen Reactions    Reglan [Metoclopramide] Anxiety       Current Outpatient Medications:     GLUCAGON EMERGENCY 1 MG injection, Inject 1 mg into a muscle once for 1 dose, Disp: 1 mg, Rfl: 3    glucose blood test strip, Check BG 6x per day Use as instructed, Disp: 600 each, Rfl: 1    glucose blood test strip, 1 each by Other route 6 (six) times a day Use as instructed, Disp: 600 each, Rfl: 3    insulin glargine (BASAGLAR KWIKPEN) 100 units/mL injection pen, 30 units daily while off pump , Disp: 5 pen, Rfl: 1    insulin lispro (HumaLOG) 100 units/mL injection, Use 70 units per day via insulin pump, Disp: 20 mL, Rfl: 2    LORazepam (ATIVAN) 0 5 mg tablet, Take 1 tablet (0 5 mg total) by mouth every 8 (eight) hours as needed for anxiety, Disp: 30 tablet, Rfl: 0    Prenatal Vit-Fe Fumarate-FA (PRENATAL VITAMIN PO), Take by mouth, Disp: , Rfl:     sertraline (ZOLOFT) 50 mg tablet, Take 1 tablet (50 mg total) by mouth daily, Disp: 90 tablet, Rfl: 0    OBJECTIVE  Vitals:    20 1411   BP: 118/80   BP Location: Left arm   Patient Position: Sitting   Cuff Size: Standard   Weight: 64 1 kg (141 lb 6 4 oz)         Early OB Ultrasound Procedure Note: Transvaginal US    Technician: Study performed by the interpreting physician    Indications:  Early gestation, dating & viability    Procedure Details   The entire study was done at settings of 6 0 to 8 0 MHz  Gestational Sac: Absent  Yolk sac: Absent    Empty endometrial cavity, thin stripe  Cul-de-sac: no fluid  Left ovary: not visualized  Right ovary: not visualized    On speculum exam, Large amount of blood within vaginal vault at cervical os  Findings:  No fetal pole within uterine cavity  Thin stripe    ASSESSMENT  Findings today consistent with miscarriage, spontaneous   PLAN  1  Condolences given, Pat tearful  Reassured her and her partner this was of no fault of theirs, most likely abnormal embryo that would no go to term  Does not indicate future difficulty conceiving or carrying pregnancy  2  Plan for bHCG quant  Urine pregnancy test positive  Will need to ensure goes down to negative prior to trying for next pregnancy  3  Type and screen ordered  Reported Rh positive  All questions were answered and the patient expressed understanding      Skip Milan MD

## 2020-01-16 ENCOUNTER — OFFICE VISIT (OUTPATIENT)
Dept: URGENT CARE | Facility: CLINIC | Age: 25
End: 2020-01-16
Payer: COMMERCIAL

## 2020-01-16 VITALS
WEIGHT: 140 LBS | HEIGHT: 59 IN | HEART RATE: 84 BPM | TEMPERATURE: 98.5 F | BODY MASS INDEX: 28.22 KG/M2 | RESPIRATION RATE: 16 BRPM | OXYGEN SATURATION: 99 % | SYSTOLIC BLOOD PRESSURE: 110 MMHG | DIASTOLIC BLOOD PRESSURE: 72 MMHG

## 2020-01-16 DIAGNOSIS — H10.33 ACUTE BACTERIAL CONJUNCTIVITIS OF BOTH EYES: ICD-10-CM

## 2020-01-16 DIAGNOSIS — J02.9 SORE THROAT: Primary | ICD-10-CM

## 2020-01-16 LAB
ABO GROUP BLD: NORMAL
HCG INTACT+B SERPL-ACNC: 6576 MIU/ML
RH BLD: POSITIVE
S PYO AG THROAT QL: NEGATIVE

## 2020-01-16 PROCEDURE — 87070 CULTURE OTHR SPECIMN AEROBIC: CPT | Performed by: FAMILY MEDICINE

## 2020-01-16 PROCEDURE — 87880 STREP A ASSAY W/OPTIC: CPT | Performed by: FAMILY MEDICINE

## 2020-01-16 PROCEDURE — 99213 OFFICE O/P EST LOW 20 MIN: CPT | Performed by: FAMILY MEDICINE

## 2020-01-16 RX ORDER — TOBRAMYCIN 3 MG/ML
1 SOLUTION/ DROPS OPHTHALMIC
Qty: 5 ML | Refills: 0 | Status: SHIPPED | OUTPATIENT
Start: 2020-01-16 | End: 2020-02-07 | Stop reason: ALTCHOICE

## 2020-01-16 NOTE — PROGRESS NOTES
Assessment/Plan:      Diagnoses and all orders for this visit:    Sore throat  -     POCT rapid strepA    Acute bacterial conjunctivitis of both eyes  -     tobramycin (TOBREX) 0 3 % SOLN; Administer 1 drop to both eyes every 4 (four) hours while awake          Subjective:     Patient ID: Daniela Chopra is a 30 y o  female  Patient is a 15-year-old female who began with a sore throat about 4 days ago  She states it is pretty bad  A rapid strep done here today was negative  In the last couple of days she has had increased tearing and redness of both eyes  In the morning they are crusted shut  Review of Systems   HENT: Positive for sore throat  Eyes: Positive for discharge and redness  Respiratory: Negative  Cardiovascular: Negative  Objective:     Physical Exam   Constitutional: She is oriented to person, place, and time  She appears well-developed and well-nourished  HENT:   Head: Normocephalic and atraumatic  There is some hyperemia of the posterior pharynx  This is minimal    Eyes: EOM are normal    Cardiovascular: Normal rate, regular rhythm and normal heart sounds  Pulmonary/Chest: Effort normal and breath sounds normal    Neurological: She is alert and oriented to person, place, and time

## 2020-01-16 NOTE — PATIENT INSTRUCTIONS
As we discussed, the rapid strep was negative  If you are not improving call here before 4:00 p m  on Saturday for the results of the strep culture  Use the eyedrops as written  Initially do it every 2 hours for a couple of doses and then go to 4 hours  Frequent hand washing to avoid transmission    Start vitamin D3 at 28620 units daily to boost the immune system  Take this with food since it is fat soluble and requires some fat to be absorbed

## 2020-01-18 LAB — BACTERIA THROAT CULT: NORMAL

## 2020-01-19 DIAGNOSIS — E10.65 TYPE 1 DIABETES MELLITUS WITH HYPERGLYCEMIA (HCC): ICD-10-CM

## 2020-01-20 ENCOUNTER — TELEPHONE (OUTPATIENT)
Dept: OBGYN CLINIC | Facility: CLINIC | Age: 25
End: 2020-01-20

## 2020-01-20 DIAGNOSIS — E10.65 TYPE 1 DIABETES MELLITUS WITH HYPERGLYCEMIA (HCC): ICD-10-CM

## 2020-01-20 NOTE — TELEPHONE ENCOUNTER
Called Yasemin to touch base after last week's appointment - no answer  Left message on voicemail inviting to call us back with any issues  If patient calls back, inform of the following:  As decreasing quant, empty uterine cavity, no further intervention needed at this time  Still recommend negative pregnancy test (can be home urine test) prior to attempting another pregnancy

## 2020-01-20 NOTE — TELEPHONE ENCOUNTER
Arnaud nayak it appears she had miscarriage-  Needs to be set up with our office ASAP hasn't been seen since MAY

## 2020-01-22 ENCOUNTER — DOCUMENTATION (OUTPATIENT)
Dept: OBGYN CLINIC | Facility: CLINIC | Age: 25
End: 2020-01-22

## 2020-01-22 ENCOUNTER — APPOINTMENT (OUTPATIENT)
Dept: LAB | Facility: CLINIC | Age: 25
End: 2020-01-22
Payer: COMMERCIAL

## 2020-01-22 ENCOUNTER — TELEPHONE (OUTPATIENT)
Dept: OBGYN CLINIC | Facility: CLINIC | Age: 25
End: 2020-01-22

## 2020-01-22 ENCOUNTER — HOSPITAL ENCOUNTER (OUTPATIENT)
Dept: ULTRASOUND IMAGING | Facility: HOSPITAL | Age: 25
Discharge: HOME | End: 2020-01-22
Payer: COMMERCIAL

## 2020-01-22 DIAGNOSIS — O03.9 SAB (SPONTANEOUS ABORTION): Primary | ICD-10-CM

## 2020-01-22 DIAGNOSIS — R11.0 NAUSEA: Primary | ICD-10-CM

## 2020-01-22 DIAGNOSIS — O20.9 BLEEDING IN EARLY PREGNANCY: ICD-10-CM

## 2020-01-22 DIAGNOSIS — R11.0 NAUSEA: ICD-10-CM

## 2020-01-22 DIAGNOSIS — O20.9 BLEEDING IN EARLY PREGNANCY: Primary | ICD-10-CM

## 2020-01-22 LAB — B-HCG SERPL-ACNC: ABNORMAL MIU/ML

## 2020-01-22 PROCEDURE — 76801 OB US < 14 WKS SINGLE FETUS: CPT

## 2020-01-22 PROCEDURE — 84702 CHORIONIC GONADOTROPIN TEST: CPT

## 2020-01-22 PROCEDURE — 36415 COLL VENOUS BLD VENIPUNCTURE: CPT

## 2020-01-22 RX ORDER — ONDANSETRON 4 MG/1
4 TABLET, FILM COATED ORAL EVERY 8 HOURS PRN
Qty: 20 TABLET | Refills: 0 | Status: SHIPPED | OUTPATIENT
Start: 2020-01-22 | End: 2020-02-23 | Stop reason: SDUPTHER

## 2020-01-22 NOTE — TELEPHONE ENCOUNTER
Pt had miscarriages last week and say pregnancy test still shows up positive and wonders is this possible   Please call

## 2020-01-22 NOTE — PROGRESS NOTES
After hanging up with Marjorie Marroquin, results of quantitative bHCG back  INCREASING currently approximately 34,000  May be viable intrauterine pregnancy versus ectopic pregnancy  No abdominal pain aside from occasional cramping  STAT transvaginal ultrasound ordered  Marjorie Marroquin has the number for central scheduling, will call ASAP  On call physicians of the group aware of this pending result

## 2020-01-22 NOTE — TELEPHONE ENCOUNTER
----- Message from Devaughn Acosta sent at 1/21/2020  6:33 PM EST -----  Regarding: Non-Urgent Medical Question  Contact: [phone]  Is there an order in the system for me to be able to go get the second Hcg test done?

## 2020-01-22 NOTE — TELEPHONE ENCOUNTER
Spoke with Yusef Velez by phone, sent Rx for Zofran for nausea  Continues to have positive urine bHCG tests  Got bHCG drawn today, will await results  Pending these results may plan for appointment for repeat scan with me in office  Will be in touch by phone to discuss next steps

## 2020-01-22 NOTE — TELEPHONE ENCOUNTER
----- Message from Raina Durant sent at 1/22/2020 10:22 AM EST -----  Regarding: Non-Urgent Medical Question  Contact: [phone]  I have been extremely nausea and vomiting, I was not sure if this was due to the miscarriage or not

## 2020-01-23 ENCOUNTER — TELEPHONE (OUTPATIENT)
Dept: OBGYN CLINIC | Age: 25
End: 2020-01-23

## 2020-01-23 RX ORDER — DOXYLAMINE SUCCINATE AND PYRIDOXINE HYDROCHLORIDE, DELAYED RELEASE TABLETS 10 MG/10 MG 10; 10 MG/1; MG/1
1 TABLET, DELAYED RELEASE ORAL EVERY 8 HOURS PRN
Qty: 30 TABLET | Refills: 1 | Status: SHIPPED | OUTPATIENT
Start: 2020-01-23 | End: 2020-04-08 | Stop reason: ALTCHOICE

## 2020-01-23 NOTE — PROGRESS NOTES
Spoke with Dilma Perdomo by phone, congratulated on successful pregnancy documented by transvaginal ultrasound tonight  Advised to avoid taking zofran in first trimester, use john candies first  Will prescribe unisom and b6 if needed  Okay that took single dose zofran earlier  Will touch base at a more reasonable hour to discuss next steps

## 2020-01-23 NOTE — TELEPHONE ENCOUNTER
Attempted to call Emma Hall - nathan message on [employer]  Sent Rx for new medication (Dicelgis) to take instead of the zofran for nausea  Also invited her to call back to make an appointment with us for her new diagnosis (intrauterine pregnancy due 9/16/2020)

## 2020-01-27 ENCOUNTER — TELEPHONE (OUTPATIENT)
Dept: OBGYN CLINIC | Facility: CLINIC | Age: 25
End: 2020-01-27

## 2020-01-27 NOTE — TELEPHONE ENCOUNTER
Tried calling Pat to discuss the routine  treatment plan for OBs  She already has appt for her OB-Intake 2/7/20, where she will be 8w2d  At that visit they will review her HX, family hx, pregnancy in general, plan ofcare, our OB team and referrals to endocrine and MFM  Advised if she has any additional questions to please call back

## 2020-01-27 NOTE — TELEPHONE ENCOUNTER
Oh goodness! I had tried to contact her by phone and left a message, my apologies  Definitely follow up with Endocrinology, plan for early MFM consult    Thank you! -AMM

## 2020-01-27 NOTE — TELEPHONE ENCOUNTER
----- Message from Tiburcio Rodney sent at 1/27/2020 10:12 AM EST -----  Regarding: Non-Urgent Medical Question  Contact: [phone]  I had called last week and not received a message back  I was just curious as to what the next step is now

## 2020-01-27 NOTE — TELEPHONE ENCOUNTER
Scheduled pt for her ob interview, should she follow up with her endo?  Or is there any other info pt needs to know

## 2020-01-28 ENCOUNTER — TELEPHONE (OUTPATIENT)
Dept: OBGYN CLINIC | Facility: CLINIC | Age: 25
End: 2020-01-28

## 2020-01-28 NOTE — TELEPHONE ENCOUNTER
Patient called to report light pink spotting when wiping one time this am  Our phone call kept getting disconnected, we will try her back again

## 2020-01-28 NOTE — TELEPHONE ENCOUNTER
Patient called back to report that she had just one time this am of light pink when wiping  No pain   I told her to observe and call us with any concerns

## 2020-01-29 NOTE — PROGRESS NOTES
Established Patient Progress Note      Chief Complaint   Patient presents with    Diabetes Type 1        History of Present Illness:   Dread Quiles is a 30 y o  female with a history of  type 1 diabetes with long term use of insulin for 16 years  Reports no complications of diabetes  Last A1C 8 2 June 2019  Has not been seen since May 2019  She is currently 7w6d pregnant  Her MODE is September 11  She is currently using Omnipod using older PDM  Interested in [employer]  Has not had sensor has she reports her insurance did not cover it and she could no longer afford it  Review of pump download she BG are very erratic with episodes of severe hypo and hyperglycemia  Has not been eating much due to nausea from the pregnancy  Will have a couple sips of ginger ale or crackers  Occasionally will have some broth  Denies recent illness or hospitalizations  Denies any issues with her current regimen  Home glucose monitoring: are performed regularly      Patient is on a Omnipod pump prescribed by previous endocrinologist  She has been on a pump for 4 years  She denies any malfunctioning of the pump  Current Insulin pump settings:  Basal rate: 12 am 0 90, 5 am 1 30, 4 pm 1 10  Insulin to carb ratio: 12 am 8, 9:30 am 10  Insulin sensitivity factor: 50  BG target:120-160  Active Insulin time: 4 hrs     Type of insulin: Humalog     Backup Plan: patient aware that in case of malfunctioning of the pump or unexplained hyperglycemia to use basal and bolus therapy as backup which is prescribed to the patient  Also notified patient to call clinic and/or pump company if any issues or go to emergency department if signs/symptoms of DKA  compliant all of the timedenies any side effects from current medications     Hypoglycemic episodes: Yes intermittent   H/o of hypoglycemia causing hospitalization or Intervention such as glucagon injection or ambulance call No   Hypoglycemia symptoms: dizziness, headache and sweating Treatment of hypoglycemia: orange juice     Last Eye Exam: 19  Last Foot Exam: does not see podiatrist        Patient Active Problem List   Diagnosis    [facility] [facility]    Depression    Chronic GERD    Insulin pump in place    Less than 8 weeks gestation of pregnancy      Past Medical History:   Diagnosis Date    Allergic dermatitis     Resolved 3/2/2016     Anxiety     Autoimmune disorder ([facility] [facility]     Diabetes ([facility] [facility]     Diabetes ([facility]     Diabetes mellitus type 1 ([facility] [facility]     Diabetic ketoacidosis without coma associated with type 1 diabetes mellitus ([facility] [facility] ) 2016    DKA (diabetic ketoacidoses) ([facility]     Gastroenteritis 2016    Hypoglycemia due to type 1 diabetes mellitus ([facility] [facility]     Left upper extremity numbness     Resolved 2016     Psychiatric disorder     Sepsis ([facility] [facility] ) 2017    Sepsis ([facility] [facility] ) 2017    Varicella       Past Surgical History:   Procedure Laterality Date    FL INJECTION LEFT SHOULDER (ARTHROGRAM)  2018    NV ESOPHAGOGASTRODUODENOSCOPY TRANSORAL DIAGNOSTIC N/A 2019    Procedure: ESOPHAGOGASTRODUODENOSCOPY (EGD) with biopsy;  Surgeon: Jessi Levy DO;  [facility];  Service: Gastroenterology    WISDOM TOOTH EXTRACTION Bilateral       Family History   Problem Relation Age of Onset    Anxiety disorder Mother     No Known Problems Father     Anxiety disorder Sister     Anxiety disorder Maternal Grandmother     Cancer Maternal Grandmother     Anxiety disorder Maternal Aunt     Hyperlipidemia Family      Social History     Tobacco Use    Smoking status: Former Smoker     Packs/day: 0 50     Years: 4 00     Pack years: 2 00     Types: Cigarettes     Last attempt to quit: 2017     Years since quittin 7    Smokeless tobacco: Never Used   Substance Use Topics    Alcohol use: Not Currently     Frequency: 2-4 times a month     Drinks per session: 1 or 2     Comment: rare      Allergies   Allergen Reactions    Reglan [Metoclopramide] Anxiety         Current Outpatient Medications:     Doxylamine-Pyridoxine 10-10 MG TBEC, Take 1 tablet by mouth every 8 (eight) hours as needed (nausea), Disp: 30 tablet, Rfl: 1    GLUCAGON EMERGENCY 1 MG injection, Inject 1 mg into a muscle once for 1 dose, Disp: 1 mg, Rfl: 3    glucose blood test strip, 1 each by Other route 6 (six) times a day Use as instructed, Disp: 600 each, Rfl: 3    glucose blood test strip, Check BG 6x per day, Disp: 200 each, Rfl: 0    insulin glargine (BASAGLAR KWIKPEN) 100 units/mL injection pen, 30 units daily while off pump , Disp: 5 pen, Rfl: 1    insulin lispro (HUMALOG) 100 units/mL injection, USE 70 UNITS VIA INSULIN PUMP DAILY AS DIRECTED, Disp: 20 mL, Rfl: 6    Prenatal Vit-Fe Fumarate-FA (PRENATAL VITAMIN PO), Take by mouth, Disp: , Rfl:     LORazepam (ATIVAN) 0 5 mg tablet, Take 1 tablet (0 5 mg total) by mouth every 8 (eight) hours as needed for anxiety (Patient not taking: Reported on 1/30/2020), Disp: 30 tablet, Rfl: 0    ondansetron (ZOFRAN) 4 mg tablet, Take 1 tablet (4 mg total) by mouth every 8 (eight) hours as needed for nausea or vomiting (Patient not taking: Reported on 1/30/2020), Disp: 20 tablet, Rfl: 0    sertraline (ZOLOFT) 50 mg tablet, Take 1 tablet (50 mg total) by mouth daily (Patient not taking: Reported on 1/30/2020), Disp: 90 tablet, Rfl: 0    tobramycin (TOBREX) 0 3 % SOLN, Administer 1 drop to both eyes every 4 (four) hours while awake (Patient not taking: Reported on 1/30/2020), Disp: 5 mL, Rfl: 0    Review of Systems   Constitutional: Negative for chills and fever  HENT: Negative for sore throat and trouble swallowing  Eyes: Negative for visual disturbance  Respiratory: Negative for shortness of breath  Cardiovascular: Negative for chest pain and palpitations  Gastrointestinal: Negative for abdominal pain, constipation and diarrhea     Endocrine: Negative for cold intolerance, heat intolerance, polydipsia, polyphagia and polyuria  Genitourinary: Negative for frequency  Musculoskeletal: Negative for arthralgias and myalgias  Skin: Negative for rash  Neurological: Negative for dizziness and tremors  Hematological: Negative for adenopathy  Psychiatric/Behavioral: Negative for sleep disturbance  All other systems reviewed and are negative  Physical Exam:  Body mass index is 28 48 kg/m²  /58   Pulse 81   Ht 4' 11" (1 499 m)   Wt 64 kg (141 lb)   BMI 28 48 kg/m²    Wt Readings from Last 3 Encounters:   01/30/20 64 kg (141 lb)   01/16/20 63 5 kg (140 lb)   01/14/20 64 1 kg (141 lb 6 4 oz)       Physical Exam   Constitutional: She is oriented to person, place, and time  She appears well-developed and well-nourished  No distress  HENT:   Head: Normocephalic and atraumatic  Eyes: Pupils are equal, round, and reactive to light  Conjunctivae are normal    Neck: Normal range of motion  Neck supple  No thyromegaly present  Cardiovascular: Normal rate, regular rhythm and normal heart sounds  Pulmonary/Chest: Effort normal and breath sounds normal  No respiratory distress  She has no wheezes  She has no rales  Abdominal: Soft  Bowel sounds are normal  She exhibits no distension  There is no tenderness  Musculoskeletal: Normal range of motion  She exhibits no edema  Neurological: She is alert and oriented to person, place, and time  Skin: Skin is warm and dry  Psychiatric: She has a normal mood and affect  Vitals reviewed          Labs:     Lab Results   Component Value Date    HGBA1C 8 2 (H) 06/25/2019       Lab Results   Component Value Date     05/15/2014    SODIUM 140 11/11/2019    K 3 2 (L) 11/11/2019     11/11/2019    CO2 27 11/11/2019    ANIONGAP 4 05/15/2014    AGAP 10 11/11/2019    BUN 8 11/11/2019    CREATININE 0 62 11/11/2019    GLUC 50 (L) 11/11/2019    GLUF 135 (H) 06/25/2019    CALCIUM 8 7 11/11/2019    AST 16 11/11/2019    ALT 20 11/11/2019    ALKPHOS 73 11/11/2019    PROT 7 6 05/15/2014    TP 7 6 11/11/2019    BILITOT 0 5 05/15/2014    TBILI 0 30 11/11/2019    EGFR 125 11/11/2019         Lab Results   Component Value Date    HDL 52 06/25/2019    TRIG 95 06/25/2019       Lab Results   Component Value Date    GLE5BNRBSNJC 0 917 06/25/2019    XXW6EPTELNIX 3 647 02/25/2016     Lab Results   Component Value Date    FREET4 0 86 06/25/2019       Impression & Plan:    Problem List Items Addressed This Visit        Endocrine    Diabetes mellitus type 1 ([facility] [address] ) - Primary     Patient currently pregnant with severely erratic BG, having episodes of both hypo and hyperglycemia  Strongly recommend patient see Maternal Fetal Medicine ASAP due to her uncontrolled diabetes and pregnancy  Called M and have patient to see Aminah Spicer NP on 02/04  Strongly recommend personal dexcom  Will submit form for patient  Relevant Medications    insulin lispro (HUMALOG) 100 units/mL injection    Other Relevant Orders    Hemoglobin A1C    Comprehensive metabolic panel    Microalbumin / creatinine urine ratio    T4, free    TSH, 3rd generation       Other    Insulin pump in place    Less than 8 weeks gestation of pregnancy          Orders Placed This Encounter   Procedures    Hemoglobin A1C    Comprehensive metabolic panel     This is a patient instruction: Patient fasting for 8 hours or longer recommended   Microalbumin / creatinine urine ratio    T4, free    TSH, 3rd generation     This is a patient instruction: This test is non-fasting  Please drink two glasses of water morning of bloodwork  Discussed with the patient and all questioned fully answered  She will call me if any problems arise        Counseled patient on diagnostic results, prognosis, risk and benefit of treatment options, instruction for management, importance of treatment compliance, Risk  factor reduction and impressions      Jess Christopher [address]aur

## 2020-01-30 ENCOUNTER — TELEPHONE (OUTPATIENT)
Dept: OBGYN CLINIC | Facility: CLINIC | Age: 25
End: 2020-01-30

## 2020-01-30 ENCOUNTER — OFFICE VISIT (OUTPATIENT)
Dept: ENDOCRINOLOGY | Facility: CLINIC | Age: 25
End: 2020-01-30
Payer: COMMERCIAL

## 2020-01-30 VITALS
DIASTOLIC BLOOD PRESSURE: 58 MMHG | HEART RATE: 81 BPM | WEIGHT: 141 LBS | HEIGHT: 59 IN | BODY MASS INDEX: 28.43 KG/M2 | SYSTOLIC BLOOD PRESSURE: 102 MMHG

## 2020-01-30 DIAGNOSIS — E10.65 TYPE 1 DIABETES MELLITUS WITH HYPERGLYCEMIA (HCC): Primary | ICD-10-CM

## 2020-01-30 DIAGNOSIS — E10.69 TYPE 1 DIABETES MELLITUS WITH OTHER SPECIFIED COMPLICATION (HCC): Primary | ICD-10-CM

## 2020-01-30 DIAGNOSIS — Z96.41 INSULIN PUMP IN PLACE: ICD-10-CM

## 2020-01-30 DIAGNOSIS — Z3A.01 LESS THAN 8 WEEKS GESTATION OF PREGNANCY: ICD-10-CM

## 2020-01-30 PROCEDURE — 99215 OFFICE O/P EST HI 40 MIN: CPT | Performed by: NURSE PRACTITIONER

## 2020-01-30 NOTE — ASSESSMENT & PLAN NOTE
Patient currently pregnant with severely erratic BG, having episodes of both hypo and hyperglycemia  Strongly recommend patient see Maternal Fetal Medicine ASAP due to her uncontrolled diabetes and pregnancy  Called MFM and have patient to see Ciera Campos NP on 02/04  Strongly recommend personal dexcom  Will submit form for patient

## 2020-01-30 NOTE — TELEPHONE ENCOUNTER
Dimitris Crowe with MFM called to request a referral for patient to have an ultrasound done  Patient is diabetic

## 2020-02-03 ENCOUNTER — APPOINTMENT (OUTPATIENT)
Dept: LAB | Facility: CLINIC | Age: 25
End: 2020-02-03
Payer: COMMERCIAL

## 2020-02-03 DIAGNOSIS — O03.9 SAB (SPONTANEOUS ABORTION): ICD-10-CM

## 2020-02-03 LAB
ALBUMIN SERPL BCP-MCNC: 3.2 G/DL (ref 3.5–5)
ALP SERPL-CCNC: 51 U/L (ref 46–116)
ALT SERPL W P-5'-P-CCNC: 17 U/L (ref 12–78)
ANION GAP SERPL CALCULATED.3IONS-SCNC: 5 MMOL/L (ref 4–13)
AST SERPL W P-5'-P-CCNC: 9 U/L (ref 5–45)
B-HCG SERPL-ACNC: ABNORMAL MIU/ML
BILIRUB SERPL-MCNC: 0.36 MG/DL (ref 0.2–1)
BUN SERPL-MCNC: 8 MG/DL (ref 5–25)
CALCIUM SERPL-MCNC: 8.3 MG/DL (ref 8.3–10.1)
CHLORIDE SERPL-SCNC: 109 MMOL/L (ref 100–108)
CO2 SERPL-SCNC: 25 MMOL/L (ref 21–32)
CREAT SERPL-MCNC: 0.52 MG/DL (ref 0.6–1.3)
CREAT UR-MCNC: 279 MG/DL
EST. AVERAGE GLUCOSE BLD GHB EST-MCNC: 197 MG/DL
GFR SERPL CREATININE-BSD FRML MDRD: 132 ML/MIN/1.73SQ M
GLUCOSE P FAST SERPL-MCNC: 68 MG/DL (ref 65–99)
HBA1C MFR BLD: 8.5 % (ref 4.2–6.3)
MICROALBUMIN UR-MCNC: 49.5 MG/L (ref 0–20)
MICROALBUMIN/CREAT 24H UR: 18 MG/G CREATININE (ref 0–30)
POTASSIUM SERPL-SCNC: 3.7 MMOL/L (ref 3.5–5.3)
PROT SERPL-MCNC: 6.9 G/DL (ref 6.4–8.2)
SODIUM SERPL-SCNC: 139 MMOL/L (ref 136–145)
T4 FREE SERPL-MCNC: 0.96 NG/DL (ref 0.76–1.46)
TSH SERPL DL<=0.05 MIU/L-ACNC: 1.36 UIU/ML (ref 0.36–3.74)

## 2020-02-03 PROCEDURE — 36415 COLL VENOUS BLD VENIPUNCTURE: CPT | Performed by: NURSE PRACTITIONER

## 2020-02-03 PROCEDURE — 82570 ASSAY OF URINE CREATININE: CPT | Performed by: NURSE PRACTITIONER

## 2020-02-03 PROCEDURE — 82043 UR ALBUMIN QUANTITATIVE: CPT | Performed by: NURSE PRACTITIONER

## 2020-02-03 PROCEDURE — 84439 ASSAY OF FREE THYROXINE: CPT | Performed by: NURSE PRACTITIONER

## 2020-02-03 PROCEDURE — 80053 COMPREHEN METABOLIC PANEL: CPT | Performed by: NURSE PRACTITIONER

## 2020-02-03 PROCEDURE — 83036 HEMOGLOBIN GLYCOSYLATED A1C: CPT | Performed by: NURSE PRACTITIONER

## 2020-02-03 PROCEDURE — 84443 ASSAY THYROID STIM HORMONE: CPT | Performed by: NURSE PRACTITIONER

## 2020-02-03 PROCEDURE — 84702 CHORIONIC GONADOTROPIN TEST: CPT

## 2020-02-04 ENCOUNTER — OFFICE VISIT (OUTPATIENT)
Dept: PERINATAL CARE | Facility: CLINIC | Age: 25
End: 2020-02-04
Payer: COMMERCIAL

## 2020-02-04 VITALS
WEIGHT: 141.8 LBS | HEART RATE: 79 BPM | DIASTOLIC BLOOD PRESSURE: 66 MMHG | HEIGHT: 59 IN | SYSTOLIC BLOOD PRESSURE: 104 MMHG | BODY MASS INDEX: 28.59 KG/M2

## 2020-02-04 DIAGNOSIS — O24.011 PRE-EXISTING TYPE 1 DIABETES MELLITUS IN PREGNANCY IN FIRST TRIMESTER: Primary | ICD-10-CM

## 2020-02-04 DIAGNOSIS — E10.65 PRE-EXISTING TYPE 1 DIABETES MELLITUS WITH HYPERGLYCEMIA DURING PREGNANCY IN FIRST TRIMESTER (HCC): Primary | ICD-10-CM

## 2020-02-04 DIAGNOSIS — Z86.39 HISTORY OF DIABETES WITH KETOACIDOSIS: ICD-10-CM

## 2020-02-04 DIAGNOSIS — IMO0002 BODY MASS INDEX (BMI) OF 25.0 TO 29.9: ICD-10-CM

## 2020-02-04 DIAGNOSIS — Z3A.01 LESS THAN 8 WEEKS GESTATION OF PREGNANCY: ICD-10-CM

## 2020-02-04 DIAGNOSIS — E10.649 HYPOGLYCEMIA DUE TO TYPE 1 DIABETES MELLITUS (HCC): ICD-10-CM

## 2020-02-04 DIAGNOSIS — O24.011 PRE-EXISTING TYPE 1 DIABETES MELLITUS WITH HYPERGLYCEMIA DURING PREGNANCY IN FIRST TRIMESTER (HCC): Primary | ICD-10-CM

## 2020-02-04 DIAGNOSIS — Z46.81 INSULIN PUMP TITRATION: ICD-10-CM

## 2020-02-04 PROCEDURE — 2022F DILAT RTA XM EVC RTNOPTHY: CPT | Performed by: NURSE PRACTITIONER

## 2020-02-04 PROCEDURE — 99215 OFFICE O/P EST HI 40 MIN: CPT | Performed by: NURSE PRACTITIONER

## 2020-02-04 PROCEDURE — G0108 DIAB MANAGE TRN  PER INDIV: HCPCS | Performed by: DIETITIAN, REGISTERED

## 2020-02-04 PROCEDURE — 1036F TOBACCO NON-USER: CPT | Performed by: NURSE PRACTITIONER

## 2020-02-04 NOTE — PATIENT INSTRUCTIONS
1  Due to hyper and hypoglycemia, insulin pump adjustments made and current basal decreased by 15%, from 27 6 to 23 6 units basal  MN-4 AM @ 0 80 units/hour; 4 AM- 8 AM @ 1 10 units/hour; 8 AM- 4 PM @ 1 05 units/hour; 4 PM- MN @ 0 95 units/hour  2  ICR increased to 14, ISF continues at 50, IOB 4 hours and BG target decreased to 90    3  Continue Humalog via Omnipod insulin pump, please try to bolus only before breakfast, lunch and dinner  Post meal bolus can cause hypoglycemia  4  Please  Freestyle test strips and do not use Freestyle lite test strips  5  Start GDM diet with 3 meals and 3 snacks including recommended combination of carb, protein and fat per meal/snack  6  No more than 8 to 10 hours of fasting during sleep hours  7  Self monitor blood glucose before meals, 2 hours post start of meal, 3 AM, before driving and with hypoglycemia  8  Please try to use 15 by 15 rule for treating hypoglycemia  9  Glucagon at home and  knows how to use  10  A1c 8 5% not at goal, aim for 6% with minimal hypoglycemia  11  Continue prenatal vitamin and use doxylamine/pyrixidoxine as prescribed by your OBGYN as directed for nausea  12  Patient education sheet given on prevention of pre-eclampsia with use of baby aspirin  13  Follow up with your OBGYN and MFM as recommended  14  Download insulin pump on Friday, 2/7/20   15  Follow up in 2 weeks

## 2020-02-04 NOTE — PROGRESS NOTES
DATE: 20   RE: Bao Em   : 1995  MODE: Estimated Date of Delivery: 20  EGA:  8 4/7 weeks    Dear Dr Cindy Jackson: Thank you for referring your patient to the Diabetes and Pregnancy Program at [address] [place] [address]  The patient &  were seen today for medical nutrition therapy for the treatment of Type 1 diabetes during pregnancy  In addition to Type 1 diabetes, the nutrition status is complicated by being overweight prior to pregnancy  The following was reviewed with the patient:      Weight gain during in pregnancy  Based on the patients height of 59  inches, pre-pregnancy weight of 141 2 pounds (BMI 28 5), we would recommend a total weight gain of 15-25 pounds for the pregnancy  o The patients current weight is 141 75  pounds, and her weight gain to date is 0 5 pounds   Basic review of macronutrients   Meal pattern should consist of three small meals and three snacks daily   Carbohydrate gram amounts per meal  Reviewed ability of patient's ability to carbohydrate gram count  Is able to carbohydrate gram count very accurately  Not fond of starches; uses vegetables & fruits as her preferred CHO food   Instructions on how to read a food label   Appropriate serving size of foods   Incorporating protein at each meal and snack in the importance of protein in relationship to blood glucose control   Individualized meal plan: 1700 calories for 1st trimester  & 1900 calories for 2nd/3rd trimesters  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  Reports she is constantly nauseous & has frequent vomiting all day  Reports an intolerance to most foods, but is able to tolerate fruits, vegetables, yogurt & applesauce  Continues to try other foods   Advised patient to keep foods in her stomach most of the time; may need to check her BG p meals in 1 hour so can eat in 1 hour after a meal  Advised patient to try either Glucerna or Boost Glucose Control & to drink 1/2 can at a time; may need to water down the supplement as has difficulty tolerating a thick drink  Use of food diary to maintain a meal plan   Sick day guidelines and hypoglycemia with treatment   Report blood glucose levels to [address] weekly or as directed  o Phone: [phone]  If no response in 24 hours, call [phone]   o Fax: [phone]  o Email: [email or web address]   Follow up: 1 month    Thank you for the opportunity to participate in the care of this patient  I can be reached at [phone] should you have any questions  Time spent with patient 3:15-3:50 PM; time spent face to face counseling greater than 50% of the appointment      Sincerely,     Kvng Servin  Diabetes Educator  Diabetes and Pregnancy Program

## 2020-02-04 NOTE — PROGRESS NOTES
Assessment/Plan:     Diagnoses and all orders for this visit:    Pre-existing type 1 diabetes mellitus with hyperglycemia during pregnancy in first trimester [facility])  -     Ambulatory referral to Diabetic Education  -     glucose blood test strip; Freestyle test strips (Not lite)  Tests up to 8 times a day  Hypoglycemia due to type 1 diabetes mellitus (HCC)    Less than 8 weeks gestation of pregnancy    Insulin pump titration      1  Due to hyper and hypoglycemia, insulin pump adjustments made and current basal decreased by 15%, from 27 6 to 23 6 units basal  MN-4 AM @ 0 80 units/hour; 4 AM- 8 AM @ 1 10 units/hour; 8 AM- 4 PM @ 1 05 units/hour; 4 PM- MN @ 0 95 units/hour  2  ICR increased to 14, ISF continues at 50, IOB 4 hours and BG target decreased to 90    3  Continue Humalog via Omnipod insulin pump, please try to bolus only before breakfast, lunch and dinner  Post meal bolus can cause hypoglycemia  4  Please  Freestyle test strips and do not use Freestyle lite test strips  5  Start GDM diet with 3 meals and 3 snacks including recommended combination of carb, protein and fat per meal/snack  6  No more than 8 to 10 hours of fasting during sleep hours  7  Self monitor blood glucose before meals, 2 hours post start of meal, 3 AM, before driving and with hypoglycemia  8  Please try to use 15 by 15 rule for treating hypoglycemia  9  Glucagon at home and  knows how to use  10  A1c 8 5% not at goal, aim for 6% with minimal hypoglycemia  11  Continue prenatal vitamin and use doxylamine/pyrixidoxine as prescribed by your OBGYN as directed for nausea  12  Patient education sheet given on prevention of pre-eclampsia with use of baby aspirin  13  Follow up with your OBGYN and MFM as recommended  14  Download insulin pump on Friday, 2/7/20   15  Follow up in 2 weeks        Basal/bolus concept discussed, using insulin pump to mimic normal pathophysiology  and importance of maintaining glucose readings within recommended goal range during pregnancy  Risk factors discussed including macrosomia,  hypoglycemia, should dystocia, increase risk of , polyhydramnios, increase in blood pressure and stillbirth  Subjective:      Patient ID: Oscar Salmeron is a 30 y o  female  , MODE 20  Currently 8 4/7 weeks gestation  Pre-existing T1DM on Humalog via Omnipod insulin pump, has had insulin pump for 10 years and ready for upgrade to [employer]  History of being on Dexcom G6 and pending supplies  Referred for diabetes and pregnancy management   present during visit  Currently glucose readings out of control but patient reports issues with nausea/vomiting making it difficult to eat  Does have Doxylamine/pyrridoxine for nausea  Reports will dose bolus insulin post meal due to fear of hypoglycemia  Insulin pump downloaded during visit showing readings from 37 to 385 but during visit noted Rolf Yuan is using freestyle lite test strips vs freestyle test strips that are compatible with Omnipod insulin  History of DKA, last episode 4 years ago, reports having sepsis post wisdom teeth removal  Reports stopping Zoloft abruptly given current pregnancy  Prior to pregnancy did have a healthy appetite  Due to nausea and risk of vomiting will give insulin post meal vs before         The following portions of the patient's history were reviewed and updated as appropriate: allergies, current medications, past family history, past medical history, past social history, past surgical history and problem list     Allergies   Allergen Reactions    Reglan [Metoclopramide] Anxiety     Current Outpatient Medications on File Prior to Visit   Medication Sig Dispense Refill    Doxylamine-Pyridoxine 10-10 MG TBEC Take 1 tablet by mouth every 8 (eight) hours as needed (nausea) 30 tablet 1    GLUCAGON EMERGENCY 1 MG injection Inject 1 mg into a muscle once for 1 dose 1 mg 3    insulin glargine [facility]) 100 units/mL injection pen 30 units daily while off pump  5 pen 1    insulin lispro (HUMALOG) 100 units/mL injection USE 70 UNITS VIA INSULIN PUMP DAILY AS DIRECTED 20 mL 6    Prenatal Vit-Fe Fumarate-FA (PRENATAL VITAMIN PO) Take by mouth      [DISCONTINUED] glucose blood test strip 1 each by Other route 6 (six) times a day Use as instructed 600 each 3    [DISCONTINUED] glucose blood test strip Check BG 6x per day 200 each 0    ondansetron (ZOFRAN) 4 mg tablet Take 1 tablet (4 mg total) by mouth every 8 (eight) hours as needed for nausea or vomiting (Patient not taking: Reported on 1/30/2020) 20 tablet 0    sertraline (ZOLOFT) 50 mg tablet Take 1 tablet (50 mg total) by mouth daily (Patient not taking: Reported on 1/30/2020) 90 tablet 0    tobramycin (TOBREX) 0 3 % SOLN Administer 1 drop to both eyes every 4 (four) hours while awake (Patient not taking: Reported on 1/30/2020) 5 mL 0    [DISCONTINUED] LORazepam (ATIVAN) 0 5 mg tablet Take 1 tablet (0 5 mg total) by mouth every 8 (eight) hours as needed for anxiety (Patient not taking: Reported on 1/30/2020) 30 tablet 0     No current facility-administered medications on file prior to visit  Review of Systems   Constitutional: Positive for appetite change and fatigue  Negative for fever  HENT: Negative for trouble swallowing  Eyes: Negative for visual disturbance  Last eye exam 6/2019  Respiratory: Negative for cough, shortness of breath and wheezing  Cardiovascular: Negative for chest pain, palpitations and leg swelling  Gastrointestinal: Positive for nausea and vomiting  Heartburn    Endocrine: Positive for polyuria  Negative for polydipsia and polyphagia  Genitourinary: Negative for difficulty urinating and vaginal bleeding (episode of spotting)  Skin: Negative for rash  Neurological: Positive for headaches  Psychiatric/Behavioral: Negative for sleep disturbance           Objective:  Labs reviewed  Component Value Date/Time    HGBA1C 8 5 (H) 02/03/2020 0745    HGBA1C 8 2 (H) 06/25/2019 1016      /66 (BP Location: Right arm, Patient Position: Sitting, Cuff Size: Standard)   Pulse 79   Ht 4' 11" (1 499 m)   Wt 64 3 kg (141 lb 12 8 oz)   LMP 12/04/2019 (Exact Date)   BMI 28 64 kg/m²        Physical Exam   Constitutional: She is oriented to person, place, and time  She appears well-developed and well-nourished  She is cooperative  HENT:   Head: Normocephalic  Eyes: Conjunctivae and lids are normal    Cardiovascular: Normal rate, regular rhythm, S1 normal and S2 normal    Pulmonary/Chest: Effort normal and breath sounds normal    Musculoskeletal: Normal range of motion  Neurological: She is alert and oriented to person, place, and time  Skin: Skin is warm, dry and intact  Psychiatric: Her speech is normal and behavior is normal  Thought content normal  Her mood appears anxious           Time in:1:00 PM   Time out:2:10 PM

## 2020-02-05 DIAGNOSIS — E10.65 PRE-EXISTING TYPE 1 DIABETES MELLITUS WITH HYPERGLYCEMIA DURING PREGNANCY IN FIRST TRIMESTER (HCC): ICD-10-CM

## 2020-02-05 DIAGNOSIS — O24.011 PRE-EXISTING TYPE 1 DIABETES MELLITUS WITH HYPERGLYCEMIA DURING PREGNANCY IN FIRST TRIMESTER (HCC): ICD-10-CM

## 2020-02-07 ENCOUNTER — INITIAL PRENATAL (OUTPATIENT)
Dept: OBGYN CLINIC | Facility: CLINIC | Age: 25
End: 2020-02-07

## 2020-02-07 VITALS — BODY MASS INDEX: 28.43 KG/M2 | WEIGHT: 141 LBS | HEIGHT: 59 IN

## 2020-02-07 DIAGNOSIS — Z34.01 ENCOUNTER FOR SUPERVISION OF NORMAL FIRST PREGNANCY IN FIRST TRIMESTER: Primary | ICD-10-CM

## 2020-02-07 PROCEDURE — OBC: Performed by: STUDENT IN AN ORGANIZED HEALTH CARE EDUCATION/TRAINING PROGRAM

## 2020-02-07 NOTE — PROGRESS NOTES
OB INTAKE INTERVIEW  * Pt presents for OB intake  * Accompanied by:   *  *Hx of  delivery prior to 36 weeks 6 days no  *Last Menstrual Period: Pt's LMP was 2019  *Ultrasound date:20   6weeks 0days  *Estimated date of delivery: 2020   * confirmed by US    *Signs/Symptoms of Pregnancy      *constipation no   *headaches no   *cramping/spotting no   *PICA cravings no  *Diabetes- if you answer yes, please order 1 hour GTT, 50g   *hx of GDM no   *BMI >35 no   *first degree relative with type 2 diabetes no   *hx of PCOS no   *current metformin use no, pt is IDDM   *prior hx of LGA/macrosomia no   *AMA with other risk factors no  *Hypertension- if you answer yes, please order preeclampsia labs including 24 hour urine protein   *Hx of chronic HTN no   *hx of gestational HTN no   *hx of preeclampsia, eclampsia, or HELLP syndrome no  *Infection Screening-    *does the pt have a hx of MRSA? no   *if yes- please follow MRSA protocol and obtain a nasal swab for MRSA culture   *history of herpes? no   *ok for blood transfusion yes  *Immunizations:   *influenza vaccine given today no   *discussed Tdap vaccine    *Interview education   *[facility] Pregnancy Essentials Book reviewed and discussed   *Handouts given:    *Baby and Me phone fna guide    *Baby and Me support center    *[facility]     *discussed genetic testing- pt interested yes     *appointment at [facility] made no-pt to call for appt    *Prenatal lab work scripts    *Nurse/Family Partnership- pt may qualify no; referral placed no   *4 P's- substance abuse screening    Presently using? no    Past use? no    Partner using? no    Parents/Family using? no    *I have these concerns about this prenatal patient: pt is insulin dependant diabetic  *Details that I feel the provider should be aware of: see above    PN1 visit scheduled  The patient was oriented to our practice and all questions were answered  Pt &  present for PN Interview  Happy with pregnancy  Pn bldwk ordered- (abo/Rh already completed), lab slips given  Pt works as an [employer] [place] for [employer]  Flu vaccine admin 10/2019  Pt was seen by diabetic ed  At [facility]  Referral entered for seq screen & level 2  20wks US  Pt to call for an appt  Pt to consider CF testing- informed of NPO one hour prior  PN 1 visit scheduled  upon D/C today       Interviewed by: Mitchell Maher RN, [address] [facility]

## 2020-02-14 ENCOUNTER — DOCUMENTATION (OUTPATIENT)
Dept: PERINATAL CARE | Facility: CLINIC | Age: 25
End: 2020-02-14

## 2020-02-14 NOTE — PROGRESS NOTES
Date:  20  RE: Raina Durant    : 1995  MODE: Estimated Date of Delivery: 20  EGA: 10 0/7 weeks  Type 1 diabetes on Omnipod insulin pump    Pump downloads are attached for -20    Current regimen:  1700 calories diet for pregnancy with 3 meals & 3 snacks  Self-Blood Glucose Monitoring 7-10 times daily with a FreeStyle Lite glucose meter  Omnipod Insulin Pump settings with Humalog insulin:        Basal Rates   12 AM-4 AM: 0 80   4 AM-8 AM: 1 10   8 AM-4 PM: 1 05   4 PM-12 AM: 0 95  Insulin to Carbohydrate Ratio (ICR): 14  Insulin Sensitivity Factor (ISF): 50  Insulin on Board (IOB): 4 hours  Blood Glucose Target: 90     Plan:  Ominipod Insulin Pump settings (all pump settings were reviewed with ADILENE Mei, KATIUSAK): Basal Rates   12 AM-4 AM: Increase to 0 85   4 AM-8 AM:  Increase to 1 20   8 AM-4 PM: Increase to 1 15   4 PM-12 AM: Increase to 1 0  Continue above rates for ICR, ISF, IOB & BG target  Stressed the importance of taking bolus insulin 15 minutes before eating meals    Continue diet & testing BG   2/3/20 [id number] 5%; re-order week of     Date due to report next:  Monday, 20    Chloe Hayward  Diabetes Educator   [facility]regnancy [facility]

## 2020-02-18 ENCOUNTER — TELEPHONE (OUTPATIENT)
Dept: OBGYN CLINIC | Facility: CLINIC | Age: 25
End: 2020-02-18

## 2020-02-18 DIAGNOSIS — O21.9 NAUSEA/VOMITING IN PREGNANCY: Primary | ICD-10-CM

## 2020-02-18 RX ORDER — PROMETHAZINE HYDROCHLORIDE 12.5 MG/1
12.5 TABLET ORAL EVERY 6 HOURS PRN
Qty: 30 TABLET | Refills: 2 | Status: SHIPPED | OUTPATIENT
Start: 2020-02-18 | End: 2020-03-30 | Stop reason: SDUPTHER

## 2020-02-18 NOTE — TELEPHONE ENCOUNTER
----- Message from Dread Quiles sent at 2/18/2020  4:12 PM EST -----  Regarding: Non-Urgent Medical Question  Contact: [phone]  Last week I started to feel better and was able to start eating other than crackers and pretzels  Unfortunately this week I am back to not being able to really eat anything and If I do I am throwing up it back up right afterwards  I have been taking the doxylamine and it is not helping much  I have also tried the natural remedies  Is there anything else I can do?

## 2020-02-19 NOTE — TELEPHONE ENCOUNTER
Rx sent for phenergan, left message on patient's voicemail informing her of this  May take as suppository if unable to tolerate PO    Thanks! -AMM

## 2020-02-22 ENCOUNTER — HOSPITAL ENCOUNTER (EMERGENCY)
Facility: HOSPITAL | Age: 25
Discharge: HOME | End: 2020-02-23
Attending: EMERGENCY MEDICINE | Admitting: EMERGENCY MEDICINE
Payer: COMMERCIAL

## 2020-02-22 DIAGNOSIS — R11.2 NAUSEA & VOMITING: Primary | ICD-10-CM

## 2020-02-22 DIAGNOSIS — Z34.91 FIRST TRIMESTER PREGNANCY: ICD-10-CM

## 2020-02-22 DIAGNOSIS — R11.0 NAUSEA: ICD-10-CM

## 2020-02-22 LAB
ALBUMIN SERPL BCP-MCNC: 3.8 G/DL (ref 3.5–5)
ALP SERPL-CCNC: 58 U/L (ref 46–116)
ALT SERPL W P-5'-P-CCNC: 25 U/L (ref 12–78)
ANION GAP SERPL CALCULATED.3IONS-SCNC: 10 MMOL/L (ref 4–13)
AST SERPL W P-5'-P-CCNC: 14 U/L (ref 5–45)
BACTERIA UR QL AUTO: ABNORMAL /HPF
BASOPHILS # BLD AUTO: 0.03 THOUSANDS/ΜL (ref 0–0.1)
BASOPHILS NFR BLD AUTO: 0 % (ref 0–1)
BILIRUB SERPL-MCNC: 0.5 MG/DL (ref 0.2–1)
BILIRUB UR QL STRIP: NEGATIVE
BUN SERPL-MCNC: 8 MG/DL (ref 5–25)
CALCIUM SERPL-MCNC: 9 MG/DL (ref 8.3–10.1)
CHLORIDE SERPL-SCNC: 104 MMOL/L (ref 100–108)
CLARITY UR: ABNORMAL
CO2 SERPL-SCNC: 25 MMOL/L (ref 21–32)
COLOR UR: YELLOW
CREAT SERPL-MCNC: 0.54 MG/DL (ref 0.6–1.3)
EOSINOPHIL # BLD AUTO: 0.02 THOUSAND/ΜL (ref 0–0.61)
EOSINOPHIL NFR BLD AUTO: 0 % (ref 0–6)
ERYTHROCYTE [DISTWIDTH] IN BLOOD BY AUTOMATED COUNT: 12.1 % (ref 11.6–15.1)
EXT PREG TEST URINE: NORMAL
EXT. CONTROL ED NAV: NORMAL
GFR SERPL CREATININE-BSD FRML MDRD: 131 ML/MIN/1.73SQ M
GLUCOSE SERPL-MCNC: 120 MG/DL (ref 65–140)
GLUCOSE UR STRIP-MCNC: ABNORMAL MG/DL
HCT VFR BLD AUTO: 39.6 % (ref 34.8–46.1)
HGB BLD-MCNC: 13.5 G/DL (ref 11.5–15.4)
HGB UR QL STRIP.AUTO: NEGATIVE
IMM GRANULOCYTES # BLD AUTO: 0.04 THOUSAND/UL (ref 0–0.2)
IMM GRANULOCYTES NFR BLD AUTO: 0 % (ref 0–2)
KETONES UR STRIP-MCNC: ABNORMAL MG/DL
LEUKOCYTE ESTERASE UR QL STRIP: ABNORMAL
LYMPHOCYTES # BLD AUTO: 1.59 THOUSANDS/ΜL (ref 0.6–4.47)
LYMPHOCYTES NFR BLD AUTO: 15 % (ref 14–44)
MCH RBC QN AUTO: 31 PG (ref 26.8–34.3)
MCHC RBC AUTO-ENTMCNC: 34.1 G/DL (ref 31.4–37.4)
MCV RBC AUTO: 91 FL (ref 82–98)
MONOCYTES # BLD AUTO: 0.37 THOUSAND/ΜL (ref 0.17–1.22)
MONOCYTES NFR BLD AUTO: 4 % (ref 4–12)
MUCOUS THREADS UR QL AUTO: ABNORMAL
NEUTROPHILS # BLD AUTO: 8.45 THOUSANDS/ΜL (ref 1.85–7.62)
NEUTS SEG NFR BLD AUTO: 81 % (ref 43–75)
NITRITE UR QL STRIP: NEGATIVE
NON-SQ EPI CELLS URNS QL MICRO: ABNORMAL /HPF
NRBC BLD AUTO-RTO: 0 /100 WBCS
PH UR STRIP.AUTO: 6.5 [PH]
PLATELET # BLD AUTO: 295 THOUSANDS/UL (ref 149–390)
PMV BLD AUTO: 9.9 FL (ref 8.9–12.7)
POTASSIUM SERPL-SCNC: 3.5 MMOL/L (ref 3.5–5.3)
PROT SERPL-MCNC: 8.1 G/DL (ref 6.4–8.2)
PROT UR STRIP-MCNC: ABNORMAL MG/DL
RBC # BLD AUTO: 4.36 MILLION/UL (ref 3.81–5.12)
RBC #/AREA URNS AUTO: ABNORMAL /HPF
SODIUM SERPL-SCNC: 139 MMOL/L (ref 136–145)
SP GR UR STRIP.AUTO: 1.02 (ref 1–1.03)
UROBILINOGEN UR QL STRIP.AUTO: 0.2 E.U./DL
WBC # BLD AUTO: 10.5 THOUSAND/UL (ref 4.31–10.16)
WBC #/AREA URNS AUTO: ABNORMAL /HPF

## 2020-02-22 PROCEDURE — 96374 THER/PROPH/DIAG INJ IV PUSH: CPT

## 2020-02-22 PROCEDURE — 96361 HYDRATE IV INFUSION ADD-ON: CPT

## 2020-02-22 PROCEDURE — 80053 COMPREHEN METABOLIC PANEL: CPT | Performed by: EMERGENCY MEDICINE

## 2020-02-22 PROCEDURE — 99283 EMERGENCY DEPT VISIT LOW MDM: CPT

## 2020-02-22 PROCEDURE — 76815 OB US LIMITED FETUS(S): CPT | Performed by: EMERGENCY MEDICINE

## 2020-02-22 PROCEDURE — 81001 URINALYSIS AUTO W/SCOPE: CPT | Performed by: EMERGENCY MEDICINE

## 2020-02-22 PROCEDURE — 81025 URINE PREGNANCY TEST: CPT | Performed by: EMERGENCY MEDICINE

## 2020-02-22 PROCEDURE — 36415 COLL VENOUS BLD VENIPUNCTURE: CPT | Performed by: EMERGENCY MEDICINE

## 2020-02-22 PROCEDURE — 85025 COMPLETE CBC W/AUTO DIFF WBC: CPT | Performed by: EMERGENCY MEDICINE

## 2020-02-22 PROCEDURE — 99284 EMERGENCY DEPT VISIT MOD MDM: CPT | Performed by: EMERGENCY MEDICINE

## 2020-02-22 PROCEDURE — 87086 URINE CULTURE/COLONY COUNT: CPT | Performed by: EMERGENCY MEDICINE

## 2020-02-22 RX ORDER — ONDANSETRON 2 MG/ML
4 INJECTION INTRAMUSCULAR; INTRAVENOUS ONCE
Status: COMPLETED | OUTPATIENT
Start: 2020-02-22 | End: 2020-02-22

## 2020-02-22 RX ADMIN — ONDANSETRON 4 MG: 2 INJECTION INTRAMUSCULAR; INTRAVENOUS at 23:41

## 2020-02-22 RX ADMIN — SODIUM CHLORIDE 1000 ML: 0.9 INJECTION, SOLUTION INTRAVENOUS at 23:41

## 2020-02-23 ENCOUNTER — HOSPITAL ENCOUNTER (OUTPATIENT)
Facility: HOSPITAL | Age: 25
Setting detail: OBSERVATION
Discharge: HOME | End: 2020-02-25
Attending: EMERGENCY MEDICINE | Admitting: INTERNAL MEDICINE
Payer: COMMERCIAL

## 2020-02-23 VITALS
BODY MASS INDEX: 27.87 KG/M2 | WEIGHT: 138 LBS | DIASTOLIC BLOOD PRESSURE: 79 MMHG | RESPIRATION RATE: 20 BRPM | TEMPERATURE: 97.9 F | OXYGEN SATURATION: 98 % | SYSTOLIC BLOOD PRESSURE: 106 MMHG | HEART RATE: 60 BPM

## 2020-02-23 DIAGNOSIS — E10.649 HYPOGLYCEMIA DUE TO TYPE 1 DIABETES MELLITUS (HCC): ICD-10-CM

## 2020-02-23 DIAGNOSIS — O21.9 NAUSEA AND VOMITING IN PREGNANCY: Primary | ICD-10-CM

## 2020-02-23 DIAGNOSIS — K21.9 CHRONIC GERD: ICD-10-CM

## 2020-02-23 DIAGNOSIS — Z96.41 INSULIN PUMP IN PLACE: ICD-10-CM

## 2020-02-23 DIAGNOSIS — E10.9 TYPE 1 DIABETES (HCC): ICD-10-CM

## 2020-02-23 PROBLEM — O21.0 HYPEREMESIS GRAVIDARUM: Status: ACTIVE | Noted: 2020-02-23

## 2020-02-23 LAB
ANION GAP SERPL CALCULATED.3IONS-SCNC: 9 MMOL/L (ref 4–13)
BASE EXCESS BLDA CALC-SCNC: -7 MMOL/L (ref -2–3)
BETA-HYDROXYBUTYRATE: 2.1 MMOL/L
BILIRUB UR QL STRIP: NEGATIVE
BILIRUB UR QL STRIP: NEGATIVE
BUN SERPL-MCNC: 5 MG/DL (ref 5–25)
CA-I BLD-SCNC: 1.12 MMOL/L (ref 1.12–1.32)
CALCIUM SERPL-MCNC: 8.1 MG/DL (ref 8.3–10.1)
CHLORIDE SERPL-SCNC: 112 MMOL/L (ref 100–108)
CLARITY UR: CLEAR
CLARITY UR: CLEAR
CLARITY, POC: CLEAR
CO2 SERPL-SCNC: 19 MMOL/L (ref 21–32)
COLOR UR: YELLOW
COLOR UR: YELLOW
COLOR, POC: YELLOW
CREAT SERPL-MCNC: 0.44 MG/DL (ref 0.6–1.3)
EXT BILIRUBIN, UA: NORMAL
EXT BLOOD URINE: NORMAL
EXT GLUCOSE, UA: 250
EXT KETONES: 160
EXT NITRITE, UA: NORMAL
EXT PH, UA: 5.5
EXT PROTEIN, UA: NORMAL
EXT SPECIFIC GRAVITY, UA: 1.03
EXT UROBILINOGEN: 0.2
GFR SERPL CREATININE-BSD FRML MDRD: 140 ML/MIN/1.73SQ M
GLUCOSE SERPL-MCNC: 199 MG/DL (ref 65–140)
GLUCOSE SERPL-MCNC: 201 MG/DL (ref 65–140)
GLUCOSE SERPL-MCNC: 203 MG/DL (ref 65–140)
GLUCOSE SERPL-MCNC: 226 MG/DL (ref 65–140)
GLUCOSE UR STRIP-MCNC: ABNORMAL MG/DL
GLUCOSE UR STRIP-MCNC: ABNORMAL MG/DL
HCO3 BLDA-SCNC: 17.5 MMOL/L (ref 24–30)
HCT VFR BLD CALC: 32 % (ref 34.8–46.1)
HGB BLDA-MCNC: 10.9 G/DL (ref 11.5–15.4)
HGB UR QL STRIP.AUTO: NEGATIVE
HGB UR QL STRIP.AUTO: NEGATIVE
KETONES UR STRIP-MCNC: ABNORMAL MG/DL
KETONES UR STRIP-MCNC: ABNORMAL MG/DL
LEUKOCYTE ESTERASE UR QL STRIP: NEGATIVE
LEUKOCYTE ESTERASE UR QL STRIP: NEGATIVE
NITRITE UR QL STRIP: NEGATIVE
NITRITE UR QL STRIP: NEGATIVE
PCO2 BLD: 18 MMOL/L (ref 21–32)
PCO2 BLD: 29.9 MM HG (ref 42–50)
PH BLD: 7.38 [PH] (ref 7.3–7.4)
PH UR STRIP.AUTO: 6 [PH]
PH UR STRIP.AUTO: 6 [PH]
PO2 BLD: 21 MM HG (ref 35–45)
POTASSIUM BLD-SCNC: 3.5 MMOL/L (ref 3.5–5.3)
POTASSIUM SERPL-SCNC: 3.5 MMOL/L (ref 3.5–5.3)
PROT UR STRIP-MCNC: NEGATIVE MG/DL
PROT UR STRIP-MCNC: NEGATIVE MG/DL
SAO2 % BLD FROM PO2: 34 % (ref 60–85)
SODIUM BLD-SCNC: 138 MMOL/L (ref 136–145)
SODIUM SERPL-SCNC: 140 MMOL/L (ref 136–145)
SP GR UR STRIP.AUTO: 1.02 (ref 1–1.03)
SP GR UR STRIP.AUTO: >=1.03 (ref 1–1.03)
SPECIMEN SOURCE: ABNORMAL
UROBILINOGEN UR QL STRIP.AUTO: 0.2 E.U./DL
UROBILINOGEN UR QL STRIP.AUTO: 0.2 E.U./DL
WBC # BLD EST: NORMAL 10*3/UL

## 2020-02-23 PROCEDURE — 82803 BLOOD GASES ANY COMBINATION: CPT

## 2020-02-23 PROCEDURE — 96375 TX/PRO/DX INJ NEW DRUG ADDON: CPT

## 2020-02-23 PROCEDURE — 84295 ASSAY OF SERUM SODIUM: CPT

## 2020-02-23 PROCEDURE — 84132 ASSAY OF SERUM POTASSIUM: CPT

## 2020-02-23 PROCEDURE — 80048 BASIC METABOLIC PNL TOTAL CA: CPT | Performed by: EMERGENCY MEDICINE

## 2020-02-23 PROCEDURE — 96374 THER/PROPH/DIAG INJ IV PUSH: CPT

## 2020-02-23 PROCEDURE — 99285 EMERGENCY DEPT VISIT HI MDM: CPT

## 2020-02-23 PROCEDURE — 85014 HEMATOCRIT: CPT

## 2020-02-23 PROCEDURE — 82948 REAGENT STRIP/BLOOD GLUCOSE: CPT

## 2020-02-23 PROCEDURE — 81003 URINALYSIS AUTO W/O SCOPE: CPT | Performed by: EMERGENCY MEDICINE

## 2020-02-23 PROCEDURE — 36415 COLL VENOUS BLD VENIPUNCTURE: CPT | Performed by: EMERGENCY MEDICINE

## 2020-02-23 PROCEDURE — 99243 OFF/OP CNSLTJ NEW/EST LOW 30: CPT | Performed by: STUDENT IN AN ORGANIZED HEALTH CARE EDUCATION/TRAINING PROGRAM

## 2020-02-23 PROCEDURE — 82010 KETONE BODYS QUAN: CPT | Performed by: EMERGENCY MEDICINE

## 2020-02-23 PROCEDURE — 96361 HYDRATE IV INFUSION ADD-ON: CPT

## 2020-02-23 PROCEDURE — 82330 ASSAY OF CALCIUM: CPT

## 2020-02-23 PROCEDURE — 82947 ASSAY GLUCOSE BLOOD QUANT: CPT

## 2020-02-23 PROCEDURE — 87086 URINE CULTURE/COLONY COUNT: CPT | Performed by: EMERGENCY MEDICINE

## 2020-02-23 PROCEDURE — 99285 EMERGENCY DEPT VISIT HI MDM: CPT | Performed by: EMERGENCY MEDICINE

## 2020-02-23 RX ORDER — ONDANSETRON 2 MG/ML
4 INJECTION INTRAMUSCULAR; INTRAVENOUS ONCE
Status: COMPLETED | OUTPATIENT
Start: 2020-02-23 | End: 2020-02-23

## 2020-02-23 RX ORDER — ONDANSETRON 2 MG/ML
4 INJECTION INTRAMUSCULAR; INTRAVENOUS ONCE
Status: DISCONTINUED | OUTPATIENT
Start: 2020-02-23 | End: 2020-02-23

## 2020-02-23 RX ORDER — ONDANSETRON 4 MG/1
4 TABLET, FILM COATED ORAL EVERY 8 HOURS PRN
Qty: 12 TABLET | Refills: 0 | Status: SHIPPED | OUTPATIENT
Start: 2020-02-23 | End: 2020-04-08 | Stop reason: CLARIF

## 2020-02-23 RX ADMIN — DOXYLAMINE SUCCINATE 25 MG: 25 TABLET ORAL at 21:24

## 2020-02-23 RX ADMIN — ONDANSETRON 4 MG: 2 INJECTION INTRAMUSCULAR; INTRAVENOUS at 18:52

## 2020-02-23 RX ADMIN — SODIUM CHLORIDE 1000 ML: 0.9 INJECTION, SOLUTION INTRAVENOUS at 18:52

## 2020-02-23 RX ADMIN — PYRIDOXINE HYDROCHLORIDE 25 MG: 100 INJECTION, SOLUTION INTRAMUSCULAR; INTRAVENOUS at 21:23

## 2020-02-23 NOTE — ED PROVIDER NOTES
History  Chief Complaint   Patient presents with    Vomiting During Pregnancy     Type 1 DM 10 week patient reports continued vomiting, was seen at [facility] for same without improvement today  HPI  31 yo female with PMH DM1  currently at 10 weeks gestation with confirmed IUP on US presents to the ED with concern for nausea, multiple episodes of vomiting, and hypoglycemia  Pt was in the ED at [facility] yesterday evening/early this morning for similar symptoms, and had ketones in her urine at that time  She received IV fluids and nausea medications and was able to be discharged with a prescription for zofran  Pt reports last dose of zofran at home was at 1400  At that time pt also checked her blood glucose and found it to be 56, so she disconnected her insulin pump  Pt attempted to drink some juice, but has not been able to keep down any fluids  Pt has been having persistent issues with nausea and vomiting during her pregnancy, and is prescribed diclegis, phenergan, and zofran  Pt denies any abdominal pain, vaginal discharge, or vaginal bleeding at this time  She does note a headache currently   was visualized on bedside US earlier this morning  Prior to Admission Medications   Prescriptions Last Dose Informant Patient Reported? Taking? Doxylamine-Pyridoxine 10-10 MG TBEC  Self No No   Sig: Take 1 tablet by mouth every 8 (eight) hours as needed (nausea)   GLUCAGON EMERGENCY 1 MG injection  Self No No   Sig: Inject 1 mg into a muscle once for 1 dose   Prenatal Vit-Fe Fumarate-FA (PRENATAL VITAMIN PO)  Self Yes No   Sig: Take by mouth   glucose blood test strip   No No   Sig: Freestyle test strips (Not lite)  Tests up to 8 times a day  insulin glargine (BASAGLAR KWIKPEN) 100 units/mL injection pen  Self No No   Si units daily while off pump     Patient not taking: Reported on 2020   insulin lispro (HUMALOG) 100 units/mL injection  Self No No   Sig: USE 70 UNITS VIA INSULIN PUMP DAILY AS DIRECTED   ondansetron (ZOFRAN) 4 mg tablet   No No   Sig: Take 1 tablet (4 mg total) by mouth every 8 (eight) hours as needed for nausea or vomiting   promethazine (PHENERGAN) 12 5 MG tablet   No No   Sig: Take 1 tablet (12 5 mg total) by mouth every 6 (six) hours as needed for nausea or vomiting      Facility-Administered Medications: None       Past Medical History:   Diagnosis Date    Allergic dermatitis     Resolved 3/2/2016     Anxiety     Autoimmune disorder ([place] [address] )     Diabetes ([place] [address] )     Diabetes ([place] [address] )     Diabetes mellitus type 1 ([place] [address] )     Diabetic ketoacidosis without coma associated with type 1 diabetes mellitus ([place] [address] ) 2/22/2016    DKA (diabetic ketoacidoses) ([place] [address] )     Gastroenteritis 2/22/2016    Hypoglycemia due to type 1 diabetes mellitus ([place] [address] )     Left upper extremity numbness     Resolved 11/1/2016     Psychiatric disorder     Sepsis ([facility] ) 02/2017    Sepsis ([place] [address] ) 02/2017    Urinary tract infection     last episode 2 yrs ago    Varicella     childhood chickenpox       Past Surgical History:   Procedure Laterality Date    FL INJECTION LEFT SHOULDER (ARTHROGRAM)  12/11/2018    [place] ESOPHAGOGASTRODUODENOSCOPY TRANSORAL DIAGNOSTIC N/A 4/24/2019    Procedure: ESOPHAGOGASTRODUODENOSCOPY (EGD) with biopsy;  Surgeon: Janett Carrillo DO;  Location:  MAIN OR;  Service: Gastroenterology    WISDOM TOOTH EXTRACTION Bilateral        Family History   Problem Relation Age of Onset    Anxiety disorder Mother     No Known Problems Father     Anxiety disorder Sister     Anxiety disorder Maternal Grandmother     Cancer Maternal Grandmother     Anxiety disorder Maternal Aunt     Hyperlipidemia Family     Diabetes Family     No Known Problems Maternal Grandfather     No Known Problems Paternal Grandmother     No Known Problems Paternal Grandfather      I have reviewed and agree with the history as documented      Social History     Tobacco Use    Smoking status: Former Smoker Packs/day: 0 50     Years: 4 00     Pack years: 2 00     Types: Cigarettes     Last attempt to quit: 2017     Years since quittin 8    Smokeless tobacco: Never Used   Substance Use Topics    Alcohol use: Not Currently     Frequency: 2-4 times a month     Drinks per session: 1 or 2     Comment: rare     Drug use: No        Review of Systems   Constitutional: Negative for chills and fever  HENT: Negative for congestion, rhinorrhea and sore throat  Respiratory: Negative for chest tightness and shortness of breath  Cardiovascular: Negative for chest pain  Gastrointestinal: Positive for nausea and vomiting  Negative for abdominal pain and diarrhea  Genitourinary: Negative for dysuria and hematuria  Musculoskeletal: Negative for arthralgias, myalgias, neck pain and neck stiffness  Skin: Negative for rash  Neurological: Positive for light-headedness and headaches  Negative for dizziness, syncope, weakness and numbness  All other systems reviewed and are negative  Physical Exam  ED Triage Vitals [20 1809]   Temperature Pulse Respirations Blood Pressure SpO2   97 8 °F (36 6 °C) 94 20 115/65 98 %      Temp Source Heart Rate Source Patient Position - Orthostatic VS BP Location FiO2 (%)   Tympanic Monitor Sitting Left arm --      Pain Score       6             Orthostatic Vital Signs  Vitals:    20 2131 20 2245 20 0058 20 0736   BP: 115/72 115/72 97/74 98/68   Pulse: 84 91 81 98   Patient Position - Orthostatic VS: Sitting          Physical Exam   Constitutional: She is oriented to person, place, and time  She appears well-developed and well-nourished  She appears distressed  Actively vomiting in the ED   HENT:   Head: Normocephalic and atraumatic  Right Ear: External ear normal    Left Ear: External ear normal    Nose: Nose normal    Eyes: Pupils are equal, round, and reactive to light  Conjunctivae and EOM are normal    Neck: Normal range of motion   Neck supple  Cardiovascular: Regular rhythm, normal heart sounds and intact distal pulses  Tachycardia present  Exam reveals no gallop and no friction rub  No murmur heard  Pulmonary/Chest: Effort normal and breath sounds normal  No respiratory distress  She has no wheezes  She has no rhonchi  She has no rales  Abdominal: Soft  Bowel sounds are normal  She exhibits no distension and no mass  There is no tenderness  There is no rebound and no guarding  Musculoskeletal: Normal range of motion  Lymphadenopathy:     She has no cervical adenopathy  Neurological: She is alert and oriented to person, place, and time  She has normal strength  No cranial nerve deficit or sensory deficit  Skin: Skin is warm and dry  She is not diaphoretic  Psychiatric: She has a normal mood and affect  Nursing note and vitals reviewed        ED Medications  Medications   doxylamine (UNISON) tablet 25 mg (25 mg Oral Given 3/17/28 6358)   folic acid 1 mg in sodium chloride 0 9 % 50 mL IVPB (1 mg Intravenous New Bag 2/24/20 0232)   insulin lispro (HumaLOG) FOR PUMP REFILLS 100 Units (has no administration in time range)   PATIENT MAINTAINED INSULIN PUMP 1 each (1 each Subcutaneous Given 2/24/20 0159)   promethazine (PHENERGAN) injection 12 5 mg (has no administration in time range)   dextrose 5 % and sodium chloride 0 45 % infusion (125 mL/hr Intravenous New Bag 2/24/20 0635)   diphenhydrAMINE (BENADRYL) tablet 25 mg (has no administration in time range)   ondansetron (ZOFRAN) injection 4 mg (4 mg Intravenous Given 2/23/20 1852)   sodium chloride 0 9 % bolus 1,000 mL (0 mL Intravenous Stopped 2/23/20 2122)   pyridoxine (VITAMIN B6) injection 25 mg (25 mg Intravenous Given 2/23/20 2123)   dextrose 50 % IV solution 50 mL (50 mL Intravenous Given 2/24/20 0628)       Diagnostic Studies  Results Reviewed     Procedure Component Value Units Date/Time    Basic metabolic panel [[id number]]  (Abnormal) Collected:  02/23/20 2128    Lab Status: Final result Specimen:  Blood from Arm, Right Updated:  02/23/20 2154     Sodium 140 mmol/L      Potassium 3 5 mmol/L      Chloride 112 mmol/L      CO2 19 mmol/L      ANION GAP 9 mmol/L      BUN 5 mg/dL      Creatinine 0 44 mg/dL      Glucose 203 mg/dL      Calcium 8 1 mg/dL      eGFR 140 ml/min/1 73sq m     Narrative:       [facility] guidelines for Chronic Kidney Disease (CKD):     Stage 1 with normal or high GFR (GFR > 90 mL/min/1 73 square meters)    Stage 2 Mild CKD (GFR = 60-89 mL/min/1 73 square meters)    Stage 3A Moderate CKD (GFR = 45-59 mL/min/1 73 square meters)    Stage 3B Moderate CKD (GFR = 30-44 mL/min/1 73 square meters)    Stage 4 Severe CKD (GFR = 15-29 mL/min/1 73 square meters)    Stage 5 End Stage CKD (GFR <15 mL/min/1 73 square meters)  Note: GFR calculation is accurate only with a steady state creatinine    Beta Hydroxybutyrate [[id number]]  (Abnormal) Collected:  02/23/20 2128    Lab Status:  Final result Specimen:  Blood from Arm, Right Updated:  02/23/20 2144     BETA-HYDROXYBUTYRATE 2 1 mmol/L     POCT Blood Gas (CG8+) [[id number]]  (Abnormal) Collected:  02/23/20 2137    Lab Status:  Final result Specimen:  Venous Updated:  02/23/20 2142     ph, Esteban ISTAT 7 376     pCO2, Esteban i-STAT 29 9 mm HG      pO2, Esteban i-STAT 21 0 mm HG      BE, i-STAT -7 mmol/L      HCO3, Esteban i-STAT 17 5 mmol/L      CO2, i-STAT 18 mmol/L      O2 Sat, i-STAT 34 %      SODIUM, I-STAT 138 mmol/l      Potassium, i-STAT 3 5 mmol/L      Calcium, Ionized i-STAT 1 12 mmol/L      Hct, i-STAT 32 %      Hgb, i-STAT 10 9 g/dl      Glucose, i-STAT 201 mg/dl      Specimen Type VENOUS    Blood gas, venous [[id number]]     Lab Status:  No result Specimen:  Blood     Fingerstick Glucose (POCT) [[id number]]  (Abnormal) Collected:  02/23/20 2010    Lab Status:  Final result Updated:  02/23/20 2012     POC Glucose 226 mg/dl     Fingerstick Glucose (POCT) [[id number]]  (Abnormal) Collected:  02/23/20 1836 Lab Status:  Final result Updated:  20 1840     POC Glucose 199 mg/dl                  No orders to display         Procedures  Procedures      ED Course  ED Course as of 833   Sun 2020   1842 Pt will reconnect her insulin pump  Will give IV fluids without dextrose   POC Glucose(!): 199                               MDM  31 yo female with PMH DM1,  currently 10 weeks gestation presenting with vomiting in pregnancy  Pt had blood work and UA this morning that were not concerning for infection or electrolyte abnormality   was confirmed during ED visit earlier this morning  Will check fingerstick glucose and plan to treat with D5NS if hypoglycemic  Will give another dose of zofran  If sxs are unable to be controlled in the ED will consider consult to obstetrics and/or possible admission  Disposition  Final diagnoses:   Nausea and vomiting in pregnancy   Type 1 diabetes ([facility] )     Time reflects when diagnosis was documented in both MDM as applicable and the Disposition within this note     Time User Action Codes Description Comment    2020 11:29 PM Elizabeth Bovill Add [O21 9] Nausea and vomiting in pregnancy     2020 11:30 PM Elizabeth Bovill Add [E10 9] Type 1 diabetes ([facility] )     2020  6:36 AM Naila Drain H Add [E10 649] Hypoglycemia due to type 1 diabetes mellitus ([facility] )     2020  6:36 AM Naila Drain H Add [Z96 41] Insulin pump in place     2020  6:36 AM Naila Drain H Add [E10 9] Type 1 diabetes mellitus without complication ([place] [address] )       6:37 AM Naila Drain H Remove [E10 9] Type 1 diabetes mellitus without complication [facility])       ED Disposition     ED Disposition Condition Date/Time Comment    Admit Stable [place] 2020 11:29 PM Case was discussed with Dr Nereida Pride and the patient's admission status was agreed to be Admission Status: observation status to the service of Dr Nereida Pride          Follow-up Information    None Current Discharge Medication List      CONTINUE these medications which have NOT CHANGED    Details   Doxylamine-Pyridoxine 10-10 MG TBEC Take 1 tablet by mouth every 8 (eight) hours as needed (nausea)  Qty: 30 tablet, Refills: 1    Associated Diagnoses: Nausea      GLUCAGON EMERGENCY 1 MG injection Inject 1 mg into a muscle once for 1 dose  Qty: 1 mg, Refills: 3    Associated Diagnoses: Type 1 diabetes mellitus with hyperglycemia (HCC)      glucose blood test strip Freestyle test strips (Not lite)  Tests up to 8 times a day  Qty: 400 each, Refills: 3    Associated Diagnoses: Pre-existing type 1 diabetes mellitus with hyperglycemia during pregnancy in first trimester ([facility] )      insulin glargine (BASAGLAR KWIKPEN) 100 units/mL injection pen 30 units daily while off pump  Qty: 5 pen, Refills: 1    Comments: Please keep on hold, patient was given sample today  Associated Diagnoses: Type 1 diabetes mellitus with hyperglycemia (HCC)      insulin lispro (HUMALOG) 100 units/mL injection USE 70 UNITS VIA INSULIN PUMP DAILY AS DIRECTED  Qty: 20 mL, Refills: 6    Associated Diagnoses: Type 1 diabetes mellitus with hyperglycemia (HCC)      ondansetron (ZOFRAN) 4 mg tablet Take 1 tablet (4 mg total) by mouth every 8 (eight) hours as needed for nausea or vomiting  Qty: 12 tablet, Refills: 0    Associated Diagnoses: Nausea      Prenatal Vit-Fe Fumarate-FA (PRENATAL VITAMIN PO) Take by mouth      promethazine (PHENERGAN) 12 5 MG tablet Take 1 tablet (12 5 mg total) by mouth every 6 (six) hours as needed for nausea or vomiting  Qty: 30 tablet, Refills: 2    Associated Diagnoses: Nausea/vomiting in pregnancy           No discharge procedures on file  ED Provider  Attending physically available and evaluated Mandy Gaston  FANNY managed the patient along with the ED Attending      Electronically Signed by         Srikanth Verma MD  02/24/20 1180       Srikanth Verma MD  02/24/20 5882

## 2020-02-23 NOTE — ED PROVIDER NOTES
History  Chief Complaint   Patient presents with    Vomiting     hyperemesis today for 3 hours, type 1 DM, 10 weeks pregnant      53-year-old female, type 1 diabetic currently  at 10 weeks gestation presents for evaluation of vomiting, patient has had issues with vomiting through this entire pregnancy, sees OBGYN with [facility] as well as MFM, has been on Zofran which did help initially, has tried Diclegis and is currently taking Phenergan, has been able to tolerate some oral intake at home up until this evening when she had multiple episodes of vomiting for last 3 hours  Patient actively vomiting in the emergency department, otherwise no abdominal pain, no vaginal discharge or bleeding  Patient is a type 1 diabetic however sugars are well controlled and were 117 pre-hospital   Patient was given Zofran with improvement of her symptoms          Prior to Admission Medications   Prescriptions Last Dose Informant Patient Reported? Taking? Doxylamine-Pyridoxine 10-10 MG TBEC  Self No Yes   Sig: Take 1 tablet by mouth every 8 (eight) hours as needed (nausea)   GLUCAGON EMERGENCY 1 MG injection  Self No Yes   Sig: Inject 1 mg into a muscle once for 1 dose   Prenatal Vit-Fe Fumarate-FA (PRENATAL VITAMIN PO)  Self Yes Yes   Sig: Take by mouth   glucose blood test strip   No Yes   Sig: Freestyle test strips (Not lite)  Tests up to 8 times a day  insulin glargine (BASAGLAR KWIKPEN) 100 units/mL injection pen Not Taking at Unknown time Self No No   Si units daily while off pump     Patient not taking: Reported on 2020   insulin lispro (HUMALOG) 100 units/mL injection  Self No Yes   Sig: USE 70 UNITS VIA INSULIN PUMP DAILY AS DIRECTED   ondansetron (ZOFRAN) 4 mg tablet  Self No No   Sig: Take 1 tablet (4 mg total) by mouth every 8 (eight) hours as needed for nausea or vomiting   Patient not taking: Reported on 2020   ondansetron (ZOFRAN) 4 mg tablet   No No   Sig: Take 1 tablet (4 mg total) by mouth every 8 (eight) hours as needed for nausea or vomiting   promethazine (PHENERGAN) 12 5 MG tablet   No Yes   Sig: Take 1 tablet (12 5 mg total) by mouth every 6 (six) hours as needed for nausea or vomiting      Facility-Administered Medications: None       Past Medical History:   Diagnosis Date    Allergic dermatitis     Resolved 3/2/2016     Anxiety     Autoimmune disorder ([facility] )     Diabetes [facility]     Diabetes ([facility]     [facility] mellitus [facility] 1 ([facility]     Diabetic ketoacidosis without coma associated with type 1 diabetes mellitus ([facility] ) 2/22/2016    DKA (diabetic ketoacidoses) ([facility] )     Gastroenteritis 2/22/2016    Hypoglycemia due to type 1 diabetes mellitus ([facility] )     Left upper extremity numbness     Resolved 11/1/2016     Psychiatric disorder     Sepsis ([facility] ) 02/2017    Sepsis ([facility] ) 02/2017    Urinary tract infection     last episode 2 yrs ago    Varicella     childhood chickenpox       Past Surgical History:   Procedure Laterality Date    FL INJECTION LEFT SHOULDER (ARTHROGRAM)  12/11/2018    MI ESOPHAGOGASTRODUODENOSCOPY TRANSORAL DIAGNOSTIC N/A 4/24/2019    Procedure: ESOPHAGOGASTRODUODENOSCOPY (EGD) with biopsy;  Surgeon: Ana Rosales DO;  Location:  MAIN OR;  Service: Gastroenterology    WISDOM TOOTH EXTRACTION Bilateral        Family History   Problem Relation Age of Onset    Anxiety disorder Mother     No Known Problems Father     Anxiety disorder Sister     Anxiety disorder Maternal Grandmother     Cancer Maternal Grandmother     Anxiety disorder Maternal Aunt     Hyperlipidemia Family     Diabetes Family     No Known Problems Maternal Grandfather     No Known Problems Paternal Grandmother     No Known Problems Paternal Grandfather      I have reviewed and agree with the history as documented      Social History     Tobacco Use    Smoking status: Former Smoker     Packs/day: 0 50     Years: 4 00     Pack years: 2 00     Types: Cigarettes     Last attempt to quit: 4/16/2017 Years since quittin 8    Smokeless tobacco: Never Used   Substance Use Topics    Alcohol use: Not Currently     Frequency: 2-4 times a month     Drinks per session: 1 or 2     Comment: rare     Drug use: No       Review of Systems   Constitutional: Negative for appetite change and fever  HENT: Negative for rhinorrhea and sore throat  Eyes: Negative for photophobia and visual disturbance  Respiratory: Negative for cough, chest tightness and wheezing  Cardiovascular: Negative for chest pain, palpitations and leg swelling  Gastrointestinal: Positive for nausea and vomiting  Negative for abdominal distention, abdominal pain, blood in stool, constipation and diarrhea  Genitourinary: Negative for dysuria, flank pain, frequency, hematuria and urgency  Musculoskeletal: Negative for back pain  Skin: Negative for rash  Neurological: Negative for dizziness, weakness and headaches  All other systems reviewed and are negative  Physical Exam  Physical Exam   Constitutional: She is oriented to person, place, and time  She appears well-developed and well-nourished  HENT:   Head: Normocephalic and atraumatic  Eyes: Pupils are equal, round, and reactive to light  EOM are normal    Neck: Normal range of motion  Neck supple  Cardiovascular: Normal rate and regular rhythm  Exam reveals no gallop and no friction rub  No murmur heard  Pulmonary/Chest: Effort normal  She has no wheezes  She has no rales  She exhibits no tenderness  Abdominal: Soft  She exhibits no distension and no mass  There is no tenderness  There is no rebound and no guarding  Neurological: She is alert and oriented to person, place, and time  Skin: Skin is warm and dry  Psychiatric: She has a normal mood and affect  Nursing note and vitals reviewed        Vital Signs  ED Triage Vitals [20 2313]   Temperature Pulse Respirations Blood Pressure SpO2   97 9 °F (36 6 °C) 95 20 120/70 98 %      Temp Source Heart Rate Source Patient Position - Orthostatic VS BP Location FiO2 (%)   Tympanic Monitor Sitting Left arm --      Pain Score       No Pain           Vitals:    02/22/20 2313 02/23/20 0015 02/23/20 0319   BP: 120/70 102/59 106/79   Pulse: 95 101 60   Patient Position - Orthostatic VS: Sitting           Visual Acuity      ED Medications  Medications   ondansetron (ZOFRAN) injection 4 mg (4 mg Intravenous Given 2/22/20 3451)   sodium chloride 0 9 % bolus 1,000 mL (0 mL Intravenous Stopped 2/23/20 0054)       Diagnostic Studies  Results Reviewed     Procedure Component Value Units Date/Time    UA w Reflex to Microscopic w Reflex to Culture [[id number]]  (Abnormal) Collected:  02/23/20 0304    Lab Status:  Final result Specimen:  Urine, Clean Catch Updated:  02/23/20 0333     Color, UA Yellow     Clarity, UA Clear     Specific Gravity, UA >=1 030     pH, UA 6 0     Leukocytes, UA Negative     Nitrite, UA Negative     Protein, UA Negative mg/dl      Glucose,  (1/4%) mg/dl      Ketones, UA >=80 (3+) mg/dl      Urobilinogen, UA 0 2 E U /dl      Bilirubin, UA Negative     Blood, UA Negative     URINE COMMENT --    Urine culture [[id number]] Collected:  02/23/20 0304    Lab Status:   In process Specimen:  Urine, Clean Catch Updated:  02/23/20 0333    POCT urinalysis dipstick [[id number]]  (Normal) Resulted:  02/23/20 0256    Lab Status:  Final result Updated:  02/23/20 0257     Color, UA YELLOW     Clarity, UA CLEAR     Glucose, UA (Ref: Negative) 250     Bilirubin, UA (Ref: Negative) NEG     Ketones, UA (Ref: Negative) 160     Spec Grav, UA (Ref:1 003-1 030) 1 030     Blood, UA (Ref: Negative) NEG     pH, UA (Ref: 4 5-8 0) 5 5     Protein, UA (Ref: Negative) NEG     Urobilinogen, UA (Ref: 0 2- 1 0) 0 2      Leukocytes, UA (Ref: Negative) NEG     Nitrite, UA (Ref: Negative) NEG    UA w Reflex to Microscopic w Reflex to Culture [[id number]]  (Abnormal) Collected:  02/23/20 0054    Lab Status:  Final result Specimen:  Urine, Clean Catch Updated:  02/23/20 0107     Color, UA Yellow     Clarity, UA Clear     Specific [place], UA 1 020     pH, UA 6 0     Leukocytes, UA Negative     Nitrite, UA Negative     Protein, UA Negative mg/dl      Glucose,  (1/10%) mg/dl      Ketones, UA >=80 (3+) mg/dl      Urobilinogen, UA 0 2 E U /dl      Bilirubin, UA Negative     Blood, UA Negative     URINE COMMENT --    Urine culture [[id number]] Collected:  02/23/20 0054    Lab Status:   In process Specimen:  Urine, Clean Catch Updated:  02/23/20 0107    Urine Microscopic [[id number]]  (Abnormal) Collected:  02/22/20 2322    Lab Status:  Final result Specimen:  Urine, Clean Catch Updated:  02/22/20 2352     RBC, UA None Seen /hpf      WBC, UA 20-30 /hpf      Epithelial Cells Moderate /hpf      Bacteria, UA Innumerable /hpf      MUCUS THREADS Moderate     URINE COMMENT --    Comprehensive metabolic panel [[id number]]  (Abnormal) Collected:  02/22/20 2323    Lab Status:  Final result Specimen:  Blood from Arm, Right Updated:  02/22/20 2346     Sodium 139 mmol/L      Potassium 3 5 mmol/L      Chloride 104 mmol/L      CO2 25 mmol/L      ANION GAP 10 mmol/L      BUN 8 mg/dL      Creatinine 0 54 mg/dL      Glucose 120 mg/dL      Calcium 9 0 mg/dL      AST 14 U/L      ALT 25 U/L      Alkaline Phosphatase 58 U/L      Total Protein 8 1 g/dL      Albumin 3 8 g/dL      Total Bilirubin 0 50 mg/dL      eGFR 131 ml/min/1 73sq m     Narrative:       [place] guidelines for Chronic Kidney Disease (CKD):     Stage 1 with normal or high GFR (GFR > 90 mL/min/1 73 square meters)    Stage 2 Mild CKD (GFR = 60-89 mL/min/1 73 square meters)    Stage 3A Moderate CKD (GFR = 45-59 mL/min/1 73 square meters)    Stage 3B Moderate CKD (GFR = 30-44 mL/min/1 73 square meters)    Stage 4 Severe CKD (GFR = 15-29 mL/min/1 73 square meters)    Stage 5 End Stage CKD (GFR <15 mL/min/1 73 square meters)  Note: GFR calculation is accurate only with a steady state creatinine    POCT pregnancy, urine [[id number]]  (Normal) Resulted:  02/22/20 2337    Lab Status:  Edited Result - FINAL Updated:  02/22/20 2341     EXT PREG TEST UR (Ref: Negative) postive     Control valid    UA w Reflex to Microscopic w Reflex to Culture [[id number]]  (Abnormal) Collected:  02/22/20 2322    Lab Status:  Final result Specimen:  Urine, Clean Catch Updated:  02/22/20 2330     Color, UA Yellow     Clarity, UA Cloudy     Specific Gravity, UA 1 025     pH, UA 6 5     Leukocytes, UA Moderate     Nitrite, UA Negative     Protein, UA 30 (1+) mg/dl      Glucose,  (1/4%) mg/dl      Ketones, UA 40 (2+) mg/dl      Urobilinogen, UA 0 2 E U /dl      Bilirubin, UA Negative     Blood, UA Negative     URINE COMMENT --    Urine culture [[id number]] Collected:  02/22/20 2322    Lab Status:   In process Specimen:  Urine, Clean Catch Updated:  02/22/20 2330    CBC and differential [[id number]]  (Abnormal) Collected:  02/22/20 2323    Lab Status:  Final result Specimen:  Blood from Arm, Right Updated:  02/22/20 2329     WBC 10 50 Thousand/uL      RBC 4 36 Million/uL      Hemoglobin 13 5 g/dL      Hematocrit 39 6 %      MCV 91 fL      MCH 31 0 pg      MCHC 34 1 g/dL      RDW 12 1 %      MPV 9 9 fL      Platelets 061 Thousands/uL      nRBC 0 /100 WBCs      Neutrophils Relative 81 %      Immat GRANS % 0 %      Lymphocytes Relative 15 %      Monocytes Relative 4 %      Eosinophils Relative 0 %      Basophils Relative 0 %      Neutrophils Absolute 8 45 Thousands/µL      Immature Grans Absolute 0 04 Thousand/uL      Lymphocytes Absolute 1 59 Thousands/µL      Monocytes Absolute 0 37 Thousand/µL      Eosinophils Absolute 0 02 Thousand/µL      Basophils Absolute 0 03 Thousands/µL                  No orders to display              Procedures  Procedures         ED Course  ED Course as of Feb 23 0512   Sun Feb 23, 2020 0058 Bedside ultrasound positive for single intrauterine gestation at approximately 10 weeks by crown-rump length heart rate 165      0252 Feeling better, no current nausea      0304 Initial urine was a dirty catch, had leukocytes and innumerable bacteria in the setting of moderate epithelial cells, repeats have no leukocytes, inconsistent with urinary tract infections      0310 No further vomiting, patient tolerating oral intake, patient will be discharged with Zofran, patient will follow up with OBGYN for further care      5352 Patient does still have ketones in the urine, BMP with normal gap, normal bicarb, no concern for DKA, patient able to tolerate oral intake, will encourage hydration and recheck with OBGYN, patient encouraged to return if any further vomiting develops                                  MDM  Number of Diagnoses or Management Options  Diagnosis management comments: 15-year-old female type 1 diabetic, currently 10 weeks gestation with vomiting, urine positive for ketones, will treat symptomatically, will hydrate patient and re-evaluate        Disposition  Final diagnoses:   Nausea & vomiting   First trimester pregnancy     Time reflects when diagnosis was documented in both MDM as applicable and the Disposition within this note     Time User Action Codes Description Comment    2/23/2020  3:13 AM Madison Colvin Add [R11 2] Nausea & vomiting     2/23/2020  3:13 AM Madison Colvin Add [Z34 91] First trimester pregnancy     2/23/2020  3:14 AM Madison Colvin Add [R11 0] Nausea       ED Disposition     ED Disposition Condition Date/Time Comment    Discharge Stable Sun Feb 23, 2020  3:14 AM Sis Martinez discharge to home/self care              Follow-up Information     Follow up With Specialties Details Why Contact Info Additional [address], [address], Nurse Practitioner  As needed [address]  [address]  [facility] [phone]        [address] Emergency Department Emergency Medicine  If symptoms worsen [address] [place],[address] [address]  [phone]  ED, [address] [place], [place], [address]          Discharge Medication List as of 2/23/2020  3:14 AM      CONTINUE these medications which have CHANGED    Details   ondansetron (ZOFRAN) 4 mg tablet Take 1 tablet (4 mg total) by mouth every 8 (eight) hours as needed for nausea or vomiting, Starting Sun 2/23/2020, Normal         CONTINUE these medications which have NOT CHANGED    Details   Doxylamine-Pyridoxine 10-10 MG TBEC Take 1 tablet by mouth every 8 (eight) hours as needed (nausea), Starting Thu 1/23/2020, Normal      GLUCAGON EMERGENCY 1 MG injection Inject 1 mg into a muscle once for 1 dose, Starting Mon 8/19/2019, Normal      glucose blood test strip Freestyle test strips (Not lite)  Tests up to 8 times a day , Normal      insulin lispro (HUMALOG) 100 units/mL injection USE 70 UNITS VIA INSULIN PUMP DAILY AS DIRECTED, Normal      Prenatal Vit-Fe Fumarate-FA (PRENATAL VITAMIN PO) Take by mouth, Historical Med      promethazine (PHENERGAN) 12 5 MG tablet Take 1 tablet (12 5 mg total) by mouth every 6 (six) hours as needed for nausea or vomiting, Starting Tue 2/18/2020, Normal      insulin glargine (BASAGLAR KWIKPEN) 100 units/mL injection pen 30 units daily while off pump , Normal           No discharge procedures on file      PDMP Review     None          ED Provider  Electronically Signed by           Anamaria Lafleur MD  02/23/20 5979

## 2020-02-23 NOTE — DISCHARGE INSTRUCTIONS
Please follow-up with the OBGYN on Monday for further care, if symptoms worsen please return to the emergency department

## 2020-02-24 PROBLEM — Z3A.10 10 WEEKS GESTATION OF PREGNANCY: Status: ACTIVE | Noted: 2020-01-30

## 2020-02-24 LAB
ANION GAP SERPL CALCULATED.3IONS-SCNC: 10 MMOL/L (ref 4–13)
BACTERIA UR CULT: ABNORMAL
BACTERIA UR CULT: ABNORMAL
BACTERIA UR CULT: NORMAL
BACTERIA UR CULT: NORMAL
BUN SERPL-MCNC: 3 MG/DL (ref 5–25)
CALCIUM SERPL-MCNC: 7.7 MG/DL (ref 8.3–10.1)
CHLORIDE SERPL-SCNC: 113 MMOL/L (ref 100–108)
CO2 SERPL-SCNC: 18 MMOL/L (ref 21–32)
CREAT SERPL-MCNC: 0.29 MG/DL (ref 0.6–1.3)
ERYTHROCYTE [DISTWIDTH] IN BLOOD BY AUTOMATED COUNT: 12.5 % (ref 11.6–15.1)
GFR SERPL CREATININE-BSD FRML MDRD: 160 ML/MIN/1.73SQ M
GLUCOSE P FAST SERPL-MCNC: 41 MG/DL (ref 65–99)
GLUCOSE SERPL-MCNC: 131 MG/DL (ref 65–140)
GLUCOSE SERPL-MCNC: 165 MG/DL (ref 65–140)
GLUCOSE SERPL-MCNC: 27 MG/DL (ref 65–140)
GLUCOSE SERPL-MCNC: 29 MG/DL (ref 65–140)
GLUCOSE SERPL-MCNC: 41 MG/DL (ref 65–140)
GLUCOSE SERPL-MCNC: 63 MG/DL (ref 65–140)
GLUCOSE SERPL-MCNC: 65 MG/DL (ref 65–140)
GLUCOSE SERPL-MCNC: 77 MG/DL (ref 65–140)
GLUCOSE SERPL-MCNC: 78 MG/DL (ref 65–140)
GLUCOSE SERPL-MCNC: 78 MG/DL (ref 65–140)
HCT VFR BLD AUTO: 31.2 % (ref 34.8–46.1)
HGB BLD-MCNC: 10.2 G/DL (ref 11.5–15.4)
MCH RBC QN AUTO: 30.6 PG (ref 26.8–34.3)
MCHC RBC AUTO-ENTMCNC: 32.7 G/DL (ref 31.4–37.4)
MCV RBC AUTO: 94 FL (ref 82–98)
PLATELET # BLD AUTO: 225 THOUSANDS/UL (ref 149–390)
PMV BLD AUTO: 10.4 FL (ref 8.9–12.7)
POTASSIUM SERPL-SCNC: 3.2 MMOL/L (ref 3.5–5.3)
RBC # BLD AUTO: 3.33 MILLION/UL (ref 3.81–5.12)
SODIUM SERPL-SCNC: 141 MMOL/L (ref 136–145)
WBC # BLD AUTO: 8.87 THOUSAND/UL (ref 4.31–10.16)

## 2020-02-24 PROCEDURE — 99220 PR INITIAL OBSERVATION CARE/DAY 70 MINUTES: CPT | Performed by: INTERNAL MEDICINE

## 2020-02-24 PROCEDURE — 80048 BASIC METABOLIC PNL TOTAL CA: CPT | Performed by: INTERNAL MEDICINE

## 2020-02-24 PROCEDURE — 85027 COMPLETE CBC AUTOMATED: CPT | Performed by: INTERNAL MEDICINE

## 2020-02-24 PROCEDURE — 99213 OFFICE O/P EST LOW 20 MIN: CPT | Performed by: OBSTETRICS & GYNECOLOGY

## 2020-02-24 PROCEDURE — 82948 REAGENT STRIP/BLOOD GLUCOSE: CPT

## 2020-02-24 RX ORDER — DEXTROSE MONOHYDRATE 25 G/50ML
INJECTION, SOLUTION INTRAVENOUS
Status: COMPLETED
Start: 2020-02-24 | End: 2020-02-24

## 2020-02-24 RX ORDER — DIPHENHYDRAMINE HCL 25 MG
25 TABLET ORAL EVERY 4 HOURS PRN
Status: DISCONTINUED | OUTPATIENT
Start: 2020-02-24 | End: 2020-02-25 | Stop reason: HOSPADM

## 2020-02-24 RX ORDER — DIPHENHYDRAMINE HCL 25 MG
25 TABLET ORAL EVERY 4 HOURS
Status: DISCONTINUED | OUTPATIENT
Start: 2020-02-24 | End: 2020-02-24

## 2020-02-24 RX ORDER — PROMETHAZINE HYDROCHLORIDE 25 MG/ML
12.5 INJECTION, SOLUTION INTRAMUSCULAR; INTRAVENOUS EVERY 4 HOURS PRN
Status: DISCONTINUED | OUTPATIENT
Start: 2020-02-24 | End: 2020-02-25 | Stop reason: HOSPADM

## 2020-02-24 RX ORDER — SODIUM CHLORIDE, SODIUM GLUCONATE, SODIUM ACETATE, POTASSIUM CHLORIDE, MAGNESIUM CHLORIDE, SODIUM PHOSPHATE, DIBASIC, AND POTASSIUM PHOSPHATE .53; .5; .37; .037; .03; .012; .00082 G/100ML; G/100ML; G/100ML; G/100ML; G/100ML; G/100ML; G/100ML
150 INJECTION, SOLUTION INTRAVENOUS CONTINUOUS
Status: DISCONTINUED | OUTPATIENT
Start: 2020-02-24 | End: 2020-02-24

## 2020-02-24 RX ORDER — DEXTROSE AND SODIUM CHLORIDE 5; .45 G/100ML; G/100ML
125 INJECTION, SOLUTION INTRAVENOUS CONTINUOUS
Status: DISCONTINUED | OUTPATIENT
Start: 2020-02-24 | End: 2020-02-24

## 2020-02-24 RX ORDER — DEXTROSE MONOHYDRATE 25 G/50ML
50 INJECTION, SOLUTION INTRAVENOUS ONCE
Status: COMPLETED | OUTPATIENT
Start: 2020-02-24 | End: 2020-02-24

## 2020-02-24 RX ORDER — DEXTROSE MONOHYDRATE 25 G/50ML
25 INJECTION, SOLUTION INTRAVENOUS ONCE
Status: DISCONTINUED | OUTPATIENT
Start: 2020-02-24 | End: 2020-02-24

## 2020-02-24 RX ADMIN — FOLIC ACID 1 MG: 5 INJECTION, SOLUTION INTRAMUSCULAR; INTRAVENOUS; SUBCUTANEOUS at 08:42

## 2020-02-24 RX ADMIN — FOLIC ACID 1 MG: 5 INJECTION, SOLUTION INTRAMUSCULAR; INTRAVENOUS; SUBCUTANEOUS at 02:32

## 2020-02-24 RX ADMIN — DEXTROSE 50 % IN WATER (D50W) INTRAVENOUS SYRINGE 50 ML: at 06:28

## 2020-02-24 RX ADMIN — PROMETHAZINE HYDROCHLORIDE 12.5 MG: 25 INJECTION INTRAMUSCULAR; INTRAVENOUS at 17:31

## 2020-02-24 RX ADMIN — SODIUM CHLORIDE, SODIUM GLUCONATE, SODIUM ACETATE, POTASSIUM CHLORIDE, MAGNESIUM CHLORIDE, SODIUM PHOSPHATE, DIBASIC, AND POTASSIUM PHOSPHATE 150 ML/HR: .53; .5; .37; .037; .03; .012; .00082 INJECTION, SOLUTION INTRAVENOUS at 01:55

## 2020-02-24 RX ADMIN — PROMETHAZINE HYDROCHLORIDE 12.5 MG: 25 INJECTION INTRAMUSCULAR; INTRAVENOUS at 09:37

## 2020-02-24 RX ADMIN — DEXTROSE MONOHYDRATE 50 ML: 25 INJECTION, SOLUTION INTRAVENOUS at 06:28

## 2020-02-24 RX ADMIN — DEXTROSE AND SODIUM CHLORIDE 125 ML/HR: 5; .45 INJECTION, SOLUTION INTRAVENOUS at 06:35

## 2020-02-24 NOTE — SOCIAL WORK
CM met with pt to discuss CM role in D/C planning  Pt is 11 weeks pregnant and reported the following:    Emergency Contact: Spouse, Sophie Curiel   POA/LW: None  Level of assist with ADL's PTA: IND  House or Apt: Lives w spouse in [facility]  [place] to enter: 6[place] then 4 [place]  DME: Diabetic Supplies and Glucometer  VNA/SNF/Rehab: None    Transportation: Self  Help at home: Spouse    Pharmacy/Rx Coverage: [facility]  Has Rx Coverage  Name of PCP: Luis Alfredo Vera tx for MH/SA: None    Employment/Income: Employed at [address] [place] [address]    Anticipated D/C Plan: Pt anticipates return home with spouse  CM reviewed d/c planning process including the following: identifying help at home, patient preference for d/c planning needs, Discharge Lounge, [facility] Meds to Bed program, availability of treatment team to discuss questions or concerns patient and/or family may have regarding understanding medications and recognizing signs and symptoms once discharged  CM also encouraged patient to follow up with all recommended appointments after discharge  Patient advised of importance for patient and family to participate in managing patients medical well being

## 2020-02-24 NOTE — PROGRESS NOTES
Progress Note - OB/GYN   Yue Booker 30 y o  female MRN: [id number]  Unit/Bed#: -01 Encounter: [id number]    Assessment:  10 weeks 5 days HEG  Type I IDDM  Improving on current med regimen    Plan:  Continue plan per primary service  Anticipate home tomorrow  Follow up as scheduled    Subjective/Objective   Chief Complaint: "I haven't vomited since last night"    Subjective: Patient tolerating clears so far; awaiting general diet  Denies VB or cramping  Objective:     Vitals: Blood pressure 98/68, pulse 98, temperature 98 4 °F (36 9 °C), resp  rate 20, height 4' 11" (1 499 m), weight 62 6 kg (138 lb), last menstrual period 12/04/2019, SpO2 99 %, not currently breastfeeding  ,Body mass index is 27 87 kg/m²  Intake/Output Summary (Last 24 hours) at 2/24/2020 1359  Last data filed at 2/24/2020 7524  Gross per 24 hour   Intake 1700 ml   Output --   Net 1700 ml       Invasive Devices     Peripheral Intravenous Line            Peripheral IV 02/23/20 Right Antecubital less than 1 day                  Physical Exam: Physical Exam   Constitutional: She is oriented to person, place, and time  She appears well-developed and well-nourished  HENT:   Head: Normocephalic  Cardiovascular: Normal rate  Pulmonary/Chest: Effort normal    Musculoskeletal: She exhibits no edema  Neurological: She is alert and oriented to person, place, and time  Skin: Skin is warm and dry  Psychiatric: She has a normal mood and affect  Vitals reviewed  Lab, Imaging and other studies: I have personally reviewed pertinent reports  Results for Lennox Sever (MRN [id number]) as of 2/24/2020 14:02   Ref   Range 2/24/2020 05:38   Sodium Latest Ref Range: 136 - 145 mmol/L 141   Potassium Latest Ref Range: 3 5 - 5 3 mmol/L 3 2 (L)   Chloride Latest Ref Range: 100 - 108 mmol/L 113 (H)   CO2 Latest Ref Range: 21 - 32 mmol/L 18 (L)   Anion Gap Latest Ref Range: 4 - 13 mmol/L 10   BUN Latest Ref Range: 5 - 25 mg/dL 3 (L) Creatinine Latest Ref Range: 0 60 - 1 30 mg/dL 0 29 (L)   Glucose, Random Latest Ref Range: 65 - 140 mg/dL 41 (L)   GLUCOSE FASTING Latest Ref Range: 65 - 99 mg/dL 41 (L)   Calcium Latest Ref Range: 8 3 - 10 1 mg/dL 7 7 (L)   eGFR Latest Units: ml/min/1 73sq m 160   WBC Latest Ref Range: 4 31 - 10 16 Thousand/uL 8 87   Red Blood Cell Count Latest Ref Range: 3 81 - 5 12 Million/uL 3 33 (L)   Hemoglobin Latest Ref Range: 11 5 - 15 4 g/dL 10 2 (L)   HCT Latest Ref Range: 34 8 - 46 1 % 31 2 (L)   MCV Latest Ref Range: 82 - 98 fL 94   MCH Latest Ref Range: 26 8 - 34 3 pg 30 6   MCHC Latest Ref Range: 31 4 - 37 4 g/dL 32 7   RDW Latest Ref Range: 11 6 - 15 1 % 12 5   Platelet Count Latest Ref Range: 149 - 390 Thousands/uL 225   MPV Latest Ref Range: 8 9 - 12 7 fL 10 4

## 2020-02-24 NOTE — UTILIZATION REVIEW
Initial Clinical Review    Admission: Date/Time/Statement: Admission Orders (From admission, onward)     Ordered        20 2330  Place in Observation  Once                   Orders Placed This Encounter   Procedures    Place in Observation     Standing Status:   Standing     Number of Occurrences:   1     Order Specific Question:   Admitting Physician     Answer:   Jamie Love [[id number]]     Order Specific Question:   Level of Care     Answer:   Med Surg [[id number]]     ED Arrival Information     Expected Arrival Acuity Means of Arrival Escorted By Service Admission Type    - 2020 18:04 Emergent Walk-In Family Member Hospitalist Emergency    Arrival Complaint    vomiting (10 weeks pregnant, diabetic)        Chief Complaint   Patient presents with    Vomiting During Pregnancy     Type 1 DM 10 week patient reports continued vomiting, was seen at [facility] for same without improvement today  Assessment/Plan: 31 y/o female with PMHx of T1 DM on insulin pump, currently  @ 10 wks gestation who initially presents to ED with multiple episodes of N/V, and hypoglycemia  Pt state she has been having multiple issues of N/V with the duration of her pregnancy which have been controlled with Zofran, Diclegis and Phenergan, but in last day her vomiting had worsening, and her BS this AM was 56 so she had disconnected her ins pump at that time  In the ED pump was restarted, IVF's, Zofran  OB/Gyn eval'd in ED, confirmed viability of pregnancy  Plan to admit under observation for   Hyperemesis gravidarum, DM type 1  FHT monitoring, continue IVF's, npo this evening with advance as anamaria in AM  Alternate diphenhydramine and Phenergan;  add p r n  chlorpromazine if nausea persists  Monitor electrolytes, replace as needed  OB/Gyn following, continue folic acid, MVI once anamaria po  Continue Ins pump for now, d/c if any episodes of hypoglycemia  Endocrinology consulted       -- pt reports nausea currently improved, well controlled on diphenhydramine and promethazine   Diet advanced  Cont IVF, ins pump  Endocrinology consult pending  Continue current medical plan  Anticipate d/c 2/25        ED Triage Vitals [02/23/20 1809]   Temperature Pulse Respirations Blood Pressure SpO2   97 8 °F (36 6 °C) 94 20 115/65 98 %      Temp Source Heart Rate Source Patient Position - Orthostatic VS BP Location FiO2 (%)   Tympanic Monitor Sitting Left arm --      Pain Score       6        Wt Readings from Last 1 Encounters:   02/23/20 62 6 kg (138 lb)     Additional Vital Signs:   Date/Time  Temp  Pulse  Resp  BP  MAP (mmHg)  SpO2  O2 Device   02/24/20 07:36:32  98 4 °F (36 9 °C)  98  --  98/68  78  99 %  --   02/24/20 0100  98 6 °F (37 °C)  --  --  --  --  98 %  None (Room air)   02/24/20 00:58:11  98 6 °F (37 °C)  81  --  97/74  82  100 %  --   02/23/20 2245  --  91  --  115/72  --  100 %  None (Room air)   02/23/20 2131  --  84  20  115/72  --  100 %  None (Room air)   02/23/20 2026  --  93  20  129/66  --  100 %  None (Room air)   02/23/20 1850  --  --  --  --  --  --  None (Room air)   02/23/20 1809  97 8 °F (36 6 °C)  94  20  115/65  70  98 %  None (Room air)     Pertinent Labs/Diagnostic Test Results:   Results from last 7 days   Lab Units 02/23/20 2137 02/22/20  2323   WBC Thousand/uL  --  10 50*   HEMOGLOBIN g/dL  --  13 5   I STAT HEMOGLOBIN g/dl 10 9*  --    HEMATOCRIT %  --  39 6   HEMATOCRIT, ISTAT % 32*  --    PLATELETS Thousands/uL  --  295   NEUTROS ABS Thousands/µL  --  8 45*     Results from last 7 days   Lab Units 02/24/20  0538 02/23/20 2137 02/23/20 2128 02/22/20  2323   SODIUM mmol/L 141  --  140 139   POTASSIUM mmol/L 3 2*  --  3 5 3 5   CHLORIDE mmol/L 113*  --  112* 104   CO2 mmol/L 18*  --  19* 25   CO2, I-STAT mmol/L  --  18*  --   --    ANION GAP mmol/L 10  --  9 10   BUN mg/dL 3*  --  5 8   CREATININE mg/dL 0 29*  --  0 44* 0 54*   EGFR ml/min/1 73sq m 160  --  140 131   CALCIUM mg/dL 7 7*  --  8 1* 9 0   CALCIUM, IONIZED, ISTAT mmol/L  --  1 12  --   --      Results from last 7 days   Lab Units 02/22/20  2323   AST U/L 14   ALT U/L 25   ALK PHOS U/L 58   TOTAL PROTEIN g/dL 8 1   ALBUMIN g/dL 3 8   TOTAL BILIRUBIN mg/dL 0 50     Results from last 7 days   Lab Units 02/24/20  0732 02/24/20  0600 02/24/20  0557 02/24/20  0148 02/23/20 2010 02/23/20  1839   POC GLUCOSE mg/dl 165* 27* 29* 78 226* 199*     Results from last 7 days   Lab Units 02/24/20  0538 02/23/20  2128 02/22/20  2323   GLUCOSE RANDOM mg/dL 41* 203* 120     BETA-HYDROXYBUTYRATE   Date Value Ref Range Status   02/23/2020 2 1 (H) <0 6 mmol/L Final      Results from last 7 days   Lab Units 02/23/20  2137   PH, NATIVIDAD I-STAT  7 376   PCO2, NATIVIDAD ISTAT mm HG 29 9*   PO2, NATIVIDAD ISTAT mm HG 21 0*   HCO3, NATIVIDAD ISTAT mmol/L 17 5*   I STAT BASE EXC mmol/L -7*   I STAT O2 SAT % 34*       Results from last 7 days   Lab Units 02/23/20  0304 02/23/20  0256 02/23/20  0054 02/22/20  2322   CLARITY UA  Clear CLEAR Clear Cloudy   COLOR UA  Yellow YELLOW Yellow Yellow   SPEC GRAV UA  >=1 030  --  1 020 1 025   SPEC GRAV US   --  1 030  --   --    PH UA  6 0 5 5 6 0 6 5   GLUCOSE UA mg/dl 250 (1/4%)* 250 100 (1/10%)* 250 (1/4%)*   KETONES UA mg/dl >=80 (3+)* 160 >=80 (3+)* 40 (2+)*   BLOOD UA  Negative NEG Negative Negative   PROTEIN UA mg/dl Negative NEG Negative 30 (1+)*   NITRITE UA  Negative NEG Negative Negative   BILIRUBIN UA  Negative  --  Negative Negative   BILIRUBIN, UA   --  NEG  --   --    UROBILINOGEN UA E U /dl 0 2 0 2 0 2 0 2   LEUKOCYTES UA  Negative NEG Negative Moderate*   WBC UA /hpf  --   --   --  20-30*   RBC UA /hpf  --   --   --  None Seen   BACTERIA UA /hpf  --   --   --  Innumerable*   EPITHELIAL CELLS WET PREP /hpf  --   --   --  Moderate*   MUCUS THREADS   --   --   --  Moderate*     Results from last 7 days   Lab Units 02/23/20  0304 02/23/20  0054 02/22/20  2322   URINE CULTURE  <10,000 cfu/ml  10,000-19,000 cfu/ml  20,000-29,000 cfu/ml Lactobacillus species* <10,000 cfu/ml      ED Treatment:   Medication Administration from 02/23/2020 1804 to 02/24/2020 0048       Date/Time Order Dose Route Action     02/23/2020 1852 ondansetron (ZOFRAN) injection 4 mg 4 mg Intravenous Given     02/23/2020 1852 sodium chloride 0 9 % bolus 1,000 mL 1,000 mL Intravenous New Bag     02/23/2020 2124 doxylamine (UNISON) tablet 25 mg 25 mg Oral Given     02/23/2020 2123 pyridoxine (VITAMIN B6) injection 25 mg 25 mg Intravenous Given     Past Medical History:   Diagnosis Date    Allergic dermatitis     Resolved 3/2/2016     Anxiety     [facility]immune disorder ([facility] )     Diabetes ([facility] )     Diabetes ([facility] )     Diabetes mellitus type 1 ([facility] )     Diabetic ketoacidosis without coma associated with type 1 diabetes mellitus ([facility] ) 2/22/2016    DKA (diabetic ketoacidoses) ([facility] )     Gastroenteritis 2/22/2016    Hypogly[facility] to type [facility]     Left upper extremity numbness     Resolved 11/1/2016     Psychiatric disorder     Sepsis ([facility] ) 02/2017    Sepsis [facility] ) 02/2017    Urinary tract infection     last episode 2 yrs ago    Varicella     childhood chickenpox     Present on Admission:   Diabetes mellitus type 1 ([facility]   Hyperemesis gravidarum      Admitting Diagnosis: Vomiting [R11 10]  Nausea and vomiting in pregnancy [O21 9]  Type 1 diabetes ([facility] ) [E10 9]  Age/Sex: 30 y o  female  Admission Orders:  Scheduled Medications:  Medications:  folic acid IVPB 1 mg Intravenous Daily   patient maintained insulin pump 1 each Subcutaneous Q8H     Continuous IV Infusions:  dextrose 5 % and sodium chloride 0 45 % 125 mL/hr Intravenous Continuous     PRN Meds:  diphenhydrAMINE 25 mg Oral Q4H PRN   doxylamine 25 mg Oral HS PRN  2/23 x1   insulin lispro 1 mL Subcutaneous Insulin Pump PRN   promethazine 12 5 mg Intramuscular Q4H PRN       IP CONSULT TO ENDOCRINOLOGY    Network Utilization Review Department  [email or web address]  ATTENTION: Please call with any questions or concerns to [phone] and carefully listen to the prompts so that you are directed to the right person  All voicemails are confidential   Saray Cabrera all requests for admission clinical reviews, approved or denied determinations and any other requests to dedicated fax number below belonging to the campus where the patient is receiving treatment   List of dedicated fax numbers for the Facilities:  [address] DENIALS (Administrative/Medical Necessity) [phone]   [address] (Maternity/NICU/Pediatrics) [phone]   Dafne Adames [phone]   Kelby Garibay [phone]   Zeeshan Person [phone]   Rocky Lopezner [phone]   1205 [facility] 1525 [facility] [phone]   [facility]  [phone]   [address] [facility] [address]  2401 [facility] 1000 [address] [facility] [phone]

## 2020-02-24 NOTE — ED NOTES
Contacted pharmacy regarding the pyridoxine; indicated that it should be sent soon       Miesha Mallory RN  02/23/20 1431

## 2020-02-24 NOTE — H&P
H&P- [place] 1995, 30 y o  female MRN: [id number]    Unit/Bed#: -01 Encounter: [id number]    Primary Care Provider: ADILENE Pat   Date and time admitted to hospital: 2/23/2020  6:11 PM        * Hyperemesis gravidarum  Assessment & Plan  · Patient's pregnancy has been complicated by vomiting; had previously been controlled with Zofran and trials of B6/Unisom and Phenergan but has worsened over the past several days  · Patient unable tolerate oral particular past 1 day, initially presented [facility] [address]  [facility] ED but transferred due to lack of OBGYN coverage  · Appreciate OBGYN recommendations, they will continue to follow  · Continue IVF  · NPO for remainder of evening, advance as tolerated in a m  · Patient received dose of B6/Unisom  · Will continue alternating diphenhydramine and Phenergan; will add p r n  chlorpromazine if nausea should persist  · Monitor electrolytes and replete as necessary    10 weeks gestation of pregnancy  Assessment & Plan  · Appreciate OBGYN evaluation, fetal heart tones acceptable per their evaluation  · Their team will continue to follow while inpatient  · Continue folic acid supplementation; resume prenatal multivitamin once patient better tolerating p o      Diabetes mellitus type 1 [facility])  Assessment & Plan  Lab Results   Component Value Date    HGBA1C 8 5 (H) 02/03/2020       Recent Labs     02/23/20  1839 02/23/20 2010   POCGLU 199* 226*       Blood Sugar Average: Last 72 hrs:  (P) 212 5   · Patient is currently managed on continuous insulin pump; patient states she had briefly been off pump for 2 hours prior to admission due to hypoglycemia  · Initial labs with elevated beta hydroxybutyrate and hyperglycemia however no anion gap is noted  · For now will allow insulin pump to continue; if patient should have persistent hyperglycemia or further developed hypoglycemia will hold pump, initiate basal bolus regimen  · Perinatology consultation given pregnancy status    VTE Prophylaxis: Pharmacologic VTE Prophylaxis contraindicated due to low risk  / reason for no mechanical VTE prophylaxis encourage ambulation   Code Status: Level 1 - Full Code  POLST: POLST form is not discussed and not completed at this time  Anticipated Length of Stay:  Patient will be admitted on an Observation basis with an anticipated length of stay of  Less than 2 midnights  Justification for Hospital Stay: Please see detailed plans noted above  Chief Complaint:     Nausea and vomiting  History of Present Illness:  Hemanth Garcia is a 30 y o  female who has past medical history significant for type 1 diabetes on insulin pump and is currently  at 10 weeks gestation who initially presented with concerns for multiple episodes of vomiting and hypoglycemia  For the duration of her pregnancy she has been having multiple issues with nausea and vomiting; initially her symptoms were well controlled with Zofran, Diclegis, and Phenergan, however for the past 1 day her vomiting has markedly worsened and she has been unable to tolerate p o  Including fluids  She additionally noted that her blood sugar at home was 56 and had self disconnected her insulin pump at that time; it had been reconnected in ED  Notably was additionally seen [address]  [facility] 2020 with similar symptoms, improved with IVF and Zofran and was able to be discharged home  During ED evaluation she was seen by OBGYN, who made recommendations regarding antiemetics, confirmed continued viability of pregnancy, and advised admission to internal medicine service given presence of type 1 diabetes  At the time my evaluation, patient notes some improvement but persistence in her nausea  Additionally notes continue limited appetite      Review of Systems:    Constitutional:  Denies fever or chills   Eyes:  Denies change in visual acuity   HENT:  Denies nasal congestion or sore throat   Respiratory:  Denies cough or shortness of breath Cardiovascular:  Denies chest pain or edema   GI:  Denies abdominal pain, bloody stools or diarrhea but endorses nausea and vomiting  :  Denies dysuria   Musculoskeletal:  Denies back pain or joint pain   Integument:  Denies rash   Neurologic:  Denies focal weakness or sensory changes but endorsed lightheadedness and headaches  Endocrine:  Denies polyuria or polydipsia   Lymphatic:  Denies swollen glands   Psychiatric:  Denies depression or anxiety     Past Medical and Surgical History:   Past Medical History:   Diagnosis Date    Allergic dermatitis     Resolved 3/2/2016     Anxiety     Autoimmune disorder ([facility] )     Diabetes ([facility] )     Diabetes ([facility] )     Diabetes mellitus type 1 ([facility] )     Diabetic ketoacidosis without coma associated with type [facility] mellitus [facility] ) 2/22/2016    DKA (diabetic ketoacidoses) ([facility] )     Gastroenteritis 2/22/2016    Hypoglycemia due to type 1 diabetes mellitus [facility]     Left upper extremity numbness     Resolved 11/1/2016     Psychiatric disorder     Sepsis [facility] ) 02/2017    Sepsis ([facility] ) 02/2017    Urinary tract infection     last episode 2 yrs ago    Varicella     childhood chickenpox     Past Surgical History:   Procedure Laterality Date    FL INJECTION LEFT SHOULDER (ARTHROGRAM)  12/11/2018    NE ESOPHAGOGASTRODUODENOSCOPY TRANSORAL DIAGNOSTIC N/A 4/24/2019    Procedure: ESOPHAGOGASTRODUODENOSCOPY (EGD) with biopsy;  Surgeon: Marion Bonilla DO;  Location: [facility] OR;  Service: Gastroenterology    WISDOM TOOTH EXTRACTION Bilateral        Meds/Allergies:  Medications Prior to Admission   Medication    Doxylamine-Pyridoxine 10-10 MG TBEC    GLUCAGON EMERGENCY 1 MG injection    glucose blood test strip    insulin glargine (BASAGLAR KWIKPEN) 100 units/mL injection pen    insulin lispro (HUMALOG) 100 units/mL injection    ondansetron (ZOFRAN) 4 mg tablet    Prenatal Vit-Fe Fumarate-FA (PRENATAL VITAMIN PO)    promethazine (PHENERGAN) 12 5 MG tablet Allergies: Allergies   Allergen Reactions    Reglan [Metoclopramide] Anxiety     History:  Marital Status: /Civil Union     Substance Use History:   Social History     Substance and Sexual Activity   Alcohol Use Not Currently    Frequency: 2-4 times a month    Drinks per session: 1 or 2    Comment: rare      Social History     Tobacco Use   Smoking Status Former Smoker    Packs/day: 0 50    Years: 4 00    Pack years: 2 00    Types: Cigarettes    Last attempt to quit: 2017    Years since quittin 8   Smokeless Tobacco Never Used     Social History     Substance and Sexual Activity   Drug Use No       Family History:  Family History   Problem Relation Age of Onset    Anxiety disorder Mother     No Known Problems Father     Anxiety disorder Sister     Anxiety disorder Maternal Grandmother     Cancer Maternal Grandmother     Anxiety disorder Maternal Aunt     Hyperlipidemia Family     Diabetes Family     No Known Problems Maternal Grandfather     No Known Problems Paternal Grandmother     No Known Problems Paternal Grandfather        Physical Exam:     Vitals:   Blood Pressure: 97/74 (20)  Pulse: 81 (20)  Temperature: 98 6 °F (37 °C) (20)  Temp Source: Tympanic (20)  Respirations: 20 (20)  Height: 4' 11" (149 9 cm) (20)  Weight - Scale: 62 6 kg (138 lb) (20)  SpO2: 98 % (20)    Constitutional:  Well developed, well nourished, no acute distress, non-toxic appearance   Eyes:  PERRL, conjunctiva normal   HENT:  Atraumatic, external ears normal, nose normal, oropharynx moist, no pharyngeal exudates   Neck- normal range of motion, no tenderness, supple   Respiratory:  No respiratory distress, normal breath sounds, no rales, no wheezing   Cardiovascular:  Normal rate, normal rhythm, no murmurs, no gallops, no rubs   GI:  Soft, nondistended, normal bowel sounds, nontender, no organomegaly, no mass, no rebound, no guarding   :  No costovertebral angle tenderness   Musculoskeletal:  No edema, no tenderness, no deformities  Back- no tenderness  Integument:  Well hydrated, no rash   Lymphatic:  No lymphadenopathy noted   Neurologic:  Alert &awake, communicative, CN 2-12 normal, normal motor function, normal sensory function, no focal deficits noted   Psychiatric:  Speech and behavior appropriate       Lab Results: I have personally reviewed pertinent reports  Results from last 7 days   Lab Units 02/23/20 2137 02/22/20  2323   WBC Thousand/uL  --  10 50*   HEMOGLOBIN g/dL  --  13 5   I STAT HEMOGLOBIN g/dl 10 9*  --    HEMATOCRIT %  --  39 6   HEMATOCRIT, ISTAT % 32*  --    PLATELETS Thousands/uL  --  295   NEUTROS PCT %  --  81*   LYMPHS PCT %  --  15   MONOS PCT %  --  4   EOS PCT %  --  0     Results from last 7 days   Lab Units 02/23/20 2137 02/23/20 2128 02/22/20  2323   POTASSIUM mmol/L  --  3 5 3 5   CHLORIDE mmol/L  --  112* 104   CO2 mmol/L  --  19* 25   CO2, I-STAT mmol/L 18*  --   --    BUN mg/dL  --  5 8   CREATININE mg/dL  --  0 44* 0 54*   CALCIUM mg/dL  --  8 1* 9 0   ALK PHOS U/L  --   --  58   ALT U/L  --   --  25   AST U/L  --   --  14   GLUCOSE, ISTAT mg/dl 201*  --   --              ** Please Note: Dragon 360 Dictation voice to text software was used in the creation of this document   **

## 2020-02-24 NOTE — PROGRESS NOTES
Progress Note - Beryle Primus 1995, 30 y o  female MRN: [id number]    Unit/Bed#: -01 Encounter: [id number]    Primary Care Provider: ADILENE Maxwell   Date and time admitted to hospital: 2/23/2020  6:11 PM        * Hyperemesis gravidarum  Assessment & Plan  · Patient's pregnancy has been complicated by vomiting; had previously been controlled with Zofran and trials of B6/Unisom and Phenergan but had worsened over the past several days prior to admission  · Initially presented to [facility]  [place] [facility] ED but transferred due to lack of OB?GYN coverage  · Appreciate OB/GYN recommendations, they will continue to follow  · Continue IVF  · Nausea has improved, patient reports feeling hungry, diet was advanced   · Symptoms currently well controlled on diphenhydramine and promethazine   · Monitor electrolytes and replete as necessary    Diabetes mellitus type 1 [facility])  Assessment & Plan  Lab Results   Component Value Date    HGBA1C 8 5 (H) 02/03/2020       Recent Labs     02/24/20  0148 02/24/20  0557 02/24/20  0600 02/24/20  0732   POCGLU 78 29* 27* 165*       Blood Sugar Average: Last 72 hrs:  (P) 806 [id number]   · Patient is currently managed on continuous insulin pump; patient states she had briefly been off pump for 2 hours prior to admission due to hypoglycemia  · Initial labs with elevated beta hydroxybutyrate and hyperglycemia however no anion gap or acidosis noted  · With hypoglycemia in the 20s overnight  · On D5 1/2NS infusion  · Endocrinology consult pending     10 weeks gestation of pregnancy  Assessment & Plan  · Appreciate OB/GYN evaluation, fetal heart tones acceptable per their evaluation  · Their team will continue to follow while inpatient  · Continue folic acid supplementation; resume prenatal multivitamin once patient better tolerating p o        VTE Pharmacologic Prophylaxis:   Pharmacologic: Pharmacologic VTE Prophylaxis contraindicated due to low risk per admission screen  Mechanical VTE Prophylaxis in Place: No    Patient Centered Rounds: I have performed bedside rounds with nursing staff today  Whitney Barajas    Discussions with Specialists or Other Care Team Provider: Nurse and overnight admitting attending     Education and Discussions with Family / Patient: patient; when asked if there was anyone she would like me to call today with an update the patient kindly declined     Time Spent for Care: 20 minutes  More than 50% of total time spent on counseling and coordination of care as described above  Current Length of Stay: 0 day(s)    Current Patient Status: Observation   Certification Statement: The patient, admitted on an observation basis, will now require > 2 midnight hospital stay due to hypoglycemia in the 20s overnight requiring IVF and Endocrinology consult  Discharge Plan: pending the above, needs to have sugars and symptoms under control and tolerating PO  Likely tomorrow    Code Status: Level 1 - Full Code      Subjective:   Ms Shakira Ortiz reports she is feeling much better this AM  She is hungry  Denies any pain whatsoever  Objective:     Vitals:   Temp (24hrs), Av 4 °F (36 9 °C), Min:97 8 °F (36 6 °C), Max:98 6 °F (37 °C)    Temp:  [97 8 °F (36 6 °C)-98 6 °F (37 °C)] 98 4 °F (36 9 °C)  HR:  [81-98] 98  Resp:  [20] 20  BP: ()/(65-74) 98/68  SpO2:  [98 %-100 %] 99 %  Body mass index is 27 87 kg/m²  Input and Output Summary (last 24 hours): Intake/Output Summary (Last 24 hours) at 2020 0845  Last data filed at 2020 0509  Gross per 24 hour   Intake 1700 ml   Output --   Net 1700 ml       Physical Exam:     Physical Exam   Constitutional: She is oriented to person, place, and time  Patient seen lying in bed comfortably resting, very pleasant and cooperative, NAD   Cardiovascular: Normal rate and regular rhythm  Pulmonary/Chest: Effort normal and breath sounds normal  No respiratory distress  She has no wheezes  Abdominal: Soft   Bowel sounds are normal  There is no tenderness  Musculoskeletal: She exhibits no edema  No calf tenderness b/l   Neurological: She is alert and oriented to person, place, and time  Skin: Skin is warm  She is not diaphoretic  Psychiatric: She has a normal mood and affect  Her behavior is normal    Vitals reviewed  Additional Data:     Labs:    Results from last 7 days   Lab Units 02/23/20  2137 02/22/20  2323   WBC Thousand/uL  --  10 50*   HEMOGLOBIN g/dL  --  13 5   I STAT HEMOGLOBIN g/dl 10 9*  --    HEMATOCRIT %  --  39 6   HEMATOCRIT, ISTAT % 32*  --    PLATELETS Thousands/uL  --  295   NEUTROS PCT %  --  81*   LYMPHS PCT %  --  15   MONOS PCT %  --  4   EOS PCT %  --  0     Results from last 7 days   Lab Units 02/24/20  0538  02/22/20  2323   SODIUM mmol/L 141   < > 139   POTASSIUM mmol/L 3 2*   < > 3 5   CHLORIDE mmol/L 113*   < > 104   CO2 mmol/L 18*   < > 25   CO2, I-STAT   --    < >  --    BUN mg/dL 3*   < > 8   CREATININE mg/dL 0 29*   < > 0 54*   ANION GAP mmol/L 10   < > 10   CALCIUM mg/dL 7 7*   < > 9 0   ALBUMIN g/dL  --   --  3 8   TOTAL BILIRUBIN mg/dL  --   --  0 50   ALK PHOS U/L  --   --  58   ALT U/L  --   --  25   AST U/L  --   --  14   GLUCOSE RANDOM mg/dL 41*   < > 120    < > = values in this interval not displayed  Results from last 7 days   Lab Units 02/24/20  0732 02/24/20  0600 02/24/20  0557 02/24/20  0148 02/23/20 2010 02/23/20  1839   POC GLUCOSE mg/dl 165* 27* 29* 78 226* 199*                   * I Have Reviewed All Lab Data Listed Above  * Additional Pertinent Lab Tests Reviewed:  All Labs Within Last 24 Hours Reviewed    Imaging:    Imaging Reports Reviewed Today Include: None available for this admission   Imaging Personally Reviewed by Myself Includes:  None    Recent Cultures (last 7 days):     Results from last 7 days   Lab Units 02/23/20  0304 02/23/20  0054 02/22/20  2322   URINE CULTURE  <10,000 cfu/ml  10,000-19,000 cfu/ml  20,000-29,000 cfu/ml Lactobacillus species*  <10,000 cfu/ml        Last 24 Hours Medication List:     Current Facility-Administered Medications:  dextrose 5 % and sodium chloride 0 45 % 125 mL/hr Intravenous Continuous Vazquez Howeer, DO Last Rate: 125 mL/hr (02/24/20 1979)   diphenhydrAMINE 25 mg Oral Q4H PRN Gloriavd Castell, DO    doxylamine 25 mg Oral HS PRN Vazquez Castell, DO    folic acid IVPB 1 mg Intravenous Daily Vazquez Díaz, DO Last Rate: 1 mg (02/24/20 0842)   insulin lispro 1 mL Subcutaneous Insulin Pump PRN Vazquez Howeer, DO    patient maintained insulin pump 1 each Subcutaneous Q8H Vazquez Howeer, DO    promethazine 12 5 mg Intramuscular Q4H PRN Vazquez Díaz, DO         Today, Patient Was Seen By: Kelsey Piedra PA-C    ** Please Note: Dictation voice to text software may have been used in the creation of this document   **

## 2020-02-24 NOTE — ASSESSMENT & PLAN NOTE
Lab Results   Component Value Date    HGBA1C 8 5 (H) 02/03/2020       Recent Labs     02/23/20  1839 02/23/20 2010   POCGLU 199* 226*       Blood Sugar Average: Last 72 hrs:  (P) 212 5   · Patient is currently managed on continuous insulin pump; patient states she had briefly been off pump for 2 hours prior to admission due to hypoglycemia  · Initial labs with elevated beta hydroxybutyrate and hyperglycemia however no anion gap is noted  · For now will allow insulin pump to continue; if patient should have persistent hyperglycemia or further developed hypoglycemia will hold pump, initiate basal bolus regimen  · Low threshold to consider endocrinology evaluation

## 2020-02-24 NOTE — ASSESSMENT & PLAN NOTE
· Appreciate OB/GYN evaluation, fetal heart tones acceptable per their evaluation  · Their team will continue to follow while inpatient  · Continue folic acid supplementation; resume prenatal multivitamin once patient better tolerating p o

## 2020-02-24 NOTE — PLAN OF CARE
Problem: PAIN - ADULT  Goal: Verbalizes/displays adequate comfort level or baseline comfort level  Description  Interventions:  - Encourage patient to monitor pain and request assistance  - Assess pain using appropriate pain scale  - Administer analgesics based on type and severity of pain and evaluate response  - Implement non-pharmacological measures as appropriate and evaluate response  - Consider cultural and social influences on pain and pain management  - Notify physician/advanced practitioner if interventions unsuccessful or patient reports new pain  Outcome: Progressing     Problem: INFECTION - ADULT  Goal: Absence or prevention of progression during hospitalization  Description  INTERVENTIONS:  - Assess and monitor for signs and symptoms of infection  - Monitor lab/diagnostic results  - Monitor all insertion sites, i e  indwelling lines, tubes, and drains  - Monitor endotracheal if appropriate and nasal secretions for changes in amount and color  - [place] appropriate cooling/warming therapies per order  - Administer medications as ordered  - Instruct and encourage patient and family to use good hand hygiene technique  - Identify and instruct in appropriate isolation precautions for identified infection/condition  Outcome: Progressing  Goal: Absence of fever/infection during neutropenic period  Description  INTERVENTIONS:  - Monitor WBC    Outcome: Progressing     Problem: SAFETY ADULT  Goal: Patient will remain free of falls  Description  INTERVENTIONS:  - Assess patient frequently for physical needs  -  Identify cognitive and physical deficits and behaviors that affect risk of falls    -  [place] fall precautions as indicated by assessment   - Educate patient/family on patient safety including physical limitations  - Instruct patient to call for assistance with activity based on assessment  - Modify environment to reduce risk of injury  - Consider OT/PT consult to assist with strengthening/mobility  Outcome: Progressing  Goal: Maintain or return to baseline ADL function  Description  INTERVENTIONS:  -  Assess patient's ability to carry out ADLs; assess patient's baseline for ADL function and identify physical deficits which impact ability to perform ADLs (bathing, care of mouth/teeth, toileting, grooming, dressing, etc )  - Assess/evaluate cause of self-care deficits   - Assess range of motion  - Assess patient's mobility; develop plan if impaired  - Assess patient's need for assistive devices and provide as appropriate  - Encourage maximum independence but intervene and supervise when necessary  - Involve family in performance of ADLs  - Assess for home care needs following discharge   - Consider OT consult to assist with ADL evaluation and planning for discharge  - Provide patient education as appropriate  Outcome: Progressing  Goal: Maintain or return mobility status to optimal level  Description  INTERVENTIONS:  - Assess patient's baseline mobility status (ambulation, transfers, stairs, etc )    - Identify cognitive and physical deficits and behaviors that affect mobility  - Identify mobility aids required to assist with transfers and/or ambulation (gait belt, sit-to-stand, lift, walker, cane, etc )  - [place] fall precautions as indicated by assessment  - Record patient progress and toleration of activity level on Mobility SBAR; progress patient to next Phase/Stage  - Instruct patient to call for assistance with activity based on assessment  - Consider rehabilitation consult to assist with strengthening/weightbearing, etc   Outcome: Progressing     Problem: DISCHARGE PLANNING  Goal: Discharge to home or other facility with appropriate resources  Description  INTERVENTIONS:  - Identify barriers to discharge w/patient and caregiver  - Arrange for needed discharge resources and transportation as appropriate  - Identify discharge learning needs (meds, wound care, etc )  - Arrange for interpretive services to assist at discharge as needed  - Refer to Case Management Department for coordinating discharge planning if the patient needs post-hospital services based on physician/advanced practitioner order or complex needs related to functional status, cognitive ability, or social support system  Outcome: Progressing     Problem: Knowledge Deficit  Goal: Patient/family/caregiver demonstrates understanding of disease process, treatment plan, medications, and discharge instructions  Description  Complete learning assessment and assess knowledge base    Interventions:  - Provide teaching at level of understanding  - Provide teaching via preferred learning methods  Outcome: Progressing

## 2020-02-24 NOTE — ASSESSMENT & PLAN NOTE
· Appreciate OBGYN evaluation, fetal heart tones acceptable per their evaluation  · Their team will continue to follow while inpatient  · Continue folic acid supplementation; resume prenatal multivitamin once patient better tolerating p o

## 2020-02-24 NOTE — ASSESSMENT & PLAN NOTE
· Patient's pregnancy has been complicated by vomiting; had previously been controlled with Zofran and trials of B6/Unisom and Phenergan but had worsened over the past several days prior to admission  · Initially presented to [facility]  [facility] ED but transferred due to lack of OB?GYN coverage  · Appreciate OB/GYN recommendations, they will continue to follow  · Continue IVF  · Nausea has improved, patient reports feeling hungry, diet was advanced   · Symptoms currently well controlled on diphenhydramine and promethazine   · Monitor electrolytes and replete as necessary

## 2020-02-24 NOTE — ED NOTES
Pt actively attempting to vomit at this time   Resident notified      Edna Michele RN  02/23/20 2025

## 2020-02-24 NOTE — ED NOTES
Resident at the bedside at this time       Marilyn Gotti, [facility]  02/23/20 3651

## 2020-02-24 NOTE — ASSESSMENT & PLAN NOTE
· Patient's pregnancy has been complicated by vomiting; had previously been controlled with Zofran and trials of B6/Unisom and Phenergan but has worsened over the past several days  · Patient unable tolerate oral particular past 1 day, initially presented to [address]  [facility] ED but transferred due to lack of OBGYN coverage  · Appreciate OBGYN recommendations, they will continue to follow  · Continue IVF  · NPO for now pending improvement in nausea  · Patient received dose of B6/Unisom  · Will continue alternating diphenhydramine and Phenergan; will add p r n  chlorpromazine if nausea should persist  · Monitor electrolytes and replete as necessary

## 2020-02-24 NOTE — CONSULTS
Inpatient Consult - Gynecology   Sumaya Alvarado 30 y o  female MRN: [id number]  [facility] ED 12 Encounter: [id number]    Chief Complaint   Patient presents with    Vomiting During Pregnancy     Type 1 DM 10 week patient reports continued vomiting, was seen at [facility] for same without improvement today  History of Present Illness     Sumaya Alvarado is a 30 y o  female  who presents with nausea and vomiting in setting of 10 4/7 week pregnancy MODE 2020 by 6 0/7 week ultrasound on 2020 and Type I diabetes on insulin pump       OBGYN consulted for hyperemesis in pregnancy  Had been seen in [facility] last night/discharged early this AM for similar symptoms, treated with IV fluids and Zofran  Had discontinued insulin pump at home secondary to concern for hypoglycemia  Unable to tolerate PO since this AM   Had previously been well-managed on B6 and Unisom, subsequently phenergan, however acutely worsened over the last few days  REVIEW OF SYSTEMS  12 point review of systems negative aside from HPI      Past Medical History:   Diagnosis Date    Allergic dermatitis     Resolved 3/2/2016     Anxiety     Autoimmune disorder [facility]     Diabetes ([facility]     Diabetes ([address]     Diabetes mellitus type 1 ([facility] )     Diabetic ketoacidosis without coma associated with type 1 diabetes mellitus ([address] ) 2016    DKA (diabetic ketoacidoses) ([address] )     Gastroenteritis 2016    Hypoglycemia due to type 1 diabetes mellitus ([address] )     Left upper extremity numbness     Resolved 2016     Psychiatric disorder     Sepsis ([address] ) 2017    Sepsis ([address] ) 2017    Urinary tract infection     last episode 2 yrs ago    Varicella     childhood chickenpox     Patient Active Problem List   Diagnosis    Diabetes mellitus type 1 (HCC)    Depression    Chronic GERD    Insulin pump in place    Less than 8 weeks gestation of pregnancy    Anxiety, generalized    Hypoglycemia due to type 1 diabetes mellitus (HCC)    Insulin pump titration    History of diabetes with ketoacidosis       Past Surgical History:   Procedure Laterality Date    FL INJECTION LEFT SHOULDER (ARTHROGRAM)  2018    IA ESOPHAGOGASTRODUODENOSCOPY TRANSORAL DIAGNOSTIC N/A 2019    Procedure: ESOPHAGOGASTRODUODENOSCOPY (EGD) with biopsy;  Surgeon: Janene Thomson DO;  Location:  MAIN OR;  Service: Gastroenterology    WISDOM TOOTH EXTRACTION Bilateral        OB History        1    Para        Term                AB        Living           SAB        TAB        Ectopic        Multiple        Live Births                         Social History   Social History     Substance and Sexual Activity   Alcohol Use Not Currently    Frequency: 2-4 times a month    Drinks per session: 1 or 2    Comment: rare      Social History     Substance and Sexual Activity   Drug Use No     Social History     Tobacco Use   Smoking Status Former Smoker    Packs/day: 0 50    Years: 4 00    Pack years: 2 00    Types: Cigarettes    Last attempt to quit: 2017    Years since quittin 8   Smokeless Tobacco Never Used         Current Facility-Administered Medications:     doxylamine (UNISON) tablet 25 mg, 25 mg, Oral, HS PRN, Marcos Shaw DO, 25 mg at 20    Current Outpatient Medications:     Doxylamine-Pyridoxine 10-10 MG TBEC, Take 1 tablet by mouth every 8 (eight) hours as needed (nausea), Disp: 30 tablet, Rfl: 1    GLUCAGON EMERGENCY 1 MG injection, Inject 1 mg into a muscle once for 1 dose, Disp: 1 mg, Rfl: 3    glucose blood test strip, Freestyle test strips (Not lite)  Tests up to 8 times a day , Disp: 400 each, Rfl: 3    insulin glargine (BASAGLAR KWIKPEN) 100 units/mL injection pen, 30 units daily while off pump   (Patient not taking: Reported on 2020), Disp: 5 pen, Rfl: 1    insulin lispro (HUMALOG) 100 units/mL injection, USE 70 UNITS VIA INSULIN PUMP DAILY AS DIRECTED, Disp: 20 mL, Rfl: 6    ondansetron (ZOFRAN) 4 mg tablet, Take 1 tablet (4 mg total) by mouth every 8 (eight) hours as needed for nausea or vomiting, Disp: 12 tablet, Rfl: 0    Prenatal Vit-Fe Fumarate-FA (PRENATAL VITAMIN PO), Take by mouth, Disp: , Rfl:     promethazine (PHENERGAN) 12 5 MG tablet, Take 1 tablet (12 5 mg total) by mouth every 6 (six) hours as needed for nausea or vomiting, Disp: 30 tablet, Rfl: 2    Allergies   Allergen Reactions    Reglan [Metoclopramide] Anxiety       Objective   Vitals: Blood pressure 115/72, pulse 84, temperature 97 8 °F (36 6 °C), temperature source Tympanic, resp  rate 20, weight 62 6 kg (138 lb), last menstrual period 12/04/2019, SpO2 100 %, not currently breastfeeding  Body mass index is 27 87 kg/m²      General: NAD, AAOx3  Heart: non-tachycardic  Lungs: non-labored breathing, symmetric chest rise    FHR pending - this AM recorded 165 bpm by bedside ultrasound    Lab Results:   Admission on 02/23/2020   Component Date Value    POC Glucose 02/23/2020 199*    POC Glucose 02/23/2020 226*    Sodium 02/23/2020 140     Potassium 02/23/2020 3 5     Chloride 02/23/2020 112*    CO2 02/23/2020 19*    ANION GAP 02/23/2020 9     BUN 02/23/2020 5     Creatinine 02/23/2020 0 44*    Glucose 02/23/2020 203*    Calcium 02/23/2020 8 1*    eGFR 02/23/2020 140     BETA-HYDROXYBUTYRATE 02/23/2020 2 1*    ph, Esteban ISTAT 02/23/2020 7 376     pCO2, Esteban i-STAT 02/23/2020 29 9*    pO2, Esteban i-STAT 02/23/2020 21 0*    BE, i-STAT 02/23/2020 -7*    HCO3, Esteban i-STAT 02/23/2020 17 5*    CO2, i-STAT 02/23/2020 18*    O2 Sat, i-STAT 02/23/2020 34*    SODIUM, I-STAT 02/23/2020 138     Potassium, i-STAT 02/23/2020 3 5     Calcium, Ionized i-STAT 02/23/2020 1 12     Hct, i-STAT 02/23/2020 32*    Hgb, i-STAT 02/23/2020 10 9*    Glucose, i-STAT 02/23/2020 201*    Specimen Type 02/23/2020 VENOUS    Admission on 02/22/2020, Discharged on 02/23/2020 Component Date Value    EXT PREG TEST UR (Ref: N* 02/22/2020 postive     Control 02/22/2020 valid     Color, UA 02/22/2020 Yellow     Clarity, UA 02/22/2020 Cloudy     Specific Gravity, UA 02/22/2020 1 025     pH, UA 02/22/2020 6 5     Leukocytes, UA 02/22/2020 Moderate*    Nitrite, UA 02/22/2020 Negative     Protein, UA 02/22/2020 30 (1+)*    Glucose, UA 02/22/2020 250 (1/4%)*    Ketones, UA 02/22/2020 40 (2+)*    Urobilinogen, UA 02/22/2020 0 2     Bilirubin, UA 02/22/2020 Negative     Blood, UA 02/22/2020 Negative     URINE COMMENT 02/22/2020      WBC 02/22/2020 10 50*    RBC 02/22/2020 4 36     Hemoglobin 02/22/2020 13 5     Hematocrit 02/22/2020 39 6     MCV 02/22/2020 91     MCH 02/22/2020 31 0     MCHC 02/22/2020 34 1     RDW 02/22/2020 12 1     MPV 02/22/2020 9 9     Platelets 37/60/8441 295     nRBC 02/22/2020 0     Neutrophils Relative 02/22/2020 81*    Immat GRANS % 02/22/2020 0     Lymphocytes Relative 02/22/2020 15     Monocytes Relative 02/22/2020 4     Eosinophils Relative 02/22/2020 0     Basophils Relative 02/22/2020 0     Neutrophils Absolute 02/22/2020 8 45*    Immature Grans Absolute 02/22/2020 0 04     Lymphocytes Absolute 02/22/2020 1 59     Monocytes Absolute 02/22/2020 0 37     Eosinophils Absolute 02/22/2020 0 02     Basophils Absolute 02/22/2020 0 03     Sodium 02/22/2020 139     Potassium 02/22/2020 3 5     Chloride 02/22/2020 104     CO2 02/22/2020 25     ANION GAP 02/22/2020 10     BUN 02/22/2020 8     Creatinine 02/22/2020 0 54*    Glucose 02/22/2020 120     Calcium 02/22/2020 9 0     AST 02/22/2020 14     ALT 02/22/2020 25     Alkaline Phosphatase 02/22/2020 58     Total Protein 02/22/2020 8 1     Albumin 02/22/2020 3 8     Total Bilirubin 02/22/2020 0 50     eGFR 02/22/2020 131     RBC, UA 02/22/2020 None Seen     WBC, UA 02/22/2020 20-30*    Epithelial Cells 02/22/2020 Moderate*    Bacteria, UA 02/22/2020 Innumerable*    MUCUS THREADS 2020 Moderate*    URINE COMMENT 2020      Color, UA 2020 Yellow     Clarity, UA 2020 Clear     Specific Gravity, UA 2020 1 020     pH, UA 2020 6 0     Leukocytes, UA 2020 Negative     Nitrite, UA 2020 Negative     Protein, UA 2020 Negative     Glucose, UA 2020 100 (1/10%)*    Ketones, UA 2020 >=80 (3+)*    Urobilinogen, UA 2020 0 2     Bilirubin, UA 2020 Negative     Blood, UA 2020 Negative     URINE COMMENT 2020      Color, UA 2020 YELLOW     Clarity, UA 2020 CLEAR     Glucose, UA (Ref: Negati* 2020 250     Bilirubin, UA (Ref: Nega* 2020 NEG     Ketones, UA (Ref: Negati* 2020 160     Spec Grav, UA (Ref:1 003* 2020 1 030     Blood, UA (Ref: Negative) 2020 NEG     pH, UA (Ref: 4 5-8 0) 2020 5 5     Protein, UA (Ref: Negati* 2020 NEG     Urobilinogen, UA (Ref: 0* 2020 0 2      Leukocytes, UA (Ref: Ne* 2020 NEG     Nitrite, UA (Ref: Negati* 2020 NEG     Color, UA 2020 Yellow     Clarity, UA 2020 Clear     Specific Gravity, UA 2020 >=1 030     pH, UA 2020 6 0     Leukocytes, UA 2020 Negative     Nitrite, UA 2020 Negative     Protein, UA 2020 Negative     Glucose, UA 2020 250 (1/4%)*    Ketones, UA 2020 >=80 (3+)*    Urobilinogen, UA 2020 0 2     Bilirubin, UA 2020 Negative     Blood, UA 2020 Negative     URINE COMMENT 2020          Assessment/Plan     Assessment:  30 y o   at 8 4/7 weeks gestational age, with hyperemesis in the setting of type 1 diabetes    Given Type 1 diabetes, would defer to Internal Medicine for management of glucoses, insulin management, and electrolyte repletion    For hyperemesis, would recommend    - IV fluid replacement (as well as electrolyte management and repletion)   - NPO status except sips with meds   - Dicelgis 10/10 2 tab qhs, may increase to add one tab q AM, one tab q mid-afternoon   - Diphenhydramine 25 mg q4 hours   - folic acid supplement (to replace prenatal vitamin)   - promethazine 12 5 mg q4 hours IV (stagger with diphenhydramine)    If does not improve with above regimen, would recommend adding chlorpromazine 25 mg IV q4 hours, may convert to PO once tolerating  Will continue to follow closely  Fetal heart tones this  bpm per notes, no continuous monitoring of fetus required at this time

## 2020-02-24 NOTE — ASSESSMENT & PLAN NOTE
Lab Results   Component Value Date    HGBA1C 8 5 (H) 02/03/2020       Recent Labs     02/24/20  0148 02/24/20  0557 02/24/20  0600 02/24/20  0732   POCGLU 78 29* 27* 165*       Blood Sugar Average: Last 72 hrs:  (P) 258 [id number]   · Patient is currently managed on continuous insulin pump; patient states she had briefly been off pump for 2 hours prior to admission due to hypoglycemia  · Initial labs with elevated beta hydroxybutyrate and hyperglycemia however no anion gap or acidosis noted  · With hypoglycemia in the 20s overnight  · On D5 1/2NS infusion  · Endocrinology consult pending

## 2020-02-25 ENCOUNTER — TRANSITIONAL CARE MANAGEMENT (OUTPATIENT)
Dept: FAMILY MEDICINE CLINIC | Facility: CLINIC | Age: 25
End: 2020-02-25

## 2020-02-25 VITALS
OXYGEN SATURATION: 97 % | BODY MASS INDEX: 27.82 KG/M2 | SYSTOLIC BLOOD PRESSURE: 94 MMHG | RESPIRATION RATE: 16 BRPM | DIASTOLIC BLOOD PRESSURE: 62 MMHG | WEIGHT: 138 LBS | HEART RATE: 77 BPM | TEMPERATURE: 98.8 F | HEIGHT: 59 IN

## 2020-02-25 LAB
GLUCOSE SERPL-MCNC: 177 MG/DL (ref 65–140)
GLUCOSE SERPL-MCNC: 233 MG/DL (ref 65–140)
GLUCOSE SERPL-MCNC: 41 MG/DL (ref 65–140)
GLUCOSE SERPL-MCNC: 60 MG/DL (ref 65–140)
GLUCOSE SERPL-MCNC: 73 MG/DL (ref 65–140)

## 2020-02-25 PROCEDURE — 99217 PR OBSERVATION CARE DISCHARGE MANAGEMENT: CPT | Performed by: PHYSICIAN ASSISTANT

## 2020-02-25 PROCEDURE — 99225 PR SBSQ OBSERVATION CARE/DAY 25 MINUTES: CPT | Performed by: OBSTETRICS & GYNECOLOGY

## 2020-02-25 PROCEDURE — 82948 REAGENT STRIP/BLOOD GLUCOSE: CPT

## 2020-02-25 RX ORDER — FAMOTIDINE 20 MG/1
10 TABLET, FILM COATED ORAL 2 TIMES DAILY
Refills: 0
Start: 2020-02-25 | End: 2020-03-10

## 2020-02-25 RX ADMIN — PROMETHAZINE HYDROCHLORIDE 12.5 MG: 25 INJECTION INTRAMUSCULAR; INTRAVENOUS at 10:32

## 2020-02-25 RX ADMIN — FOLIC ACID 1 MG: 5 INJECTION, SOLUTION INTRAMUSCULAR; INTRAVENOUS; SUBCUTANEOUS at 08:57

## 2020-02-25 NOTE — PROGRESS NOTES
Consultation - Maternal Fetal Medicine  Monique Garduno 30 y o  female MRN: [id number]  Unit/Bed#: -01 Encounter: [id number]     Reason for Consult / Principal Problem: Hyperemesis during pregnancy  Subspeciality: Maternal Fetal Medicine    HPI:  Monique Garduno is a 30 y o  Female G1PO with an MODE 2020  with a sPMHX of  type 1 diabetes on insulin pump who is being evaluated for hyperemesis in pregnancy  Patient seen and examined today  Mrs Jose Manuel Lakhani states she is eating this morning and has eaten two pieces of toast and has been drinking some coffee  The patient denies nausea and vomiting today (and has not vomited since the night of ), but reports feeling some indigestion  The patient feels well and wants to leave the hospital     The patient has a follow up appointment scheduled for her sequential scan with MFM  Discussed using pepcid to treat the patient's indigestion  Patient plans to buy pepcid 10mg OTC and the patient was instructed to take the medication 30 minutes before before breakfast and 30 minutes before dinner  PREGNANCY COMPLICATIONS:   Her current obstetrical history is significant for type 1 diabetes on insulin pump  OB History    Para Term  AB Living   1             SAB TAB Ectopic Multiple Live Births                  # Outcome Date GA Lbr Abiodun/2nd Weight Sex Delivery Anes PTL Lv   1 Current                Baby complications/comments: None    Review of Systems   Constitutional: Negative for activity change, appetite change, chills, fatigue and fever  Respiratory: Negative for shortness of breath  Cardiovascular: Negative for chest pain, palpitations and leg swelling  Gastrointestinal: Negative for abdominal pain, diarrhea, nausea and vomiting  Genitourinary: Negative for difficulty urinating, hematuria and urgency         Historical Information   Past Medical History:   Diagnosis Date    Allergic dermatitis     Resolved 3/2/2016     Anxiety     Auto[place] disorder ([place] [address] )     Diabetes ([place] [address] )     Diabetes ([place] [address] )     Diabetes mellitus type 1 ([place] [address] )     Diabetic ketoacidosis without coma associated with type 1 diabetes mellitus ([place] [address] ) 2016    DKA (diabetic ketoacidoses) ([place] [address] )     Gastroenteritis 2016    Hypoglycemia due to type 1 diabetes mellitus ([place] [address] )     Left upper extremity numbness     Resolved 2016     Psychiatric disorder     Sepsis ([place] [address] ) 2017    Sepsis ([place] [address] ) 2017    Urinary tract infection     last episode 2 yrs ago    Varicella     childhood chickenpox     Past Surgical History:   Procedure Laterality Date    FL INJECTION LEFT SHOULDER (ARTHROGRAM)  2018    NH ESOPHAGOGASTRODUODENOSCOPY TRANSORAL DIAGNOSTIC N/A 2019    Procedure: ESOPHAGOGASTRODUODENOSCOPY (EGD) with biopsy;  Surgeon: Janene Thomson DO;  Location:  MAIN OR;  Service: Gastroenterology    WISDOM TOOTH EXTRACTION Bilateral      Social History   Social History     Substance and Sexual Activity   Alcohol Use Not Currently    Frequency: 2-4 times a month    Drinks per session: 1 or 2    Comment: rare      Social History     Substance and Sexual Activity   Drug Use No     Social History     Tobacco Use   Smoking Status Former Smoker    Packs/day: 0 50    Years: 4 00    Pack years: 2 00    Types: Cigarettes    Last attempt to quit: 2017    Years since quittin 8   Smokeless Tobacco Never Used     Family History: non-contributory    Meds/Allergies     Medications Prior to Admission   Medication    Doxylamine-Pyridoxine 10-10 MG TBEC    GLUCAGON EMERGENCY 1 MG injection    glucose blood test strip    insulin glargine (BASAGLAR KWIKPEN) 100 units/mL injection pen    insulin lispro (HUMALOG) 100 units/mL injection    ondansetron (ZOFRAN) 4 mg tablet    Prenatal Vit-Fe Fumarate-FA (PRENATAL VITAMIN PO)    promethazine (PHENERGAN) 12 5 MG tablet       Current Facility-Administered Medications:     diphenhydrAMINE (BENADRYL) tablet 25 mg, 25 mg, Oral, Q4H PRN, Aurora Dejesus DO    doxylamine (UNISON) tablet 25 mg, 25 mg, Oral, HS PRN, Aurora Dejesus DO, 25 mg at 21/06/21 1939    folic acid 1 mg in sodium chloride 0 9 % 50 mL IVPB, 1 mg, Intravenous, Daily, Aurora Dejesus DO, Stopped at 02/25/20 0901    insulin lispro (HumaLOG) FOR PUMP REFILLS 100 Units, 1 mL, Subcutaneous Insulin Pump, PRN, Aurora Dejesus DO    PATIENT MAINTAINED INSULIN PUMP 1 each, 1 each, Subcutaneous, Q8H, Aurora Dejesus DO, 1 each at 02/25/20 0221    promethazine (PHENERGAN) injection 12 5 mg, 12 5 mg, Intramuscular, Q4H PRN, Aurora Dejesus DO, 12 5 mg at 02/25/20 1032     Allergies   Allergen Reactions    Reglan [Metoclopramide] Anxiety       OBJECTIVE:    Vitals: Blood pressure 94/62, pulse 77, temperature 98 8 °F (37 1 °C), resp  rate 16, height 4' 11" (1 499 m), weight 62 6 kg (138 lb), last menstrual period 12/04/2019, SpO2 97 %, not currently breastfeeding  Body mass index is 27 87 kg/m²  Physical Exam   Constitutional: She is oriented to person, place, and time  She appears well-developed and well-nourished  She is active  No distress  HENT:   Head: Normocephalic and atraumatic  Eyes: Pupils are equal, round, and reactive to light  Conjunctivae and EOM are normal    Cardiovascular: Normal rate, regular rhythm, S1 normal, S2 normal, normal heart sounds, intact distal pulses and normal pulses  Pulmonary/Chest: Effort normal and breath sounds normal  No stridor  Abdominal: Soft  She exhibits no distension  There is no tenderness  Neurological: She is alert and oriented to person, place, and time  Skin: Skin is warm  Psychiatric: She has a normal mood and affect  Her behavior is normal  Judgment and thought content normal    Nursing note and vitals reviewed  Prenatal Labs: I have personally reviewed pertinent reports        Assessment/Plan     ASSESSMENT:   10 weeks 6 days HEG   Type I IDDM   Improved on current med regimen    PLAN:   Instructed the patient to purchase pepcid 10mg OTC for indigestion 30 min prior to dinner and breakfast   Anticipate home today  Follow up as scheduled  Patient has an upcoming scan schedule with [facility] for a sequential screen in 2 weeks  She will follow with [facility] for diabetes control as an outpatient  Low sugars this am is due to her getting her meal later then she would normally eat at home    I have reviewed the notes, assessments, and/or procedures performed by sonja, I concur with her/his documentation of Umesh Regan  I saw Mrs Arvizu Levels with the student  My exam is lungs CTA, heart RRR, abdomen soft Nontender, extremities without edema and no erythema  Back no cvat and BP 94/62   Pulse 77   Temp 98 8 °F (37 1 °C)   Resp 16   Ht 4' 11" (1 499 m)   Wt 62 6 kg (138 lb)   LMP 12/04/2019 (Exact Date)   SpO2 97%   BMI 27 87 kg/m²     Recommend DC home on medications used in hospital that are converted to the po form and would add pepcid 10 mg bid for reflux taken 30 min prior to breakfast and dinner       Mena Kasper MD

## 2020-02-25 NOTE — ASSESSMENT & PLAN NOTE
· Patient's pregnancy has been complicated by vomiting; had previously been controlled with Zofran and trials of B6/Unisom and Phenergan but had worsened over the past few days prior to admission  · seen in in the ED at [address] [place] [address] on 02/22/2020 for this but symptoms reportedly improved with IV fluids and Zofran and she was able to be discharged home at that time  · OB/GYN consulted  · Maternal fetal medicine consulted  · IV fluids discontinued on 2/24  · Nausea improved, tolerating oral intake; has not vomited since evening of admission  · Continue prn B6/Unisom, Zofran and promethazine on discharge  · Outpatient follow-up with OB and MFM

## 2020-02-25 NOTE — DISCHARGE INSTRUCTIONS
Hyperemesis Gravidarum   WHAT YOU NEED TO KNOW:   Hyperemesis gravidarum is a severe form of nausea and vomiting that happens during pregnancy  Hyperemesis is more severe than morning sickness  It may cause you to have nausea or vomiting all day for many days  It may also keep you from getting enough food and liquid  DISCHARGE INSTRUCTIONS:   Seek care immediately if:   · You have signs of severe dehydration including little to no urine and dry mouth or lips  · You have severe stomach pain  · You feel too weak or dizzy to stand up  · You see blood in your vomit or bowel movements  Contact your healthcare provider if:   · You cannot keep any food or liquid down  · You are losing weight  · You have a fever  · You have questions or concerns about your condition or care  Medicines:   · Medicines, vitamins, or supplements  may be given to help decrease nausea and vomiting  · Take your medicine as directed  Contact your healthcare provider if you think your medicine is not helping or if you have side effects  Tell him of her if you are allergic to any medicine  Keep a list of the medicines, vitamins, and herbs you take  Include the amounts, and when and why you take them  Bring the list or the pill bottles to follow-up visits  Carry your medicine list with you in case of an emergency  Manage your symptoms:   · Eat small amounts of food every 1 to 2 hours  Some examples of good foods to eat include broth, toast, crackers, fruit, eggs, gelatin, or cottage cheese  Do not eat spicy or high-fat foods  Foods and drinks with ginger, such as ginger ale, may help to decrease nausea and vomiting  · Drink liquids as directed  You may need to drink small amounts of liquid often to prevent dehydration  Ask how much liquid to drink each day and which liquids are best for you  · Rest when you need to  Start activity slowly and work up to your usual routine as you start to feel better      · Medicines, vitamins, or supplements  may be given to help decrease nausea and vomiting  · Weigh yourself daily if directed by your healthcare provider  You may need to keep a record of your daily weights for your healthcare provider  He may want to make sure you are not losing too much weight  Follow up with your healthcare provider as directed:  Write down your questions so you remember to ask them during your visits  © 2017 [employer] Information is for End User's use only and may not be sold, redistributed or otherwise used for commercial purposes  All illustrations and images included in CareNotes® are the copyrighted property of [employer]  or Rhett Preston  The above information is an  only  It is not intended as medical advice for individual conditions or treatments  Talk to your doctor, nurse or pharmacist before following any medical regimen to see if it is safe and effective for you

## 2020-02-25 NOTE — ASSESSMENT & PLAN NOTE
Lab Results   Component Value Date    HGBA1C 8 5 (H) 02/03/2020       Recent Labs     02/25/20  0615 02/25/20  0807 02/25/20  0827 02/25/20  1057   POCGLU 73 41* 60* 233*       Blood Sugar Average: Last 72 hrs:  (P) 107 625   · Patient is currently managed on continuous insulin pump; patient states she had briefly been off pump for 2 hours prior to admission due to hypoglycemia  · Initial labs with elevated beta hydroxybutyrate and hyperglycemia however no anion gap or acidosis noted  · IV fluids discontinued on 2/24  · With hypoglycemia this AM but suspect due to not getting her breakfast as early as she would at home  · Maternal fetal medicine consulted and she is to follow-up with them as an outpatient for diabetes control

## 2020-02-25 NOTE — ASSESSMENT & PLAN NOTE
· Appreciate OB/GYN evaluation, fetal heart tones acceptable per their evaluation  · Outpatient follow-up with OB and MFM  · Resume prenatal multivitamin

## 2020-02-25 NOTE — UTILIZATION REVIEW
Continued Stay Review    Date: 02/25/2020                         Current Patient Class: Observation  Current Level of Care: Med/Surg    HPI:24 y o  female initially admitted on 02/23/2020    Assessment/Plan:     Pertinent Labs/Diagnostic Results:   Results from last 7 days   Lab Units 02/24/20  0538 02/23/20 2137 02/22/20  2323   WBC Thousand/uL 8 87  --  10 50*   HEMOGLOBIN g/dL 10 2*  --  13 5   I STAT HEMOGLOBIN g/dl  --  10 9*  --    HEMATOCRIT % 31 2*  --  39 6   HEMATOCRIT, ISTAT %  --  32*  --    PLATELETS Thousands/uL 225  --  295   NEUTROS ABS Thousands/µL  --   --  8 45*     Results from last 7 days   Lab Units 02/24/20 0538 02/23/20 2137 02/23/20 2128 02/22/20  2323   SODIUM mmol/L 141  --  140 139   POTASSIUM mmol/L 3 2*  --  3 5 3 5   CHLORIDE mmol/L 113*  --  112* 104   CO2 mmol/L 18*  --  19* 25   CO2, I-STAT mmol/L  --  18*  --   --    ANION GAP mmol/L 10  --  9 10   BUN mg/dL 3*  --  5 8   CREATININE mg/dL 0 29*  --  0 44* 0 54*   EGFR ml/min/1 73sq m 160  --  140 131   CALCIUM mg/dL 7 7*  --  8 1* 9 0   CALCIUM, IONIZED, ISTAT mmol/L  --  1 12  --   --      Results from last 7 days   Lab Units 02/22/20  2323   AST U/L 14   ALT U/L 25   ALK PHOS U/L 58   TOTAL PROTEIN g/dL 8 1   ALBUMIN g/dL 3 8   TOTAL BILIRUBIN mg/dL 0 50     Results from last 7 days   Lab Units 02/25/20  0807 02/25/20  0615 02/25/20  0206 02/24/20  2101 02/24/20  1652 02/24/20  1555 02/24/20  1506 02/24/20  1105 02/24/20  0732 02/24/20  0600 02/24/20  0557 02/24/20  0148   POC GLUCOSE mg/dl 41* 73 177* 78 77 65 63* 131 165* 27* 29* 78     Results from last 7 days   Lab Units 02/24/20  0538 02/23/20 2128 02/22/20  2323   GLUCOSE RANDOM mg/dL 41* 203* 120     BETA-HYDROXYBUTYRATE   Date Value Ref Range Status   02/23/2020 2 1 (H) <0 6 mmol/L Final      Results from last 7 days   Lab Units 02/23/20 2137   PH, NATIVIDAD I-STAT  7 376   PCO2, NATIVIDAD ISTAT mm HG 29 9*   PO2, NATIVIDAD ISTAT mm HG 21 0*   HCO3, NATIVIDAD ISTAT mmol/L 17 5*   I STAT BASE EXC mmol/L -7*   I STAT O2 SAT % 34*     Results from last 7 days   Lab Units 02/23/20 0304 02/23/20 0256 02/23/20 0054 02/22/20 2322   CLARITY UA  Clear CLEAR Clear Cloudy   COLOR UA  Yellow YELLOW Yellow Yellow   SPEC GRAV UA  >=1 030  --  1 020 1 025   SPEC GRAV US   --  1 030  --   --    PH UA  6 0 5 5 6 0 6 5   GLUCOSE UA mg/dl 250 (1/4%)* 250 100 (1/10%)* 250 (1/4%)*   KETONES UA mg/dl >=80 (3+)* 160 >=80 (3+)* 40 (2+)*   BLOOD UA  Negative NEG Negative Negative   PROTEIN UA mg/dl Negative NEG Negative 30 (1+)*   NITRITE UA  Negative NEG Negative Negative   BILIRUBIN UA  Negative  --  Negative Negative   BILIRUBIN, UA   --  NEG  --   --    UROBILINOGEN UA E U /dl 0 2 0 2 0 2 0 2   LEUKOCYTES UA  Negative NEG Negative Moderate*   WBC UA /hpf  --   --   --  20-30*   RBC UA /hpf  --   --   --  None Seen   BACTERIA UA /hpf  --   --   --  Innumerable*   EPITHELIAL CELLS WET PREP /hpf  --   --   --  Moderate*   MUCUS THREADS   --   --   --  Moderate*     Results from last 7 days   Lab Units 02/23/20 0304 02/23/20 0054 02/22/20 2322   URINE CULTURE  <10,000 cfu/ml  10,000-19,000 cfu/ml  20,000-29,000 cfu/ml Lactobacillus species*  <10,000 cfu/ml      Vital Signs: BP 94/62   Pulse 77   Temp 98 8 °F (37 1 °C)   Resp 16   Ht 4' 11" (1 499 m)   Wt 62 6 kg (138 lb)   LMP 12/04/2019 (Exact Date)   SpO2 97%   BMI 27 87 kg/m²     Medications:   Scheduled Medications:  Medications:  folic acid IVPB 1 mg Intravenous Daily   patient maintained insulin pump 1 each Subcutaneous Q8H   Continuous IV Infusions:   PRN Meds:  diphenhydrAMINE 25 mg Oral Q4H PRN   doxylamine 25 mg Oral HS PRN   insulin lispro 1 mL Subcutaneous Insulin Pump PRN   promethazine 12 5 mg Intramuscular Q4H PRN     Discharge Plan: Home    Network Utilization Review Department  [email or web address]  ATTENTION: Please call with any questions or concerns to [phone] and carefully listen to the prompts so that you are directed to the right person  All voicemails are confidential   Nadine Lakhani all requests for admission clinical reviews, approved or denied determinations and any other requests to dedicated fax number below belonging to the campus where the patient is receiving treatment   List of dedicated fax numbers for the Facilities:  [address] [place] [address] DENIALS (Administrative/Medical Necessity) [phone]   [address] (Maternity/NICU/Pediatrics) [phone]   Calso Bauer [phone]   Jovan Nelson [phone]   Raymond Barbour [phone]   Rozina Gleason [phone]   [address] [facility] [address] [facility] [phone]   [facility]  [phone]   [address] [facility] [address]  [address] [facility] [address] [facility] [phone]

## 2020-02-25 NOTE — DISCHARGE SUMMARY
Discharge- Daniela Chopra 1995, 30 y o  female MRN: [id number]    Unit/Bed#: -01 Encounter: [id number]    Primary Care Provider: ADILENE Julien   Date and time admitted to hospital: 2/23/2020  6:11 PM        * Hyperemesis gravidarum  Assessment & Plan  · Patient's pregnancy has been complicated by vomiting; had previously been controlled with Zofran and trials of B6/Unisom and Phenergan but had worsened over the past few days prior to admission  · seen in in the ED at [address] [place] [address] on 02/22/2020 for this but symptoms reportedly improved with IV fluids and Zofran and she was able to be discharged home at that time  · OB/GYN consulted  · Maternal fetal medicine consulted  · IV fluids discontinued on 2/24  · Nausea improved, tolerating oral intake; has not vomited since evening of admission  · Continue prn B6/Unisom, Zofran and promethazine on discharge  · Outpatient follow-up with OB and MFM  Diabetes mellitus type 1 [facility])  Assessment & Plan  Lab Results   Component Value Date    HGBA1C 8 5 (H) 02/03/2020       Recent Labs     02/25/20  0615 02/25/20  0807 02/25/20  0827 02/25/20  1057   POCGLU 73 41* 60* 233*       Blood Sugar Average: Last 72 hrs:  (P) 107 625   · Patient is currently managed on continuous insulin pump; patient states she had briefly been off pump for 2 hours prior to admission due to hypoglycemia  · Initial labs with elevated beta hydroxybutyrate and hyperglycemia however no anion gap or acidosis noted  · IV fluids discontinued on 2/24  · With hypoglycemia this AM but suspect due to not getting her breakfast as early as she would at home  · Maternal fetal medicine consulted and she is to follow-up with them as an outpatient for diabetes control       10 weeks gestation of pregnancy  Assessment & Plan  · Appreciate OB/GYN evaluation, fetal heart tones acceptable per their evaluation  · Outpatient follow-up with OB and MFM  · Resume prenatal multivitamin  Discharging Physician / Practitioner: Geena Torres PA-C  PCP: Karol Vargas, [facility]  Admission Date:   Admission Orders (From admission, onward)     Ordered        20 2330  Place in Observation  Once                   Discharge Date: 20    Resolved Problems  Date Reviewed: 2020    None          Consultations During Hospital Stay:  · OB/GYN  · Maternal Fetal Medicine    Procedures Performed:   · None    Significant Findings / Test Results:   · Beta hydroxybutyrate: 2 1    Incidental Findings:   · None    Test Results Pending at Discharge (will require follow up): · None     Outpatient Tests Requested:  · Outpatient follow-up with OB and Maternal Fetal Medicine  · Outpatient follow-up with PCP  Complications:  None    Reason for Admission: vomiting    Hospital Course:     Rosemary Welch is a 30 y o  female patient with a history of type 1 DM on insulin pump and  who currently 10 weeks gestation who originally presented to the hospital on 2020 due to multiple episodes of vomiting and hypoglycemia  Patient has reportedly been having multiple issues with nausea and vomiting for the duration of her pregnancy but noted on admission that her vomiting worsened 1 day prior to presentation and she was reportedly unable to tolerate oral intake including fluids  She also noted on admission that her blood sugars were reportedly in the 50s and disconnected herself from her insulin pump prior to coming to the hospital and her pump was reconnected on presentation to the ED  Patient was seen in in the ED at [address] [place] [address] on 2020 with similar symptoms which improved with IV fluids and Zofran and she was able to be discharged home at that time  On presentation, she was seen by OB/GYN who confirmed viability of pregnancy and recommended antiemetics  She received IV fluids with D5 1/2 NS which were discontinued once her po intake improved   Blood sugars were monitor and she was noted to be intermittently hypoglycemic  She was evaluated by Maternal Fetal Medicine who recommended that the patient eat 3 meals and 3 snacks with 15 g of carbohydrates with snacks  MFM reccommended discharging patient home on oral antiemetics and Pepcid 10mg which she was instructed to take 30 minutes prior to breakfast and dinner  Patient received prn doxylamine and promethazine during her admission and at time of discharge has noticed improvement in her nausea/ vomiting  She has been tolerating PO intake without further episodes of vomiting since presentation  She is to follow-up with her OB and MFM as an outpatient as well as her PCP  Please see above list of diagnoses and related plan for additional information  Condition at Discharge: stable     Discharge Day Visit / Exam:     Subjective:  Patient laying in bed, reports having some nausea this morning but was able to tolerate breakfast   Nausea has since improved and she was also able to tolerate lunch  Denies vomiting since evening of presentation  No abdominal pain, chest pain, SOB or lightheadedness/ dizziness  Denies urinary complaints  Vitals: Blood Pressure: 94/62 (02/25/20 0715)  Pulse: 77 (02/25/20 0715)  Temperature: 98 8 °F (37 1 °C) (02/25/20 0715)  Temp Source: Tympanic (02/23/20 1809)  Respirations: 16 (02/25/20 0715)  Height: 4' 11" (149 9 cm) (02/24/20 0100)  Weight - Scale: 62 6 kg (138 lb) (02/23/20 1809)  SpO2: 97 % (02/25/20 0715)  Exam:   Physical Exam   Constitutional: She is oriented to person, place, and time  She appears well-developed and well-nourished  No distress  HENT:   Head: Normocephalic and atraumatic  Eyes: Conjunctivae are normal    Neck: Neck supple  Cardiovascular: Normal rate and regular rhythm  Pulmonary/Chest: Effort normal and breath sounds normal  No respiratory distress  Abdominal: Bowel sounds are normal  There is no tenderness  Musculoskeletal: She exhibits no edema     Neurological: She is alert and oriented to person, place, and time  Skin: Skin is warm and dry  She is not diaphoretic  No erythema  Psychiatric: She has a normal mood and affect  Her behavior is normal    Nursing note and vitals reviewed  Discharge instructions/Information to patient and family:   See after visit summary for information provided to patient and family  Provisions for Follow-Up Care:  See after visit summary for information related to follow-up care and any pertinent home health orders  Disposition:     Home      Planned Readmission: none     Discharge Statement:  I spent 36 minutes discharging the patient  This time was spent on the day of discharge  I had direct contact with the patient on the day of discharge  Greater than 50% of the total time was spent examining patient, answering all patient questions, arranging and discussing plan of care with patient as well as directly providing post-discharge instructions  Additional time then spent on discharge activities  Discharge Medications:  See after visit summary for reconciled discharge medications provided to patient and family        ** Please Note: This note has been constructed using a voice recognition system **

## 2020-02-25 NOTE — PLAN OF CARE
Problem: PAIN - ADULT  Goal: Verbalizes/displays adequate comfort level or baseline comfort level  Description  Interventions:  - Encourage patient to monitor pain and request assistance  - Assess pain using appropriate pain scale  - Administer analgesics based on type and severity of pain and evaluate response  - Implement non-pharmacological measures as appropriate and evaluate response  - Consider cultural and social influences on pain and pain management  - Notify physician/advanced practitioner if interventions unsuccessful or patient reports new pain  Outcome: Progressing     Problem: INFECTION - ADULT  Goal: Absence or prevention of progression during hospitalization  Description  INTERVENTIONS:  - Assess and monitor for signs and symptoms of infection  - Monitor lab/diagnostic results  - Monitor all insertion sites, i e  indwelling lines, tubes, and drains  - Monitor endotracheal if appropriate and nasal secretions for changes in amount and color  - [place] appropriate cooling/warming therapies per order  - Administer medications as ordered  - Instruct and encourage patient and family to use good hand hygiene technique  - Identify and instruct in appropriate isolation precautions for identified infection/condition  Outcome: Progressing  Goal: Absence of fever/infection during neutropenic period  Description  INTERVENTIONS:  - Monitor WBC    Outcome: Progressing     Problem: SAFETY ADULT  Goal: Patient will remain free of falls  Description  INTERVENTIONS:  - Assess patient frequently for physical needs  -  Identify cognitive and physical deficits and behaviors that affect risk of falls    -  [place] fall precautions as indicated by assessment   - Educate patient/family on patient safety including physical limitations  - Instruct patient to call for assistance with activity based on assessment  - Modify environment to reduce risk of injury  - Consider OT/PT consult to assist with strengthening/mobility  Outcome: Progressing  Goal: Maintain or return to baseline ADL function  Description  INTERVENTIONS:  -  Assess patient's ability to carry out ADLs; assess patient's baseline for ADL function and identify physical deficits which impact ability to perform ADLs (bathing, care of mouth/teeth, toileting, grooming, dressing, etc )  - Assess/evaluate cause of self-care deficits   - Assess range of motion  - Assess patient's mobility; develop plan if impaired  - Assess patient's need for assistive devices and provide as appropriate  - Encourage maximum independence but intervene and supervise when necessary  - Involve family in performance of ADLs  - Assess for home care needs following discharge   - Consider OT consult to assist with ADL evaluation and planning for discharge  - Provide patient education as appropriate  Outcome: Progressing  Goal: Maintain or return mobility status to optimal level  Description  INTERVENTIONS:  - Assess patient's baseline mobility status (ambulation, transfers, stairs, etc )    - Identify cognitive and physical deficits and behaviors that affect mobility  - Identify mobility aids required to assist with transfers and/or ambulation (gait belt, sit-to-stand, lift, walker, cane, etc )  - [place] fall precautions as indicated by assessment  - Record patient progress and toleration of activity level on Mobility SBAR; progress patient to next Phase/Stage  - Instruct patient to call for assistance with activity based on assessment  - Consider rehabilitation consult to assist with strengthening/weightbearing, etc   Outcome: Progressing     Problem: DISCHARGE PLANNING  Goal: Discharge to home or other facility with appropriate resources  Description  INTERVENTIONS:  - Identify barriers to discharge w/patient and caregiver  - Arrange for needed discharge resources and transportation as appropriate  - Identify discharge learning needs (meds, wound care, etc )  - Arrange for interpretive services to assist at discharge as needed  - Refer to Case Management Department for coordinating discharge planning if the patient needs post-hospital services based on physician/advanced practitioner order or complex needs related to functional status, cognitive ability, or social support system  Outcome: Progressing     Problem: Knowledge Deficit  Goal: Patient/family/caregiver demonstrates understanding of disease process, treatment plan, medications, and discharge instructions  Description  Complete learning assessment and assess knowledge base    Interventions:  - Provide teaching at level of understanding  - Provide teaching via preferred learning methods  Outcome: Progressing     Problem: GASTROINTESTINAL - ADULT  Goal: Minimal or absence of nausea and/or vomiting  Description  INTERVENTIONS:  - Administer IV fluids if ordered to ensure adequate hydration  - Maintain NPO status until nausea and vomiting are resolved  - Nasogastric tube if ordered  - Administer ordered antiemetic medications as needed  - Provide nonpharmacologic comfort measures as appropriate  - Advance diet as tolerated, if ordered  - Consider nutrition services referral to assist patient with adequate nutrition and appropriate food choices  Outcome: Progressing  Goal: Maintains adequate nutritional intake  Description  INTERVENTIONS:  - Monitor percentage of each meal consumed  - Identify factors contributing to decreased intake, treat as appropriate  - Assist with meals as needed  - Monitor I&O, weight, and lab values if indicated  - Obtain nutrition services referral as needed  Outcome: Progressing

## 2020-03-02 NOTE — ED ATTENDING ATTESTATION
2020  Isamar Loyd DO, saw and evaluated the patient  I have discussed the patient with the resident/non-physician practitioner and agree with the resident's/non-physician practitioner's findings, Plan of Care, and MDM as documented in the resident's/non-physician practitioner's note, except where noted  All available labs and Radiology studies were reviewed  I was present for key portions of any procedure(s) performed by the resident/non-physician practitioner and I was immediately available to provide assistance  At this point I agree with the current assessment done in the Emergency Department  I have conducted an independent evaluation of this patient a history and physical is as follows:    29 yo female  10 WGA w/hx DM1 presents for evaluation of vomiting during pregnancy  Has had multiple episodes, unable to tolerate any PO intake  Denies abd pain  Symptoms constant, generalized in nature, rated severe no other a/e factors (PO intake makes worse)  Pt was seen at [address] [place] [address] for same earlier this morning, had IVF and antiemetics  Pt checked BG earlier and it was 56 so she has turned her insulin pump off  Unfortunately, with no insulin, pt will continue to produce ketones  accucheck here 199    Recommend resuming insulin  Will give NS, antiemetics, check labs and urine and discuss with OB  May require admission for further eval and tx hyperemesis gravidarum  At this time would not give D5 as pt needs to resume insulin pump so she can utilize the glucose that is already in her body        ED Course         Critical Care Time  Procedures

## 2020-03-09 ENCOUNTER — APPOINTMENT (OUTPATIENT)
Dept: LAB | Facility: CLINIC | Age: 25
End: 2020-03-09
Payer: COMMERCIAL

## 2020-03-09 DIAGNOSIS — Z34.01 ENCOUNTER FOR SUPERVISION OF NORMAL FIRST PREGNANCY IN FIRST TRIMESTER: ICD-10-CM

## 2020-03-09 LAB
BACTERIA UR QL AUTO: ABNORMAL /HPF
BASOPHILS # BLD AUTO: 0.04 THOUSANDS/ΜL (ref 0–0.1)
BASOPHILS NFR BLD AUTO: 1 % (ref 0–1)
BILIRUB UR QL STRIP: NEGATIVE
BLD GP AB SCN SERPL QL: NEGATIVE
CLARITY UR: ABNORMAL
COLOR UR: YELLOW
EOSINOPHIL # BLD AUTO: 0.12 THOUSAND/ΜL (ref 0–0.61)
EOSINOPHIL NFR BLD AUTO: 2 % (ref 0–6)
ERYTHROCYTE [DISTWIDTH] IN BLOOD BY AUTOMATED COUNT: 13.3 % (ref 11.6–15.1)
GLUCOSE UR STRIP-MCNC: NEGATIVE MG/DL
HBV SURFACE AG SER QL: NORMAL
HCT VFR BLD AUTO: 40 % (ref 34.8–46.1)
HGB BLD-MCNC: 12.9 G/DL (ref 11.5–15.4)
HGB UR QL STRIP.AUTO: NEGATIVE
IMM GRANULOCYTES # BLD AUTO: 0.02 THOUSAND/UL (ref 0–0.2)
IMM GRANULOCYTES NFR BLD AUTO: 0 % (ref 0–2)
KETONES UR STRIP-MCNC: ABNORMAL MG/DL
LEUKOCYTE ESTERASE UR QL STRIP: NEGATIVE
LYMPHOCYTES # BLD AUTO: 2.61 THOUSANDS/ΜL (ref 0.6–4.47)
LYMPHOCYTES NFR BLD AUTO: 35 % (ref 14–44)
MCH RBC QN AUTO: 30.5 PG (ref 26.8–34.3)
MCHC RBC AUTO-ENTMCNC: 32.3 G/DL (ref 31.4–37.4)
MCV RBC AUTO: 95 FL (ref 82–98)
MONOCYTES # BLD AUTO: 0.43 THOUSAND/ΜL (ref 0.17–1.22)
MONOCYTES NFR BLD AUTO: 6 % (ref 4–12)
NEUTROPHILS # BLD AUTO: 4.22 THOUSANDS/ΜL (ref 1.85–7.62)
NEUTS SEG NFR BLD AUTO: 56 % (ref 43–75)
NITRITE UR QL STRIP: NEGATIVE
NON-SQ EPI CELLS URNS QL MICRO: ABNORMAL /HPF
NRBC BLD AUTO-RTO: 0 /100 WBCS
PH UR STRIP.AUTO: 6.5 [PH]
PLATELET # BLD AUTO: 285 THOUSANDS/UL (ref 149–390)
PMV BLD AUTO: 10.4 FL (ref 8.9–12.7)
PROT UR STRIP-MCNC: NEGATIVE MG/DL
RBC # BLD AUTO: 4.23 MILLION/UL (ref 3.81–5.12)
RBC #/AREA URNS AUTO: ABNORMAL /HPF
RUBV IGG SERPL IA-ACNC: 137.9 IU/ML
SP GR UR STRIP.AUTO: 1.03 (ref 1–1.03)
UROBILINOGEN UR QL STRIP.AUTO: 0.2 E.U./DL
WBC # BLD AUTO: 7.44 THOUSAND/UL (ref 4.31–10.16)
WBC #/AREA URNS AUTO: ABNORMAL /HPF

## 2020-03-09 PROCEDURE — 36415 COLL VENOUS BLD VENIPUNCTURE: CPT

## 2020-03-09 PROCEDURE — 87086 URINE CULTURE/COLONY COUNT: CPT

## 2020-03-09 PROCEDURE — 86592 SYPHILIS TEST NON-TREP QUAL: CPT

## 2020-03-09 PROCEDURE — 87340 HEPATITIS B SURFACE AG IA: CPT

## 2020-03-09 PROCEDURE — 81001 URINALYSIS AUTO W/SCOPE: CPT

## 2020-03-09 PROCEDURE — 85025 COMPLETE CBC W/AUTO DIFF WBC: CPT

## 2020-03-09 PROCEDURE — 86850 RBC ANTIBODY SCREEN: CPT

## 2020-03-09 PROCEDURE — 87389 HIV-1 AG W/HIV-1&-2 AB AG IA: CPT

## 2020-03-09 PROCEDURE — 86762 RUBELLA ANTIBODY: CPT

## 2020-03-10 ENCOUNTER — ROUTINE PRENATAL (OUTPATIENT)
Dept: PERINATAL CARE | Facility: CLINIC | Age: 25
End: 2020-03-10
Payer: COMMERCIAL

## 2020-03-10 VITALS
WEIGHT: 138.8 LBS | BODY MASS INDEX: 27.98 KG/M2 | SYSTOLIC BLOOD PRESSURE: 117 MMHG | HEART RATE: 87 BPM | HEIGHT: 59 IN | DIASTOLIC BLOOD PRESSURE: 70 MMHG

## 2020-03-10 DIAGNOSIS — F41.1 ANXIETY, GENERALIZED: Primary | ICD-10-CM

## 2020-03-10 DIAGNOSIS — Z36.82 ENCOUNTER FOR (NT) NUCHAL TRANSLUCENCY SCAN: ICD-10-CM

## 2020-03-10 DIAGNOSIS — Z3A.12 12 WEEKS GESTATION OF PREGNANCY: ICD-10-CM

## 2020-03-10 DIAGNOSIS — O24.011 TYPE 1 DIABETES MELLITUS DURING PREGNANCY IN FIRST TRIMESTER: ICD-10-CM

## 2020-03-10 DIAGNOSIS — Z34.01 ENCOUNTER FOR SUPERVISION OF NORMAL FIRST PREGNANCY IN FIRST TRIMESTER: ICD-10-CM

## 2020-03-10 LAB
BACTERIA UR CULT: NORMAL
RPR SER QL: NORMAL

## 2020-03-10 PROCEDURE — 76813 OB US NUCHAL MEAS 1 GEST: CPT | Performed by: OBSTETRICS & GYNECOLOGY

## 2020-03-10 PROCEDURE — 99242 OFF/OP CONSLTJ NEW/EST SF 20: CPT | Performed by: OBSTETRICS & GYNECOLOGY

## 2020-03-10 PROCEDURE — 3052F HG A1C>EQUAL 8.0%<EQUAL 9.0%: CPT | Performed by: OBSTETRICS & GYNECOLOGY

## 2020-03-10 RX ORDER — ASPIRIN 81 MG/1
162 TABLET ORAL DAILY
Qty: 60 TABLET | Refills: 3 | Status: SHIPPED | OUTPATIENT
Start: 2020-03-10 | End: 2020-06-02

## 2020-03-10 RX ORDER — SERTRALINE HYDROCHLORIDE 25 MG/1
50 TABLET, FILM COATED ORAL DAILY
Qty: 60 TABLET | Refills: 0 | Status: SHIPPED | OUTPATIENT
Start: 2020-03-10 | End: 2020-04-01

## 2020-03-10 NOTE — PROGRESS NOTES
CONSULTATION: MATERNAL-FETAL MEDICINE    Dear Md Kyle Ventura 6[address]  [address] [facility], [address],    Thank you very much for your kind referral of patient Devaughn Acosta for Maternal-Fetal Medicine consultation  As you know, Ms Raphael Buck is a 30y o  year-old  at 14w9d presenting for consultation for pre-existing type 1 diabetes and genetic screening  She has no complaints today  Her Antepartum course is significant for admission for hyperemesis in the first trimester  Her symptoms are presently well controlled on her anti-emetic regimen  She also has pre-existing type 1 diabetes and is on an OmniPod pump, and followed by our diabetes in pregnancy program  Her EDC was determined by a 6 week ultrasound that was 7 days discrepant from menstrual dating  Patient Active Problem List   Diagnosis    Type 1 diabetes mellitus during pregnancy    Depression    Chronic GERD    Insulin pump in place    12 weeks gestation of pregnancy    Anxiety, generalized    Hypoglycemia due to type 1 diabetes mellitus ([address] )    Insulin pump titration    History of diabetes with ketoacidosis    Hyperemesis gravidarum       Obstetric History:  OB History    Para Term  AB Living   1             SAB TAB Ectopic Multiple Live Births                  # Outcome Date GA Lbr Abiodun/2nd Weight Sex Delivery Anes PTL Lv   1 Current                Past Medical History:  Past Medical History:   Diagnosis Date    Allergic dermatitis     Resolved 3/2/2016     Anxiety     Autoimmune disorder ([address] )     Diabetes mellitus type 1 ([address] )     Diabetic ketoacidosis without coma associated with type 1 diabetes mellitus ([address] ) 2016    Hypoglycemia due to type 1 diabetes mellitus ([address] )     Urinary tract infection     last episode 2 yrs ago    Varicella     childhood chickenpox     She has a history of type 1 diabetes with a history of DKA  Had sepsis after wisdom tooth extraction   She has anxiety and was on Zoloft pre-pregnancy, would like to resume, mood hasn't been well controlled particularly with hyperemesis  Past Surgical History:  Past Surgical History:   Procedure Laterality Date    FL INJECTION LEFT SHOULDER (ARTHROGRAM)  12/11/2018    LA ESOPHAGOGASTRODUODENOSCOPY TRANSORAL DIAGNOSTIC N/A 4/24/2019    Procedure: ESOPHAGOGASTRODUODENOSCOPY (EGD) with biopsy;  Surgeon: Nixon Mena DO;  Location:  MAIN OR;  Service: Gastroenterology    WISDOM TOOTH EXTRACTION Bilateral    Sepsis after wisdom tooth extraction  Social History:   She denies current use of alcohol, drugs of abuse, tobacco, and marijuana products  Family History:  Family history was reviewed using an office screening tool, and is negative for congenital anomalies, genetic diseases, and thromboembolism in first degree relatives of this pregnancy  Family history is notable for diabetes on her side of the family  Medications:    Current Outpatient Medications:     Doxylamine-Pyridoxine 10-10 MG TBEC, Take 1 tablet by mouth every 8 (eight) hours as needed (nausea), Disp: 30 tablet, Rfl: 1    GLUCAGON EMERGENCY 1 MG injection, Inject 1 mg into a muscle once for 1 dose, Disp: 1 mg, Rfl: 3    glucose blood test strip, Freestyle test strips (Not lite)  Tests up to 8 times a day , Disp: 400 each, Rfl: 3    insulin lispro (HUMALOG) 100 units/mL injection, USE 70 UNITS VIA INSULIN PUMP DAILY AS DIRECTED, Disp: 20 mL, Rfl: 6    Prenatal Vit-Fe Fumarate-FA (PRENATAL VITAMIN PO), Take by mouth, Disp: , Rfl:     promethazine (PHENERGAN) 12 5 MG tablet, Take 1 tablet (12 5 mg total) by mouth every 6 (six) hours as needed for nausea or vomiting, Disp: 30 tablet, Rfl: 2    aspirin (ECOTRIN LOW STRENGTH) 81 mg EC tablet, Take 2 tablets (162 mg total) by mouth daily, Disp: 60 tablet, Rfl: 3    insulin glargine (BASAGLAR KWIKPEN) 100 units/mL injection pen, 30 units daily while off pump   (Patient not taking: Reported on 2/22/2020), Disp: 5 pen, Rfl: 1    ondansetron (ZOFRAN) 4 mg tablet, Take 1 tablet (4 mg total) by mouth every 8 (eight) hours as needed for nausea or vomiting (Patient not taking: Reported on 3/10/2020), Disp: 12 tablet, Rfl: 0    sertraline (ZOLOFT) 25 mg tablet, Take 2 tablets (50 mg total) by mouth daily Take 1 tab (25mg)/day for 1 week, then increase to 50mg (2 tabs) daily if needed  , Disp: 60 tablet, Rfl: 0    Allergies: Allergies   Allergen Reactions    Reglan [Metoclopramide] Anxiety       Review of Systems:  Pertinent items are noted in HPI  Exam:  Vitals: Blood pressure 117/70, pulse 87, height 4' 11" (1 499 m), weight 63 kg (138 lb 12 8 oz), last menstrual period 2019, not currently breastfeeding  General appearance: alert, well appearing, and in no distress  The remainder of her physical examination was deferred as she was here today for consultation and discussion  Ultrasound findings today are as follows: There is a single live intrauterine pregnancy with size equivalent to dates  No gross anomalies were identified on limited views  Amniotic fluid is within normal limits  Nuchal translucency measures 1 9mm which is <95th percentile for this crown-rump length  My recommendations are as follows:     Type 1 diabetes mellitus during pregnancy  Pregnancies complicated by pre-gestational diabetes are associated  with increased  and maternal morbidity  Many of the risks are correlated to glycemic control entering pregnancy (measured by Hemoglobin A1C), and affected by antepartum glycemic control  Her most recent hemoglobin A1C is 8 5 on 2/3/20  Fetal risks include  macrosomia, shoulder dystocia, birth injuries, hypoglycemia,  hyperbilirubinemia, and potential long-term sequelae such as obesity and  impaired intellectual achievement  Maternal risks include preeclampsia and hypertensive disorders of pregnancy, operative delivery    Aggressive treatment of pregestational diabetes with diet, exercise, and medications including metformin and insulin have been shown to decrease fetal and  morbidity and mortality  We have a diabetes in pregnancy program where we follow our patients closely to help achieve optimal maternal and  outcomes  She is already established with our program, and I recommend that she be closely followed for the duration of her pregnancy  Since her hemoglobin A1C is 6 5 or greater, an early anatomic survey for malformations is also recommended  Following a 20 week anatomic survey, fetal echocardiography is recommended at 22 weeks gestation, due an increased risk of congenital heart disease  Subsequently, evaluation of fetal growth every 4 weeks is recommended beginning at 28 weeks gestation  Antepartum fetal surveillance should be initiated twice weekly at 32 weeks gestation  Delivery timing should be individualized based on glycemic control  Patients who remain well controlled on medication should deliver between 39 0/7 and 39 6/7 weeks gestation, unless additional obstetric factors necessitate otherwise  Due to the risk of preeclampsia, I recommend a baby aspirin (81-162mg daily) be initiated as soon as possible, and before 28 weeks gestation unless contraindicated  Aspirin was prescribed today  12 weeks gestation of pregnancy   We reviewed the availability of genetic screening, as well as diagnostic testing, which are available to all pregnant women  She declines screening/testing, and understands that ultrasound does not accurately diagnose genetic syndromes  A detailed anatomic survey as well as transvaginal cervical length screening are recommended between 18-22 weeks gestation  The use of low dose aspirin in pregnancy (81-162mg) is recommended in women with a high risk, or multiple moderate risk factors for preeclampsia   Aspirin therapy should be initiated between 12-28 weeks gestation, and is most effective if started prior to 16 weeks gestation, and continued until delivery  Low dose aspirin in pregnancy has been shown to reduce the incidence of preeclampsia in women with risk factors, and has been shown to be safe and without significant maternal or fetal risk  In light of her risk factors which include type 1 diabetes, I recommend initiating aspirin therapy, which was prescribed today  Anxiety, generalized  We reviewed her history of anxiety  She was previously well controlled on Zoloft, but discontinued due to pregnancy and is having worsening anxiety especially with hyperemesis  The relative safety of therapy and medication during and after pregnancy, compared with uncontrolled maternal mood disorders was emphasized  The use of selective serotonin reuptake inhibitors (SSRIs) in pregnancy is associated with approximately a 30% risk of transient  withdrawal, which is typically self-limited and does not usually require  intensive care  There is also approximately a 1% risk of persistent pulmonary hypertension of the  (PP[facility]), which can require  intensive care treatment  Importantly, this risk also exists in the general population  Decisions about maternal medication use in pregnancy should be individualized, and I support her resuming Zoloft based on her symptoms  If desired, neonatology consultation is available in the third trimester to further discuss these  complications  I recommend early postpartum follow-up of her mood, and referral for therapy if needed  Evaluation and Management:  The patient was counseled regarding the above findings  The limitations of ultrasound were reviewed  The approximate face-to-face time was 30 minutes  The majority of time (>50%) was spent counseling and/or coordinating care with the patient and/or family members  At the conclusion of today's encounter, all questions were answered to her satisfaction   Thank you very much for this kind referral and please do not hesitate to contact me with any further questions or concerns      Sincerely,    Vianey Bridges MD  Attending Physician, [place]

## 2020-03-10 NOTE — ASSESSMENT & PLAN NOTE
We reviewed the availability of genetic screening, as well as diagnostic testing, which are available to all pregnant women  She declines screening/testing, and understands that ultrasound does not accurately diagnose genetic syndromes  A detailed anatomic survey as well as transvaginal cervical length screening are recommended between 18-22 weeks gestation  The use of low dose aspirin in pregnancy (81-162mg) is recommended in women with a high risk, or multiple moderate risk factors for preeclampsia  Aspirin therapy should be initiated between 12-28 weeks gestation, and is most effective if started prior to 16 weeks gestation, and continued until delivery  Low dose aspirin in pregnancy has been shown to reduce the incidence of preeclampsia in women with risk factors, and has been shown to be safe and without significant maternal or fetal risk  In light of her risk factors which include type 1 diabetes, I recommend initiating aspirin therapy, which was prescribed today

## 2020-03-10 NOTE — ASSESSMENT & PLAN NOTE
Pregnancies complicated by pre-gestational diabetes are associated  with increased  and maternal morbidity  Many of the risks are correlated to glycemic control entering pregnancy (measured by Hemoglobin A1C), and affected by antepartum glycemic control  Her most recent hemoglobin A1C is 8 5 on 2/3/20  Fetal risks include  macrosomia, shoulder dystocia, birth injuries, hypoglycemia,  hyperbilirubinemia, and potential long-term sequelae such as obesity and  impaired intellectual achievement  Maternal risks include preeclampsia and hypertensive disorders of pregnancy, operative delivery  Aggressive treatment of pregestational diabetes with diet, exercise, and medications including metformin and insulin have been shown to decrease fetal and  morbidity and mortality  We have a diabetes in pregnancy program where we follow our patients closely to help achieve optimal maternal and  outcomes  She is already established with our program, and I recommend that she be closely followed for the duration of her pregnancy  Since her hemoglobin A1C is 6 5 or greater, an early anatomic survey for malformations is also recommended  Following a 20 week anatomic survey, fetal echocardiography is recommended at 22 weeks gestation, due an increased risk of congenital heart disease  Subsequently, evaluation of fetal growth every 4 weeks is recommended beginning at 28 weeks gestation  Antepartum fetal surveillance should be initiated twice weekly at 32 weeks gestation  Delivery timing should be individualized based on glycemic control  Patients who remain well controlled on medication should deliver between 39 0/7 and 39 6/7 weeks gestation, unless additional obstetric factors necessitate otherwise  Due to the risk of preeclampsia, I recommend a baby aspirin (81-162mg daily) be initiated as soon as possible, and before 28 weeks gestation unless contraindicated  Aspirin was prescribed today

## 2020-03-10 NOTE — ASSESSMENT & PLAN NOTE
We reviewed her history of anxiety  She was previously well controlled on Zoloft, but discontinued due to pregnancy and is having worsening anxiety especially with hyperemesis  The relative safety of therapy and medication during and after pregnancy, compared with uncontrolled maternal mood disorders was emphasized  The use of selective serotonin reuptake inhibitors (SSRIs) in pregnancy is associated with approximately a 30% risk of transient  withdrawal, which is typically self-limited and does not usually require  intensive care  There is also approximately a 1% risk of persistent pulmonary hypertension of the  (PPHN), which can require  intensive care treatment  Importantly, this risk also exists in the general population  Decisions about maternal medication use in pregnancy should be individualized, and I support her resuming Zoloft based on her symptoms  If desired, neonatology consultation is available in the third trimester to further discuss these  complications  I recommend early postpartum follow-up of her mood, and referral for therapy if needed

## 2020-03-10 NOTE — PATIENT INSTRUCTIONS
The use of low dose aspirin in pregnancy (81-162mg) is recommended in women with a high risk, or multiple moderate risk factors for preeclampsia  Aspirin therapy should be initiated between 12-28 weeks gestation, and is most effective if started prior to 16 weeks gestation, and continued until delivery  Low dose aspirin in pregnancy has been shown to reduce the incidence of preeclampsia in women with risk factors, and has been shown to be safe and without significant maternal or fetal risk  In light of your risk factors for preeclampsia, including: Diabetes (Type 1 or 2) I recommend initiating aspirin therapy, which was prescribed today

## 2020-03-10 NOTE — LETTER
March 10, 2020     [address]  [address] [address]    Patient: Lu Genao   YOB: 1995   Date of Visit: 3/10/2020       Dear Dr Joesph Peoples: Thank you for referring Lu Genao to me for evaluation  Below are my notes for this consultation  If you have questions, please do not hesitate to call me  I look forward to following your patient along with you  Sincerely,        Shawn Hicks MD        CC: No Recipients  Shawn Hicks MD  3/10/2020  9:26 AM  Sign at close encounter  CONSULTATION: MATERNAL-FETAL MEDICINE    Dear Guillermo Kendrick Md  [facility] [address]  [address] [facility], [facility],    Thank you very much for your kind referral of patient Lu Genao for Maternal-Fetal Medicine consultation  As you know, Ms Merlin Glass is a 30y o  year-old  at 14w9d presenting for consultation for pre-existing type 1 diabetes and genetic screening  She has no complaints today  Her Antepartum course is significant for admission for hyperemesis in the first trimester  Her symptoms are presently well controlled on her anti-emetic regimen  She also has pre-existing type 1 diabetes and is on an OmniPod pump, and followed by our diabetes in pregnancy program  Her EDC was determined by a 6 week ultrasound that was 7 days discrepant from menstrual dating       Patient Active Problem List   Diagnosis    Type 1 diabetes mellitus during pregnancy    Depression    Chronic GERD    Insulin pump in place    12 weeks gestation of pregnancy    Anxiety, generalized    Hypoglycemia due to type 1 diabetes mellitus ([facility] [address] )    Insulin pump titration    History of diabetes with ketoacidosis    Hyperemesis gravidarum       Obstetric History:  OB History    Para Term  AB Living   1             SAB TAB Ectopic Multiple Live Births                  # Outcome Date GA Lbr Abiodun/2nd Weight Sex Delivery Anes PTL Lv   1 Current                Past Medical History:  Past Medical History:   Diagnosis Date    Allergic dermatitis     Resolved 3/2/2016     Anxiety     Autoimmune disorder ([facility] )     Diabetes mellitus type 1 ([facility] )     Diabetic ketoacidosis without coma associated with type 1 diabetes mellitus ([facility] ) 2/22/2016    Hypoglycemia due to type 1 diabetes mellitus ([facility] )     Urinary tract infection     last episode 2 yrs ago    Varicella     childhood chickenpox     She has a history of type 1 diabetes with a history of DKA  Had sepsis after wisdom tooth extraction  She has anxiety and was on Zoloft pre-pregnancy, would like to resume, mood hasn't been well controlled particularly with hyperemesis  Past Surgical History:  Past Surgical History:   Procedure Laterality Date    FL INJECTION LEFT SHOULDER (ARTHROGRAM)  12/11/2018    RI ESOPHAGOGASTRODUODENOSCOPY TRANSORAL DIAGNOSTIC N/A 4/24/2019    Procedure: ESOPHAGOGASTRODUODENOSCOPY (EGD) with biopsy;  Surgeon: Isabel Jones DO;  Location: [facility] OR;  Service: Gastroenterology    WISDOM TOOTH EXTRACTION Bilateral    Sepsis after wisdom tooth extraction  Social History:   She denies current use of alcohol, drugs of abuse, tobacco, and marijuana products  Family History:  Family history was reviewed using an office screening tool, and is negative for congenital anomalies, genetic diseases, and thromboembolism in first degree relatives of this pregnancy  Family history is notable for diabetes on her side of the family  Medications:    Current Outpatient Medications:     Doxylamine-Pyridoxine 10-10 MG TBEC, Take 1 tablet by mouth every 8 (eight) hours as needed (nausea), Disp: 30 tablet, Rfl: 1    GLUCAGON EMERGENCY 1 MG injection, Inject 1 mg into a muscle once for 1 dose, Disp: 1 mg, Rfl: 3    glucose blood test strip, Freestyle test strips (Not lite)   Tests up to 8 times a day , Disp: 400 each, Rfl: 3    insulin lispro (HUMALOG) 100 units/mL injection, USE 70 UNITS VIA INSULIN PUMP DAILY AS DIRECTED, Disp: 20 mL, Rfl: 6    Prenatal Vit-Fe Fumarate-FA (PRENATAL VITAMIN PO), Take by mouth, Disp: , Rfl:     promethazine (PHENERGAN) 12 5 MG tablet, Take 1 tablet (12 5 mg total) by mouth every 6 (six) hours as needed for nausea or vomiting, Disp: 30 tablet, Rfl: 2    aspirin (ECOTRIN LOW STRENGTH) 81 mg EC tablet, Take 2 tablets (162 mg total) by mouth daily, Disp: 60 tablet, Rfl: 3    insulin glargine (BASAGLAR KWIKPEN) 100 units/mL injection pen, 30 units daily while off pump  (Patient not taking: Reported on 2020), Disp: 5 pen, Rfl: 1    ondansetron (ZOFRAN) 4 mg tablet, Take 1 tablet (4 mg total) by mouth every 8 (eight) hours as needed for nausea or vomiting (Patient not taking: Reported on 3/10/2020), Disp: 12 tablet, Rfl: 0    sertraline (ZOLOFT) 25 mg tablet, Take 2 tablets (50 mg total) by mouth daily Take 1 tab (25mg)/day for 1 week, then increase to 50mg (2 tabs) daily if needed  , Disp: 60 tablet, Rfl: 0    Allergies: Allergies   Allergen Reactions    Reglan [Metoclopramide] Anxiety       Review of Systems:  Pertinent items are noted in HPI  Exam:  Vitals: Blood pressure 117/70, pulse 87, height 4' 11" (1 499 m), weight 63 kg (138 lb 12 8 oz), last menstrual period 2019, not currently breastfeeding  General appearance: alert, well appearing, and in no distress  The remainder of her physical examination was deferred as she was here today for consultation and discussion  Ultrasound findings today are as follows: There is a single live intrauterine pregnancy with size equivalent to dates  No gross anomalies were identified on limited views  Amniotic fluid is within normal limits  Nuchal translucency measures 1 9mm which is <95th percentile for this crown-rump length         My recommendations are as follows:     Type 1 diabetes mellitus during pregnancy  Pregnancies complicated by pre-gestational diabetes are associated  with increased  and maternal morbidity  Many of the risks are correlated to glycemic control entering pregnancy (measured by Hemoglobin A1C), and affected by antepartum glycemic control  Her most recent hemoglobin A1C is 8 5 on 2/3/20  Fetal risks include  macrosomia, shoulder dystocia, birth injuries, hypoglycemia,  hyperbilirubinemia, and potential long-term sequelae such as obesity and  impaired intellectual achievement  Maternal risks include preeclampsia and hypertensive disorders of pregnancy, operative delivery  Aggressive treatment of pregestational diabetes with diet, exercise, and medications including metformin and insulin have been shown to decrease fetal and  morbidity and mortality  We have a diabetes in pregnancy program where we follow our patients closely to help achieve optimal maternal and  outcomes  She is already established with our program, and I recommend that she be closely followed for the duration of her pregnancy  Since her hemoglobin A1C is 6 5 or greater, an early anatomic survey for malformations is also recommended  Following a 20 week anatomic survey, fetal echocardiography is recommended at 22 weeks gestation, due an increased risk of congenital heart disease  Subsequently, evaluation of fetal growth every 4 weeks is recommended beginning at 28 weeks gestation  Antepartum fetal surveillance should be initiated twice weekly at 32 weeks gestation  Delivery timing should be individualized based on glycemic control  Patients who remain well controlled on medication should deliver between 39 0/7 and 39 6/7 weeks gestation, unless additional obstetric factors necessitate otherwise  Due to the risk of preeclampsia, I recommend a baby aspirin (81-162mg daily) be initiated as soon as possible, and before 28 weeks gestation unless contraindicated  Aspirin was prescribed today      12 weeks gestation of pregnancy   We reviewed the availability of genetic screening, as well as diagnostic testing, which are available to all pregnant women  She declines screening/testing, and understands that ultrasound does not accurately diagnose genetic syndromes  A detailed anatomic survey as well as transvaginal cervical length screening are recommended between 18-22 weeks gestation  The use of low dose aspirin in pregnancy (81-162mg) is recommended in women with a high risk, or multiple moderate risk factors for preeclampsia  Aspirin therapy should be initiated between 12-28 weeks gestation, and is most effective if started prior to 16 weeks gestation, and continued until delivery  Low dose aspirin in pregnancy has been shown to reduce the incidence of preeclampsia in women with risk factors, and has been shown to be safe and without significant maternal or fetal risk  In light of her risk factors which include type 1 diabetes, I recommend initiating aspirin therapy, which was prescribed today  Anxiety, generalized  We reviewed her history of anxiety  She was previously well controlled on Zoloft, but discontinued due to pregnancy and is having worsening anxiety especially with hyperemesis  The relative safety of therapy and medication during and after pregnancy, compared with uncontrolled maternal mood disorders was emphasized  The use of selective serotonin reuptake inhibitors (SSRIs) in pregnancy is associated with approximately a 30% risk of transient  withdrawal, which is typically self-limited and does not usually require  intensive care  There is also approximately a 1% risk of persistent pulmonary hypertension of the  (PPHN), which can require  intensive care treatment  Importantly, this risk also exists in the general population  Decisions about maternal medication use in pregnancy should be individualized, and I support her resuming Zoloft based on her symptoms   If desired, neonatology consultation is available in the third trimester to further discuss these  complications  I recommend early postpartum follow-up of her mood, and referral for therapy if needed  Evaluation and Management:  The patient was counseled regarding the above findings  The limitations of ultrasound were reviewed  The approximate face-to-face time was 30 minutes  The majority of time (>50%) was spent counseling and/or coordinating care with the patient and/or family members  At the conclusion of today's encounter, all questions were answered to her satisfaction  Thank you very much for this kind referral and please do not hesitate to contact me with any further questions or concerns      Sincerely,    Nohemi Toure MD  Attending Physician, [place]

## 2020-03-11 LAB — HIV 1+2 AB+HIV1 P24 AG SERPL QL IA: NORMAL

## 2020-03-12 ENCOUNTER — TELEPHONE (OUTPATIENT)
Dept: OBGYN CLINIC | Facility: CLINIC | Age: 25
End: 2020-03-12

## 2020-03-12 NOTE — TELEPHONE ENCOUNTER
----- Message from Tonie Lara MD sent at 3/11/2020  7:27 AM EDT -----  Please let Sandy Arriola know that her prenatal labs were normal  thanks

## 2020-03-17 ENCOUNTER — TELEPHONE (OUTPATIENT)
Dept: PERINATAL CARE | Facility: CLINIC | Age: 25
End: 2020-03-17

## 2020-03-19 ENCOUNTER — DOCUMENTATION (OUTPATIENT)
Dept: PERINATAL CARE | Facility: CLINIC | Age: 25
End: 2020-03-19

## 2020-03-19 DIAGNOSIS — Z3A.14 14 WEEKS GESTATION OF PREGNANCY: ICD-10-CM

## 2020-03-19 DIAGNOSIS — Z46.81 INSULIN PUMP TITRATION: ICD-10-CM

## 2020-03-19 DIAGNOSIS — O24.012 PRE-EXISTING TYPE 1 DIABETES MELLITUS WITH HYPERGLYCEMIA DURING PREGNANCY IN SECOND TRIMESTER (HCC): Primary | ICD-10-CM

## 2020-03-19 DIAGNOSIS — E10.649 HYPOGLYCEMIA DUE TO TYPE 1 DIABETES MELLITUS (HCC): ICD-10-CM

## 2020-03-19 DIAGNOSIS — E10.65 PRE-EXISTING TYPE 1 DIABETES MELLITUS WITH HYPERGLYCEMIA DURING PREGNANCY IN SECOND TRIMESTER (HCC): Primary | ICD-10-CM

## 2020-03-19 NOTE — PROGRESS NOTES
Date:  20  RE: Sis Martinez    : 1995  MODE: Estimated Date of Delivery: 20  EGA: 14 1/7 weeks  Type 1 diabetes on Omnipod insulin pump      Current regimen:  1700 calories diet for pregnancy with 3 meals & 3 snacks  Self-monitoring blood glucose  Omnipod Insulin Pump settings with Humalog insulin:            Basal Rates   12 AM-4 AM: 0 85   4 AM-8 AM: 1 20   8 AM-4 PM: 1 15   4 PM-12 AM: 1 00  Insulin to Carbohydrate Ratio (ICR): 14  Insulin Sensitivity Factor (ISF): 50  Insulin on Board (IOB): 4 hours  Blood Glucose Target: 90     Plan: Will recommend consistency with eating 3 meals and 3 snacks including combination of carbohydrates, protein and fat per meal/snack  Minimum total carbohydrates 175 mg a day recommended for pregnancy  Will encourage to self monitor blood glucose before meals, 1 or 2 hours post start of meal, at bedtime and 3 AM  Will verify patient using current Freestyle test strips  Unable to make adjustments to insulin pump basal settings due to inconsistency with eating and wide variations in glucose readings  Will decrease ICR to 1:11   2/3/20 [id number] 5%; repeat A1c  Date due to report next:  Monday, 3/23/20      ADILENE Villaseñor, KRYSTYNAE, BC-ADM  Diabetes Educator   Diabetes and Pregnancy Program

## 2020-03-30 ENCOUNTER — ROUTINE PRENATAL (OUTPATIENT)
Dept: OBGYN CLINIC | Facility: CLINIC | Age: 25
End: 2020-03-30

## 2020-03-30 VITALS — DIASTOLIC BLOOD PRESSURE: 58 MMHG | SYSTOLIC BLOOD PRESSURE: 98 MMHG | BODY MASS INDEX: 27.87 KG/M2 | WEIGHT: 138 LBS

## 2020-03-30 DIAGNOSIS — Z11.3 SCREEN FOR SEXUALLY TRANSMITTED DISEASES: ICD-10-CM

## 2020-03-30 DIAGNOSIS — O21.9 NAUSEA/VOMITING IN PREGNANCY: ICD-10-CM

## 2020-03-30 DIAGNOSIS — O21.0 HYPEREMESIS GRAVIDARUM: ICD-10-CM

## 2020-03-30 DIAGNOSIS — Z12.4 CERVICAL CANCER SCREENING: ICD-10-CM

## 2020-03-30 DIAGNOSIS — Z34.02 ENCOUNTER FOR SUPERVISION OF NORMAL FIRST PREGNANCY IN SECOND TRIMESTER: Primary | ICD-10-CM

## 2020-03-30 DIAGNOSIS — E10.65 PRE-EXISTING TYPE 1 DIABETES MELLITUS WITH HYPERGLYCEMIA DURING PREGNANCY IN SECOND TRIMESTER (HCC): ICD-10-CM

## 2020-03-30 DIAGNOSIS — O24.012 PRE-EXISTING TYPE 1 DIABETES MELLITUS WITH HYPERGLYCEMIA DURING PREGNANCY IN SECOND TRIMESTER (HCC): ICD-10-CM

## 2020-03-30 LAB
SL AMB  POCT GLUCOSE, UA: NORMAL
SL AMB POCT URINE PROTEIN: NORMAL

## 2020-03-30 PROCEDURE — 3060F POS MICROALBUMINURIA REV: CPT | Performed by: OBSTETRICS & GYNECOLOGY

## 2020-03-30 PROCEDURE — PNV: Performed by: OBSTETRICS & GYNECOLOGY

## 2020-03-30 PROCEDURE — 2022F DILAT RTA XM EVC RTNOPTHY: CPT | Performed by: OBSTETRICS & GYNECOLOGY

## 2020-03-30 PROCEDURE — G0145 SCR C/V CYTO,THINLAYER,RESCR: HCPCS | Performed by: OBSTETRICS & GYNECOLOGY

## 2020-03-30 RX ORDER — PROMETHAZINE HYDROCHLORIDE 12.5 MG/1
12.5 TABLET ORAL EVERY 6 HOURS PRN
Qty: 30 TABLET | Refills: 2 | Status: SHIPPED | OUTPATIENT
Start: 2020-03-30 | End: 2020-07-24

## 2020-03-30 NOTE — PROGRESS NOTES
B pos, had flu shot, first part of sequential screen completed, level 2 is scheduled for 4/14  Pap/GC/chlamydia collected today  She is taking phenergan every 6 hours, feels better

## 2020-03-30 NOTE — PROGRESS NOTES
Laila Bettencourt is doing well  Works as MA at [email or web address]  Pap and cultures done today - cervical significantly friable, with notable bleeding after pap  Discussed with patient  Advised pelvic rest x 2 weeks  Has level II US scheduled  Discussed COVID and scheduling office appointments versus virtual visits  Problem List Items Addressed This Visit        Digestive    Hyperemesis gravidarum     Improving, well controlled with phenergan - rx sent to her pharmacy as she needed refills  Endocrine    Pre-existing type 1 diabetes mellitus with hyperglycemia during pregnancy in second trimester ([address] )     Follows with [facility] - reports sugars have been controlled      Lab Results   Component Value Date    HGBA1C 8 5 (H) 02/03/2020              Other Visit Diagnoses     Cervical cancer screening    -  Primary    Relevant Orders    Liquid-based pap, screening    Encounter for supervision of normal first pregnancy in second trimester        Relevant Orders    POCT urine dip (Completed)    Chlamydia/GC amplified DNA by PCR    Screen for sexually transmitted diseases        Relevant Orders    Chlamydia/GC amplified DNA by PCR    Nausea/vomiting in pregnancy        Relevant Medications    promethazine (PHENERGAN) 12 5 MG tablet

## 2020-03-30 NOTE — ASSESSMENT & PLAN NOTE
Follows with [facility] - reports sugars have been controlled      Lab Results   Component Value Date    HGBA1C 8 5 (H) 02/03/2020

## 2020-03-31 LAB
LAB AP GYN PRIMARY INTERPRETATION: NORMAL
Lab: NORMAL

## 2020-04-01 DIAGNOSIS — F41.1 ANXIETY, GENERALIZED: ICD-10-CM

## 2020-04-01 RX ORDER — SERTRALINE HYDROCHLORIDE 25 MG/1
TABLET, FILM COATED ORAL
Qty: 60 TABLET | Refills: 0 | Status: SHIPPED | OUTPATIENT
Start: 2020-04-01 | End: 2020-04-29

## 2020-04-03 LAB
C TRACH DNA SPEC QL NAA+PROBE: ABNORMAL
N GONORRHOEA DNA SPEC QL NAA+PROBE: ABNORMAL

## 2020-04-06 ENCOUNTER — TELEPHONE (OUTPATIENT)
Dept: LAB | Facility: HOSPITAL | Age: 25
End: 2020-04-06

## 2020-04-06 ENCOUNTER — TELEPHONE (OUTPATIENT)
Dept: OBGYN CLINIC | Facility: CLINIC | Age: 25
End: 2020-04-06

## 2020-04-06 DIAGNOSIS — Z11.3 SCREEN FOR SEXUALLY TRANSMITTED DISEASES: Primary | ICD-10-CM

## 2020-04-07 ENCOUNTER — DOCUMENTATION (OUTPATIENT)
Dept: PERINATAL CARE | Facility: CLINIC | Age: 25
End: 2020-04-07

## 2020-04-08 ENCOUNTER — TELEMEDICINE (OUTPATIENT)
Dept: PERINATAL CARE | Facility: CLINIC | Age: 25
End: 2020-04-08
Payer: COMMERCIAL

## 2020-04-08 DIAGNOSIS — E10.65 PRE-EXISTING TYPE 1 DIABETES MELLITUS WITH HYPERGLYCEMIA DURING PREGNANCY IN SECOND TRIMESTER (HCC): Primary | ICD-10-CM

## 2020-04-08 DIAGNOSIS — E10.65 TYPE 1 DIABETES MELLITUS WITH HYPERGLYCEMIA (HCC): ICD-10-CM

## 2020-04-08 DIAGNOSIS — E10.649 HYPOGLYCEMIA DUE TO TYPE 1 DIABETES MELLITUS (HCC): ICD-10-CM

## 2020-04-08 DIAGNOSIS — O24.012 PRE-EXISTING TYPE 1 DIABETES MELLITUS WITH HYPERGLYCEMIA DURING PREGNANCY IN SECOND TRIMESTER (HCC): Primary | ICD-10-CM

## 2020-04-08 DIAGNOSIS — Z46.81 INSULIN PUMP TITRATION: ICD-10-CM

## 2020-04-08 DIAGNOSIS — Z3A.17 17 WEEKS GESTATION OF PREGNANCY: ICD-10-CM

## 2020-04-08 PROCEDURE — 99215 OFFICE O/P EST HI 40 MIN: CPT | Performed by: NURSE PRACTITIONER

## 2020-04-08 RX ORDER — BLOOD-GLUCOSE,RECEIVER,CONT
EACH MISCELLANEOUS
Qty: 1 DEVICE | Refills: 0 | Status: SHIPPED | OUTPATIENT
Start: 2020-04-08 | End: 2020-06-25

## 2020-04-08 RX ORDER — BLOOD-GLUCOSE SENSOR
EACH MISCELLANEOUS
Qty: 1 EACH | Refills: 12 | Status: SHIPPED | OUTPATIENT
Start: 2020-04-08 | End: 2020-07-01 | Stop reason: SDUPTHER

## 2020-04-08 RX ORDER — IBUPROFEN 600 MG/1
1 TABLET ORAL ONCE
Qty: 1 MG | Refills: 3 | Status: SHIPPED | OUTPATIENT
Start: 2020-04-08 | End: 2020-04-27 | Stop reason: SDUPTHER

## 2020-04-08 RX ORDER — BLOOD-GLUCOSE TRANSMITTER
EACH MISCELLANEOUS
Qty: 1 EACH | Refills: 3 | Status: SHIPPED | OUTPATIENT
Start: 2020-04-08 | End: 2020-07-01 | Stop reason: SDUPTHER

## 2020-04-13 ENCOUNTER — TELEPHONE (OUTPATIENT)
Dept: PERINATAL CARE | Facility: CLINIC | Age: 25
End: 2020-04-13

## 2020-04-15 ENCOUNTER — TELEMEDICINE (OUTPATIENT)
Dept: FAMILY MEDICINE CLINIC | Facility: CLINIC | Age: 25
End: 2020-04-15
Payer: COMMERCIAL

## 2020-04-15 VITALS — TEMPERATURE: 100.6 F | WEIGHT: 148 LBS | BODY MASS INDEX: 29.84 KG/M2 | HEIGHT: 59 IN | HEART RATE: 66 BPM

## 2020-04-15 DIAGNOSIS — R19.7 DIARRHEA, UNSPECIFIED TYPE: Primary | ICD-10-CM

## 2020-04-15 DIAGNOSIS — R50.9 FEVER, UNSPECIFIED FEVER CAUSE: ICD-10-CM

## 2020-04-15 PROCEDURE — 1036F TOBACCO NON-USER: CPT | Performed by: NURSE PRACTITIONER

## 2020-04-15 PROCEDURE — 3060F POS MICROALBUMINURIA REV: CPT | Performed by: NURSE PRACTITIONER

## 2020-04-15 PROCEDURE — 2022F DILAT RTA XM EVC RTNOPTHY: CPT | Performed by: NURSE PRACTITIONER

## 2020-04-15 PROCEDURE — 99213 OFFICE O/P EST LOW 20 MIN: CPT | Performed by: NURSE PRACTITIONER

## 2020-04-15 PROCEDURE — 3052F HG A1C>EQUAL 8.0%<EQUAL 9.0%: CPT | Performed by: NURSE PRACTITIONER

## 2020-04-19 ENCOUNTER — NURSE TRIAGE (OUTPATIENT)
Dept: OTHER | Facility: OTHER | Age: 25
End: 2020-04-19

## 2020-04-19 ENCOUNTER — HOSPITAL ENCOUNTER (EMERGENCY)
Facility: HOSPITAL | Age: 25
End: 2020-04-19
Attending: EMERGENCY MEDICINE | Admitting: EMERGENCY MEDICINE
Payer: COMMERCIAL

## 2020-04-19 ENCOUNTER — HOSPITAL ENCOUNTER (OUTPATIENT)
Facility: HOSPITAL | Age: 25
Setting detail: OBSERVATION
Discharge: HOME | End: 2020-04-20
Attending: STUDENT IN AN ORGANIZED HEALTH CARE EDUCATION/TRAINING PROGRAM | Admitting: STUDENT IN AN ORGANIZED HEALTH CARE EDUCATION/TRAINING PROGRAM
Payer: COMMERCIAL

## 2020-04-19 VITALS
DIASTOLIC BLOOD PRESSURE: 76 MMHG | RESPIRATION RATE: 20 BRPM | OXYGEN SATURATION: 100 % | HEART RATE: 101 BPM | SYSTOLIC BLOOD PRESSURE: 119 MMHG | TEMPERATURE: 97.9 F

## 2020-04-19 DIAGNOSIS — R10.9 ABDOMINAL PAIN: Primary | ICD-10-CM

## 2020-04-19 DIAGNOSIS — R11.2 NAUSEA & VOMITING: ICD-10-CM

## 2020-04-19 LAB
ALBUMIN SERPL BCP-MCNC: 3.1 G/DL (ref 3.5–5)
ALP SERPL-CCNC: 58 U/L (ref 46–116)
ALT SERPL W P-5'-P-CCNC: 14 U/L (ref 12–78)
ANION GAP SERPL CALCULATED.3IONS-SCNC: 11 MMOL/L (ref 4–13)
AST SERPL W P-5'-P-CCNC: 14 U/L (ref 5–45)
BASE EXCESS BLDA CALC-SCNC: -4 MMOL/L (ref -2–3)
BASOPHILS # BLD AUTO: 0.05 THOUSANDS/ΜL (ref 0–0.1)
BASOPHILS NFR BLD AUTO: 0 % (ref 0–1)
BETA-HYDROXYBUTYRATE: 0.8 MMOL/L
BILIRUB SERPL-MCNC: 0.4 MG/DL (ref 0.2–1)
BUN SERPL-MCNC: 7 MG/DL (ref 5–25)
CA-I BLD-SCNC: 1.13 MMOL/L (ref 1.12–1.32)
CALCIUM SERPL-MCNC: 8.4 MG/DL (ref 8.3–10.1)
CHLORIDE SERPL-SCNC: 101 MMOL/L (ref 100–108)
CLARITY, POC: CLEAR
CO2 SERPL-SCNC: 23 MMOL/L (ref 21–32)
COLOR, POC: ABNORMAL
CREAT SERPL-MCNC: 0.56 MG/DL (ref 0.6–1.3)
EOSINOPHIL # BLD AUTO: 0.03 THOUSAND/ΜL (ref 0–0.61)
EOSINOPHIL NFR BLD AUTO: 0 % (ref 0–6)
ERYTHROCYTE [DISTWIDTH] IN BLOOD BY AUTOMATED COUNT: 12.8 % (ref 11.6–15.1)
EXT BILIRUBIN, UA: ABNORMAL
EXT BLOOD URINE: ABNORMAL
EXT GLUCOSE, UA: 1000
EXT KETONES: 160
EXT NITRITE, UA: ABNORMAL
EXT PH, UA: 6
EXT PROTEIN, UA: ABNORMAL
EXT SPECIFIC GRAVITY, UA: 1.02
EXT UROBILINOGEN: 0.2
GFR SERPL CREATININE-BSD FRML MDRD: 129 ML/MIN/1.73SQ M
GLUCOSE SERPL-MCNC: 252 MG/DL (ref 65–140)
GLUCOSE SERPL-MCNC: 254 MG/DL (ref 65–140)
GLUCOSE SERPL-MCNC: 254 MG/DL (ref 65–140)
GLUCOSE SERPL-MCNC: 267 MG/DL (ref 65–140)
GLUCOSE SERPL-MCNC: 293 MG/DL (ref 65–140)
HCO3 BLDA-SCNC: 20.5 MMOL/L (ref 24–30)
HCT VFR BLD AUTO: 38.9 % (ref 34.8–46.1)
HCT VFR BLD CALC: 39 % (ref 34.8–46.1)
HGB BLD-MCNC: 13.3 G/DL (ref 11.5–15.4)
HGB BLDA-MCNC: 13.3 G/DL (ref 11.5–15.4)
IMM GRANULOCYTES # BLD AUTO: 0.1 THOUSAND/UL (ref 0–0.2)
IMM GRANULOCYTES NFR BLD AUTO: 1 % (ref 0–2)
LIPASE SERPL-CCNC: 161 U/L (ref 73–393)
LYMPHOCYTES # BLD AUTO: 1.17 THOUSANDS/ΜL (ref 0.6–4.47)
LYMPHOCYTES NFR BLD AUTO: 6 % (ref 14–44)
MCH RBC QN AUTO: 31.4 PG (ref 26.8–34.3)
MCHC RBC AUTO-ENTMCNC: 34.2 G/DL (ref 31.4–37.4)
MCV RBC AUTO: 92 FL (ref 82–98)
MONOCYTES # BLD AUTO: 0.45 THOUSAND/ΜL (ref 0.17–1.22)
MONOCYTES NFR BLD AUTO: 2 % (ref 4–12)
NEUTROPHILS # BLD AUTO: 17.07 THOUSANDS/ΜL (ref 1.85–7.62)
NEUTS SEG NFR BLD AUTO: 91 % (ref 43–75)
NRBC BLD AUTO-RTO: 0 /100 WBCS
PCO2 BLD: 22 MMOL/L (ref 21–32)
PCO2 BLD: 36.8 MM HG (ref 42–50)
PH BLD: 7.35 [PH] (ref 7.3–7.4)
PLATELET # BLD AUTO: 268 THOUSANDS/UL (ref 149–390)
PMV BLD AUTO: 10.4 FL (ref 8.9–12.7)
PO2 BLD: 37 MM HG (ref 35–45)
POTASSIUM BLD-SCNC: 3.9 MMOL/L (ref 3.5–5.3)
POTASSIUM SERPL-SCNC: 3.9 MMOL/L (ref 3.5–5.3)
PROT SERPL-MCNC: 7.2 G/DL (ref 6.4–8.2)
RBC # BLD AUTO: 4.24 MILLION/UL (ref 3.81–5.12)
SAO2 % BLD FROM PO2: 68 % (ref 60–85)
SODIUM BLD-SCNC: 134 MMOL/L (ref 136–145)
SODIUM SERPL-SCNC: 135 MMOL/L (ref 136–145)
SPECIMEN SOURCE: ABNORMAL
WBC # BLD AUTO: 18.87 THOUSAND/UL (ref 4.31–10.16)
WBC # BLD EST: ABNORMAL 10*3/UL

## 2020-04-19 PROCEDURE — 84132 ASSAY OF SERUM POTASSIUM: CPT

## 2020-04-19 PROCEDURE — 82948 REAGENT STRIP/BLOOD GLUCOSE: CPT

## 2020-04-19 PROCEDURE — 96375 TX/PRO/DX INJ NEW DRUG ADDON: CPT

## 2020-04-19 PROCEDURE — 96374 THER/PROPH/DIAG INJ IV PUSH: CPT

## 2020-04-19 PROCEDURE — 85014 HEMATOCRIT: CPT

## 2020-04-19 PROCEDURE — 81002 URINALYSIS NONAUTO W/O SCOPE: CPT | Performed by: EMERGENCY MEDICINE

## 2020-04-19 PROCEDURE — 96361 HYDRATE IV INFUSION ADD-ON: CPT

## 2020-04-19 PROCEDURE — 80053 COMPREHEN METABOLIC PANEL: CPT | Performed by: EMERGENCY MEDICINE

## 2020-04-19 PROCEDURE — 99284 EMERGENCY DEPT VISIT MOD MDM: CPT | Performed by: EMERGENCY MEDICINE

## 2020-04-19 PROCEDURE — 82803 BLOOD GASES ANY COMBINATION: CPT

## 2020-04-19 PROCEDURE — 82330 ASSAY OF CALCIUM: CPT

## 2020-04-19 PROCEDURE — 82947 ASSAY GLUCOSE BLOOD QUANT: CPT

## 2020-04-19 PROCEDURE — 96372 THER/PROPH/DIAG INJ SC/IM: CPT

## 2020-04-19 PROCEDURE — 82010 KETONE BODYS QUAN: CPT | Performed by: EMERGENCY MEDICINE

## 2020-04-19 PROCEDURE — 83690 ASSAY OF LIPASE: CPT | Performed by: EMERGENCY MEDICINE

## 2020-04-19 PROCEDURE — 84295 ASSAY OF SERUM SODIUM: CPT

## 2020-04-19 PROCEDURE — 99284 EMERGENCY DEPT VISIT MOD MDM: CPT

## 2020-04-19 PROCEDURE — 36415 COLL VENOUS BLD VENIPUNCTURE: CPT | Performed by: EMERGENCY MEDICINE

## 2020-04-19 PROCEDURE — 85025 COMPLETE CBC W/AUTO DIFF WBC: CPT | Performed by: EMERGENCY MEDICINE

## 2020-04-19 RX ORDER — PROMETHAZINE HYDROCHLORIDE 25 MG/ML
25 INJECTION, SOLUTION INTRAMUSCULAR; INTRAVENOUS ONCE
Status: COMPLETED | OUTPATIENT
Start: 2020-04-19 | End: 2020-04-19

## 2020-04-19 RX ORDER — ONDANSETRON 2 MG/ML
4 INJECTION INTRAMUSCULAR; INTRAVENOUS ONCE
Status: COMPLETED | OUTPATIENT
Start: 2020-04-19 | End: 2020-04-19

## 2020-04-19 RX ADMIN — PROMETHAZINE HYDROCHLORIDE 25 MG: 25 INJECTION INTRAMUSCULAR; INTRAVENOUS at 20:26

## 2020-04-19 RX ADMIN — SODIUM CHLORIDE 1000 ML: 0.9 INJECTION, SOLUTION INTRAVENOUS at 20:26

## 2020-04-19 RX ADMIN — INSULIN HUMAN 10 UNITS: 100 INJECTION, SOLUTION PARENTERAL at 22:27

## 2020-04-19 RX ADMIN — SODIUM CHLORIDE 1000 ML: 0.9 INJECTION, SOLUTION INTRAVENOUS at 22:22

## 2020-04-19 RX ADMIN — ONDANSETRON 4 MG: 2 INJECTION INTRAMUSCULAR; INTRAVENOUS at 22:24

## 2020-04-20 ENCOUNTER — APPOINTMENT (OUTPATIENT)
Dept: MRI IMAGING | Facility: HOSPITAL | Age: 25
End: 2020-04-20
Payer: COMMERCIAL

## 2020-04-20 VITALS
RESPIRATION RATE: 16 BRPM | WEIGHT: 138 LBS | HEART RATE: 97 BPM | TEMPERATURE: 99.1 F | HEIGHT: 59 IN | DIASTOLIC BLOOD PRESSURE: 59 MMHG | OXYGEN SATURATION: 100 % | BODY MASS INDEX: 27.82 KG/M2 | SYSTOLIC BLOOD PRESSURE: 100 MMHG

## 2020-04-20 PROBLEM — O26.892 ABDOMINAL PAIN IN PREGNANCY, SECOND TRIMESTER: Status: ACTIVE | Noted: 2020-04-20

## 2020-04-20 PROBLEM — R10.9 ABDOMINAL PAIN IN PREGNANCY, SECOND TRIMESTER: Status: ACTIVE | Noted: 2020-04-20

## 2020-04-20 LAB
ABO GROUP BLD: NORMAL
ABO GROUP BLD: NORMAL
ALBUMIN SERPL BCP-MCNC: 2.9 G/DL (ref 3.5–5)
ALP SERPL-CCNC: 56 U/L (ref 46–116)
ALT SERPL W P-5'-P-CCNC: 16 U/L (ref 12–78)
ANION GAP SERPL CALCULATED.3IONS-SCNC: 10 MMOL/L (ref 4–13)
ANION GAP SERPL CALCULATED.3IONS-SCNC: 14 MMOL/L (ref 4–13)
ANION GAP SERPL CALCULATED.3IONS-SCNC: 15 MMOL/L (ref 4–13)
AST SERPL W P-5'-P-CCNC: 23 U/L (ref 5–45)
BASOPHILS # BLD AUTO: 0.02 THOUSANDS/ΜL (ref 0–0.1)
BASOPHILS NFR BLD AUTO: 0 % (ref 0–1)
BILIRUB SERPL-MCNC: 0.54 MG/DL (ref 0.2–1)
BILIRUB UR QL STRIP: NEGATIVE
BLD GP AB SCN SERPL QL: NEGATIVE
BUN SERPL-MCNC: 3 MG/DL (ref 5–25)
BUN SERPL-MCNC: 6 MG/DL (ref 5–25)
BUN SERPL-MCNC: 7 MG/DL (ref 5–25)
CALCIUM SERPL-MCNC: 7 MG/DL (ref 8.3–10.1)
CALCIUM SERPL-MCNC: 7.5 MG/DL (ref 8.3–10.1)
CALCIUM SERPL-MCNC: 7.9 MG/DL (ref 8.3–10.1)
CHLORIDE SERPL-SCNC: 104 MMOL/L (ref 100–108)
CHLORIDE SERPL-SCNC: 108 MMOL/L (ref 100–108)
CHLORIDE SERPL-SCNC: 110 MMOL/L (ref 100–108)
CLARITY UR: CLEAR
CO2 SERPL-SCNC: 16 MMOL/L (ref 21–32)
CO2 SERPL-SCNC: 18 MMOL/L (ref 21–32)
CO2 SERPL-SCNC: 19 MMOL/L (ref 21–32)
COLOR UR: YELLOW
CREAT SERPL-MCNC: 0.48 MG/DL (ref 0.6–1.3)
CREAT SERPL-MCNC: 0.7 MG/DL (ref 0.6–1.3)
CREAT SERPL-MCNC: 0.84 MG/DL (ref 0.6–1.3)
EOSINOPHIL # BLD AUTO: 0 THOUSAND/ΜL (ref 0–0.61)
EOSINOPHIL NFR BLD AUTO: 0 % (ref 0–6)
ERYTHROCYTE [DISTWIDTH] IN BLOOD BY AUTOMATED COUNT: 13.1 % (ref 11.6–15.1)
EST. AVERAGE GLUCOSE BLD GHB EST-MCNC: 154 MG/DL
GFR SERPL CREATININE-BSD FRML MDRD: 120 ML/MIN/1.73SQ M
GFR SERPL CREATININE-BSD FRML MDRD: 136 ML/MIN/1.73SQ M
GFR SERPL CREATININE-BSD FRML MDRD: 96 ML/MIN/1.73SQ M
GLUCOSE P FAST SERPL-MCNC: 179 MG/DL (ref 65–99)
GLUCOSE SERPL-MCNC: 139 MG/DL (ref 65–140)
GLUCOSE SERPL-MCNC: 143 MG/DL (ref 65–140)
GLUCOSE SERPL-MCNC: 146 MG/DL (ref 65–140)
GLUCOSE SERPL-MCNC: 151 MG/DL (ref 65–140)
GLUCOSE SERPL-MCNC: 153 MG/DL (ref 65–140)
GLUCOSE SERPL-MCNC: 156 MG/DL (ref 65–140)
GLUCOSE SERPL-MCNC: 158 MG/DL (ref 65–140)
GLUCOSE SERPL-MCNC: 179 MG/DL (ref 65–140)
GLUCOSE SERPL-MCNC: 186 MG/DL (ref 65–140)
GLUCOSE SERPL-MCNC: 194 MG/DL (ref 65–140)
GLUCOSE SERPL-MCNC: 195 MG/DL (ref 65–140)
GLUCOSE SERPL-MCNC: 204 MG/DL (ref 65–140)
GLUCOSE SERPL-MCNC: 229 MG/DL (ref 65–140)
GLUCOSE SERPL-MCNC: 231 MG/DL (ref 65–140)
GLUCOSE SERPL-MCNC: 247 MG/DL (ref 65–140)
GLUCOSE UR STRIP-MCNC: ABNORMAL MG/DL
HBA1C MFR BLD: 7 %
HCT VFR BLD AUTO: 37.8 % (ref 34.8–46.1)
HGB BLD-MCNC: 12.6 G/DL (ref 11.5–15.4)
HGB UR QL STRIP.AUTO: NEGATIVE
IMM GRANULOCYTES # BLD AUTO: 0.07 THOUSAND/UL (ref 0–0.2)
IMM GRANULOCYTES NFR BLD AUTO: 0 % (ref 0–2)
KETONES UR STRIP-MCNC: ABNORMAL MG/DL
LACTATE SERPL-SCNC: 0.8 MMOL/L (ref 0.5–2)
LEUKOCYTE ESTERASE UR QL STRIP: NEGATIVE
LYMPHOCYTES # BLD AUTO: 0.89 THOUSANDS/ΜL (ref 0.6–4.47)
LYMPHOCYTES NFR BLD AUTO: 6 % (ref 14–44)
MCH RBC QN AUTO: 32.1 PG (ref 26.8–34.3)
MCHC RBC AUTO-ENTMCNC: 33.3 G/DL (ref 31.4–37.4)
MCV RBC AUTO: 96 FL (ref 82–98)
MONOCYTES # BLD AUTO: 0.45 THOUSAND/ΜL (ref 0.17–1.22)
MONOCYTES NFR BLD AUTO: 3 % (ref 4–12)
NEUTROPHILS # BLD AUTO: 14.21 THOUSANDS/ΜL (ref 1.85–7.62)
NEUTS SEG NFR BLD AUTO: 91 % (ref 43–75)
NITRITE UR QL STRIP: NEGATIVE
NRBC BLD AUTO-RTO: 0 /100 WBCS
PH UR STRIP.AUTO: 6 [PH]
PLATELET # BLD AUTO: 253 THOUSANDS/UL (ref 149–390)
PMV BLD AUTO: 10.2 FL (ref 8.9–12.7)
POTASSIUM SERPL-SCNC: 3.4 MMOL/L (ref 3.5–5.3)
POTASSIUM SERPL-SCNC: 3.6 MMOL/L (ref 3.5–5.3)
POTASSIUM SERPL-SCNC: 3.9 MMOL/L (ref 3.5–5.3)
PROT SERPL-MCNC: 7 G/DL (ref 6.4–8.2)
PROT UR STRIP-MCNC: NEGATIVE MG/DL
RBC # BLD AUTO: 3.92 MILLION/UL (ref 3.81–5.12)
RH BLD: POSITIVE
RH BLD: POSITIVE
SARS-COV-2 RNA RESP QL NAA+PROBE: NEGATIVE
SODIUM SERPL-SCNC: 137 MMOL/L (ref 136–145)
SODIUM SERPL-SCNC: 138 MMOL/L (ref 136–145)
SODIUM SERPL-SCNC: 139 MMOL/L (ref 136–145)
SP GR UR STRIP.AUTO: 1.02 (ref 1–1.03)
SPECIMEN EXPIRATION DATE: NORMAL
UROBILINOGEN UR QL STRIP.AUTO: 0.2 E.U./DL
WBC # BLD AUTO: 15.64 THOUSAND/UL (ref 4.31–10.16)

## 2020-04-20 PROCEDURE — 99217 PR OBSERVATION CARE DISCHARGE MANAGEMENT: CPT | Performed by: OBSTETRICS & GYNECOLOGY

## 2020-04-20 PROCEDURE — NC001 PR NO CHARGE: Performed by: OBSTETRICS & GYNECOLOGY

## 2020-04-20 PROCEDURE — 85025 COMPLETE CBC W/AUTO DIFF WBC: CPT | Performed by: OBSTETRICS & GYNECOLOGY

## 2020-04-20 PROCEDURE — 87086 URINE CULTURE/COLONY COUNT: CPT | Performed by: OBSTETRICS & GYNECOLOGY

## 2020-04-20 PROCEDURE — 80048 BASIC METABOLIC PNL TOTAL CA: CPT | Performed by: OBSTETRICS & GYNECOLOGY

## 2020-04-20 PROCEDURE — 86901 BLOOD TYPING SEROLOGIC RH(D): CPT | Performed by: STUDENT IN AN ORGANIZED HEALTH CARE EDUCATION/TRAINING PROGRAM

## 2020-04-20 PROCEDURE — 86850 RBC ANTIBODY SCREEN: CPT | Performed by: STUDENT IN AN ORGANIZED HEALTH CARE EDUCATION/TRAINING PROGRAM

## 2020-04-20 PROCEDURE — 72148 MRI LUMBAR SPINE W/O DYE: CPT

## 2020-04-20 PROCEDURE — 83036 HEMOGLOBIN GLYCOSYLATED A1C: CPT | Performed by: OBSTETRICS & GYNECOLOGY

## 2020-04-20 PROCEDURE — 80053 COMPREHEN METABOLIC PANEL: CPT | Performed by: OBSTETRICS & GYNECOLOGY

## 2020-04-20 PROCEDURE — 87635 SARS-COV-2 COVID-19 AMP PRB: CPT | Performed by: OBSTETRICS & GYNECOLOGY

## 2020-04-20 PROCEDURE — 81003 URINALYSIS AUTO W/O SCOPE: CPT | Performed by: STUDENT IN AN ORGANIZED HEALTH CARE EDUCATION/TRAINING PROGRAM

## 2020-04-20 PROCEDURE — G0379 DIRECT REFER HOSPITAL OBSERV: HCPCS

## 2020-04-20 PROCEDURE — 99205 OFFICE O/P NEW HI 60 MIN: CPT

## 2020-04-20 PROCEDURE — 86900 BLOOD TYPING SEROLOGIC ABO: CPT | Performed by: STUDENT IN AN ORGANIZED HEALTH CARE EDUCATION/TRAINING PROGRAM

## 2020-04-20 PROCEDURE — 82948 REAGENT STRIP/BLOOD GLUCOSE: CPT

## 2020-04-20 PROCEDURE — 83605 ASSAY OF LACTIC ACID: CPT | Performed by: OBSTETRICS & GYNECOLOGY

## 2020-04-20 RX ORDER — POTASSIUM CHLORIDE 20 MEQ/1
40 TABLET, EXTENDED RELEASE ORAL ONCE
Status: COMPLETED | OUTPATIENT
Start: 2020-04-20 | End: 2020-04-20

## 2020-04-20 RX ORDER — POTASSIUM CHLORIDE 14.9 MG/ML
20 INJECTION INTRAVENOUS
Status: COMPLETED | OUTPATIENT
Start: 2020-04-20 | End: 2020-04-20

## 2020-04-20 RX ORDER — ONDANSETRON 2 MG/ML
4 INJECTION INTRAMUSCULAR; INTRAVENOUS ONCE
Status: COMPLETED | OUTPATIENT
Start: 2020-04-20 | End: 2020-04-20

## 2020-04-20 RX ORDER — SODIUM CHLORIDE 9 MG/ML
250 INJECTION, SOLUTION INTRAVENOUS CONTINUOUS
Status: DISCONTINUED | OUTPATIENT
Start: 2020-04-20 | End: 2020-04-20 | Stop reason: HOSPADM

## 2020-04-20 RX ORDER — DEXTROSE AND SODIUM CHLORIDE 5; .9 G/100ML; G/100ML
100 INJECTION, SOLUTION INTRAVENOUS CONTINUOUS
Status: DISCONTINUED | OUTPATIENT
Start: 2020-04-20 | End: 2020-04-20 | Stop reason: HOSPADM

## 2020-04-20 RX ADMIN — POTASSIUM CHLORIDE 40 MEQ: 1500 TABLET, EXTENDED RELEASE ORAL at 14:52

## 2020-04-20 RX ADMIN — SODIUM CHLORIDE 2 UNITS/HR: 9 INJECTION, SOLUTION INTRAVENOUS at 04:43

## 2020-04-20 RX ADMIN — SODIUM CHLORIDE 500 ML: 0.9 INJECTION, SOLUTION INTRAVENOUS at 05:05

## 2020-04-20 RX ADMIN — SODIUM CHLORIDE 1000 ML: 0.9 INJECTION, SOLUTION INTRAVENOUS at 02:30

## 2020-04-20 RX ADMIN — POTASSIUM CHLORIDE 20 MEQ: 14.9 INJECTION, SOLUTION INTRAVENOUS at 06:20

## 2020-04-20 RX ADMIN — ONDANSETRON 4 MG: 2 INJECTION INTRAMUSCULAR; INTRAVENOUS at 02:39

## 2020-04-20 RX ADMIN — DEXTROSE AND SODIUM CHLORIDE 100 ML/HR: 5; .9 INJECTION, SOLUTION INTRAVENOUS at 04:43

## 2020-04-20 RX ADMIN — INSULIN LISPRO 5 UNITS: 100 INJECTION, SOLUTION INTRAVENOUS; SUBCUTANEOUS at 11:51

## 2020-04-20 RX ADMIN — SODIUM CHLORIDE 2000 ML: 0.9 INJECTION, SOLUTION INTRAVENOUS at 03:50

## 2020-04-20 RX ADMIN — SODIUM CHLORIDE 250 ML/HR: 0.9 INJECTION, SOLUTION INTRAVENOUS at 09:00

## 2020-04-20 RX ADMIN — POTASSIUM CHLORIDE 20 MEQ: 14.9 INJECTION, SOLUTION INTRAVENOUS at 08:54

## 2020-04-21 LAB — BACTERIA UR CULT: NORMAL

## 2020-04-22 ENCOUNTER — TELEPHONE (OUTPATIENT)
Dept: PERINATAL CARE | Facility: CLINIC | Age: 25
End: 2020-04-22

## 2020-04-22 ENCOUNTER — TELEMEDICINE (OUTPATIENT)
Dept: FAMILY MEDICINE CLINIC | Facility: CLINIC | Age: 25
End: 2020-04-22
Payer: COMMERCIAL

## 2020-04-22 VITALS — WEIGHT: 138 LBS | HEIGHT: 59 IN | BODY MASS INDEX: 27.82 KG/M2 | TEMPERATURE: 99.1 F

## 2020-04-22 DIAGNOSIS — F41.1 ANXIETY, GENERALIZED: ICD-10-CM

## 2020-04-22 DIAGNOSIS — O26.812 PREGNANCY RELATED FATIGUE IN SECOND TRIMESTER: ICD-10-CM

## 2020-04-22 DIAGNOSIS — O24.012 PRE-EXISTING TYPE 1 DIABETES MELLITUS WITH HYPERGLYCEMIA DURING PREGNANCY IN SECOND TRIMESTER (HCC): Primary | ICD-10-CM

## 2020-04-22 DIAGNOSIS — E10.65 PRE-EXISTING TYPE 1 DIABETES MELLITUS WITH HYPERGLYCEMIA DURING PREGNANCY IN SECOND TRIMESTER (HCC): Primary | ICD-10-CM

## 2020-04-22 DIAGNOSIS — Z96.41 INSULIN PUMP IN PLACE: ICD-10-CM

## 2020-04-22 PROBLEM — O26.819: Status: ACTIVE | Noted: 2020-04-22

## 2020-04-22 PROCEDURE — 99213 OFFICE O/P EST LOW 20 MIN: CPT | Performed by: FAMILY MEDICINE

## 2020-04-27 ENCOUNTER — TELEMEDICINE (OUTPATIENT)
Dept: PERINATAL CARE | Facility: CLINIC | Age: 25
End: 2020-04-27
Payer: COMMERCIAL

## 2020-04-27 VITALS — WEIGHT: 140.2 LBS | HEIGHT: 59 IN | BODY MASS INDEX: 28.26 KG/M2

## 2020-04-27 DIAGNOSIS — Z86.39 HISTORY OF DIABETES WITH KETOACIDOSIS: ICD-10-CM

## 2020-04-27 DIAGNOSIS — E10.65 TYPE 1 DIABETES MELLITUS WITH HYPERGLYCEMIA (HCC): Primary | ICD-10-CM

## 2020-04-27 DIAGNOSIS — E10.65 PRE-EXISTING TYPE 1 DIABETES MELLITUS WITH HYPERGLYCEMIA DURING PREGNANCY IN SECOND TRIMESTER (HCC): ICD-10-CM

## 2020-04-27 DIAGNOSIS — O24.012 PRE-EXISTING TYPE 1 DIABETES MELLITUS WITH HYPERGLYCEMIA DURING PREGNANCY IN SECOND TRIMESTER (HCC): ICD-10-CM

## 2020-04-27 DIAGNOSIS — Z46.81 INSULIN PUMP TITRATION: ICD-10-CM

## 2020-04-27 DIAGNOSIS — Z3A.19 19 WEEKS GESTATION OF PREGNANCY: ICD-10-CM

## 2020-04-27 DIAGNOSIS — E10.649 HYPOGLYCEMIA DUE TO TYPE 1 DIABETES MELLITUS (HCC): ICD-10-CM

## 2020-04-27 PROCEDURE — 99215 OFFICE O/P EST HI 40 MIN: CPT | Performed by: NURSE PRACTITIONER

## 2020-04-27 RX ORDER — LANCETS
EACH MISCELLANEOUS
Qty: 100 EACH | Refills: 3 | Status: SHIPPED | OUTPATIENT
Start: 2020-04-27 | End: 2020-09-17 | Stop reason: ALTCHOICE

## 2020-04-27 RX ORDER — IBUPROFEN 600 MG/1
1 TABLET ORAL ONCE
Qty: 1 MG | Refills: 1 | Status: SHIPPED | OUTPATIENT
Start: 2020-04-27 | End: 2021-03-10 | Stop reason: SDUPTHER

## 2020-04-29 ENCOUNTER — TELEPHONE (OUTPATIENT)
Dept: PERINATAL CARE | Facility: CLINIC | Age: 25
End: 2020-04-29

## 2020-04-29 ENCOUNTER — TELEMEDICINE (OUTPATIENT)
Dept: OBGYN CLINIC | Facility: CLINIC | Age: 25
End: 2020-04-29

## 2020-04-29 VITALS — SYSTOLIC BLOOD PRESSURE: 104 MMHG | DIASTOLIC BLOOD PRESSURE: 62 MMHG | WEIGHT: 140 LBS | BODY MASS INDEX: 28.28 KG/M2

## 2020-04-29 DIAGNOSIS — O24.012 PRE-EXISTING TYPE 1 DIABETES MELLITUS WITH HYPERGLYCEMIA DURING PREGNANCY IN SECOND TRIMESTER (HCC): ICD-10-CM

## 2020-04-29 DIAGNOSIS — E10.65 PRE-EXISTING TYPE 1 DIABETES MELLITUS WITH HYPERGLYCEMIA DURING PREGNANCY IN SECOND TRIMESTER (HCC): ICD-10-CM

## 2020-04-29 DIAGNOSIS — F41.1 ANXIETY, GENERALIZED: ICD-10-CM

## 2020-04-29 DIAGNOSIS — O09.92 HIGH-RISK PREGNANCY, SECOND TRIMESTER: Primary | ICD-10-CM

## 2020-04-29 PROBLEM — Z3A.20 20 WEEKS GESTATION OF PREGNANCY: Status: ACTIVE | Noted: 2020-01-30

## 2020-04-29 PROCEDURE — PNV: Performed by: STUDENT IN AN ORGANIZED HEALTH CARE EDUCATION/TRAINING PROGRAM

## 2020-04-29 RX ORDER — SERTRALINE HYDROCHLORIDE 25 MG/1
TABLET, FILM COATED ORAL
Qty: 60 TABLET | Refills: 0 | Status: SHIPPED | OUTPATIENT
Start: 2020-04-29 | End: 2020-06-15

## 2020-04-30 ENCOUNTER — ROUTINE PRENATAL (OUTPATIENT)
Dept: PERINATAL CARE | Facility: OTHER | Age: 25
End: 2020-04-30
Payer: COMMERCIAL

## 2020-04-30 ENCOUNTER — TELEPHONE (OUTPATIENT)
Dept: PERINATAL CARE | Facility: CLINIC | Age: 25
End: 2020-04-30

## 2020-04-30 VITALS
SYSTOLIC BLOOD PRESSURE: 113 MMHG | DIASTOLIC BLOOD PRESSURE: 67 MMHG | HEIGHT: 59 IN | HEART RATE: 90 BPM | TEMPERATURE: 98.1 F | BODY MASS INDEX: 27.82 KG/M2 | WEIGHT: 138 LBS

## 2020-04-30 DIAGNOSIS — Z36.86 ENCOUNTER FOR ANTENATAL SCREENING FOR CERVICAL LENGTH: Primary | ICD-10-CM

## 2020-04-30 DIAGNOSIS — Z36.89 ENCOUNTER FOR FETAL ANATOMIC SURVEY: ICD-10-CM

## 2020-04-30 DIAGNOSIS — Z3A.20 20 WEEKS GESTATION OF PREGNANCY: ICD-10-CM

## 2020-04-30 DIAGNOSIS — O24.012 PRE-EXISTING TYPE 1 DIABETES MELLITUS WITH HYPERGLYCEMIA DURING PREGNANCY IN SECOND TRIMESTER (HCC): ICD-10-CM

## 2020-04-30 DIAGNOSIS — E10.65 PRE-EXISTING TYPE 1 DIABETES MELLITUS WITH HYPERGLYCEMIA DURING PREGNANCY IN SECOND TRIMESTER (HCC): ICD-10-CM

## 2020-04-30 PROCEDURE — 3051F HG A1C>EQUAL 7.0%<8.0%: CPT | Performed by: OBSTETRICS & GYNECOLOGY

## 2020-04-30 PROCEDURE — 2022F DILAT RTA XM EVC RTNOPTHY: CPT | Performed by: OBSTETRICS & GYNECOLOGY

## 2020-04-30 PROCEDURE — 99212 OFFICE O/P EST SF 10 MIN: CPT | Performed by: OBSTETRICS & GYNECOLOGY

## 2020-04-30 PROCEDURE — 76811 OB US DETAILED SNGL FETUS: CPT | Performed by: OBSTETRICS & GYNECOLOGY

## 2020-04-30 PROCEDURE — 3060F POS MICROALBUMINURIA REV: CPT | Performed by: OBSTETRICS & GYNECOLOGY

## 2020-04-30 PROCEDURE — 1036F TOBACCO NON-USER: CPT | Performed by: OBSTETRICS & GYNECOLOGY

## 2020-05-27 ENCOUNTER — ROUTINE PRENATAL (OUTPATIENT)
Dept: OBGYN CLINIC | Facility: CLINIC | Age: 25
End: 2020-05-27

## 2020-05-27 VITALS
TEMPERATURE: 97.8 F | BODY MASS INDEX: 29.25 KG/M2 | DIASTOLIC BLOOD PRESSURE: 62 MMHG | SYSTOLIC BLOOD PRESSURE: 112 MMHG | WEIGHT: 144.8 LBS

## 2020-05-27 DIAGNOSIS — O24.012 PRE-EXISTING TYPE 1 DIABETES MELLITUS WITH HYPERGLYCEMIA DURING PREGNANCY IN SECOND TRIMESTER (HCC): ICD-10-CM

## 2020-05-27 DIAGNOSIS — E10.65 PRE-EXISTING TYPE 1 DIABETES MELLITUS WITH HYPERGLYCEMIA DURING PREGNANCY IN SECOND TRIMESTER (HCC): ICD-10-CM

## 2020-05-27 DIAGNOSIS — O09.92 HIGH-RISK PREGNANCY, SECOND TRIMESTER: Primary | ICD-10-CM

## 2020-05-27 DIAGNOSIS — Z3A.24 24 WEEKS GESTATION OF PREGNANCY: ICD-10-CM

## 2020-05-27 DIAGNOSIS — Z34.02 ENCOUNTER FOR SUPERVISION OF NORMAL FIRST PREGNANCY IN SECOND TRIMESTER: ICD-10-CM

## 2020-05-27 LAB
SL AMB  POCT GLUCOSE, UA: NORMAL
SL AMB POCT URINE PROTEIN: NORMAL

## 2020-05-27 PROCEDURE — 3060F POS MICROALBUMINURIA REV: CPT | Performed by: OBSTETRICS & GYNECOLOGY

## 2020-05-27 PROCEDURE — 2022F DILAT RTA XM EVC RTNOPTHY: CPT | Performed by: OBSTETRICS & GYNECOLOGY

## 2020-05-27 PROCEDURE — PNV: Performed by: OBSTETRICS & GYNECOLOGY

## 2020-05-28 ENCOUNTER — TELEPHONE (OUTPATIENT)
Dept: PERINATAL CARE | Facility: OTHER | Age: 25
End: 2020-05-28

## 2020-05-29 ENCOUNTER — ROUTINE PRENATAL (OUTPATIENT)
Dept: PERINATAL CARE | Facility: OTHER | Age: 25
End: 2020-05-29
Payer: COMMERCIAL

## 2020-05-29 VITALS
DIASTOLIC BLOOD PRESSURE: 69 MMHG | HEIGHT: 59 IN | SYSTOLIC BLOOD PRESSURE: 108 MMHG | HEART RATE: 91 BPM | WEIGHT: 142 LBS | BODY MASS INDEX: 28.63 KG/M2 | TEMPERATURE: 98 F

## 2020-05-29 DIAGNOSIS — E10.65 PRE-EXISTING TYPE 1 DIABETES MELLITUS WITH HYPERGLYCEMIA DURING PREGNANCY IN SECOND TRIMESTER (HCC): Primary | ICD-10-CM

## 2020-05-29 DIAGNOSIS — O24.012 PRE-EXISTING TYPE 1 DIABETES MELLITUS WITH HYPERGLYCEMIA DURING PREGNANCY IN SECOND TRIMESTER (HCC): Primary | ICD-10-CM

## 2020-05-29 DIAGNOSIS — Z3A.24 24 WEEKS GESTATION OF PREGNANCY: ICD-10-CM

## 2020-05-29 PROCEDURE — 3060F POS MICROALBUMINURIA REV: CPT | Performed by: OBSTETRICS & GYNECOLOGY

## 2020-05-29 PROCEDURE — 1036F TOBACCO NON-USER: CPT | Performed by: OBSTETRICS & GYNECOLOGY

## 2020-05-29 PROCEDURE — 76825 ECHO EXAM OF FETAL HEART: CPT | Performed by: OBSTETRICS & GYNECOLOGY

## 2020-05-29 PROCEDURE — 93325 DOPPLER ECHO COLOR FLOW MAPG: CPT | Performed by: OBSTETRICS & GYNECOLOGY

## 2020-05-29 PROCEDURE — 3051F HG A1C>EQUAL 7.0%<8.0%: CPT | Performed by: OBSTETRICS & GYNECOLOGY

## 2020-05-29 PROCEDURE — 76827 ECHO EXAM OF FETAL HEART: CPT | Performed by: OBSTETRICS & GYNECOLOGY

## 2020-05-29 PROCEDURE — 99212 OFFICE O/P EST SF 10 MIN: CPT | Performed by: OBSTETRICS & GYNECOLOGY

## 2020-05-29 PROCEDURE — 2022F DILAT RTA XM EVC RTNOPTHY: CPT | Performed by: OBSTETRICS & GYNECOLOGY

## 2020-06-02 DIAGNOSIS — Z34.01 ENCOUNTER FOR SUPERVISION OF NORMAL FIRST PREGNANCY IN FIRST TRIMESTER: ICD-10-CM

## 2020-06-02 RX ORDER — ASPIRIN 81 MG/1
TABLET ORAL
Qty: 180 TABLET | Refills: 1 | Status: SHIPPED | OUTPATIENT
Start: 2020-06-02 | End: 2020-08-12 | Stop reason: HOSPADM

## 2020-06-05 ENCOUNTER — TELEPHONE (OUTPATIENT)
Dept: PERINATAL CARE | Facility: CLINIC | Age: 25
End: 2020-06-05

## 2020-06-08 ENCOUNTER — ULTRASOUND (OUTPATIENT)
Dept: PERINATAL CARE | Facility: OTHER | Age: 25
End: 2020-06-08
Payer: COMMERCIAL

## 2020-06-08 VITALS
BODY MASS INDEX: 28.83 KG/M2 | HEART RATE: 81 BPM | HEIGHT: 59 IN | DIASTOLIC BLOOD PRESSURE: 70 MMHG | SYSTOLIC BLOOD PRESSURE: 103 MMHG | TEMPERATURE: 98.1 F | WEIGHT: 143 LBS

## 2020-06-08 DIAGNOSIS — Z3A.25 25 WEEKS GESTATION OF PREGNANCY: ICD-10-CM

## 2020-06-08 DIAGNOSIS — O24.012 PRE-EXISTING TYPE 1 DIABETES MELLITUS WITH HYPERGLYCEMIA DURING PREGNANCY IN SECOND TRIMESTER (HCC): Primary | ICD-10-CM

## 2020-06-08 DIAGNOSIS — E10.65 PRE-EXISTING TYPE 1 DIABETES MELLITUS WITH HYPERGLYCEMIA DURING PREGNANCY IN SECOND TRIMESTER (HCC): Primary | ICD-10-CM

## 2020-06-08 PROCEDURE — 3060F POS MICROALBUMINURIA REV: CPT | Performed by: OBSTETRICS & GYNECOLOGY

## 2020-06-08 PROCEDURE — 3051F HG A1C>EQUAL 7.0%<8.0%: CPT | Performed by: OBSTETRICS & GYNECOLOGY

## 2020-06-08 PROCEDURE — 76816 OB US FOLLOW-UP PER FETUS: CPT | Performed by: OBSTETRICS & GYNECOLOGY

## 2020-06-08 PROCEDURE — 99212 OFFICE O/P EST SF 10 MIN: CPT | Performed by: OBSTETRICS & GYNECOLOGY

## 2020-06-08 PROCEDURE — 1036F TOBACCO NON-USER: CPT | Performed by: OBSTETRICS & GYNECOLOGY

## 2020-06-08 PROCEDURE — 2022F DILAT RTA XM EVC RTNOPTHY: CPT | Performed by: OBSTETRICS & GYNECOLOGY

## 2020-06-10 ENCOUNTER — DOCUMENTATION (OUTPATIENT)
Dept: PERINATAL CARE | Facility: CLINIC | Age: 25
End: 2020-06-10

## 2020-06-10 DIAGNOSIS — Z46.81 INSULIN PUMP TITRATION: ICD-10-CM

## 2020-06-10 DIAGNOSIS — O24.012 PRE-EXISTING TYPE 1 DIABETES MELLITUS WITH HYPERGLYCEMIA DURING PREGNANCY IN SECOND TRIMESTER (HCC): Primary | ICD-10-CM

## 2020-06-10 DIAGNOSIS — Z3A.26 26 WEEKS GESTATION OF PREGNANCY: ICD-10-CM

## 2020-06-10 DIAGNOSIS — E10.65 PRE-EXISTING TYPE 1 DIABETES MELLITUS WITH HYPERGLYCEMIA DURING PREGNANCY IN SECOND TRIMESTER (HCC): Primary | ICD-10-CM

## 2020-06-13 DIAGNOSIS — F41.1 ANXIETY, GENERALIZED: ICD-10-CM

## 2020-06-15 RX ORDER — SERTRALINE HYDROCHLORIDE 25 MG/1
TABLET, FILM COATED ORAL
Qty: 60 TABLET | Refills: 0 | Status: SHIPPED | OUTPATIENT
Start: 2020-06-15 | End: 2020-07-10

## 2020-06-18 ENCOUNTER — TELEMEDICINE (OUTPATIENT)
Dept: PERINATAL CARE | Facility: CLINIC | Age: 25
End: 2020-06-18
Payer: COMMERCIAL

## 2020-06-18 VITALS — HEIGHT: 59 IN | BODY MASS INDEX: 29.03 KG/M2 | WEIGHT: 144 LBS

## 2020-06-18 DIAGNOSIS — Z46.81 INSULIN PUMP TITRATION: ICD-10-CM

## 2020-06-18 DIAGNOSIS — E10.65 PRE-EXISTING TYPE 1 DIABETES MELLITUS WITH HYPERGLYCEMIA DURING PREGNANCY IN SECOND TRIMESTER (HCC): Primary | ICD-10-CM

## 2020-06-18 DIAGNOSIS — E10.649 HYPOGLYCEMIA DUE TO TYPE 1 DIABETES MELLITUS (HCC): ICD-10-CM

## 2020-06-18 DIAGNOSIS — O24.012 PRE-EXISTING TYPE 1 DIABETES MELLITUS WITH HYPERGLYCEMIA DURING PREGNANCY IN SECOND TRIMESTER (HCC): Primary | ICD-10-CM

## 2020-06-18 PROCEDURE — 99215 OFFICE O/P EST HI 40 MIN: CPT | Performed by: NURSE PRACTITIONER

## 2020-06-24 ENCOUNTER — DOCUMENTATION (OUTPATIENT)
Dept: PERINATAL CARE | Facility: CLINIC | Age: 25
End: 2020-06-24

## 2020-06-24 DIAGNOSIS — Z3A.28 28 WEEKS GESTATION OF PREGNANCY: ICD-10-CM

## 2020-06-24 DIAGNOSIS — Z46.81 INSULIN PUMP TITRATION: ICD-10-CM

## 2020-06-24 DIAGNOSIS — E10.649 HYPOGLYCEMIA DUE TO TYPE 1 DIABETES MELLITUS (HCC): ICD-10-CM

## 2020-06-24 DIAGNOSIS — E10.65 PRE-EXISTING TYPE 1 DIABETES MELLITUS WITH HYPERGLYCEMIA DURING PREGNANCY IN SECOND TRIMESTER (HCC): Primary | ICD-10-CM

## 2020-06-24 DIAGNOSIS — O24.012 PRE-EXISTING TYPE 1 DIABETES MELLITUS WITH HYPERGLYCEMIA DURING PREGNANCY IN SECOND TRIMESTER (HCC): Primary | ICD-10-CM

## 2020-06-25 ENCOUNTER — ROUTINE PRENATAL (OUTPATIENT)
Dept: OBGYN CLINIC | Facility: CLINIC | Age: 25
End: 2020-06-25
Payer: COMMERCIAL

## 2020-06-25 VITALS — WEIGHT: 145.2 LBS | BODY MASS INDEX: 29.33 KG/M2 | DIASTOLIC BLOOD PRESSURE: 68 MMHG | SYSTOLIC BLOOD PRESSURE: 104 MMHG

## 2020-06-25 DIAGNOSIS — O24.012 PRE-EXISTING TYPE 1 DIABETES MELLITUS WITH HYPERGLYCEMIA DURING PREGNANCY IN SECOND TRIMESTER (HCC): ICD-10-CM

## 2020-06-25 DIAGNOSIS — E10.65 PRE-EXISTING TYPE 1 DIABETES MELLITUS WITH HYPERGLYCEMIA DURING PREGNANCY IN SECOND TRIMESTER (HCC): ICD-10-CM

## 2020-06-25 DIAGNOSIS — Z34.03 ENCOUNTER FOR SUPERVISION OF NORMAL FIRST PREGNANCY IN THIRD TRIMESTER: ICD-10-CM

## 2020-06-25 DIAGNOSIS — Z23 NEED FOR TDAP VACCINATION: Primary | ICD-10-CM

## 2020-06-25 LAB
SL AMB  POCT GLUCOSE, UA: 3
SL AMB POCT URINE PROTEIN: ABNORMAL

## 2020-06-25 PROCEDURE — 3060F POS MICROALBUMINURIA REV: CPT | Performed by: NURSE PRACTITIONER

## 2020-06-25 PROCEDURE — 2022F DILAT RTA XM EVC RTNOPTHY: CPT | Performed by: NURSE PRACTITIONER

## 2020-06-25 PROCEDURE — 90471 IMMUNIZATION ADMIN: CPT

## 2020-06-25 PROCEDURE — 90715 TDAP VACCINE 7 YRS/> IM: CPT

## 2020-06-25 PROCEDURE — 81002 URINALYSIS NONAUTO W/O SCOPE: CPT | Performed by: NURSE PRACTITIONER

## 2020-06-25 PROCEDURE — PNV: Performed by: NURSE PRACTITIONER

## 2020-06-30 ENCOUNTER — TRANSCRIBE ORDERS (OUTPATIENT)
Dept: LAB | Facility: CLINIC | Age: 25
End: 2020-06-30

## 2020-06-30 ENCOUNTER — APPOINTMENT (OUTPATIENT)
Dept: LAB | Facility: CLINIC | Age: 25
End: 2020-06-30
Payer: COMMERCIAL

## 2020-06-30 DIAGNOSIS — Z11.3 SCREEN FOR SEXUALLY TRANSMITTED DISEASES: ICD-10-CM

## 2020-06-30 DIAGNOSIS — O24.012 PRE-EXISTING TYPE 1 DIABETES MELLITUS WITH HYPERGLYCEMIA DURING PREGNANCY IN SECOND TRIMESTER (HCC): ICD-10-CM

## 2020-06-30 DIAGNOSIS — Z34.02 ENCOUNTER FOR SUPERVISION OF NORMAL FIRST PREGNANCY IN SECOND TRIMESTER: ICD-10-CM

## 2020-06-30 DIAGNOSIS — E10.65 PRE-EXISTING TYPE 1 DIABETES MELLITUS WITH HYPERGLYCEMIA DURING PREGNANCY IN SECOND TRIMESTER (HCC): ICD-10-CM

## 2020-06-30 LAB
ERYTHROCYTE [DISTWIDTH] IN BLOOD BY AUTOMATED COUNT: 12.7 % (ref 11.6–15.1)
EST. AVERAGE GLUCOSE BLD GHB EST-MCNC: 169 MG/DL
HBA1C MFR BLD: 7.5 %
HCT VFR BLD AUTO: 34.4 % (ref 34.8–46.1)
HGB BLD-MCNC: 11.2 G/DL (ref 11.5–15.4)
MCH RBC QN AUTO: 30.4 PG (ref 26.8–34.3)
MCHC RBC AUTO-ENTMCNC: 32.6 G/DL (ref 31.4–37.4)
MCV RBC AUTO: 94 FL (ref 82–98)
PLATELET # BLD AUTO: 265 THOUSANDS/UL (ref 149–390)
PMV BLD AUTO: 10.3 FL (ref 8.9–12.7)
RBC # BLD AUTO: 3.68 MILLION/UL (ref 3.81–5.12)
WBC # BLD AUTO: 9.63 THOUSAND/UL (ref 4.31–10.16)

## 2020-06-30 PROCEDURE — 36415 COLL VENOUS BLD VENIPUNCTURE: CPT

## 2020-06-30 PROCEDURE — 3051F HG A1C>EQUAL 7.0%<8.0%: CPT | Performed by: NURSE PRACTITIONER

## 2020-06-30 PROCEDURE — 87491 CHLMYD TRACH DNA AMP PROBE: CPT

## 2020-06-30 PROCEDURE — 85027 COMPLETE CBC AUTOMATED: CPT

## 2020-06-30 PROCEDURE — 87591 N.GONORRHOEAE DNA AMP PROB: CPT

## 2020-06-30 PROCEDURE — 83036 HEMOGLOBIN GLYCOSYLATED A1C: CPT

## 2020-06-30 PROCEDURE — 86592 SYPHILIS TEST NON-TREP QUAL: CPT

## 2020-07-01 DIAGNOSIS — Z3A.17 17 WEEKS GESTATION OF PREGNANCY: ICD-10-CM

## 2020-07-01 DIAGNOSIS — E10.65 PRE-EXISTING TYPE 1 DIABETES MELLITUS WITH HYPERGLYCEMIA DURING PREGNANCY IN SECOND TRIMESTER (HCC): ICD-10-CM

## 2020-07-01 DIAGNOSIS — Z3A.29 29 WEEKS GESTATION OF PREGNANCY: ICD-10-CM

## 2020-07-01 DIAGNOSIS — E10.65 PRE-EXISTING TYPE 1 DIABETES MELLITUS WITH HYPERGLYCEMIA DURING PREGNANCY IN THIRD TRIMESTER (HCC): Primary | ICD-10-CM

## 2020-07-01 DIAGNOSIS — O24.013 PRE-EXISTING TYPE 1 DIABETES MELLITUS WITH HYPERGLYCEMIA DURING PREGNANCY IN THIRD TRIMESTER (HCC): Primary | ICD-10-CM

## 2020-07-01 DIAGNOSIS — E10.649 HYPOGLYCEMIA DUE TO TYPE 1 DIABETES MELLITUS (HCC): ICD-10-CM

## 2020-07-01 DIAGNOSIS — Z46.81 INSULIN PUMP TITRATION: ICD-10-CM

## 2020-07-01 DIAGNOSIS — O24.012 PRE-EXISTING TYPE 1 DIABETES MELLITUS WITH HYPERGLYCEMIA DURING PREGNANCY IN SECOND TRIMESTER (HCC): ICD-10-CM

## 2020-07-01 LAB
C TRACH DNA SPEC QL NAA+PROBE: NEGATIVE
N GONORRHOEA DNA SPEC QL NAA+PROBE: NEGATIVE
RPR SER QL: NORMAL

## 2020-07-01 RX ORDER — BLOOD-GLUCOSE,RECEIVER,CONT
EACH MISCELLANEOUS
Qty: 1 DEVICE | Refills: 0 | Status: SHIPPED | OUTPATIENT
Start: 2020-07-01 | End: 2021-05-21 | Stop reason: CLARIF

## 2020-07-01 RX ORDER — BLOOD-GLUCOSE SENSOR
EACH MISCELLANEOUS
Qty: 1 EACH | Refills: 12 | Status: SHIPPED | OUTPATIENT
Start: 2020-07-01 | End: 2021-05-21 | Stop reason: CLARIF

## 2020-07-01 RX ORDER — BLOOD-GLUCOSE TRANSMITTER
EACH MISCELLANEOUS
Qty: 1 EACH | Refills: 3 | Status: SHIPPED | OUTPATIENT
Start: 2020-07-01 | End: 2021-05-21 | Stop reason: CLARIF

## 2020-07-06 ENCOUNTER — ROUTINE PRENATAL (OUTPATIENT)
Dept: OBGYN CLINIC | Facility: CLINIC | Age: 25
End: 2020-07-06

## 2020-07-06 VITALS — BODY MASS INDEX: 29.89 KG/M2 | SYSTOLIC BLOOD PRESSURE: 112 MMHG | WEIGHT: 148 LBS | DIASTOLIC BLOOD PRESSURE: 62 MMHG

## 2020-07-06 DIAGNOSIS — E10.65 PRE-EXISTING TYPE 1 DIABETES MELLITUS WITH HYPERGLYCEMIA DURING PREGNANCY IN THIRD TRIMESTER (HCC): ICD-10-CM

## 2020-07-06 DIAGNOSIS — Z3A.29 29 WEEKS GESTATION OF PREGNANCY: ICD-10-CM

## 2020-07-06 DIAGNOSIS — Z34.03 ENCOUNTER FOR SUPERVISION OF NORMAL FIRST PREGNANCY IN THIRD TRIMESTER: Primary | ICD-10-CM

## 2020-07-06 DIAGNOSIS — O24.013 PRE-EXISTING TYPE 1 DIABETES MELLITUS WITH HYPERGLYCEMIA DURING PREGNANCY IN THIRD TRIMESTER (HCC): ICD-10-CM

## 2020-07-06 LAB
SL AMB  POCT GLUCOSE, UA: NORMAL
SL AMB POCT URINE PROTEIN: NORMAL

## 2020-07-06 PROCEDURE — 2022F DILAT RTA XM EVC RTNOPTHY: CPT | Performed by: OBSTETRICS & GYNECOLOGY

## 2020-07-06 PROCEDURE — 3060F POS MICROALBUMINURIA REV: CPT | Performed by: OBSTETRICS & GYNECOLOGY

## 2020-07-06 PROCEDURE — PNV: Performed by: OBSTETRICS & GYNECOLOGY

## 2020-07-06 NOTE — PROGRESS NOTES
Reporting sugars to Diabetic pathways  Type 1 IDDM  Denies LOF, VB, CTX    GFM! Breast pump RX resent

## 2020-07-06 NOTE — PROGRESS NOTES
Good FM  No cramping or VB  Blood sugars under much better control  Discussed delivery @ 38 weeks  Has f/u sono 1 week, NST @ 32 weeks  RTO 2 weeks

## 2020-07-09 ENCOUNTER — TELEPHONE (OUTPATIENT)
Dept: OBGYN CLINIC | Facility: CLINIC | Age: 25
End: 2020-07-09

## 2020-07-09 DIAGNOSIS — F41.1 ANXIETY, GENERALIZED: ICD-10-CM

## 2020-07-09 NOTE — TELEPHONE ENCOUNTER
Pt returned call - states after drinking water , eating a snack, she was able to have + FM  Advised pt to continue drinking 6-8 glasses of water daily & to call with any further questions/concerns

## 2020-07-09 NOTE — TELEPHONE ENCOUNTER
Pt is 30 wks prenatal  Pt called with c/o decreased FM for the past 3 hrs  Pt is sitting at work  Advised pt to drink a large glass of water, eat a protein snack, & is able to go to a place where she can recline & lie on left side  Advised pt to concentrate on feeling baby move & to call back with  Report  - my direct ext given

## 2020-07-10 ENCOUNTER — TELEPHONE (OUTPATIENT)
Dept: PERINATAL CARE | Facility: CLINIC | Age: 25
End: 2020-07-10

## 2020-07-10 RX ORDER — SERTRALINE HYDROCHLORIDE 25 MG/1
TABLET, FILM COATED ORAL
Qty: 60 TABLET | Refills: 0 | Status: SHIPPED | OUTPATIENT
Start: 2020-07-10 | End: 2020-07-24

## 2020-07-10 NOTE — TELEPHONE ENCOUNTER
Spoke with patient and confirmed appointment with [facility]  1 support person ( must be over age of 15) may accompany you for your appointment  Are you and your support person able to wear a mask without a valve during entire appointment?yes  [facility] does not allow cell phone use, recording device or streaming during the ultrasound  Check in and rooming questions will be done via phone, when you arrive in the parking lot please call the following inside line # prior to entering office:      [place] line:  [phone]      Have you or your support person traveled outside the state in the last 2 weeks?no   If yes, what state did you travel to? n/a    Do you or your support person have:  Fever or flu- like symptoms?no  Symptoms of upper respiratory infection like runny nose, sore throat or cough? no  Do you have new headache that you have not had in the past?no  Have you experienced any new shortness of breath recently?no  Do you have any new loss of taste or smell?no  Do you have any new diarrhea, nausea or vomiting?no  Have you recently been in contact with anyone who has been sick or diagnosed with COVID-19 infection?no  Have you been recommended to quarantine because of an exposure to a confirmed positive COVID19 person?no  You and your support person will be screened upon arrival     Patient verbalized understanding of all instructions   -------------------------------------------------------------

## 2020-07-13 ENCOUNTER — ULTRASOUND (OUTPATIENT)
Dept: PERINATAL CARE | Facility: OTHER | Age: 25
End: 2020-07-13
Payer: COMMERCIAL

## 2020-07-13 VITALS
HEART RATE: 108 BPM | WEIGHT: 149.4 LBS | HEIGHT: 59 IN | DIASTOLIC BLOOD PRESSURE: 70 MMHG | TEMPERATURE: 98.4 F | BODY MASS INDEX: 30.12 KG/M2 | SYSTOLIC BLOOD PRESSURE: 105 MMHG

## 2020-07-13 DIAGNOSIS — Z36.89 ENCOUNTER FOR ULTRASOUND TO ASSESS FETAL GROWTH: Primary | ICD-10-CM

## 2020-07-13 DIAGNOSIS — O24.013 PRE-EXISTING TYPE 1 DIABETES MELLITUS WITH HYPERGLYCEMIA DURING PREGNANCY IN THIRD TRIMESTER (HCC): ICD-10-CM

## 2020-07-13 DIAGNOSIS — E10.65 PRE-EXISTING TYPE 1 DIABETES MELLITUS WITH HYPERGLYCEMIA DURING PREGNANCY IN THIRD TRIMESTER (HCC): ICD-10-CM

## 2020-07-13 DIAGNOSIS — Z3A.30 30 WEEKS GESTATION OF PREGNANCY: ICD-10-CM

## 2020-07-13 PROCEDURE — 3060F POS MICROALBUMINURIA REV: CPT | Performed by: OBSTETRICS & GYNECOLOGY

## 2020-07-13 PROCEDURE — 76816 OB US FOLLOW-UP PER FETUS: CPT | Performed by: OBSTETRICS & GYNECOLOGY

## 2020-07-13 PROCEDURE — 99212 OFFICE O/P EST SF 10 MIN: CPT | Performed by: OBSTETRICS & GYNECOLOGY

## 2020-07-13 PROCEDURE — 3051F HG A1C>EQUAL 7.0%<8.0%: CPT | Performed by: OBSTETRICS & GYNECOLOGY

## 2020-07-13 PROCEDURE — 1036F TOBACCO NON-USER: CPT | Performed by: OBSTETRICS & GYNECOLOGY

## 2020-07-13 PROCEDURE — 2022F DILAT RTA XM EVC RTNOPTHY: CPT | Performed by: OBSTETRICS & GYNECOLOGY

## 2020-07-13 NOTE — PROGRESS NOTES
[address]: Ms Marilu Jon was seen today at 30w5d for fetal growth assessment ultrasound  See ultrasound report under "OB Procedures" tab    Please don't hesitate to contact our office with any concerns or questions   -Autumn Singh MD

## 2020-07-15 ENCOUNTER — TELEPHONE (OUTPATIENT)
Dept: PERINATAL CARE | Facility: CLINIC | Age: 25
End: 2020-07-15

## 2020-07-15 ENCOUNTER — DOCUMENTATION (OUTPATIENT)
Dept: PERINATAL CARE | Facility: CLINIC | Age: 25
End: 2020-07-15

## 2020-07-15 DIAGNOSIS — E10.65 PRE-EXISTING TYPE 1 DIABETES MELLITUS WITH HYPERGLYCEMIA DURING PREGNANCY IN THIRD TRIMESTER (HCC): Primary | ICD-10-CM

## 2020-07-15 DIAGNOSIS — O24.013 PRE-EXISTING TYPE 1 DIABETES MELLITUS WITH HYPERGLYCEMIA DURING PREGNANCY IN THIRD TRIMESTER (HCC): Primary | ICD-10-CM

## 2020-07-15 DIAGNOSIS — Z3A.31 31 WEEKS GESTATION OF PREGNANCY: ICD-10-CM

## 2020-07-15 DIAGNOSIS — Z46.81 INSULIN PUMP TITRATION: ICD-10-CM

## 2020-07-15 NOTE — PROGRESS NOTES
Prince Beltre is a 30 y o  female on a/an OmniPod insulin pump  Estimated Date of Delivery: 9/16/20   Currently: 28w0d  Encounter Diagnosis     ICD-10-CM    1  Pre-existing type 1 diabetes mellitus with hyperglycemia during pregnancy in third trimester ([facility] [address] ) O24 013     E10 65    2  31 weeks gestation of pregnancy Z3A 31    3  Insulin pump titration Z46 81         Current Insulin pump settings:    Basal rate: MN@ 1 00 units/hour; 3 AM@ 1 45 units/hour; 7AM@  1 40 units/hour; 5 PM@ 1 15 units/hour; 10 PM@  1 00 units/hour  Insulin to carb ratio:8  Insulin sensitivity factor:32  BG target:90  Active insulin: 4 hours  Type of insulin:Humalog  Self-monitoring blood glucose reported by patient: refer to attached file  CGM: no   Glucose ranges: refer to attached file  Plan:  Humalog via Omnipod insulin pump  Due to hyperglycemia and using correction bolus, adjust insulin basal settings as follows: MN@ to 1 10 units/hour; 3 AM@ to 1 55 units/hour; 7AM@ 1 45 units/hour; 5 PM to 1 30 units/hour;  10 PM@ to 1 10 units/hour  Due to 2 hours PP>120, under bolus settings decrease ICR from 8 to 6  Please try to avoid stacking using causing episodes of glucose readings <60  Be consistent with meals, eating at least every 2 to 3 5 hours during the day and no more than 8 to 10 hours of fasting overnight  Continue SMBG before meals, 2 hours post start of meal, before driving and with hypoglycemia  Please use 15 by 15 rule to treat hypoglycemia  Glucose goals fasting 60 to 90, before meals 60 to 100, 2 hours post meals 120 or less and overnight 60 to 100  Download insulin pump on Monday, 7/20/20

## 2020-07-15 NOTE — TELEPHONE ENCOUNTER
Spoke with patient and confirmed appointment with [facility]  1 support person ( must be over age of 15) may accompany patient  Will you and your support person be able to wear a mask ,without a valve , during entire appointment? [facility] does not allow cell phone use, recording device or streaming during the ultrasound  Check in and rooming questions will be done via phone, when you arrive in the parking lot please call the following inside line # prior to entering office:    [place] line:  [phone]    Have you or your support person traveled outside the state in the last 2 weeks? NO   If yes, what state did you travel to? Do you or your support person have:  Fever or flu- like symptoms? NO    Symptoms of upper respiratory infection like runny nose, sore throat or cough? NO   Do you have new headache that you have not had in the past? NO   Have you experienced any new shortness of breath recently? NO   Do you have any new loss of taste or smell? NO   Do you have any new diarrhea, nausea or vomiting? NO   Have you recently been in contact with anyone who has been sick or diagnosed with COVID-19 infection? NO   Have you been recommended to quarantine because of an exposure to a confirmed positive COVID19 person? NO   You and your support person will have temperature screening upon arrival     Patient verbalized understanding of all instructions

## 2020-07-16 ENCOUNTER — TELEPHONE (OUTPATIENT)
Dept: PERINATAL CARE | Facility: OTHER | Age: 25
End: 2020-07-16

## 2020-07-16 NOTE — TELEPHONE ENCOUNTER
Left message for pt to cancel her Nst appt today , due to her only being 31w, she does have an appt on 7/20 for nst & gilda, I did advise pt to call me back to confirm this appt cancellation

## 2020-07-17 ENCOUNTER — TELEPHONE (OUTPATIENT)
Dept: PERINATAL CARE | Facility: CLINIC | Age: 25
End: 2020-07-17

## 2020-07-17 NOTE — TELEPHONE ENCOUNTER
Attempted to reach patient by phone and left voicemail to confirm appointment for MFM ultrasound  1 support person ( must be over the age of 15) may accompany you for your appointment  If you or your support person have traveled outside the state in the past 2 weeks, please call and notify our office today #[phone]  You and your support person must wear a mask ,covering nose and mouth,during your entire visit  You and your support person will have temperature screened upon arrival     Please call our office prior to entering the building  Check in and rooming questions will be done via phone  We will give you directions when to enter for your appointment  Inside office # provided:    [place] line:  [phone]    [facility] does not allow cell phone use, recording device or streaming during ultrasound  IF you are not feeling well- cough, fever, shortness of breath or any flu like symptoms, contact your primary care physician or [phone] [place]    Any questions with these instructions please call Maternal Fetal Medicine nurse line today @ # [phone]

## 2020-07-20 ENCOUNTER — ULTRASOUND (OUTPATIENT)
Dept: PERINATAL CARE | Facility: OTHER | Age: 25
End: 2020-07-20
Payer: COMMERCIAL

## 2020-07-20 VITALS
DIASTOLIC BLOOD PRESSURE: 69 MMHG | HEART RATE: 90 BPM | BODY MASS INDEX: 30.44 KG/M2 | SYSTOLIC BLOOD PRESSURE: 102 MMHG | WEIGHT: 151 LBS | HEIGHT: 59 IN | TEMPERATURE: 97.6 F

## 2020-07-20 DIAGNOSIS — E10.65 PRE-EXISTING TYPE 1 DIABETES MELLITUS WITH HYPERGLYCEMIA DURING PREGNANCY IN THIRD TRIMESTER (HCC): Primary | ICD-10-CM

## 2020-07-20 DIAGNOSIS — O24.013 PRE-EXISTING TYPE 1 DIABETES MELLITUS WITH HYPERGLYCEMIA DURING PREGNANCY IN THIRD TRIMESTER (HCC): Primary | ICD-10-CM

## 2020-07-20 DIAGNOSIS — Z3A.31 31 WEEKS GESTATION OF PREGNANCY: ICD-10-CM

## 2020-07-20 PROCEDURE — 59025 FETAL NON-STRESS TEST: CPT | Performed by: OBSTETRICS & GYNECOLOGY

## 2020-07-20 PROCEDURE — 76815 OB US LIMITED FETUS(S): CPT | Performed by: OBSTETRICS & GYNECOLOGY

## 2020-07-20 NOTE — LETTER
July 20, 2020     William Stark PA-C  [address] [address]    Patient: Najma Mcgregor   YOB: 1995   Date of Visit: 7/20/2020       Dear Dr Hernandez Areas: Thank you for referring Najma Mcgregor to me for evaluation  Below are my notes for this consultation  If you have questions, please do not hesitate to call me  I look forward to following your patient along with you  Sincerely,        Criselda Frankel MD        CC: No Recipients  Criselda Frankel MD  7/20/2020  8:29 PM  Sign at close encounter  NST is reactive   Criselda Frankel MD

## 2020-07-20 NOTE — PROGRESS NOTES
NST procedure and expected outcome explained to patient  Daily fetal kick count discussed with handout given  Patient verbalized understanding of all and was receptive      Kallie Soliman RN

## 2020-07-20 NOTE — PATIENT INSTRUCTIONS
Nonstress Test for Pregnancy   WHAT YOU NEED TO KNOW:   What do I need to know about a nonstress test?  A nonstress test measures your baby's heart rate and movements  Nonstress means that no stress will be placed on your baby during the test    How do I prepare for a nonstress test?  Your healthcare provider will talk to you about how to prepare for this test  He may tell you to eat and drink plenty of fluids before your test  If you smoke, you may be asked not to smoke within 2 hours before the test  He will also tell you what medicines to take or not take on the day of your test    What will happen during a nonstress test?  You may be asked to lie down or recline back for the test on a bed  One or two belts with sensors will be placed around your abdomen  Your baby's heart rate will be recorded with a machine  If your baby does not move, your baby may be asleep  Your healthcare provider may make a noise near your abdomen to try to wake your baby  The test usually takes about 20 minutes, but can take longer if your baby needs to be awakened  What do I need to know about the test results? Your baby will be expected to move at least twice for a certain amount of time  Your baby's heart rate will be expected to go up by a certain number of beats per minute during movement  If your baby does not move as expected, the test may need to be repeated or you may need other tests  CARE AGREEMENT:   You have the right to help plan your care  Learn about your health condition and how it may be treated  Discuss treatment options with your caregivers to decide what care you want to receive  You always have the right to refuse treatment  The above information is an  only  It is not intended as medical advice for individual conditions or treatments  Talk to your doctor, nurse or pharmacist before following any medical regimen to see if it is safe and effective for you    © 2017 [employer]  Information is for End User's use only and may not be sold, redistributed or otherwise used for commercial purposes  All illustrations and images included in CareNotes® are the copyrighted property of A D A M [employer] Inc  or Rhett Preston

## 2020-07-22 ENCOUNTER — TELEPHONE (OUTPATIENT)
Dept: PERINATAL CARE | Facility: OTHER | Age: 25
End: 2020-07-22

## 2020-07-22 NOTE — TELEPHONE ENCOUNTER
Spoke with patient and confirmed appointment with [facility]  1 support person (must be over age of 15) may accompany patient  Will you and your support person be able to wear a mask, without a valve, during entire appointment? yes  [facility] does not allow cell phone use, recording device or streaming during the ultrasound  Check in and rooming questions will be done via phone, when you arrive in the parking lot please call the following inside line # prior to entering office:    [place] line: [phone]    Have you or your support person traveled outside the state in the last 2 weeks? No   If yes, what state did you travel to? n/a     Do you or your support person have:  Fever or flu-like symptoms? No  Symptoms of upper respiratory infection like runny nose, sore throat or cough? No  Do you have new headache that you have not had in the past? No  Have you experienced any new shortness of breath recently? No  Do you have any new loss of taste or smell? No  Do you have any new diarrhea, nausea or vomiting? No  Have you recently been in contact with anyone who has been sick or diagnosed with COVID-19 infection? No  Have you been recommended to quarantine because of an exposure to a confirmed positive COVID19 person? No    You and your support person will have temperature screening upon arrival     Patient verbalized understanding of all instructions

## 2020-07-23 ENCOUNTER — ROUTINE PRENATAL (OUTPATIENT)
Dept: PERINATAL CARE | Facility: CLINIC | Age: 25
End: 2020-07-23
Payer: COMMERCIAL

## 2020-07-23 ENCOUNTER — TELEPHONE (OUTPATIENT)
Dept: OBGYN CLINIC | Facility: CLINIC | Age: 25
End: 2020-07-23

## 2020-07-23 VITALS
SYSTOLIC BLOOD PRESSURE: 104 MMHG | WEIGHT: 153.6 LBS | TEMPERATURE: 98.8 F | DIASTOLIC BLOOD PRESSURE: 58 MMHG | HEIGHT: 59 IN | BODY MASS INDEX: 30.96 KG/M2 | HEART RATE: 71 BPM

## 2020-07-23 DIAGNOSIS — O24.013 PRE-EXISTING TYPE 1 DIABETES MELLITUS WITH HYPERGLYCEMIA DURING PREGNANCY IN THIRD TRIMESTER (HCC): Primary | ICD-10-CM

## 2020-07-23 DIAGNOSIS — O24.013 PREGNANCY COMPLICATED BY PRE-EXISTING TYPE 1 DIABETES, THIRD TRIMESTER: ICD-10-CM

## 2020-07-23 DIAGNOSIS — Z3A.32 32 WEEKS GESTATION OF PREGNANCY: Primary | ICD-10-CM

## 2020-07-23 DIAGNOSIS — E10.65 PRE-EXISTING TYPE 1 DIABETES MELLITUS WITH HYPERGLYCEMIA DURING PREGNANCY IN THIRD TRIMESTER (HCC): Primary | ICD-10-CM

## 2020-07-23 PROCEDURE — 3008F BODY MASS INDEX DOCD: CPT | Performed by: NURSE PRACTITIONER

## 2020-07-23 PROCEDURE — 59025 FETAL NON-STRESS TEST: CPT | Performed by: OBSTETRICS & GYNECOLOGY

## 2020-07-23 RX ORDER — INSULIN PUMP CONTROLLER
1 EACH MISCELLANEOUS
Qty: 50 EACH | Refills: 0 | Status: SHIPPED | OUTPATIENT
Start: 2020-07-23 | End: 2020-09-30 | Stop reason: SDUPTHER

## 2020-07-23 NOTE — LETTER
NST sleeve cover sheet    Patient name: Libia Maldonado  : 1995  MRN: [id number]    MODE: Estimated Date of Delivery: 20    Obstetrician: _______________________________    Reason(s) for testing:  __________________________________________      Testing frequency:    ___ 2x/wk  ___ 1x/wk  ___ Dopplers  ___ BPP?       Last growth scan: __________________________________________

## 2020-07-23 NOTE — TELEPHONE ENCOUNTER
----- Message from Corwith Shantel sent at 7/23/2020 10:59 AM EDT -----  Regarding: Non-Urgent Medical Question  Contact: [phone]  Hello, I did ask the other day when I was at maternal fetal, but I mentioned that my feet were getting very swollen and the they hurt sometimes, tingle and go numb  The nurse stated that it could be the heat  I have not really been out side besides going to my car after work  I have been putting my feet up as much as I can and drinking water, but it still continues and is very uncomfortable

## 2020-07-23 NOTE — TELEPHONE ENCOUNTER
Spoke with patient  She is having no fever, chills, redness, or hot to the touch in her legs  She states it's mostly in her ankles and feet  Aware to keep elevating legs and drinking lots of fluids  NST being done today at [facility]  Denies any SOB  Aware we can assess at her visit tomorrow with Terri Mendez and will order work-up if needed

## 2020-07-24 ENCOUNTER — ROUTINE PRENATAL (OUTPATIENT)
Dept: OBGYN CLINIC | Facility: CLINIC | Age: 25
End: 2020-07-24

## 2020-07-24 VITALS
DIASTOLIC BLOOD PRESSURE: 62 MMHG | BODY MASS INDEX: 31.1 KG/M2 | WEIGHT: 154 LBS | SYSTOLIC BLOOD PRESSURE: 110 MMHG | TEMPERATURE: 96.8 F

## 2020-07-24 DIAGNOSIS — E10.65 PRE-EXISTING TYPE 1 DIABETES MELLITUS WITH HYPERGLYCEMIA DURING PREGNANCY IN THIRD TRIMESTER (HCC): ICD-10-CM

## 2020-07-24 DIAGNOSIS — O24.013 PRE-EXISTING TYPE 1 DIABETES MELLITUS WITH HYPERGLYCEMIA DURING PREGNANCY IN THIRD TRIMESTER (HCC): ICD-10-CM

## 2020-07-24 DIAGNOSIS — O12.03 GESTATIONAL EDEMA IN THIRD TRIMESTER: ICD-10-CM

## 2020-07-24 DIAGNOSIS — Z34.03 ENCOUNTER FOR SUPERVISION OF NORMAL FIRST PREGNANCY IN THIRD TRIMESTER: ICD-10-CM

## 2020-07-24 PROBLEM — O21.0 HYPEREMESIS GRAVIDARUM: Status: RESOLVED | Noted: 2020-02-23 | Resolved: 2020-07-24

## 2020-07-24 PROBLEM — O26.892 ABDOMINAL PAIN IN PREGNANCY, SECOND TRIMESTER: Status: RESOLVED | Noted: 2020-04-20 | Resolved: 2020-07-24

## 2020-07-24 PROBLEM — R10.9 ABDOMINAL PAIN IN PREGNANCY, SECOND TRIMESTER: Status: RESOLVED | Noted: 2020-04-20 | Resolved: 2020-07-24

## 2020-07-24 LAB
CREAT UR-MCNC: 75.7 MG/DL
PROT UR-MCNC: 10 MG/DL
PROT/CREAT UR: 0.13 MG/G{CREAT} (ref 0–0.1)
SL AMB  POCT GLUCOSE, UA: NORMAL
SL AMB POCT URINE PROTEIN: NORMAL

## 2020-07-24 PROCEDURE — PNV: Performed by: PHYSICIAN ASSISTANT

## 2020-07-24 PROCEDURE — 3061F NEG MICROALBUMINURIA REV: CPT | Performed by: NURSE PRACTITIONER

## 2020-07-24 PROCEDURE — 3060F POS MICROALBUMINURIA REV: CPT | Performed by: PHYSICIAN ASSISTANT

## 2020-07-24 PROCEDURE — 2022F DILAT RTA XM EVC RTNOPTHY: CPT | Performed by: PHYSICIAN ASSISTANT

## 2020-07-24 PROCEDURE — 84156 ASSAY OF PROTEIN URINE: CPT | Performed by: PHYSICIAN ASSISTANT

## 2020-07-24 PROCEDURE — 82570 ASSAY OF URINE CREATININE: CPT | Performed by: PHYSICIAN ASSISTANT

## 2020-07-24 NOTE — ASSESSMENT & PLAN NOTE
Started last week with foot/ankle swelling which progressed to lower leg swelling as well  Somewhat better but not perfect in the morning when she wakes, but by the time she stands up she has edema already  This past week developed facial edema as well that she says made it difficult to keep her eyes open  She denies headaches, visual sx  BP normotensive  Going for twice weekly APFS  Will check CBC, CMP, urine protein/creatinine ratio  Slip given for compression stockings  Cautions given that this may be a symptoms of impending preE    Reviewed [facility] warnings (headaches, visual sx)

## 2020-07-24 NOTE — ASSESSMENT & PLAN NOTE
Followed by SORAIDAM  Current plan for 38 week delivery due to poor control  Has insulin pump    Lab Results   Component Value Date    HGBA1C 7 5 (H) 06/30/2020

## 2020-07-24 NOTE — ASSESSMENT & PLAN NOTE
30 y o  female here for routine PN visit at [facility] well overall  Good fetal movement  It's a girl  CBC, RPR normal  Has breast pump  Discussed perineal massage, paper given

## 2020-07-24 NOTE — PROGRESS NOTES
Problem List Items Addressed This Visit        Endocrine    Pre-existing type 1 diabetes mellitus with hyperglycemia during pregnancy in third trimester ([facility] [address] )     Followed by MFM  Current plan for 38 week delivery due to poor control  Has insulin pump    Lab Results   Component Value Date    HGBA1C 7 5 (H) 06/30/2020               Other    Encounter for supervision of normal first pregnancy in third trimester - Primary     30 y o  female here for routine PN visit at [facility] well overall  Good fetal movement  It's a girl  CBC, RPR normal  Has breast pump  Discussed perineal massage, paper given           Relevant Orders    POCT urine dip    Gestational edema in third trimester     Started last week with foot/ankle swelling which progressed to lower leg swelling as well  Somewhat better but not perfect in the morning when she wakes, but by the time she stands up she has edema already  This past week developed facial edema as well that she says made it difficult to keep her eyes open  She denies headaches, visual sx  BP normotensive  Going for twice weekly APFS  Will check CBC, CMP, urine protein/creatinine ratio  Slip given for compression stockings  Cautions given that this may be a symptoms of impending preE    Reviewed [facility] warnings (headaches, visual sx)         Relevant Orders    CBC and differential    Comprehensive metabolic panel    Protein / creatinine ratio, urine    Compression Stocking

## 2020-07-27 ENCOUNTER — APPOINTMENT (OUTPATIENT)
Dept: LAB | Facility: HOSPITAL | Age: 25
End: 2020-07-27
Payer: COMMERCIAL

## 2020-07-27 ENCOUNTER — TELEPHONE (OUTPATIENT)
Dept: OBGYN CLINIC | Facility: CLINIC | Age: 25
End: 2020-07-27

## 2020-07-27 DIAGNOSIS — O12.03 GESTATIONAL EDEMA IN THIRD TRIMESTER: ICD-10-CM

## 2020-07-27 LAB
ALBUMIN SERPL BCP-MCNC: 2.4 G/DL (ref 3.5–5)
ALP SERPL-CCNC: 123 U/L (ref 46–116)
ALT SERPL W P-5'-P-CCNC: 15 U/L (ref 12–78)
ANION GAP SERPL CALCULATED.3IONS-SCNC: 7 MMOL/L (ref 4–13)
AST SERPL W P-5'-P-CCNC: 16 U/L (ref 5–45)
BASOPHILS # BLD AUTO: 0.04 THOUSANDS/ΜL (ref 0–0.1)
BASOPHILS NFR BLD AUTO: 1 % (ref 0–1)
BILIRUB SERPL-MCNC: 0.26 MG/DL (ref 0.2–1)
BUN SERPL-MCNC: 7 MG/DL (ref 5–25)
CALCIUM SERPL-MCNC: 9 MG/DL (ref 8.3–10.1)
CHLORIDE SERPL-SCNC: 109 MMOL/L (ref 100–108)
CO2 SERPL-SCNC: 23 MMOL/L (ref 21–32)
CREAT SERPL-MCNC: 0.52 MG/DL (ref 0.6–1.3)
EOSINOPHIL # BLD AUTO: 0.02 THOUSAND/ΜL (ref 0–0.61)
EOSINOPHIL NFR BLD AUTO: 0 % (ref 0–6)
ERYTHROCYTE [DISTWIDTH] IN BLOOD BY AUTOMATED COUNT: 12.7 % (ref 11.6–15.1)
GFR SERPL CREATININE-BSD FRML MDRD: 131 ML/MIN/1.73SQ M
GLUCOSE SERPL-MCNC: 79 MG/DL (ref 65–140)
HCT VFR BLD AUTO: 29.7 % (ref 34.8–46.1)
HGB BLD-MCNC: 9.7 G/DL (ref 11.5–15.4)
IMM GRANULOCYTES # BLD AUTO: 0.02 THOUSAND/UL (ref 0–0.2)
IMM GRANULOCYTES NFR BLD AUTO: 0 % (ref 0–2)
LYMPHOCYTES # BLD AUTO: 1.62 THOUSANDS/ΜL (ref 0.6–4.47)
LYMPHOCYTES NFR BLD AUTO: 21 % (ref 14–44)
MCH RBC QN AUTO: 29.5 PG (ref 26.8–34.3)
MCHC RBC AUTO-ENTMCNC: 32.7 G/DL (ref 31.4–37.4)
MCV RBC AUTO: 90 FL (ref 82–98)
MONOCYTES # BLD AUTO: 0.48 THOUSAND/ΜL (ref 0.17–1.22)
MONOCYTES NFR BLD AUTO: 6 % (ref 4–12)
NEUTROPHILS # BLD AUTO: 5.74 THOUSANDS/ΜL (ref 1.85–7.62)
NEUTS SEG NFR BLD AUTO: 72 % (ref 43–75)
NRBC BLD AUTO-RTO: 0 /100 WBCS
PLATELET # BLD AUTO: 265 THOUSANDS/UL (ref 149–390)
PMV BLD AUTO: 11.4 FL (ref 8.9–12.7)
POTASSIUM SERPL-SCNC: 3.7 MMOL/L (ref 3.5–5.3)
PROT SERPL-MCNC: 6 G/DL (ref 6.4–8.2)
RBC # BLD AUTO: 3.29 MILLION/UL (ref 3.81–5.12)
SODIUM SERPL-SCNC: 139 MMOL/L (ref 136–145)
WBC # BLD AUTO: 7.92 THOUSAND/UL (ref 4.31–10.16)

## 2020-07-27 PROCEDURE — 85025 COMPLETE CBC W/AUTO DIFF WBC: CPT

## 2020-07-27 PROCEDURE — 80053 COMPREHEN METABOLIC PANEL: CPT

## 2020-07-27 PROCEDURE — 36415 COLL VENOUS BLD VENIPUNCTURE: CPT

## 2020-07-27 NOTE — TELEPHONE ENCOUNTER
----- Message from Ridgewayivanna Funes sent at 7/27/2020 12:49 PM EDT -----  Regarding: RE: results  Contact: [phone]  Good to hear! I am in the process of getting the blood work right now  I have had an increase in facial swelling, lower back pain, and have had nausea  Not sure if this has anything to do with the swelling      ----- Message -----  From: Carla Guzman PA-C  Sent: 7/24/20, 3:50 PM  To: Pat Salamanca  Subject: results    Rickey Stewart! Your urine test was normal - great news  I will let you know when I see your lab results    Have a good weekend!     Joseph Florentino

## 2020-07-27 NOTE — TELEPHONE ENCOUNTER
Pt contacted # [phone] vm- per hippa communication consent on file- lmom advising pt results are in processing but once we have results we will reach ut to her with recommendations

## 2020-07-28 ENCOUNTER — TELEPHONE (OUTPATIENT)
Dept: OBGYN CLINIC | Facility: CLINIC | Age: 25
End: 2020-07-28

## 2020-07-28 NOTE — TELEPHONE ENCOUNTER
I spoke with Cedric Chacon, notified her of labs, starting iron with vitamin C and to start monitering BP's  She is going to take them at work  I told her to track them and bring with her to next visit or call sooner if elevated

## 2020-07-28 NOTE — TELEPHONE ENCOUNTER
----- Message from Jonathan Hollis PA-C sent at 7/28/2020  1:14 PM EDT -----  Please let Isha Crane know that her kidney and liver function are normal  Her blood count shows an anemia  I do not think this would cause her swelling, but I would add iron supplementation once a day along with vitamin C supplement which will help the iron absorb (not orange juice - she is diabetic)  I would like her to get a home blood pressure cuff to start doing a daily blood pressure at home, and to report any values that are elevated at 140/90 or higher

## 2020-07-30 ENCOUNTER — TELEPHONE (OUTPATIENT)
Dept: OBGYN CLINIC | Facility: CLINIC | Age: 25
End: 2020-07-30

## 2020-07-30 NOTE — TELEPHONE ENCOUNTER
Called pt- reviewed S & S of  labor & reassured pt what she is feeling are normal signs of 3rd trimester pregnancy  Reviewed symptoms to call office  Pt verbalized understanding  Has a pn f/u appt scheduled

## 2020-07-30 NOTE — TELEPHONE ENCOUNTER
----- Message from Marietta Black sent at 7/30/2020 12:08 PM EDT -----  Regarding: Non-Urgent Medical Question  Contact: [phone]  I have been having lots of [place] blum, cramping, pelvic pressure, and vaginal discharge lately   Just checking that all this is normal

## 2020-07-31 ENCOUNTER — TELEPHONE (OUTPATIENT)
Dept: PERINATAL CARE | Facility: CLINIC | Age: 25
End: 2020-07-31

## 2020-07-31 NOTE — TELEPHONE ENCOUNTER
Spoke with patient and confirmed appointment with M  1 support person ( must be over age of 15) may accompany patient  Will you and your support person be able to wear a mask ,without a valve , during entire appointment? yes   To minimize your exposure in our waiting area,check in and rooming questions will be done via phone  When you arrive in the parking lot please call the following inside line # prior to entering office:    [place] [phone] line: [phone] [facility] line:  [phone] line: [phone]  [place] line:  [phone]  [place] line: [phone]    Have you or your support person traveled outside the state in the last 2 weeks? NO   If yes, what state did you travel to? NO     Do you or your support person have:  Fever or flu- like symptoms? NO  Symptoms of upper respiratory infection like runny nose, sore throat or cough? NO  Do you have new headache that you have not had in the past?NO  Have you experienced any new shortness of breath recently? NO  Do you have any new loss of taste or smell? NO  Do you have any new diarrhea, nausea or vomiting? NO  Have you recently been in contact with anyone who has been sick or diagnosed with COVID-19 infection? NO  Have you been recommended to quarantine because of an exposure to a confirmed positive COVID19 person? NO  You and your support person will have temperature screening upon arrival   Patient verbalized understanding of all instructions   -------------------------------------------------------------

## 2020-08-03 ENCOUNTER — ROUTINE PRENATAL (OUTPATIENT)
Dept: PERINATAL CARE | Facility: OTHER | Age: 25
End: 2020-08-03
Payer: COMMERCIAL

## 2020-08-03 ENCOUNTER — HOSPITAL ENCOUNTER (OUTPATIENT)
Facility: HOSPITAL | Age: 25
Discharge: HOME | End: 2020-08-03
Attending: STUDENT IN AN ORGANIZED HEALTH CARE EDUCATION/TRAINING PROGRAM | Admitting: STUDENT IN AN ORGANIZED HEALTH CARE EDUCATION/TRAINING PROGRAM
Payer: COMMERCIAL

## 2020-08-03 ENCOUNTER — TELEPHONE (OUTPATIENT)
Dept: PERINATAL CARE | Facility: OTHER | Age: 25
End: 2020-08-03

## 2020-08-03 ENCOUNTER — TELEPHONE (OUTPATIENT)
Dept: OBGYN CLINIC | Facility: CLINIC | Age: 25
End: 2020-08-03

## 2020-08-03 ENCOUNTER — ULTRASOUND (OUTPATIENT)
Dept: PERINATAL CARE | Facility: OTHER | Age: 25
End: 2020-08-03
Payer: COMMERCIAL

## 2020-08-03 VITALS
HEART RATE: 75 BPM | SYSTOLIC BLOOD PRESSURE: 122 MMHG | DIASTOLIC BLOOD PRESSURE: 78 MMHG | HEIGHT: 59 IN | BODY MASS INDEX: 31.1 KG/M2

## 2020-08-03 VITALS — DIASTOLIC BLOOD PRESSURE: 71 MMHG | SYSTOLIC BLOOD PRESSURE: 119 MMHG

## 2020-08-03 DIAGNOSIS — B96.89 BACTERIAL VAGINOSIS: ICD-10-CM

## 2020-08-03 DIAGNOSIS — O24.013 PRE-EXISTING TYPE 1 DIABETES MELLITUS WITH HYPERGLYCEMIA DURING PREGNANCY IN THIRD TRIMESTER (HCC): Primary | ICD-10-CM

## 2020-08-03 DIAGNOSIS — Z3A.33 33 WEEKS GESTATION OF PREGNANCY: Primary | ICD-10-CM

## 2020-08-03 DIAGNOSIS — N76.0 BACTERIAL VAGINOSIS: ICD-10-CM

## 2020-08-03 DIAGNOSIS — O24.013 PRE-EXISTING TYPE 1 DIABETES MELLITUS DURING PREGNANCY IN THIRD TRIMESTER: ICD-10-CM

## 2020-08-03 DIAGNOSIS — E10.65 PRE-EXISTING TYPE 1 DIABETES MELLITUS WITH HYPERGLYCEMIA DURING PREGNANCY IN THIRD TRIMESTER (HCC): Primary | ICD-10-CM

## 2020-08-03 DIAGNOSIS — D64.9 ANEMIA: Primary | ICD-10-CM

## 2020-08-03 PROCEDURE — 76816 OB US FOLLOW-UP PER FETUS: CPT | Performed by: OBSTETRICS & GYNECOLOGY

## 2020-08-03 PROCEDURE — 59025 FETAL NON-STRESS TEST: CPT | Performed by: OBSTETRICS & GYNECOLOGY

## 2020-08-03 PROCEDURE — NC001 PR NO CHARGE: Performed by: OBSTETRICS & GYNECOLOGY

## 2020-08-03 PROCEDURE — NC001 PR NO CHARGE

## 2020-08-03 PROCEDURE — 99214 OFFICE O/P EST MOD 30 MIN: CPT

## 2020-08-03 RX ORDER — METRONIDAZOLE 500 MG/1
500 TABLET ORAL EVERY 12 HOURS SCHEDULED
Qty: 14 TABLET | Refills: 0 | Status: SHIPPED | OUTPATIENT
Start: 2020-08-03 | End: 2020-08-12 | Stop reason: HOSPADM

## 2020-08-03 RX ORDER — FERROUS SULFATE TAB EC 324 MG (65 MG FE EQUIVALENT) 324 (65 FE) MG
324 TABLET DELAYED RESPONSE ORAL
Qty: 30 TABLET | Refills: 1 | Status: SHIPPED | OUTPATIENT
Start: 2020-08-03 | End: 2022-03-07

## 2020-08-03 NOTE — TELEPHONE ENCOUNTER
Pt is having irreg cx  Probably Jeanella Kidney  Her RENETTA is about 106/50  Edema is better   Has nst later today

## 2020-08-03 NOTE — LETTER
August 3, 2020     Zehra MichaelCharity [address]  [address] [address]    Patient: Nico Goldman   YOB: 1995   Date of Visit: 8/3/2020       Dear Dr Susanne Floyd: Thank you for referring Nico Goldman to me for evaluation  Below are my notes for this consultation  If you have questions, please do not hesitate to call me  I look forward to following your patient along with you  Sincerely,        Miryam Deng MD        CC: No Recipients  Miryam Deng MD  8/3/2020  6:21 PM  Sign when Signing Visit  Please refer to the [facility] ultrasound report in Ob Procedures for additional information regarding today's visit

## 2020-08-03 NOTE — PROGRESS NOTES
Please refer to the [facility] ultrasound report in Ob Procedures for additional information regarding today's visit

## 2020-08-03 NOTE — PATIENT INSTRUCTIONS

## 2020-08-03 NOTE — LETTER
NST sleeve cover sheet    Patient name: Kami Storm  : 1995  MRN: [id number]    MOED: Estimated Date of Delivery: 20    Obstetrician: _______________________________    Reason(s) for testing:  __________________________________________      Testing frequency:    ___ 2x/wk  ___ 1x/wk  ___ Dopplers  ___ BPP?       Last growth scan: __________________________________________

## 2020-08-03 NOTE — PROGRESS NOTES
Please refer to the [facility] ultrasound report in Ob Procedures for additional information regarding today's visit

## 2020-08-03 NOTE — TELEPHONE ENCOUNTER
----- Message from Delia Bourgeois sent at 8/3/2020  7:05 AM EDT -----  Regarding: Non-Urgent Medical Question  Contact: [phone]  Might be normal but not sure  I've been having inconsistent contractions since around 7pm last night  They last about an hour and go for 1-2 minutes  Very painful and intense  I know Alcario Cough are normal an get more intense an the time comes  These take my breathe away to the point I have to stop what I'm doing  Been drinking lots of water and changing position doesn't help  I have a NIST later today

## 2020-08-04 NOTE — H&P
Triage Note - OB  Roxy Barrett 30 y o  female MRN: [id number]  Unit/Bed#:  TRIAGE 3 Encounter: [id number]    OB TRIAGE NOTE  Roxy Barrett  [id number]  8/3/2020  8:58 PM  LD TRIAGE 3/LD TRIAGE 3-*    ASSESS:  30 y o   33w5d, not in  labor    PLAN  #1   labor workup: negative  · SVE closed/thick/high, CL 3 15-3 7cm, VTX  · Intermittent CTX on tocometry, NST reactive  · Cervix visually closed on speculum exam    #2  Bacterial vaginosis  · Rx 500mg flagyl q 12 hours x 7 days to pharmacy    #3  Discharge instructions  · Patient instructed to call if experiencing worsening contractions, vaginal bleeding, loss of fluid or decreased fetal movement  · Will follow up with Dr Alise Prabhakar on   D/w Dr Marval Heimlich  ______________    SUBJECTIVE    MODE: Estimated Date of Delivery: 20    HPI Chronology:  30 y o   33w5d presents with complaint of contractions for the past 3 days  She was evaluated at [facility] for NST, Level I scan today  Given the given duration and severity of her contractions, recommendations were made to present to L&D for evaluation  Contractions: present  Leakage: absent  Bleeding: absent  Fetal Movement: present  Pelvic pain: Absent    Vitals:   LMP 2019 (Exact Date)   There is no height or weight on file to calculate BMI  /78, HR 75    Review of Systems   As noted above    Physical Exam   Constitutional: She is oriented to person, place, and time  She appears well-developed  No distress  Pulmonary/Chest: Effort normal    Abdominal: Soft  She exhibits no distension  There is no abdominal tenderness  There is no rebound and no guarding  Neurological: She is alert and oriented to person, place, and time  Skin: She is not diaphoretic       SSE:  Cervix visually closed on speculum exam  Wet mount/KOH: clue cells present, trichomonads none, hyphae none  Thin grey vaginal discharge noted  No active bleeding noted     SVE:  Closed/thick/high    UA:  Negative for nitrite, leukocyte, protein  Positive for large ketones    TVUS:  CL: 3 15-3 7cm  Vertex presentation  No funneling or debris noted  No placenta previa or low lying placenta noted    FHT:  Baseline: 120  Variability: moderate   Accelerations: 15x15, episodic   Decelerations: absent     TOCO:   Intermittent    Labs: No results found for this or any previous visit (from the past 24 hour(s))  Lab, Imaging and other studies: I have personally reviewed pertinent reports          Oscar Arias MD  8/3/2020  8:58 PM

## 2020-08-04 NOTE — DISCHARGE INSTRUCTIONS
Pregnancy at 31 to 34 [address]:   You may continue to have symptoms such as shortness of breath, heartburn, contractions, or swelling of your ankles and feet  You may be gaining about 1 pound a week now  DISCHARGE INSTRUCTIONS:   Seek care immediately if:   · You develop a severe headache that does not go away  · You have new or increased vision changes, such as blurred or spotted vision  · You have new or increased swelling in your face or hands  · You have vaginal spotting or bleeding  · Your water broke or you feel warm water gushing or trickling from your vagina  Contact your healthcare provider if:   · You have more than 5 contractions in 1 hour  · You notice any changes in your baby's movements  · You have abdominal cramps, pressure, or tightening  · You have a change in vaginal discharge  · You have chills or a fever  · You have vaginal itching, burning, or pain  · You have yellow, green, white, or foul-smelling vaginal discharge  · You have pain or burning when you urinate, less urine than usual, or pink or bloody urine  · You have questions or concerns about your condition or care  How to care for yourself at this stage of your pregnancy:   · Eat a variety of healthy foods  Healthy foods include fruits, vegetables, whole-grain breads, low-fat dairy foods, beans, lean meats, and fish  Drink liquids as directed  Ask how much liquid to drink each day and which liquids are best for you  Limit caffeine to less than 200 milligrams each day  Limit your intake of fish to 2 servings each week  Choose fish low in mercury such as canned light tuna, shrimp, salmon, cod, or tilapia  Do not  eat fish high in mercury such as swordfish, tilefish, ceasar mackerel, and shark  · Manage heartburn  by eating 4 or 5 small meals each day instead of large meals  Avoid spicy food  · Manage swelling  by lying down and putting your feet up       · Take prenatal vitamins as directed  Your need for certain vitamins and minerals, such as folic acid, increases during pregnancy  Prenatal vitamins provide some of the extra vitamins and minerals you need  Prenatal vitamins may also help to decrease the risk of certain birth defects  · Talk to your healthcare provider about exercise  Moderate exercise can help you stay fit  Your healthcare provider will help you plan an exercise program that is safe for you during pregnancy  · Do not smoke  If you smoke, it is never too late to quit  Smoking increases your risk of a miscarriage and other health problems during your pregnancy  Smoking can cause your baby to be born too early or weigh less at birth  Ask your healthcare provider for information if you need help quitting  · Do not drink alcohol  Alcohol passes from your body to your baby through the placenta  It can affect your baby's brain development and cause fetal alcohol syndrome (FAS)  FAS is a group of conditions that causes mental, behavior, and growth problems  · Talk to your healthcare provider before you take any medicines  Many medicines may harm your baby if you take them when you are pregnant  Do not take any medicines, vitamins, herbs, or supplements without first talking to your healthcare provider  Never use illegal or street drugs (such as marijuana or cocaine) while you are pregnant  Safety tips during pregnancy:   · Avoid hot tubs and saunas  Do not use a hot tub or sauna while you are pregnant, especially during your first trimester  Hot tubs and saunas may raise your baby's temperature and increase the risk of birth defects  · Avoid toxoplasmosis  This is an infection caused by eating raw meat or being around infected cat feces  It can cause birth defects, miscarriages, and other problems  Wash your hands after you touch raw meat  Make sure any meat is well-cooked before you eat it  Avoid raw eggs and unpasteurized milk   Use gloves or ask someone else to clean your cat's litter box while you are pregnant  Changes that are happening with your baby:  By 34 weeks, your baby may weigh more than 5 pounds  Your baby will be about 12 ½ inches long from the top of the head to the rump (baby's bottom)  Your baby is gaining about ½ pound a week  Your baby's eyes open and close now  Your baby's kicks and movements are more forceful at this time  What you need to know about prenatal care: Your healthcare provider will check your blood pressure and weight  You may also need the following:  · A urine test  may also be done to check for sugar and protein  These can be signs of gestational diabetes or infection  Protein in your urine may also be a sign of preeclampsia  Preeclampsia is a condition that can develop during week 20 or later of your pregnancy  It causes high blood pressure, and it can cause problems with your kidneys and other organs  · A Tdap vaccine  may be recommended by your healthcare provider  · Fundal height  is a measurement of your uterus to check your baby's growth  This number is usually the same as the number of weeks that you have been pregnant  Your healthcare provider may also check your baby's position  · Your baby's heart rate  will be checked  © 2017 [employer] Information is for End User's use only and may not be sold, redistributed or otherwise used for commercial purposes  All illustrations and images included in CareNotes® are the copyrighted property of [employer]  or Rhett Preston  The above information is an  only  It is not intended as medical advice for individual conditions or treatments  Talk to your doctor, nurse or pharmacist before following any medical regimen to see if it is safe and effective for you

## 2020-08-05 ENCOUNTER — HOSPITAL ENCOUNTER (OUTPATIENT)
Facility: HOSPITAL | Age: 25
Discharge: HOME | End: 2020-08-05
Attending: STUDENT IN AN ORGANIZED HEALTH CARE EDUCATION/TRAINING PROGRAM | Admitting: STUDENT IN AN ORGANIZED HEALTH CARE EDUCATION/TRAINING PROGRAM
Payer: COMMERCIAL

## 2020-08-05 ENCOUNTER — TELEPHONE (OUTPATIENT)
Dept: OBGYN CLINIC | Facility: CLINIC | Age: 25
End: 2020-08-05

## 2020-08-05 ENCOUNTER — HOSPITAL ENCOUNTER (EMERGENCY)
Facility: HOSPITAL | Age: 25
End: 2020-08-05
Attending: EMERGENCY MEDICINE | Admitting: EMERGENCY MEDICINE
Payer: COMMERCIAL

## 2020-08-05 VITALS
HEART RATE: 88 BPM | TEMPERATURE: 97.9 F | SYSTOLIC BLOOD PRESSURE: 116 MMHG | OXYGEN SATURATION: 100 % | RESPIRATION RATE: 15 BRPM | DIASTOLIC BLOOD PRESSURE: 68 MMHG

## 2020-08-05 VITALS
HEART RATE: 83 BPM | RESPIRATION RATE: 18 BRPM | SYSTOLIC BLOOD PRESSURE: 116 MMHG | DIASTOLIC BLOOD PRESSURE: 65 MMHG | TEMPERATURE: 99 F

## 2020-08-05 DIAGNOSIS — E16.2 HYPOGLYCEMIA: ICD-10-CM

## 2020-08-05 DIAGNOSIS — O47.03 PRETERM UTERINE CONTRACTIONS IN THIRD TRIMESTER, ANTEPARTUM: Primary | ICD-10-CM

## 2020-08-05 PROBLEM — Z3A.34 34 WEEKS GESTATION OF PREGNANCY: Status: ACTIVE | Noted: 2020-08-03

## 2020-08-05 LAB
ALBUMIN SERPL BCP-MCNC: 2.5 G/DL (ref 3.5–5)
ALP SERPL-CCNC: 154 U/L (ref 46–116)
ALT SERPL W P-5'-P-CCNC: 16 U/L (ref 12–78)
ANION GAP SERPL CALCULATED.3IONS-SCNC: 11 MMOL/L (ref 4–13)
AST SERPL W P-5'-P-CCNC: 29 U/L (ref 5–45)
ATRIAL RATE: 88 BPM
BASOPHILS # BLD AUTO: 0.02 THOUSANDS/ΜL (ref 0–0.1)
BASOPHILS NFR BLD AUTO: 0 % (ref 0–1)
BILIRUB SERPL-MCNC: 0.5 MG/DL (ref 0.2–1)
BILIRUB UR QL STRIP: NEGATIVE
BILIRUB UR QL STRIP: NEGATIVE
BUN SERPL-MCNC: 4 MG/DL (ref 5–25)
CALCIUM SERPL-MCNC: 8.2 MG/DL (ref 8.3–10.1)
CHLORIDE SERPL-SCNC: 105 MMOL/L (ref 100–108)
CLARITY UR: CLEAR
CLARITY UR: CLEAR
CO2 SERPL-SCNC: 22 MMOL/L (ref 21–32)
COLOR UR: YELLOW
COLOR UR: YELLOW
CREAT SERPL-MCNC: 0.53 MG/DL (ref 0.6–1.3)
EOSINOPHIL # BLD AUTO: 0.07 THOUSAND/ΜL (ref 0–0.61)
EOSINOPHIL NFR BLD AUTO: 1 % (ref 0–6)
ERYTHROCYTE [DISTWIDTH] IN BLOOD BY AUTOMATED COUNT: 12.4 % (ref 11.6–15.1)
GFR SERPL CREATININE-BSD FRML MDRD: 131 ML/MIN/1.73SQ M
GLUCOSE SERPL-MCNC: 124 MG/DL (ref 65–140)
GLUCOSE SERPL-MCNC: 176 MG/DL (ref 65–140)
GLUCOSE SERPL-MCNC: 186 MG/DL (ref 65–140)
GLUCOSE SERPL-MCNC: 46 MG/DL (ref 65–140)
GLUCOSE SERPL-MCNC: 49 MG/DL (ref 65–140)
GLUCOSE UR STRIP-MCNC: ABNORMAL MG/DL
GLUCOSE UR STRIP-MCNC: NEGATIVE MG/DL
HCT VFR BLD AUTO: 33.9 % (ref 34.8–46.1)
HGB BLD-MCNC: 11.1 G/DL (ref 11.5–15.4)
HGB UR QL STRIP.AUTO: NEGATIVE
HGB UR QL STRIP.AUTO: NEGATIVE
IMM GRANULOCYTES # BLD AUTO: 0.04 THOUSAND/UL (ref 0–0.2)
IMM GRANULOCYTES NFR BLD AUTO: 1 % (ref 0–2)
KETONES UR STRIP-MCNC: ABNORMAL MG/DL
KETONES UR STRIP-MCNC: ABNORMAL MG/DL
LEUKOCYTE ESTERASE UR QL STRIP: ABNORMAL
LEUKOCYTE ESTERASE UR QL STRIP: NEGATIVE
LYMPHOCYTES # BLD AUTO: 1.82 THOUSANDS/ΜL (ref 0.6–4.47)
LYMPHOCYTES NFR BLD AUTO: 24 % (ref 14–44)
MCH RBC QN AUTO: 29.2 PG (ref 26.8–34.3)
MCHC RBC AUTO-ENTMCNC: 32.7 G/DL (ref 31.4–37.4)
MCV RBC AUTO: 89 FL (ref 82–98)
MONOCYTES # BLD AUTO: 0.44 THOUSAND/ΜL (ref 0.17–1.22)
MONOCYTES NFR BLD AUTO: 6 % (ref 4–12)
NEUTROPHILS # BLD AUTO: 5.08 THOUSANDS/ΜL (ref 1.85–7.62)
NEUTS SEG NFR BLD AUTO: 68 % (ref 43–75)
NITRITE UR QL STRIP: NEGATIVE
NITRITE UR QL STRIP: NEGATIVE
NRBC BLD AUTO-RTO: 0 /100 WBCS
P AXIS: 62 DEGREES
PH UR STRIP.AUTO: 6.5 [PH] (ref 4.5–8)
PH UR STRIP.AUTO: 8.5 [PH]
PLATELET # BLD AUTO: 235 THOUSANDS/UL (ref 149–390)
PMV BLD AUTO: 11.3 FL (ref 8.9–12.7)
POTASSIUM SERPL-SCNC: 4.2 MMOL/L (ref 3.5–5.3)
PR INTERVAL: 114 MS
PROT SERPL-MCNC: 6.8 G/DL (ref 6.4–8.2)
PROT UR STRIP-MCNC: ABNORMAL MG/DL
PROT UR STRIP-MCNC: NEGATIVE MG/DL
QRS AXIS: 74 DEGREES
QRSD INTERVAL: 76 MS
QT INTERVAL: 380 MS
QTC INTERVAL: 459 MS
RBC # BLD AUTO: 3.8 MILLION/UL (ref 3.81–5.12)
SODIUM SERPL-SCNC: 138 MMOL/L (ref 136–145)
SP GR UR STRIP.AUTO: 1.01 (ref 1–1.03)
SP GR UR STRIP.AUTO: 1.02 (ref 1–1.03)
T WAVE AXIS: 19 DEGREES
UROBILINOGEN UR QL STRIP.AUTO: 0.2 E.U./DL
UROBILINOGEN UR QL STRIP.AUTO: 1 E.U./DL
VENTRICULAR RATE: 88 BPM
WBC # BLD AUTO: 7.47 THOUSAND/UL (ref 4.31–10.16)

## 2020-08-05 PROCEDURE — 96375 TX/PRO/DX INJ NEW DRUG ADDON: CPT

## 2020-08-05 PROCEDURE — 81003 URINALYSIS AUTO W/O SCOPE: CPT | Performed by: EMERGENCY MEDICINE

## 2020-08-05 PROCEDURE — 99285 EMERGENCY DEPT VISIT HI MDM: CPT | Performed by: EMERGENCY MEDICINE

## 2020-08-05 PROCEDURE — 96376 TX/PRO/DX INJ SAME DRUG ADON: CPT

## 2020-08-05 PROCEDURE — 93010 ELECTROCARDIOGRAM REPORT: CPT | Performed by: INTERNAL MEDICINE

## 2020-08-05 PROCEDURE — 82948 REAGENT STRIP/BLOOD GLUCOSE: CPT

## 2020-08-05 PROCEDURE — 99213 OFFICE O/P EST LOW 20 MIN: CPT

## 2020-08-05 PROCEDURE — 80053 COMPREHEN METABOLIC PANEL: CPT | Performed by: EMERGENCY MEDICINE

## 2020-08-05 PROCEDURE — 93005 ELECTROCARDIOGRAM TRACING: CPT

## 2020-08-05 PROCEDURE — 99285 EMERGENCY DEPT VISIT HI MDM: CPT

## 2020-08-05 PROCEDURE — 87086 URINE CULTURE/COLONY COUNT: CPT | Performed by: EMERGENCY MEDICINE

## 2020-08-05 PROCEDURE — 36415 COLL VENOUS BLD VENIPUNCTURE: CPT | Performed by: EMERGENCY MEDICINE

## 2020-08-05 PROCEDURE — 96374 THER/PROPH/DIAG INJ IV PUSH: CPT

## 2020-08-05 PROCEDURE — NC001 PR NO CHARGE: Performed by: OBSTETRICS & GYNECOLOGY

## 2020-08-05 PROCEDURE — 85025 COMPLETE CBC W/AUTO DIFF WBC: CPT | Performed by: EMERGENCY MEDICINE

## 2020-08-05 PROCEDURE — 96361 HYDRATE IV INFUSION ADD-ON: CPT

## 2020-08-05 RX ORDER — ONDANSETRON 2 MG/ML
4 INJECTION INTRAMUSCULAR; INTRAVENOUS ONCE
Status: COMPLETED | OUTPATIENT
Start: 2020-08-05 | End: 2020-08-05

## 2020-08-05 RX ORDER — ACETAMINOPHEN 325 MG/1
975 TABLET ORAL ONCE
Status: COMPLETED | OUTPATIENT
Start: 2020-08-05 | End: 2020-08-05

## 2020-08-05 RX ORDER — DEXTROSE MONOHYDRATE 25 G/50ML
50 INJECTION, SOLUTION INTRAVENOUS ONCE
Status: COMPLETED | OUTPATIENT
Start: 2020-08-05 | End: 2020-08-05

## 2020-08-05 RX ADMIN — SODIUM CHLORIDE 1000 ML: 0.9 INJECTION, SOLUTION INTRAVENOUS at 13:38

## 2020-08-05 RX ADMIN — ONDANSETRON 4 MG: 2 INJECTION INTRAMUSCULAR; INTRAVENOUS at 13:41

## 2020-08-05 RX ADMIN — ACETAMINOPHEN 975 MG: 325 TABLET ORAL at 15:59

## 2020-08-05 RX ADMIN — SODIUM CHLORIDE 1000 ML: 0.9 INJECTION, SOLUTION INTRAVENOUS at 15:07

## 2020-08-05 RX ADMIN — ONDANSETRON 4 MG: 2 INJECTION INTRAMUSCULAR; INTRAVENOUS at 15:22

## 2020-08-05 RX ADMIN — DEXTROSE MONOHYDRATE 50 ML: 25 INJECTION, SOLUTION INTRAVENOUS at 13:33

## 2020-08-05 NOTE — PROGRESS NOTES
L&D Triage Note - OB/GYN  Daniela Douglass 30 y o  female MRN: [id number]  Unit/Bed#: LD TRIAGE  Encounter: [id number]    Patient is seen by Marlen Reynoldskole is a 30 y o   at 34w0d not in  labor  During her initial evaluation in triage she had one late deceleration that resolved with maternal repositioning  No further decels were seen, reactive strip afterwards  PLAN  #1  R/o  labor  · SVE closed/thick/high, intermittent contractions on tocometry, NST reactive, cervix visually closed with physiologic white discharge upon speculum exam       FHT:  Baseline Rate: 140 bpm  Variability: Moderate 6-25 bpm  Accelerations: 15 x 15 or greater, At variable times  Decelerations: (!) Late, Slow return to baseline    TOCO:   Contraction Frequency (minutes): 4  Contraction Duration (seconds): 60-70  Contraction Quality: Moderate      #2  Discharge instructions  · Patient instructed to call if experiencing worsening contractions, vaginal bleeding, loss of fluid or decreased fetal movement  · Will follow up with OBGYN  D/w Dr Addie Rocha  ______________    SUBJECTIVE    MODE: Estimated Date of Delivery: 20    HPI:  30 y o   34w0d presents with complaint of contractions and low blood sugar  She was seen in the emergency room at [facility] for hypoglycemia  She states her glucometer redness 30 this morning, she drank some juice improved into the 70s but continued to drop  She turned off her insulin pump approximately 1 hour prior to evaluation in the emergency room  She had a few episodes of vomiting  She is also currently being treated for bacterial vaginosis      Contractions: occasional  Leakage of fluid: no  Vaginal Bleeding: no  Fetal movement: present    Her current obstetrical history is significant for type 1 diabetes    Her past obstetrical history is significant for n/a      ROS:  Constitutional: Negative  Respiratory: Negative  Cardiovascular: Negative Gastrointestinal: Negative    Physical Exam  Constitutional:       Appearance: Normal appearance  Cardiovascular:      Rate and Rhythm: Normal rate  Pulmonary:      Effort: Pulmonary effort is normal    Abdominal:      Palpations: Abdomen is soft  Neurological:      General: No focal deficit present  Mental Status: She is alert and oriented to person, place, and time  Psychiatric:         Mood and Affect: Mood normal          Behavior: Behavior normal          OBJECTIVE:  /65 (BP Location: Right arm)   Pulse 83   Temp 99 °F (37 2 °C) (Oral)   Resp 18   LMP 12/04/2019 (Exact Date)   There is no height or weight on file to calculate BMI    Labs:   Recent Results (from the past 24 hour(s))   Fingerstick Glucose (POCT)    Collection Time: 08/05/20  1:21 PM   Result Value Ref Range    POC Glucose 46 (L) 65 - 140 mg/dl   ECG 12 lead    Collection Time: 08/05/20  1:25 PM   Result Value Ref Range    Ventricular Rate 88 BPM    Atrial Rate 88 BPM    VT Interval 114 ms    QRSD Interval 76 ms    QT Interval 380 ms    QTC Interval 459 ms    P Axis 62 degrees    QRS Axis 74 degrees    T Wave Axis 19 degrees   Comprehensive metabolic panel    Collection Time: 08/05/20  1:37 PM   Result Value Ref Range    Sodium 138 136 - 145 mmol/L    Potassium 4 2 3 5 - 5 3 mmol/L    Chloride 105 100 - 108 mmol/L    CO2 22 21 - 32 mmol/L    ANION GAP 11 4 - 13 mmol/L    BUN 4 (L) 5 - 25 mg/dL    Creatinine 0 53 (L) 0 60 - 1 30 mg/dL    Glucose 49 (L) 65 - 140 mg/dL    Calcium 8 2 (L) 8 3 - 10 1 mg/dL    AST 29 5 - 45 U/L    ALT 16 12 - 78 U/L    Alkaline Phosphatase 154 (H) 46 - 116 U/L    Total Protein 6 8 6 4 - 8 2 g/dL    Albumin 2 5 (L) 3 5 - 5 0 g/dL    Total Bilirubin 0 50 0 20 - 1 00 mg/dL    eGFR 131 ml/min/1 73sq m   CBC and differential    Collection Time: 08/05/20  1:37 PM   Result Value Ref Range    WBC 7 47 4 31 - 10 16 Thousand/uL    RBC 3 80 (L) 3 81 - 5 12 Million/uL    Hemoglobin 11 1 (L) 11 5 - 15 4 g/dL Hematocrit 33 9 (L) 34 8 - 46 1 %    MCV 89 82 - 98 fL    MCH 29 2 26 8 - 34 3 pg    MCHC 32 7 31 4 - 37 4 g/dL    RDW 12 4 11 6 - 15 1 %    MPV 11 3 8 9 - 12 7 fL    Platelets 291 323 - 121 Thousands/uL    nRBC 0 /100 WBCs    Neutrophils Relative 68 43 - 75 %    Immat GRANS % 1 0 - 2 %    Lymphocytes Relative 24 14 - 44 %    Monocytes Relative 6 4 - 12 %    Eosinophils Relative 1 0 - 6 %    Basophils Relative 0 0 - 1 %    Neutrophils Absolute 5 08 1 85 - 7 62 Thousands/µL    Immature Grans Absolute 0 04 0 00 - 0 20 Thousand/uL    Lymphocytes Absolute 1 82 0 60 - 4 47 Thousands/µL    Monocytes Absolute 0 44 0 17 - 1 22 Thousand/µL    Eosinophils Absolute 0 07 0 00 - 0 61 Thousand/µL    Basophils Absolute 0 02 0 00 - 0 10 Thousands/µL   Fingerstick Glucose (POCT)    Collection Time: 08/05/20  1:59 PM   Result Value Ref Range    POC Glucose 186 (H) 65 - 140 mg/dl   UA w Reflex to Microscopic w Reflex to Culture    Collection Time: 08/05/20  2:07 PM    Specimen: Urine, Clean Catch   Result Value Ref Range    Color, UA Yellow     Clarity, UA Clear     Specific [place], UA 1 015 1 003 - 1 030    pH, UA 8 5 (A) 4 5, 5 0, 5 5, 6 0, 6 5, 7 0, 7 5, 8 0    Leukocytes, UA Negative Negative    Nitrite, UA Negative Negative    Protein, UA Negative Negative mg/dl    Glucose,  (1/10%) (A) Negative mg/dl    Ketones, UA 40 (2+) (A) Negative mg/dl    Urobilinogen, UA 1 0 0 2, 1 0 E U /dl E U /dl    Bilirubin, UA Negative Negative    Blood, UA Negative Negative    URINE COMMENT     Fingerstick Glucose (POCT)    Collection Time: 08/05/20  3:00 PM   Result Value Ref Range    POC Glucose 124 65 - 140 mg/dl         Qi Taylor DO  OB/GYN PGY-1  8/5/2020  6:52 PM

## 2020-08-05 NOTE — TELEPHONE ENCOUNTER
I called pt - she is in ER in [facility] being treated for lo blood sugar   Pt said she was vomiting and dehydrated

## 2020-08-05 NOTE — EMTALA/ACUTE CARE TRANSFER
[facility] EMERGENCY DEPARTMENT  [address] [facility] [address]  Dept: [phone]      MQNNNI TRANSFER CONSENT    NAME Dennis Cobb                                         1995                              MRN [id number]    I have been informed of my rights regarding examination, treatment, and transfer   by Dr Luba Larios DO    Benefits: Specialized equipment and/or services available at the receiving facility (Include comment)________________________, Continuity of care    Risks: Potential for delay in receiving treatment, Potential deterioration of medical condition, Increased discomfort during transfer, Loss of IV, Possible worsening of condition or death during transfer      Consent for Transfer:  I acknowledge that my medical condition has been evaluated and explained to me by the emergency department physician or other qualified medical person and/or my attending physician, who has recommended that I be transferred to the service of  Accepting Physician: Dr Purvi Betts at [address] Name, [address] : Dimitri Amezcua  The above potential benefits of such transfer, the potential risks associated with such transfer, and the probable risks of not being transferred have been explained to me, and I fully understand them  The doctor has explained that, in my case, the benefits of transfer outweigh the risks  I agree to be transferred  I authorize the performance of emergency medical procedures and treatments upon me in both transit and upon arrival at the receiving facility  Additionally, I authorize the release of any and all medical records to the receiving facility and request they be transported with me, if possible  I understand that the safest mode of transportation during a medical emergency is an ambulance and that the Hospital advocates the use of this mode of transport   Risks of traveling to the receiving facility by car, including absence of medical control, life sustaining equipment, such as oxygen, and medical personnel has been explained to me and I fully understand them  (SUSANA CORRECT BOX BELOW)  [  ]  I consent to the stated transfer and to be transported by ambulance/helicopter  [  ]  I consent to the stated transfer, but refuse transportation by ambulance and accept full responsibility for my transportation by car  I understand the risks of non-ambulance transfers and I exonerate the Hospital and its staff from any deterioration in my condition that results from this refusal     X___________________________________________    DATE  20  TIME________  Signature of patient or legally responsible individual signing on patient behalf           RELATIONSHIP TO PATIENT_________________________          Provider Certification    NAME Najma Mcgregor                                         1995                              MRN [id number]    A medical screening exam was performed on the above named patient  Based on the examination:    Condition Necessitating Transfer There were no encounter diagnoses      Patient Condition: The patient has been stabilized such that within reasonable medical probability, no material deterioration of the patient condition or the condition of the unborn child(serg) is likely to result from the transfer    Reason for Transfer: Level of Care needed not available at this facility    Transfer Requirements: [address]   · Space available and qualified personnel available for treatment as acknowledged by    · Agreed to accept transfer and to provide appropriate medical treatment as acknowledged by       Dr Gilberto Leyden  · Appropriate medical records of the examination and treatment of the patient are provided at the time of transfer   [address] _______  · Transfer will be performed by qualified personnel from    and appropriate transfer equipment as required, including the use of necessary and appropriate life support measures  Provider Certification: I have examined the patient and explained the following risks and benefits of being transferred/refusing transfer to the patient/family:  General risk, such as traffic hazards, adverse weather conditions, rough terrain or turbulence, possible failure of equipment (including vehicle or aircraft), or consequences of actions of persons outside the control of the transport personnel, Unanticipated needs of medical equipment and personnel during transport, Risk of worsening condition, The possibility of a transport vehicle being unavailable      Based on these reasonable risks and benefits to the patient and/or the unborn child(serg), and based upon the information available at the time of the patients examination, I certify that the medical benefits reasonably to be expected from the provision of appropriate medical treatments at another medical facility outweigh the increasing risks, if any, to the individuals medical condition, and in the case of labor to the unborn child, from effecting the transfer      X____________________________________________ DATE 08/05/20        TIME_______      ORIGINAL - SEND TO MEDICAL RECORDS   COPY - SEND WITH PATIENT DURING TRANSFER

## 2020-08-05 NOTE — DISCHARGE INSTRUCTIONS
Pregnancy at 31 to 34 [address]:   You may continue to have symptoms such as shortness of breath, heartburn, contractions, or swelling of your ankles and feet  You may be gaining about 1 pound a week now  DISCHARGE INSTRUCTIONS:   Return to the emergency department if:   · You develop a severe headache that does not go away  · You have new or increased vision changes, such as blurred or spotted vision  · You have new or increased swelling in your face or hands  · You have vaginal spotting or bleeding  · Your water broke or you feel warm water gushing or trickling from your vagina  Contact your healthcare provider if:   · You have more than 5 contractions in 1 hour  · You notice any changes in your baby's movements  · You have abdominal cramps, pressure, or tightening  · You have a change in vaginal discharge  · You have chills or a fever  · You have vaginal itching, burning, or pain  · You have yellow, green, white, or foul-smelling vaginal discharge  · You have pain or burning when you urinate, less urine than usual, or pink or bloody urine  · You have questions or concerns about your condition or care  How to care for yourself at this stage of your pregnancy:   · Eat a variety of healthy foods  Healthy foods include fruits, vegetables, whole-grain breads, low-fat dairy foods, beans, lean meats, and fish  Drink liquids as directed  Ask how much liquid to drink each day and which liquids are best for you  Limit caffeine to less than 200 milligrams each day  Limit your intake of fish to 2 servings each week  Choose fish low in mercury such as canned light tuna, shrimp, salmon, cod, or tilapia  Do not  eat fish high in mercury such as swordfish, tilefish, ceasar mackerel, and shark  · Manage heartburn  by eating 4 or 5 small meals each day instead of large meals  Avoid spicy food  · Manage swelling  by lying down and putting your feet up       · Take prenatal vitamins as directed  Your need for certain vitamins and minerals, such as folic acid, increases during pregnancy  Prenatal vitamins provide some of the extra vitamins and minerals you need  Prenatal vitamins may also help to decrease the risk of certain birth defects  · Talk to your healthcare provider about exercise  Moderate exercise can help you stay fit  Your healthcare provider will help you plan an exercise program that is safe for you during pregnancy  · Do not smoke  If you smoke, it is never too late to quit  Smoking increases your risk of a miscarriage and other health problems during your pregnancy  Smoking can cause your baby to be born too early or weigh less at birth  Ask your healthcare provider for information if you need help quitting  · Do not drink alcohol  Alcohol passes from your body to your baby through the placenta  It can affect your baby's brain development and cause fetal alcohol syndrome (FAS)  FAS is a group of conditions that causes mental, behavior, and growth problems  · Talk to your healthcare provider before you take any medicines  Many medicines may harm your baby if you take them when you are pregnant  Do not take any medicines, vitamins, herbs, or supplements without first talking to your healthcare provider  Never use illegal or street drugs (such as marijuana or cocaine) while you are pregnant  Safety tips during pregnancy:   · Avoid hot tubs and saunas  Do not use a hot tub or sauna while you are pregnant, especially during your first trimester  Hot tubs and saunas may raise your baby's temperature and increase the risk of birth defects  · Avoid toxoplasmosis  This is an infection caused by eating raw meat or being around infected cat feces  It can cause birth defects, miscarriages, and other problems  Wash your hands after you touch raw meat  Make sure any meat is well-cooked before you eat it  Avoid raw eggs and unpasteurized milk   Use gloves or ask someone else to clean your cat's litter box while you are pregnant  Changes that are happening with your baby:  By 34 weeks, your baby may weigh more than 5 pounds  Your baby will be about 12 ½ inches long from the top of the head to the rump (baby's bottom)  Your baby is gaining about ½ pound a week  Your baby's eyes open and close now  Your baby's kicks and movements are more forceful at this time  What you need to know about prenatal care: Your healthcare provider will check your blood pressure and weight  You may also need the following:  · A urine test  may also be done to check for sugar and protein  These can be signs of gestational diabetes or infection  Protein in your urine may also be a sign of preeclampsia  Preeclampsia is a condition that can develop during week 20 or later of your pregnancy  It causes high blood pressure, and it can cause problems with your kidneys and other organs  · A Tdap vaccine  may be recommended by your healthcare provider  · Fundal height  is a measurement of your uterus to check your baby's growth  This number is usually the same as the number of weeks that you have been pregnant  Your healthcare provider may also check your baby's position  · Your baby's heart rate  will be checked  © 2017 [employer]  Information is for End User's use only and may not be sold, redistributed or otherwise used for commercial purposes  All illustrations and images included in CareNotes® are the copyrighted property of "[employer]  or Rhett Preston  The above information is an  only  It is not intended as medical advice for individual conditions or treatments  Talk to your doctor, nurse or pharmacist before following any medical regimen to see if it is safe and effective for you

## 2020-08-06 ENCOUNTER — TELEPHONE (OUTPATIENT)
Dept: OBGYN CLINIC | Facility: CLINIC | Age: 25
End: 2020-08-06

## 2020-08-06 LAB — BACTERIA UR CULT: NORMAL

## 2020-08-06 NOTE — TELEPHONE ENCOUNTER
Pt had cancelled her appt and I remembered speaking with her yesterday - and she was in hospital! So , I called her  She had a malfunction of her pump and was in [place] [facility]  Later was transferred to [facility]  Pt fine now but tired   I gave her an appt tomorrow with Nikolas Ac

## 2020-08-07 ENCOUNTER — ROUTINE PRENATAL (OUTPATIENT)
Dept: OBGYN CLINIC | Facility: CLINIC | Age: 25
End: 2020-08-07

## 2020-08-07 ENCOUNTER — TELEPHONE (OUTPATIENT)
Dept: LABOR AND DELIVERY | Facility: HOSPITAL | Age: 25
End: 2020-08-07

## 2020-08-07 ENCOUNTER — TELEPHONE (OUTPATIENT)
Dept: OTHER | Facility: OTHER | Age: 25
End: 2020-08-07

## 2020-08-07 VITALS — BODY MASS INDEX: 32.11 KG/M2 | WEIGHT: 159 LBS | SYSTOLIC BLOOD PRESSURE: 120 MMHG | DIASTOLIC BLOOD PRESSURE: 84 MMHG

## 2020-08-07 DIAGNOSIS — Z34.03 ENCOUNTER FOR SUPERVISION OF NORMAL FIRST PREGNANCY IN THIRD TRIMESTER: ICD-10-CM

## 2020-08-07 DIAGNOSIS — O09.93 HIGH-RISK PREGNANCY IN THIRD TRIMESTER: Primary | ICD-10-CM

## 2020-08-07 DIAGNOSIS — Z3A.34 34 WEEKS GESTATION OF PREGNANCY: ICD-10-CM

## 2020-08-07 DIAGNOSIS — O24.013 PRE-EXISTING TYPE 1 DIABETES MELLITUS DURING PREGNANCY IN THIRD TRIMESTER: ICD-10-CM

## 2020-08-07 LAB
SL AMB  POCT GLUCOSE, UA: NORMAL
SL AMB POCT URINE PROTEIN: NORMAL

## 2020-08-07 PROCEDURE — PNV: Performed by: NURSE PRACTITIONER

## 2020-08-07 PROCEDURE — 3060F POS MICROALBUMINURIA REV: CPT | Performed by: NURSE PRACTITIONER

## 2020-08-07 PROCEDURE — 2022F DILAT RTA XM EVC RTNOPTHY: CPT | Performed by: NURSE PRACTITIONER

## 2020-08-07 NOTE — ASSESSMENT & PLAN NOTE
Reminded that she needs to schedule 2 x weekly NSTs and AJ for next week  Recommended to do this today  Continues to follow with MFM  Was evaluated in triage this week d/t low blood sugars, but thinks it was a pump malfunction  Feeling well today except for some fatigue

## 2020-08-07 NOTE — PROGRESS NOTES
Problem List Items Addressed This Visit        Endocrine    Pre-existing type 1 diabetes mellitus during pregnancy in third trimester     Reminded that she needs to schedule 2 x weekly NSTs and AJ for next week  Recommended to do this today  Continues to follow with MFM  Was evaluated in triage this week d/t low blood sugars, but thinks it was a pump malfunction  Feeling well today except for some fatigue  Other    High-risk pregnancy in third trimester - Primary     Continues to c/o LE edema  Recent preeclamptic labs WNL and /84  Has had occasional HAs this week, but also was evaluated in hospital this week for hypoglycemia  Also was r/o PTL this week, but cervical exam was cl/thick/high  Good fetal movement  Occasional cramping  Denies leakage of fluid or vaginal bleeding  S/p Tdap vaccine  Reviewed  labor precautions, reasons to call, and FKCs  RTO in 2 weeks             Encounter for supervision of normal first pregnancy in third trimester    Relevant Orders    POCT urine dip (Completed)    34 weeks gestation of pregnancy

## 2020-08-07 NOTE — PROGRESS NOTES
Patient is doing well; complaints of swelling in legs, feet, and face that is painful at times  Reports headaches for the past 3 days  Also Reports blood sugars were running a little low- went to ER and thyeve been fine since  Last weeks of pregnancy paper given

## 2020-08-07 NOTE — ASSESSMENT & PLAN NOTE
Continues to c/o LE edema  Recent preeclamptic labs WNL and /84  Has had occasional HAs this week, but also was evaluated in hospital this week for hypoglycemia  Also was r/o PTL this week, but cervical exam was cl/thick/high  Good fetal movement  Occasional cramping  Denies leakage of fluid or vaginal bleeding  S/p Tdap vaccine  Reviewed  labor precautions, reasons to call, and FKCs  RTO in 2 weeks

## 2020-08-08 ENCOUNTER — HOSPITAL ENCOUNTER (INPATIENT)
Facility: HOSPITAL | Age: 25
LOS: 4 days | Discharge: HOME | End: 2020-08-12
Attending: STUDENT IN AN ORGANIZED HEALTH CARE EDUCATION/TRAINING PROGRAM | Admitting: STUDENT IN AN ORGANIZED HEALTH CARE EDUCATION/TRAINING PROGRAM
Payer: COMMERCIAL

## 2020-08-08 ENCOUNTER — TELEPHONE (OUTPATIENT)
Dept: LABOR AND DELIVERY | Facility: HOSPITAL | Age: 25
End: 2020-08-08

## 2020-08-08 ENCOUNTER — ANESTHESIA EVENT (INPATIENT)
Dept: LABOR AND DELIVERY | Facility: HOSPITAL | Age: 25
End: 2020-08-08
Payer: COMMERCIAL

## 2020-08-08 ENCOUNTER — ANESTHESIA (INPATIENT)
Dept: LABOR AND DELIVERY | Facility: HOSPITAL | Age: 25
End: 2020-08-08
Payer: COMMERCIAL

## 2020-08-08 DIAGNOSIS — Z3A.34 34 WEEKS GESTATION OF PREGNANCY: Primary | ICD-10-CM

## 2020-08-08 DIAGNOSIS — Z98.891 S/P PRIMARY LOW TRANSVERSE C-SECTION: ICD-10-CM

## 2020-08-08 DIAGNOSIS — Z96.41 INSULIN PUMP IN PLACE: ICD-10-CM

## 2020-08-08 LAB
ABO GROUP BLD: NORMAL
ALBUMIN SERPL BCP-MCNC: 2.7 G/DL (ref 3.5–5)
ALP SERPL-CCNC: 175 U/L (ref 46–116)
ALT SERPL W P-5'-P-CCNC: 11 U/L (ref 12–78)
ANION GAP SERPL CALCULATED.3IONS-SCNC: 18 MMOL/L (ref 4–13)
AST SERPL W P-5'-P-CCNC: 18 U/L (ref 5–45)
BASE EXCESS BLDA CALC-SCNC: -10 MMOL/L (ref -2–3)
BASE EXCESS BLDCOA CALC-SCNC: -19.3 MMOL/L (ref 3–11)
BASE EXCESS BLDCOV CALC-SCNC: -17.4 MMOL/L (ref 1–9)
BASOPHILS # BLD AUTO: 0.06 THOUSANDS/ΜL (ref 0–0.1)
BASOPHILS NFR BLD AUTO: 1 % (ref 0–1)
BILIRUB SERPL-MCNC: 0.91 MG/DL (ref 0.2–1)
BLD GP AB SCN SERPL QL: NEGATIVE
BUN SERPL-MCNC: 8 MG/DL (ref 5–25)
CA-I BLD-SCNC: 1.21 MMOL/L (ref 1.12–1.32)
CALCIUM SERPL-MCNC: 9.6 MG/DL (ref 8.3–10.1)
CHLORIDE SERPL-SCNC: 98 MMOL/L (ref 100–108)
CO2 SERPL-SCNC: 19 MMOL/L (ref 21–32)
CREAT SERPL-MCNC: 0.78 MG/DL (ref 0.6–1.3)
EOSINOPHIL # BLD AUTO: 0.03 THOUSAND/ΜL (ref 0–0.61)
EOSINOPHIL NFR BLD AUTO: 0 % (ref 0–6)
ERYTHROCYTE [DISTWIDTH] IN BLOOD BY AUTOMATED COUNT: 13 % (ref 11.6–15.1)
GFR SERPL CREATININE-BSD FRML MDRD: 105 ML/MIN/1.73SQ M
GLUCOSE SERPL-MCNC: 170 MG/DL (ref 65–140)
GLUCOSE SERPL-MCNC: 177 MG/DL (ref 65–140)
GLUCOSE SERPL-MCNC: 221 MG/DL (ref 65–140)
GLUCOSE SERPL-MCNC: 236 MG/DL (ref 65–140)
GLUCOSE SERPL-MCNC: 245 MG/DL (ref 65–140)
GLUCOSE SERPL-MCNC: 245 MG/DL (ref 65–140)
GLUCOSE SERPL-MCNC: 271 MG/DL (ref 65–140)
GLUCOSE SERPL-MCNC: 278 MG/DL (ref 65–140)
GLUCOSE SERPL-MCNC: 278 MG/DL (ref 65–140)
GLUCOSE SERPL-MCNC: 291 MG/DL (ref 65–140)
HCO3 BLDA-SCNC: 16.2 MMOL/L (ref 24–30)
HCO3 BLDCOA-SCNC: 14.2 MMOL/L (ref 17.3–27.3)
HCO3 BLDCOV-SCNC: 14.5 MMOL/L (ref 12.2–28.6)
HCT VFR BLD AUTO: 37.3 % (ref 34.8–46.1)
HCT VFR BLD CALC: 27 % (ref 34.8–46.1)
HGB BLD-MCNC: 12.2 G/DL (ref 11.5–15.4)
HGB BLDA-MCNC: 9.2 G/DL (ref 11.5–15.4)
IMM GRANULOCYTES # BLD AUTO: 0.06 THOUSAND/UL (ref 0–0.2)
IMM GRANULOCYTES NFR BLD AUTO: 1 % (ref 0–2)
LYMPHOCYTES # BLD AUTO: 1.69 THOUSANDS/ΜL (ref 0.6–4.47)
LYMPHOCYTES NFR BLD AUTO: 14 % (ref 14–44)
MCH RBC QN AUTO: 28.8 PG (ref 26.8–34.3)
MCHC RBC AUTO-ENTMCNC: 32.7 G/DL (ref 31.4–37.4)
MCV RBC AUTO: 88 FL (ref 82–98)
MONOCYTES # BLD AUTO: 0.53 THOUSAND/ΜL (ref 0.17–1.22)
MONOCYTES NFR BLD AUTO: 4 % (ref 4–12)
NEUTROPHILS # BLD AUTO: 9.55 THOUSANDS/ΜL (ref 1.85–7.62)
NEUTS SEG NFR BLD AUTO: 80 % (ref 43–75)
NRBC BLD AUTO-RTO: 0 /100 WBCS
O2 CT VFR BLDCOA CALC: 2.7 ML/DL
OXYHGB MFR BLDCOA: 12 %
OXYHGB MFR BLDCOV: 25.9 %
PCO2 BLD: 17 MMOL/L (ref 21–32)
PCO2 BLD: 37.3 MM HG (ref 42–50)
PCO2 BLDCOA: 68.9 MM[HG] (ref 30–60)
PCO2 BLDCOV: 59.1 MM HG (ref 27–43)
PH BLD: 7.25 [PH] (ref 7.3–7.4)
PH BLDCOA: 6.93 [PH] (ref 7.23–7.43)
PH BLDCOV: 7.01 [PH] (ref 7.19–7.49)
PLATELET # BLD AUTO: 244 THOUSANDS/UL (ref 149–390)
PMV BLD AUTO: 11.1 FL (ref 8.9–12.7)
PO2 BLD: 61 MM HG (ref 35–45)
PO2 BLDCOA: 12.1 MM HG (ref 5–25)
PO2 BLDCOV: 18.7 MM HG (ref 15–45)
POTASSIUM BLD-SCNC: 3.9 MMOL/L (ref 3.5–5.3)
POTASSIUM SERPL-SCNC: 4.5 MMOL/L (ref 3.5–5.3)
PROT SERPL-MCNC: 6.9 G/DL (ref 6.4–8.2)
RBC # BLD AUTO: 4.23 MILLION/UL (ref 3.81–5.12)
RH BLD: POSITIVE
SAO2 % BLD FROM PO2: 87 % (ref 60–85)
SAO2 % BLDCOV: 6 ML/DL
SODIUM BLD-SCNC: 136 MMOL/L (ref 136–145)
SODIUM SERPL-SCNC: 135 MMOL/L (ref 136–145)
SPECIMEN EXPIRATION DATE: NORMAL
SPECIMEN SOURCE: ABNORMAL
WBC # BLD AUTO: 11.92 THOUSAND/UL (ref 4.31–10.16)

## 2020-08-08 PROCEDURE — 82947 ASSAY GLUCOSE BLOOD QUANT: CPT

## 2020-08-08 PROCEDURE — 82330 ASSAY OF CALCIUM: CPT

## 2020-08-08 PROCEDURE — 86850 RBC ANTIBODY SCREEN: CPT | Performed by: OBSTETRICS & GYNECOLOGY

## 2020-08-08 PROCEDURE — 82805 BLOOD GASES W/O2 SATURATION: CPT | Performed by: STUDENT IN AN ORGANIZED HEALTH CARE EDUCATION/TRAINING PROGRAM

## 2020-08-08 PROCEDURE — 99024 POSTOP FOLLOW-UP VISIT: CPT | Performed by: STUDENT IN AN ORGANIZED HEALTH CARE EDUCATION/TRAINING PROGRAM

## 2020-08-08 PROCEDURE — 85014 HEMATOCRIT: CPT

## 2020-08-08 PROCEDURE — 84132 ASSAY OF SERUM POTASSIUM: CPT

## 2020-08-08 PROCEDURE — 86900 BLOOD TYPING SEROLOGIC ABO: CPT | Performed by: OBSTETRICS & GYNECOLOGY

## 2020-08-08 PROCEDURE — 94762 N-INVAS EAR/PLS OXIMTRY CONT: CPT

## 2020-08-08 PROCEDURE — 88307 TISSUE EXAM BY PATHOLOGIST: CPT | Performed by: PATHOLOGY

## 2020-08-08 PROCEDURE — 84295 ASSAY OF SERUM SODIUM: CPT

## 2020-08-08 PROCEDURE — 82948 REAGENT STRIP/BLOOD GLUCOSE: CPT

## 2020-08-08 PROCEDURE — 82803 BLOOD GASES ANY COMBINATION: CPT

## 2020-08-08 PROCEDURE — 85025 COMPLETE CBC W/AUTO DIFF WBC: CPT | Performed by: OBSTETRICS & GYNECOLOGY

## 2020-08-08 PROCEDURE — 86592 SYPHILIS TEST NON-TREP QUAL: CPT | Performed by: OBSTETRICS & GYNECOLOGY

## 2020-08-08 PROCEDURE — 80053 COMPREHEN METABOLIC PANEL: CPT | Performed by: OBSTETRICS & GYNECOLOGY

## 2020-08-08 PROCEDURE — 99214 OFFICE O/P EST MOD 30 MIN: CPT

## 2020-08-08 PROCEDURE — 94760 N-INVAS EAR/PLS OXIMETRY 1: CPT

## 2020-08-08 PROCEDURE — 59510 CESAREAN DELIVERY: CPT | Performed by: STUDENT IN AN ORGANIZED HEALTH CARE EDUCATION/TRAINING PROGRAM

## 2020-08-08 PROCEDURE — 86901 BLOOD TYPING SEROLOGIC RH(D): CPT | Performed by: OBSTETRICS & GYNECOLOGY

## 2020-08-08 RX ORDER — IBUPROFEN 600 MG/1
600 TABLET ORAL EVERY 6 HOURS PRN
Status: DISCONTINUED | OUTPATIENT
Start: 2020-08-09 | End: 2020-08-09

## 2020-08-08 RX ORDER — OXYCODONE HYDROCHLORIDE 5 MG/1
5 TABLET ORAL EVERY 4 HOURS PRN
Status: DISCONTINUED | OUTPATIENT
Start: 2020-08-09 | End: 2020-08-09

## 2020-08-08 RX ORDER — SODIUM CHLORIDE 9 MG/ML
125 INJECTION, SOLUTION INTRAVENOUS CONTINUOUS
Status: DISCONTINUED | OUTPATIENT
Start: 2020-08-08 | End: 2020-08-08

## 2020-08-08 RX ORDER — DEXTROSE AND SODIUM CHLORIDE 5; .45 G/100ML; G/100ML
100 INJECTION, SOLUTION INTRAVENOUS CONTINUOUS
Status: DISCONTINUED | OUTPATIENT
Start: 2020-08-08 | End: 2020-08-08

## 2020-08-08 RX ORDER — SODIUM CHLORIDE, SODIUM LACTATE, POTASSIUM CHLORIDE, CALCIUM CHLORIDE 600; 310; 30; 20 MG/100ML; MG/100ML; MG/100ML; MG/100ML
INJECTION, SOLUTION INTRAVENOUS CONTINUOUS PRN
Status: DISCONTINUED | OUTPATIENT
Start: 2020-08-08 | End: 2020-08-08

## 2020-08-08 RX ORDER — FENTANYL CITRATE/PF 50 MCG/ML
50 SYRINGE (ML) INJECTION
Status: DISCONTINUED | OUTPATIENT
Start: 2020-08-08 | End: 2020-08-12 | Stop reason: HOSPADM

## 2020-08-08 RX ORDER — CALCIUM CARBONATE 200(500)MG
1000 TABLET,CHEWABLE ORAL DAILY PRN
Status: DISCONTINUED | OUTPATIENT
Start: 2020-08-08 | End: 2020-08-09

## 2020-08-08 RX ORDER — MIDAZOLAM HYDROCHLORIDE 2 MG/2ML
INJECTION, SOLUTION INTRAMUSCULAR; INTRAVENOUS AS NEEDED
Status: DISCONTINUED | OUTPATIENT
Start: 2020-08-08 | End: 2020-08-08

## 2020-08-08 RX ORDER — ACETAMINOPHEN 325 MG/1
650 TABLET ORAL EVERY 6 HOURS PRN
Status: DISCONTINUED | OUTPATIENT
Start: 2020-08-09 | End: 2020-08-09

## 2020-08-08 RX ORDER — TERBUTALINE SULFATE 1 MG/ML
INJECTION, SOLUTION SUBCUTANEOUS
Status: COMPLETED
Start: 2020-08-08 | End: 2020-08-08

## 2020-08-08 RX ORDER — DIPHENHYDRAMINE HYDROCHLORIDE 50 MG/ML
12.5 INJECTION INTRAMUSCULAR; INTRAVENOUS ONCE AS NEEDED
Status: DISCONTINUED | OUTPATIENT
Start: 2020-08-08 | End: 2020-08-12 | Stop reason: HOSPADM

## 2020-08-08 RX ORDER — CEFAZOLIN SODIUM 1 G/3ML
INJECTION, POWDER, FOR SOLUTION INTRAMUSCULAR; INTRAVENOUS AS NEEDED
Status: DISCONTINUED | OUTPATIENT
Start: 2020-08-08 | End: 2020-08-08

## 2020-08-08 RX ORDER — SODIUM CHLORIDE 9 MG/ML
INJECTION, SOLUTION INTRAVENOUS CONTINUOUS PRN
Status: DISCONTINUED | OUTPATIENT
Start: 2020-08-08 | End: 2020-08-08

## 2020-08-08 RX ORDER — BETAMETHASONE SODIUM PHOSPHATE AND BETAMETHASONE ACETATE 3; 3 MG/ML; MG/ML
12 INJECTION, SUSPENSION INTRA-ARTICULAR; INTRALESIONAL; INTRAMUSCULAR; SOFT TISSUE EVERY 24 HOURS
Status: ACTIVE | OUTPATIENT
Start: 2020-08-08 | End: 2020-08-10

## 2020-08-08 RX ORDER — OXYTOCIN/RINGER'S LACTATE 30/500 ML
PLASTIC BAG, INJECTION (ML) INTRAVENOUS
Status: COMPLETED
Start: 2020-08-08 | End: 2020-08-08

## 2020-08-08 RX ORDER — DEXAMETHASONE SODIUM PHOSPHATE 4 MG/ML
INJECTION, SOLUTION INTRA-ARTICULAR; INTRALESIONAL; INTRAMUSCULAR; INTRAVENOUS; SOFT TISSUE AS NEEDED
Status: DISCONTINUED | OUTPATIENT
Start: 2020-08-08 | End: 2020-08-08

## 2020-08-08 RX ORDER — HYDROMORPHONE HCL/PF 1 MG/ML
0.5 SYRINGE (ML) INJECTION
Status: DISCONTINUED | OUTPATIENT
Start: 2020-08-08 | End: 2020-08-08

## 2020-08-08 RX ORDER — SODIUM CHLORIDE, SODIUM LACTATE, POTASSIUM CHLORIDE, CALCIUM CHLORIDE 600; 310; 30; 20 MG/100ML; MG/100ML; MG/100ML; MG/100ML
125 INJECTION, SOLUTION INTRAVENOUS CONTINUOUS
Status: DISCONTINUED | OUTPATIENT
Start: 2020-08-08 | End: 2020-08-09

## 2020-08-08 RX ORDER — FENTANYL CITRATE 50 UG/ML
INJECTION, SOLUTION INTRAMUSCULAR; INTRAVENOUS AS NEEDED
Status: DISCONTINUED | OUTPATIENT
Start: 2020-08-08 | End: 2020-08-08

## 2020-08-08 RX ORDER — ONDANSETRON 2 MG/ML
INJECTION INTRAMUSCULAR; INTRAVENOUS AS NEEDED
Status: DISCONTINUED | OUTPATIENT
Start: 2020-08-08 | End: 2020-08-08

## 2020-08-08 RX ORDER — DOCUSATE SODIUM 100 MG/1
100 CAPSULE, LIQUID FILLED ORAL 2 TIMES DAILY
Status: DISCONTINUED | OUTPATIENT
Start: 2020-08-08 | End: 2020-08-12 | Stop reason: HOSPADM

## 2020-08-08 RX ORDER — KETOROLAC TROMETHAMINE 30 MG/ML
30 INJECTION, SOLUTION INTRAMUSCULAR; INTRAVENOUS ONCE AS NEEDED
Status: COMPLETED | OUTPATIENT
Start: 2020-08-08 | End: 2020-08-08

## 2020-08-08 RX ORDER — DIPHENHYDRAMINE HYDROCHLORIDE 50 MG/ML
25 INJECTION INTRAMUSCULAR; INTRAVENOUS EVERY 6 HOURS PRN
Status: DISCONTINUED | OUTPATIENT
Start: 2020-08-08 | End: 2020-08-12 | Stop reason: HOSPADM

## 2020-08-08 RX ORDER — ONDANSETRON 2 MG/ML
4 INJECTION INTRAMUSCULAR; INTRAVENOUS ONCE
Status: DISCONTINUED | OUTPATIENT
Start: 2020-08-08 | End: 2020-08-08

## 2020-08-08 RX ORDER — ONDANSETRON 2 MG/ML
4 INJECTION INTRAMUSCULAR; INTRAVENOUS EVERY 6 HOURS PRN
Status: DISCONTINUED | OUTPATIENT
Start: 2020-08-08 | End: 2020-08-08

## 2020-08-08 RX ORDER — HYDROMORPHONE HCL/PF 1 MG/ML
0.5 SYRINGE (ML) INJECTION
Status: DISCONTINUED | OUTPATIENT
Start: 2020-08-08 | End: 2020-08-09

## 2020-08-08 RX ORDER — HYDROMORPHONE HCL/PF 1 MG/ML
0.5 SYRINGE (ML) INJECTION ONCE
Status: DISCONTINUED | OUTPATIENT
Start: 2020-08-08 | End: 2020-08-09

## 2020-08-08 RX ORDER — DEXTROSE AND SODIUM CHLORIDE 5; .9 G/100ML; G/100ML
100 INJECTION, SOLUTION INTRAVENOUS CONTINUOUS
Status: DISCONTINUED | OUTPATIENT
Start: 2020-08-08 | End: 2020-08-09

## 2020-08-08 RX ORDER — DEXTROSE AND SODIUM CHLORIDE 5; .9 G/100ML; G/100ML
100 INJECTION, SOLUTION INTRAVENOUS CONTINUOUS
Status: DISCONTINUED | OUTPATIENT
Start: 2020-08-08 | End: 2020-08-08

## 2020-08-08 RX ORDER — ROCURONIUM BROMIDE 10 MG/ML
INJECTION, SOLUTION INTRAVENOUS AS NEEDED
Status: DISCONTINUED | OUTPATIENT
Start: 2020-08-08 | End: 2020-08-08

## 2020-08-08 RX ORDER — PROPOFOL 10 MG/ML
INJECTION, EMULSION INTRAVENOUS AS NEEDED
Status: DISCONTINUED | OUTPATIENT
Start: 2020-08-08 | End: 2020-08-08

## 2020-08-08 RX ORDER — SUCCINYLCHOLINE/SOD CL,ISO/PF 100 MG/5ML
SYRINGE (ML) INTRAVENOUS AS NEEDED
Status: DISCONTINUED | OUTPATIENT
Start: 2020-08-08 | End: 2020-08-08

## 2020-08-08 RX ORDER — OXYTOCIN/RINGER'S LACTATE 30/500 ML
62.5 PLASTIC BAG, INJECTION (ML) INTRAVENOUS CONTINUOUS
Status: ACTIVE | OUTPATIENT
Start: 2020-08-08 | End: 2020-08-09

## 2020-08-08 RX ORDER — OXYTOCIN/RINGER'S LACTATE 30/500 ML
PLASTIC BAG, INJECTION (ML) INTRAVENOUS CONTINUOUS PRN
Status: DISCONTINUED | OUTPATIENT
Start: 2020-08-08 | End: 2020-08-08

## 2020-08-08 RX ORDER — ONDANSETRON 2 MG/ML
4 INJECTION INTRAMUSCULAR; INTRAVENOUS EVERY 8 HOURS PRN
Status: DISCONTINUED | OUTPATIENT
Start: 2020-08-08 | End: 2020-08-12 | Stop reason: HOSPADM

## 2020-08-08 RX ORDER — ONDANSETRON 2 MG/ML
4 INJECTION INTRAMUSCULAR; INTRAVENOUS ONCE AS NEEDED
Status: DISCONTINUED | OUTPATIENT
Start: 2020-08-08 | End: 2020-08-12 | Stop reason: HOSPADM

## 2020-08-08 RX ORDER — OXYCODONE HYDROCHLORIDE 10 MG/1
10 TABLET ORAL EVERY 4 HOURS PRN
Status: DISCONTINUED | OUTPATIENT
Start: 2020-08-09 | End: 2020-08-09

## 2020-08-08 RX ADMIN — SODIUM CHLORIDE 2.5 UNITS/HR: 9 INJECTION, SOLUTION INTRAVENOUS at 16:16

## 2020-08-08 RX ADMIN — SODIUM CHLORIDE, SODIUM LACTATE, POTASSIUM CHLORIDE, AND CALCIUM CHLORIDE 125 ML/HR: .6; .31; .03; .02 INJECTION, SOLUTION INTRAVENOUS at 15:09

## 2020-08-08 RX ADMIN — MIDAZOLAM HYDROCHLORIDE 2 MG: 1 INJECTION, SOLUTION INTRAMUSCULAR; INTRAVENOUS at 14:13

## 2020-08-08 RX ADMIN — ROCURONIUM BROMIDE 15 MG: 10 SOLUTION INTRAVENOUS at 14:05

## 2020-08-08 RX ADMIN — PROPOFOL 150 MG: 10 INJECTION, EMULSION INTRAVENOUS at 13:59

## 2020-08-08 RX ADMIN — DEXTROSE AND SODIUM CHLORIDE 100 ML/HR: 5; .45 INJECTION, SOLUTION INTRAVENOUS at 18:20

## 2020-08-08 RX ADMIN — DEXAMETHASONE SODIUM PHOSPHATE 8 MG: 4 INJECTION, SOLUTION INTRAMUSCULAR; INTRAVENOUS at 14:12

## 2020-08-08 RX ADMIN — Medication 62.5 ML: at 19:23

## 2020-08-08 RX ADMIN — Medication 250 MILLI-UNITS/MIN: at 14:04

## 2020-08-08 RX ADMIN — FENTANYL CITRATE 50 MCG: 50 INJECTION INTRAMUSCULAR; INTRAVENOUS at 15:12

## 2020-08-08 RX ADMIN — TERBUTALINE SULFATE 0.25 MG: 1 INJECTION SUBCUTANEOUS at 13:45

## 2020-08-08 RX ADMIN — FENTANYL CITRATE 50 MCG: 50 INJECTION INTRAMUSCULAR; INTRAVENOUS at 15:03

## 2020-08-08 RX ADMIN — SUGAMMADEX 200 MG: 100 INJECTION, SOLUTION INTRAVENOUS at 14:33

## 2020-08-08 RX ADMIN — CEFAZOLIN SODIUM 2000 MG: 1 INJECTION, POWDER, FOR SOLUTION INTRAMUSCULAR; INTRAVENOUS at 14:09

## 2020-08-08 RX ADMIN — HYDROMORPHONE HYDROCHLORIDE: 10 INJECTION, SOLUTION INTRAMUSCULAR; INTRAVENOUS; SUBCUTANEOUS at 18:53

## 2020-08-08 RX ADMIN — Medication 2.5 UNITS/HR: at 19:44

## 2020-08-08 RX ADMIN — ONDANSETRON 4 MG: 2 INJECTION INTRAMUSCULAR; INTRAVENOUS at 14:12

## 2020-08-08 RX ADMIN — SODIUM CHLORIDE: 0.9 INJECTION, SOLUTION INTRAVENOUS at 13:36

## 2020-08-08 RX ADMIN — KETOROLAC TROMETHAMINE 30 MG: 30 INJECTION, SOLUTION INTRAMUSCULAR at 16:02

## 2020-08-08 RX ADMIN — DEXTROSE AND SODIUM CHLORIDE 100 ML/HR: 5; .9 INJECTION, SOLUTION INTRAVENOUS at 19:45

## 2020-08-08 RX ADMIN — HYDROMORPHONE HYDROCHLORIDE 0.5 MG: 1 INJECTION, SOLUTION INTRAMUSCULAR; INTRAVENOUS; SUBCUTANEOUS at 15:44

## 2020-08-08 RX ADMIN — Medication 100 MG: at 13:59

## 2020-08-08 RX ADMIN — HYDROMORPHONE HYDROCHLORIDE 0.5 MG: 1 INJECTION, SOLUTION INTRAMUSCULAR; INTRAVENOUS; SUBCUTANEOUS at 16:45

## 2020-08-08 RX ADMIN — HYDROMORPHONE HYDROCHLORIDE 0.5 MG: 1 INJECTION, SOLUTION INTRAMUSCULAR; INTRAVENOUS; SUBCUTANEOUS at 15:29

## 2020-08-08 RX ADMIN — SODIUM CHLORIDE 125 ML/HR: 0.9 INJECTION, SOLUTION INTRAVENOUS at 15:10

## 2020-08-08 RX ADMIN — SODIUM CHLORIDE, SODIUM LACTATE, POTASSIUM CHLORIDE, AND CALCIUM CHLORIDE: .6; .31; .03; .02 INJECTION, SOLUTION INTRAVENOUS at 14:04

## 2020-08-08 RX ADMIN — FENTANYL CITRATE 100 MCG: 50 INJECTION INTRAMUSCULAR; INTRAVENOUS at 13:59

## 2020-08-08 NOTE — TELEPHONE ENCOUNTER
Pt with contractions, just like the contractions that she often has and with which she is ruled out for labor  Was lying down and had vomiting, with severe bilateral lower quadrant pain upon vomiting, has now resolved  Otherwise feeling well  With GERD, taking Tums, but does not think that this is enough  Discussed likely reflux --> vomiting, given lack of other symptoms  Contractions at baseline, discussed unlikely PTL, but if they worsen to call back and present to L&D

## 2020-08-08 NOTE — OP NOTE
Section Procedure Note    Indications:   Concern for abruption  Nonreassuring fetal heart tracing    Pre-operative Diagnosis:   Patient Active Problem List   Diagnosis    Chronic GERD    Insulin pump in place    Anxiety, generalized    Hypoglycemia due to type 1 diabetes mellitus ([facility])    Insulin pump titration    History of diabetes with ketoacidosis    Pre-existing type 1 diabetes mellitus with hyperglycemia during pregnancy in third trimester ([facility] )    Pregnancy related fatigue    High-risk pregnancy in third trimester    Encounter for supervision of normal first pregnancy in third trimester    Gestational edema in third trimester    Pre-existing type 1 diabetes mellitus during pregnancy in third trimester    34 weeks gestation of pregnancy     Post-operative Diagnosis:   Patient Active Problem List   Diagnosis    Chronic GERD    Insulin pump in place    Anxiety, generalized    Hypoglycemia due to type 1 diabetes mellitus ([facility] )    Insulin pump titration    History of diabetes with ketoacidosis    Pre-existing type 1 diabetes mellitus with hyperglycemia during pregnancy in third trimester ([facility] )    Pregnancy related fatigue    High-risk pregnancy in third trimester    Encounter for supervision of normal first pregnancy in third trimester    Gestational edema in third trimester    Pre-existing type 1 diabetes mellitus during pregnancy in third trimester    34 weeks gestation of pregnancy    S/P primary low transverse        Attending: Halley Monet MD  Resident: Hawa Hemphill MD    Maternal Findings:  Normal uterus  Normal tubes and ovaries bilaterally  No adhesions  No difficulty noted from skin to delivery     Findings:  Viable female with weight pending at time of dictation;  Apgar scores of 7 at one minute and 9 at five minutes     Clear amniotic fluid  Normal placenta with 3-vessel cord    Arterial and Venous Gases:  Umbilical Cord Venous Blood Gas:  Results from last 7 days   Lab Units 20  1404   PH COV  7 007*   PCO2 COV mm HG 59 1*   HCO3 COV mmol/L 14 5   BASE EXC COV mmol/L -17 4*   O2 CT CD VB mL/dL 6 0   O2 HGB, VENOUS CORD % 18 8     Umbilical Cord Arterial Blood Gas:  Results from last 7 days   Lab Units 20  1404   PH COA  6 932*   PCO2 COA  68 9*   PO2 COA mm HG 12 1   HCO3 COA mmol/L 14 2*   BASE EXC COA mmol/L -19 3*   O2 CONTENT CORD ART ml/dl 2 7   O2 HGB, ARTERIAL CORD % 12 0       Specimens: Arterial and venous cord gases, cord blood, segment of umbilical cord, placenta to storage    Quantitative Blood Loss: 840 mL    Drains: Miller catheter           Complications:  None; patient tolerated the procedure well  Disposition: PACU            Condition: stable    Brief Labor Course:   Patient was admitted in acute distress with concern for placental abruption given fetal heart rate tracing  The decision was made for an urgent  section  Please see initial note and admission H&P  Procedure Details   The patient was seen prior to the procedure  Risks, benefits, possible complications, alternate treatment options, and expected outcomes were discussed with the patient  The patient agreed with the proposed plan and gave informed consent for a  section verbally (please see separate note)  The patient was taken to the [facility] Operating Room where she received general anesthesia  For infection prophylaxis, she received Ancef 1g preoperatively  Fetal heart tones in the OR were assessed and noted to be within normal limits (130s) and a Miller catheter and SCDs were placed  The abdomen was prepped with Chloraprep, the vagina was prepped with Betadine, and following appropriate drying time, the patient was draped in the usual sterile manner  The patient was identified as Mehrdad Vital and the procedure verified as a  Delivery      A Pfannenstiel incision was made and carried down through the underlying subcutaneous tissue to the fascia using a scalpel  Rectus fascia was then incised  We then proceeded in Bryan-Betancourt fashion  All anatomic layers were well-demarcated  The rectus muscles were  and the peritoneum was identified, entered, and extended longitudinally with blunt dissection  The bladder blade was inserted  Uterine vessels were noted to be significantly dilated across in the lower uterine segment furthering suspicion for placental abuprtion  A low transverse uterine incision was made with the scalpel and extended laterally with blunt dissection  The amnion was entered bluntly  The fetal head was palpated, elevated, and delivered through the uterine incision followed by the body without difficulty  The umbilical cord was doubly clamped and cut immediately and the infant was handed off to the  providers  Arterial and venous cord gases, cord blood, and a segment of umbilical cord were obtained for evaluation  The placenta delivered spontaneously with uterine fundal massage and appeared normal  The uterus was exteriorized and cleaned out with a moist lap sponge  The uterine incision was closed with a running locked suture of 0 Vicryl  A second layer of the same suture was used to imbricate the first   Hemostasis was noted to be excellent  Posterior cul-de-sac was suctioned  The uterus was returned to the abdomen  The paracolic gutters were inspected and cleared of all clots and debris with suction  The fascia was closed with a running suture of 0 Vicryl  Subcutaneous adipose tissue was closed with a running suture of 2-0 Plain gut  The skin was closed with a subcuticular running suture of 4-0 Monocryl  Exofin was applied  The patient appeared to tolerate the procedure very well  She was extubated in the OR  Lap sponge, needle, and instrument counts were correct x2    The patient was transferred to her postpartum recovery room in stable condition and her infant went to the NICU     Attending Attestation: Dr Elvis Marley MD was present for the entire procedure  Ramses Crespo MD  OB/GYN  8/8/2020  5:14 PM

## 2020-08-08 NOTE — H&P
[address] [facility] 30 y o  female MRN: [id number]  Unit/Bed#: -01 Encounter: [id number]    **Delayed charting secondary to patient care**    Assessment: 30 y o   at 34w3d admitted for urgent 1LTCS due to fetal intolerance of labor, Category II FHT  SVE: /-3  FHT: Category II  Clinical EFW: 54%  GBS status: Negative     Plan:   · Admit for labor  · During process of triage admission, patient began to have recurrent late decelerations on FHT with a tracing suspicious for placental abruption  (See progress note from Dr Marcelina Chowdhury on 20 at 17:09) Following discussion with patient and attending physician, decision was made to take patient to OR for 1LTCS     Dr Suyapa Lazaro aware       Chief Complaint: I am having worse contractions    HPI: Marietta Black is a 30 y o   with an MODE of 2020, by Ultrasound at 34w3d who is being admitted for labor  and worsening abdominal pain  Patient was evaluated in triage with contractions for r/o  labor on 20  She was closed/thick/high on exam with intermittent contractions on toco and a reactive NST  She was discharged home and followed up at her outpatient office on 20  Later that evening she called the office with complaints of contractions 2-3 minutes apart with vomiting, cramping and abdominal pain similar to her previous contractions for which she was evaluated in triage  Patient was advised by Dr Lane He to present to L&D if her contractions were worsening  As the patient lived 1 hour away from [facility], she decided to stay home, per her , because she didn't want to be sent home again if she was not in labor  Her contractions resolved, but then worsened to the worst pain they had been at 0830 the following morning,   She was having continuous nausea and vomiting  She had been experiencing  She denied vaginal bleeding and loss of fluid  She reported some fetal movement   Her pregnancy has been complicated by pre-existing T1DM for which she has had ED evaluations for hypoglycemia  Patient Active Problem List   Diagnosis    Chronic GERD    Insulin pump in place    Anxiety, generalized    Hypoglycemia due to type 1 diabetes mellitus (HCC)    Insulin pump titration    History of diabetes with ketoacidosis    Pre-existing type 1 diabetes mellitus with hyperglycemia during pregnancy in third trimester ([facility] )    Pregnancy related fatigue    High-risk pregnancy in third trimester    Encounter for supervision of normal first pregnancy in third trimester    Gestational edema in third trimester    Pre-existing type 1 diabetes mellitus during pregnancy in third trimester    34 weeks gestation of pregnancy    S/P primary low transverse        Baby complications/comments: none    Review of Systems   Constitutional: Negative for chills and fever  Respiratory: Negative for shortness of breath  Cardiovascular: Negative for chest pain  Gastrointestinal: Positive for abdominal pain, nausea and vomiting  Negative for constipation and diarrhea  Genitourinary: Negative for dysuria and hematuria  Neurological: Negative for headaches         OB History    Para Term  AB Living   1 1   1   1   SAB TAB Ectopic Multiple Live Births         0 1      # Outcome Date GA Lbr Abiodun/2nd Weight Sex Delivery Anes PTL Lv   1  20 34w3d   F CS-LTranv Gen Y CORINNE      Complications: Abruptio Placenta       Past Medical History:   Diagnosis Date    Allergic dermatitis     Resolved 3/2/2016     Anxiety     Autoimmune disorder ([facility] )     Diabetes mellitus type 1 ([facility] )     Diabetic ketoacidosis without coma associated with type 1 diabetes mellitus ([facility] ) 2016    Hypoglycemia due to type 1 diabetes mellitus (HCC)     Type 1 diabetes mellitus during pregnancy     Urinary tract infection     last episode 2 yrs ago    Varicella     childhood chickenpox       Past Surgical History:   Procedure Laterality Date    FL INJECTION LEFT SHOULDER (ARTHROGRAM)  2018    TN  DELIVERY ONLY N/A 2020    Procedure:  SECTION (); Surgeon: Mayur Ford MD;  Location: [facility] LD;  Service: Obstetrics    TN ESOPHAGOGASTRODUODENOSCOPY TRANSORAL DIAGNOSTIC N/A 2019    Procedure: ESOPHAGOGASTRODUODENOSCOPY (EGD) with biopsy;  Surgeon: Ana Lucero DO;  Location: [facility] MAIN OR;  Service: Gastroenterology    WISDOM TOOTH EXTRACTION Bilateral        Social History     Tobacco Use    Smoking status: Former Smoker     Packs/day: 0 50     Years: 4 00     Pack years: 2 00     Types: Cigarettes     Last attempt to quit: 2017     Years since quitting: 3 3    Smokeless tobacco: Never Used   Substance Use Topics    Alcohol use: Not Currently     Comment: rare        Allergies   Allergen Reactions    Reglan [Metoclopramide] Anxiety       Medications Prior to Admission   Medication    Accu-Chek FastClix Lancets MISC    aspirin (ECOTRIN LOW STRENGTH) 81 mg EC tablet    Continuous Blood Gluc  (DEXCOM G6 ) ZACH    Continuous Blood Gluc Sensor (DEXCOM G6 SENSOR) MISC    Continuous Blood Gluc Transmit (DEXCOM G6 TRANSMITTER) MISC    ferrous sulfate 324 (65 Fe) mg    GLUCAGON EMERGENCY 1 MG injection    glucose blood test strip    Insulin Disposable Pump (OMNIPOD DASH 5 PACK PODS) MISC    insulin glargine (BASAGLAR KWIKPEN) 100 units/mL injection pen    insulin lispro (HUMALOG) 100 units/mL injection    metroNIDAZOLE (FLAGYL) 500 mg tablet    Prenatal Vit-Fe Fumarate-FA (PRENATAL VITAMIN PO)    URINE GLUCOSE-KETONES TEST VI       Objective:  Temp:  [97 1 °F (36 2 °C)-98 6 °F (37 °C)] 98 °F (36 7 °C)  HR:  [] 97  Resp:  [16-20] 18  BP: ()/(52-64) 89/52  Body mass index is 32 32 kg/m²  Physical Exam:  Physical Exam  Constitutional:       Appearance: She is well-developed  HENT:      Head: Normocephalic and atraumatic     Cardiovascular:      Rate and Rhythm: Tachycardia present  Pulmonary:      Effort: Pulmonary effort is normal    Abdominal:      Palpations: Abdomen is soft  Tenderness: There is abdominal tenderness  Neurological:      Mental Status: She is alert and oriented to person, place, and time  Skin:     General: Skin is warm and dry  Psychiatric:         Behavior: Behavior normal    Vitals signs reviewed            SVE: 2/50/-3       FHT:  Baseline Rate: 135 bpm  Variability: Minimal < 6 bpm  Decelerations: (!) Late, Recurrent  FHR Category: Category II    TOCO:   Contraction Frequency (minutes): 1-1 5  Contraction Duration (seconds): 50-70  Contraction Quality: Moderate, Strong    Lab Results   Component Value Date    WBC 11 92 (H) 08/08/2020    HGB 9 2 (L) 08/08/2020    HCT 27 (L) 08/08/2020     08/08/2020     Lab Results   Component Value Date     05/15/2014    K 4 5 08/08/2020    CL 98 (L) 08/08/2020    CO2 17 (L) 08/08/2020    BUN 8 08/08/2020    CREATININE 0 78 08/08/2020    GLUCOSE 177 (H) 08/08/2020    AST 18 08/08/2020    ALT 11 (L) 08/08/2020     Prenatal Labs: Reviewed      Blood type: B positive  Antibody: Negative  GBS: unknown  HIV:Non-reactive  Rubella: Immune  VDRL/RPR: Non reactive  HBsAg: Negative  Chlamydia: Negative  Gonorrhea: Negative  Fasting blood glucose: 179  3 hour glucose: not completed  Platelets: 533  Hgb: 12 2  >2 Midnights  INPATIENT     Signature/Title: Nita Vallejo MD  Date: 8/9/2020  Time: 5:21 AM

## 2020-08-08 NOTE — PROGRESS NOTES
Delayed note due to acute patient care  Shortly after arrival in triage, patient was noted to have a category 2 fetal heart tracing with contractions every 1 minute and variable/late decelerations  IV fluids were started  Patient was found to be ruptured for clear fluid and 2 5cm dilated  She was in acute abdominal pain and vomiting on presentation  Terbutaline was administered with minimal improvement in fetal heart tones  The OR and anesthesia teams were made aware of the need to proceed with urgent  section  Given acuity of the situation while the attending was in route, I verbally consented the patient in the presence of her  for a  section  We discussed the risks associated with  section including but not limited to infection, bleeding, injury to surrounding organs, pain  This was discussed in the context of concern for abruption given her clinical picture  She consented to proceed with a  section and was taken immediately to the operating room for preparation  The plan was discussed with Dr Nubia Mtichell and consent was obtained on her behalf  Kecia Simon MD  OB/GYN  2020  5:13 PM

## 2020-08-08 NOTE — ANESTHESIA POSTPROCEDURE EVALUATION
Post-Op Assessment Note    CV Status:  Stable  Pain Score: 5    Pain management: adequate     Mental Status:  Alert and awake   Hydration Status:  Stable   PONV Controlled:  None   Airway Patency:  Patent      Post Op Vitals Reviewed: Yes      Staff: CRNA         No complications documented      /57 (08/08/20 1447)    Temp   97 1   Pulse (!) 120 (08/08/20 1447)   Resp 18   SpO2 98

## 2020-08-08 NOTE — TELEPHONE ENCOUNTER
[phone]/ Yasemin Salamanca/ 1995/ Pt called back for instructions on where to go from last night  She did not go anywhere last night as the contraction stopped but they started back up again today   Please call back with instructions  Contacted via tiger connect

## 2020-08-08 NOTE — PROGRESS NOTES
Stopped by room to debrief delivery with patient and her   We discussed very concerning FHT on arrival, that after an expedited evaluation for maternal causes such as dehydration or hypoglycemia, that we made the decision to delivery emergently  Discussed that there is a concern for placental abruption as the underlying etiology, given the frequency of contractions and discomfort associated with them, however this is generally associated with vaginal bleeding  They express understanding and agreement with the rapid delivery after arrival, gratitude for the care they received after arriving on labor and delivery  We discussed that it was good that they came in when they did as FHT was very concerning on arrival and might have evolved into a terminal bradycardia  The patient expressed guilt that she did not come to labor and delivery last night, when I had recommended as such  We discussed that it is impossible to know when this process began and that, at this point, there is no reason to feel guilty regarding her behaviors  We again discussed the FHT and the associated blood gasses, with the arterial <7  They again indicate that they are grateful things are not worse  They are reassured that all is well with Darinel Menchaca and are optimistic that all will be well with baby   All questions answered

## 2020-08-08 NOTE — TELEPHONE ENCOUNTER
Giovanny Salamanca/REYNALDO 1995/  [phone]/[id number] is 34 weeks pregnant and is experiencing contractions 2-3 minutes apart, vomiting, cramping and abdominal pain  Patient is requesting a call back from on call provider for care advice

## 2020-08-08 NOTE — ANESTHESIA PREPROCEDURE EVALUATION
Procedure:   SECTION () (N/A Uterus)    Relevant Problems   ANESTHESIA (within normal limits)      ENDO   (+) Pre-existing type 1 diabetes mellitus during pregnancy in third trimester   (+) Pre-existing type 1 diabetes mellitus with hyperglycemia during pregnancy in third trimester ([facility] )      GI/HEPATIC   (+) Chronic GERD      GYN   (+) Encounter for supervision of normal first pregnancy in third trimester   (+) High-risk pregnancy in third trimester      NEURO/PSYCH   (+) Anxiety, generalized   (+) History of diabetes with ketoacidosis      Patient brought emergently from triage to operating room in setting of placental placental abruption        Anesthesia Plan  ASA Score- 3 Emergent    Anesthesia Type- general with ASA Monitors  Additional Monitors:   Airway Plan: ETT  Plan Factors-    Induction- intravenous and rapid sequence induction  Postoperative Plan- Plan for postoperative opioid use  Planned trial extubation    Informed Consent- Anesthetic plan and risks discussed with patient  I personally reviewed this patient with the CRNA  Discussed and agreed on the Anesthesia Plan with the CRNA  Sonam Lambert

## 2020-08-08 NOTE — INTERIM OP NOTE
SECTION ()  Postoperative Note  PATIENT NAME: Richy Landers  : 1995  MRN: [id number]  [facility] L&D OR ROOM 02    Surgery Date: 2020    Preop Diagnosis:  Fetal intolerance to labor, delivered, current hospitalization [O77 9]    Post-Op Diagnosis Codes:     * Fetal intolerance to labor, delivered, current hospitalization [O77 9]    Procedure(s) (LRB):   SECTION () (N/A)    Surgeon(s) and Role:     * Vickey Jennings MD - Primary     * Nasra Gabriel MD - Assisting    Specimens:  ID Type Source Tests Collected by Time Destination   1 :  Tissue (Placenta on Hold) OB Only Placenta TISSUE EXAM OB (PLACENTA) Maurice Gorman MD 2020 1403    A :  Cord Blood Cord BLOOD GAS, VENOUS, CORD, BLOOD GAS, ARTERIAL, CORD Ashanti Alfonzo Ashraf MD 2020 1404      Anesthesia Type:   General     Findings:      Section Procedure Note    Maternal Findings:  Normal uterus  Normal tubes and ovaries bilaterally  No adhesions  No difficulty noted from skin to delivery     Findings:  Viable female; Apgar scores of 7 at one minute and 9 at five minutes     Clear amniotic fluid  Possible placental abruption with 3-vessel cord    Arterial and Venous Gases:  Umbilical Cord Venous Blood Gas:  Results from last 7 days   Lab Units 20  1404   PH COV  7 007*   PCO2 COV mm HG 59 1*   HCO3 COV mmol/L 14 5   BASE EXC COV mmol/L -17 4*   O2 CT CD VB mL/dL 6 0   O2 HGB, VENOUS CORD % 47 9     Umbilical Cord Arterial Blood Gas:  Results from last 7 days   Lab Units 20  1404   PH COA  6 932*   PCO2 COA  68 9*   PO2 COA mm HG 12 1   HCO3 COA mmol/L 14 2*   BASE EXC COA mmol/L -19 3*   O2 CONTENT CORD ART ml/dl 2 7   O2 HGB, ARTERIAL CORD % 71 3     Complications:   None    SIGNATURE: Nasra Gabriel MD   DATE: 2020   TIME: 2:47 PM

## 2020-08-09 LAB
ERYTHROCYTE [DISTWIDTH] IN BLOOD BY AUTOMATED COUNT: 13.2 % (ref 11.6–15.1)
GLUCOSE SERPL-MCNC: 127 MG/DL (ref 65–140)
GLUCOSE SERPL-MCNC: 137 MG/DL (ref 65–140)
GLUCOSE SERPL-MCNC: 144 MG/DL (ref 65–140)
GLUCOSE SERPL-MCNC: 146 MG/DL (ref 65–140)
GLUCOSE SERPL-MCNC: 153 MG/DL (ref 65–140)
GLUCOSE SERPL-MCNC: 154 MG/DL (ref 65–140)
GLUCOSE SERPL-MCNC: 155 MG/DL (ref 65–140)
GLUCOSE SERPL-MCNC: 155 MG/DL (ref 65–140)
GLUCOSE SERPL-MCNC: 157 MG/DL (ref 65–140)
GLUCOSE SERPL-MCNC: 161 MG/DL (ref 65–140)
GLUCOSE SERPL-MCNC: 162 MG/DL (ref 65–140)
GLUCOSE SERPL-MCNC: 171 MG/DL (ref 65–140)
GLUCOSE SERPL-MCNC: 172 MG/DL (ref 65–140)
GLUCOSE SERPL-MCNC: 187 MG/DL (ref 65–140)
GLUCOSE SERPL-MCNC: 200 MG/DL (ref 65–140)
GLUCOSE SERPL-MCNC: 205 MG/DL (ref 65–140)
GLUCOSE SERPL-MCNC: 208 MG/DL (ref 65–140)
GLUCOSE SERPL-MCNC: 209 MG/DL (ref 65–140)
GLUCOSE SERPL-MCNC: 230 MG/DL (ref 65–140)
GLUCOSE SERPL-MCNC: 233 MG/DL (ref 65–140)
GLUCOSE SERPL-MCNC: 250 MG/DL (ref 65–140)
GLUCOSE SERPL-MCNC: 266 MG/DL (ref 65–140)
GLUCOSE SERPL-MCNC: 290 MG/DL (ref 65–140)
GLUCOSE SERPL-MCNC: 83 MG/DL (ref 65–140)
GLUCOSE SERPL-MCNC: 94 MG/DL (ref 65–140)
HCT VFR BLD AUTO: 24.1 % (ref 34.8–46.1)
HGB BLD-MCNC: 7.8 G/DL (ref 11.5–15.4)
MCH RBC QN AUTO: 29.3 PG (ref 26.8–34.3)
MCHC RBC AUTO-ENTMCNC: 32.4 G/DL (ref 31.4–37.4)
MCV RBC AUTO: 91 FL (ref 82–98)
PLATELET # BLD AUTO: 232 THOUSANDS/UL (ref 149–390)
PMV BLD AUTO: 10.9 FL (ref 8.9–12.7)
RBC # BLD AUTO: 2.66 MILLION/UL (ref 3.81–5.12)
WBC # BLD AUTO: 16.26 THOUSAND/UL (ref 4.31–10.16)

## 2020-08-09 PROCEDURE — 99024 POSTOP FOLLOW-UP VISIT: CPT | Performed by: STUDENT IN AN ORGANIZED HEALTH CARE EDUCATION/TRAINING PROGRAM

## 2020-08-09 PROCEDURE — 85027 COMPLETE CBC AUTOMATED: CPT | Performed by: OBSTETRICS & GYNECOLOGY

## 2020-08-09 PROCEDURE — 94762 N-INVAS EAR/PLS OXIMTRY CONT: CPT

## 2020-08-09 PROCEDURE — 82948 REAGENT STRIP/BLOOD GLUCOSE: CPT

## 2020-08-09 PROCEDURE — 94760 N-INVAS EAR/PLS OXIMETRY 1: CPT

## 2020-08-09 RX ORDER — OXYCODONE HYDROCHLORIDE 10 MG/1
10 TABLET ORAL EVERY 4 HOURS PRN
Status: DISCONTINUED | OUTPATIENT
Start: 2020-08-09 | End: 2020-08-12 | Stop reason: HOSPADM

## 2020-08-09 RX ORDER — ACETAMINOPHEN 325 MG/1
650 TABLET ORAL EVERY 6 HOURS PRN
Status: DISCONTINUED | OUTPATIENT
Start: 2020-08-09 | End: 2020-08-12 | Stop reason: HOSPADM

## 2020-08-09 RX ORDER — IBUPROFEN 600 MG/1
600 TABLET ORAL EVERY 6 HOURS PRN
Status: DISCONTINUED | OUTPATIENT
Start: 2020-08-09 | End: 2020-08-12 | Stop reason: HOSPADM

## 2020-08-09 RX ORDER — CALCIUM CARBONATE 200(500)MG
1000 TABLET,CHEWABLE ORAL 3 TIMES DAILY PRN
Status: DISCONTINUED | OUTPATIENT
Start: 2020-08-09 | End: 2020-08-12 | Stop reason: HOSPADM

## 2020-08-09 RX ORDER — SODIUM CHLORIDE, SODIUM LACTATE, POTASSIUM CHLORIDE, CALCIUM CHLORIDE 600; 310; 30; 20 MG/100ML; MG/100ML; MG/100ML; MG/100ML
125 INJECTION, SOLUTION INTRAVENOUS CONTINUOUS
Status: DISCONTINUED | OUTPATIENT
Start: 2020-08-09 | End: 2020-08-09

## 2020-08-09 RX ORDER — OXYCODONE HYDROCHLORIDE 5 MG/1
5 TABLET ORAL EVERY 4 HOURS PRN
Status: DISCONTINUED | OUTPATIENT
Start: 2020-08-09 | End: 2020-08-12 | Stop reason: HOSPADM

## 2020-08-09 RX ADMIN — IBUPROFEN 600 MG: 600 TABLET ORAL at 10:53

## 2020-08-09 RX ADMIN — DOCUSATE SODIUM 100 MG: 100 CAPSULE, LIQUID FILLED ORAL at 18:21

## 2020-08-09 RX ADMIN — SODIUM CHLORIDE 500 ML: 0.9 INJECTION, SOLUTION INTRAVENOUS at 04:19

## 2020-08-09 RX ADMIN — DEXTROSE AND SODIUM CHLORIDE 100 ML/HR: 5; .9 INJECTION, SOLUTION INTRAVENOUS at 05:09

## 2020-08-09 RX ADMIN — DEXTROSE AND SODIUM CHLORIDE 100 ML/HR: 5; .9 INJECTION, SOLUTION INTRAVENOUS at 15:13

## 2020-08-09 RX ADMIN — CALCIUM CARBONATE (ANTACID) CHEW TAB 500 MG 1000 MG: 500 CHEW TAB at 08:12

## 2020-08-09 RX ADMIN — ACETAMINOPHEN 650 MG: 325 TABLET, FILM COATED ORAL at 18:20

## 2020-08-09 RX ADMIN — OXYCODONE HYDROCHLORIDE 5 MG: 5 TABLET ORAL at 11:04

## 2020-08-09 RX ADMIN — SODIUM CHLORIDE 125 ML/HR: 0.9 INJECTION, SOLUTION INTRAVENOUS at 00:03

## 2020-08-09 RX ADMIN — CALCIUM CARBONATE (ANTACID) CHEW TAB 500 MG 1000 MG: 500 CHEW TAB at 14:00

## 2020-08-09 RX ADMIN — SODIUM CHLORIDE, SODIUM LACTATE, POTASSIUM CHLORIDE, AND CALCIUM CHLORIDE 125 ML/HR: .6; .31; .03; .02 INJECTION, SOLUTION INTRAVENOUS at 06:41

## 2020-08-09 RX ADMIN — IBUPROFEN 600 MG: 600 TABLET ORAL at 17:07

## 2020-08-09 RX ADMIN — DOCUSATE SODIUM 100 MG: 100 CAPSULE, LIQUID FILLED ORAL at 08:12

## 2020-08-09 NOTE — DISCHARGE SUMMARY
CS Discharge Summary - Nico Goldman 30 y o  female MRN: [id number]    Unit/Bed#: -01 Encounter: [id number]    Admission Date: 2020     Discharge Date: 2020    Admitting Diagnosis:   Patient Active Problem List   Diagnosis    Chronic GERD    Insulin pump in place    Anxiety, generalized    Hypoglycemia due to type 1 diabetes mellitus ([facility]    Insulin pump titration    History of diabetes with ketoacidosis    Pre-existing type 1 diabetes mellitus with hyperglycemia during pregnancy in third trimester ([facility]    Pregnancy related fatigue    High-risk pregnancy in third trimester    Encounter for supervision of normal first pregnancy in third trimester    Gestational edema in third trimester    Pre-existing type 1 diabetes mellitus during pregnancy in third trimester    34 weeks gestation of pregnancy     Discharge Diagnosis:   Same, delivered    Procedures:   primary  section, low transverse incision    Admitting Attending: Dr Hellen Wallace  Delivery Attending: Dr Annette Mai  Discharge Attending: Dr Darinel olvera    Consult service: Endocrinology    Hospital Course:     Nico Goldman is a 30 y o  Sandre Polio who was admitted acute distress with concern for placental abruption given fetal heart rate tracing and contraction pattern  The decision was made for an urgent  section  She then underwent a  section delivery under general anesthesia and delivered a viable female  on 20 at 1404  APGARS were 5, 7 at 1 and 5 minutes, respectively   weighed 2300g   was then transferred to NICU  Patient tolerated the procedure well and was transferred to recovery in stable condition  The patient's post partum course was unremarkable    Preoperative hemaglobin was 12 2, postoperative was 7 8  She was asymptomatic from anemia and discharged home on iron  Her postoperative pain was well controlled with oral analgesics      She had an endocrinology consult for assistance with blood glucose control postpartum and will continue to follow outpatient upon discharge  On day of discharge, she was ambulating and able to reasonably perform all ADLs  She was voiding and had appropriate bowel function  Pain was well controlled  She was discharged home on post-operative day #4 without complications  Patient was instructed to follow up with her OB as an outpatient and was given appropriate warnings to call provider if she develops signs of infection or uncontrolled pain  Complications:   Emergency  with suspicion for abruption    Condition at discharge:   good     Provisions for Follow-Up Care:  See after visit summary for information related to follow-up care and any pertinent home health orders  Disposition:   Home    Planned Readmission:   No    Discharge Medications:   Prenatal vitamin daily for 6 months or the duration of nursing whichever is longer  Motrin 600 mg orally every 6 hours as needed for pain  Tylenol (over the counter) per bottle directions as needed for pain  Hydrocortisone cream 1% (over the counter) applied 1-2x daily to hemorrhoids as needed  Witch hazel pads for hemorrhoidal discomfort as needed      Discharge instructions :   -Do not place anything (no partner, tampons or douche) in your vagina for 6 weeks  -You may walk for exercise for the first 6 weeks then gradually return to your usual activities    -Please do not drive for 1 week if you have no stitches and for 2 weeks if you have stitches or underwent a  delivery     -You may take baths or shower per your preference    -Please look at your bust (breasts) in the mirror daily and call provider for redness or tenderness or increased warmth     - If you have had a  please look at your incision daily as well and call provider for increasing redness or steady drainage from the incision    -Please call your provider if temperature > 100 4*F or 38* C, worsening pain or a foul discharge

## 2020-08-09 NOTE — DISCHARGE INSTRUCTIONS
Section   WHAT YOU SHOULD KNOW:   A  delivery, or , is abdominal surgery to deliver your baby  There are many reasons you may need a   · A  may be scheduled before labor if you had a  with your last baby  It may be scheduled if your baby is not positioned normally, or you are pregnant with more than 1 baby  · Your caregiver may perform an emergency  during labor to prevent life-threatening complications for you or your baby  A  may be done if your cervix does not dilate after several hours of active labor  · Other reasons for a  include maternal infections and problems with the placenta  AFTER YOU LEAVE:   Medicines:   · Prescription pain medicine  may be given  Ask how to take this medicine safely  · Acetaminophen  decreases pain and fever  It is available without a doctor's order  Ask how much to take and how often to take it  Follow directions  Acetaminophen can cause liver damage if not taken correctly  · NSAIDs  help decrease swelling and pain or fever  This medicine is available with or without a doctor's order  NSAIDs can cause stomach bleeding or kidney problems in certain people  If you take blood thinner medicine, always ask your obstetrician if NSAIDs are safe for you  Always read the medicine label and follow directions  · Take your medicine as directed  Contact your obstetrician (OB) if you think your medicine is not helping or if you have side effects  Tell him if you are allergic to any medicine  Keep a list of the medicines, vitamins, and herbs you take  Include the amounts, and when and why you take them  Bring the list or the pill bottles to follow-up visits  Carry your medicine list with you in case of an emergency  Follow up with your OB as directed: You may need to return to have your stitches or staples removed  Write down your questions so you remember to ask them during your visits    Wound care:  Carefully wash your wound with soap and water every day  Keep your wound clean and dry  Wear loose, comfortable clothes that do not rub against your wound  Ask your OB about bathing and showering  Drink plenty of liquids: You can lower your risk for a blood clot if you drink plenty of liquids  Ask how much liquid to drink each day and which liquids are best for you  Limit activity until you have fully recovered from surgery:   · Ask when it is safe for you to drive, walk up stairs, lift heavy objects, and have sex  · Ask when it is okay to exercise, and what types of exercise to do  Start slowly and do more as you get stronger  Contact your OB if:   · You have heavy vaginal bleeding that fills 1 or more sanitary pads in 1 hour  · You have a fever  · Your incision is swollen, red, or draining pus  · You have questions or concerns about yourself or your baby  Seek care immediately or call 911 if:   · Blood soaks through your bandage  · Your stitches come apart  · You feel lightheaded, short of breath, and have chest pain  · You cough up blood  · Your arm or leg feels warm, tender, and painful  It may look swollen and red  © 2014 [address] is for End User's use only and may not be sold, redistributed or otherwise used for commercial purposes  All illustrations and images included in CareNotes® are the copyrighted property of A D A M [employer] Inc  or Rhett Preston  The above information is an  only  It is not intended as medical advice for individual conditions or treatments  Talk to your doctor, nurse or pharmacist before following any medical regimen to see if it is safe and effective for you

## 2020-08-09 NOTE — PROGRESS NOTES
Progress Note - OB/GYN   Derrek Romberg 30 y o  female MRN: [id number]  Unit/Bed#: -01 Encounter: [id number]    Assessment:  Post partum Day #1 s/p1LTCS under GETA due to concern for placental abruption, stable, baby in NICU    Plan:  1  Post partum   - Continue routine post partum care  - Encourage ambulation  - Encourage breastfeeding  - dPCA for the 1st 24 hours postpartum followed by routine PO medications  - QBL: 840cc; Hgb 12 2 --> 7 8  - Plan for ferrer removal later today after dPCA is discontinued and voiding trial     2  T1DM  - Usually managed with insulin pump  - Currently on insulin drip; Plan to transition today    Subjective/Objective   Chief Complaint:     Post delivery  Patient is doing well  Lochia WNL  Pain well controlled  Subjective: This morning, she has no complaints  Pain: yes, soreness, improved with meds  Tolerating PO: yes  Voiding: ferrer in place  Flatus: no  BM: no  Ambulating: no  Breastfeeding:  yes  Chest pain: no  Shortness of breath: no  Leg pain: no  Lochia: minimal    Objective:     Vitals: BP (!) 89/52 (BP Location: Left arm) Comment: notified nurse  Pulse 97   Temp 98 °F (36 7 °C) (Oral)   Resp 18   Ht 4' 11" (1 499 m)   Wt 72 6 kg (160 lb)   LMP 12/04/2019 (Exact Date)   SpO2 96%   Breastfeeding Yes   BMI 32 32 kg/m²       Intake/Output Summary (Last 24 hours) at 8/9/2020 0815  Last data filed at 8/9/2020 3468  Gross per 24 hour   Intake 7642 1 ml   Output 1565 ml   Net 6077 1 ml       Lab Results   Component Value Date    WBC 16 26 (H) 08/09/2020    HGB 7 8 (L) 08/09/2020    HCT 24 1 (L) 08/09/2020    MCV 91 08/09/2020     08/09/2020       Physical Exam:   Physical Exam  Vitals signs reviewed  Constitutional:       Appearance: She is well-developed  Cardiovascular:      Rate and Rhythm: Normal rate and regular rhythm  Heart sounds: Normal heart sounds  No murmur  No friction rub  No gallop      Pulmonary:      Effort: Pulmonary effort is normal  No respiratory distress  Breath sounds: Normal breath sounds  No stridor  No wheezing  Abdominal:      General: There is no distension  Palpations: Abdomen is soft  Tenderness: There is no abdominal tenderness  There is no guarding  Comments: Incision clean, dry, and intact without evidence of infection, erythema, or bleeding   Genitourinary:     Comments: Uterus nontender, firm at 1cm below the umbilicus  Skin:     General: Skin is warm and dry     Psychiatric:         Behavior: Behavior normal            Ania Lamar MD  8/9/2020  8:15 AM

## 2020-08-09 NOTE — PLAN OF CARE
Problem: PAIN - ADULT  Goal: Verbalizes/displays adequate comfort level or baseline comfort level  Description: Interventions:  - Encourage patient to monitor pain and request assistance  - Assess pain using appropriate pain scale  - Administer analgesics based on type and severity of pain and evaluate response  - Implement non-pharmacological measures as appropriate and evaluate response  - Consider cultural and social influences on pain and pain management  - Notify physician/advanced practitioner if interventions unsuccessful or patient reports new pain  Outcome: Progressing     Problem: INFECTION - ADULT  Goal: Absence or prevention of progression during hospitalization  Description: INTERVENTIONS:  - Assess and monitor for signs and symptoms of infection  - Monitor lab/diagnostic results  - Monitor all insertion sites, i e  indwelling lines, tubes, and drains  - Monitor endotracheal if appropriate and nasal secretions for changes in amount and color  - [place] appropriate cooling/warming therapies per order  - Administer medications as ordered  - Instruct and encourage patient and family to use good hand hygiene technique  - Identify and instruct in appropriate isolation precautions for identified infection/condition  Outcome: Progressing  Goal: Absence of fever/infection during neutropenic period  Description: INTERVENTIONS:  - Monitor WBC    Outcome: Progressing     Problem: SAFETY ADULT  Goal: Patient will remain free of falls  Description: INTERVENTIONS:  - Assess patient frequently for physical needs  -  Identify cognitive and physical deficits and behaviors that affect risk of falls    -  [place] fall precautions as indicated by assessment   - Educate patient/family on patient safety including physical limitations  - Instruct patient to call for assistance with activity based on assessment  - Modify environment to reduce risk of injury  - Consider OT/PT consult to assist with strengthening/mobility  Outcome: Progressing  Goal: Maintain or return to baseline ADL function  Description: INTERVENTIONS:  -  Assess patient's ability to carry out ADLs; assess patient's baseline for ADL function and identify physical deficits which impact ability to perform ADLs (bathing, care of mouth/teeth, toileting, grooming, dressing, etc )  - Assess/evaluate cause of self-care deficits   - Assess range of motion  - Assess patient's mobility; develop plan if impaired  - Assess patient's need for assistive devices and provide as appropriate  - Encourage maximum independence but intervene and supervise when necessary  - Involve family in performance of ADLs  - Assess for home care needs following discharge   - Consider OT consult to assist with ADL evaluation and planning for discharge  - Provide patient education as appropriate  Outcome: Progressing  Goal: Maintain or return mobility status to optimal level  Description: INTERVENTIONS:  - Assess patient's baseline mobility status (ambulation, transfers, stairs, etc )    - Identify cognitive and physical deficits and behaviors that affect mobility  - Identify mobility aids required to assist with transfers and/or ambulation (gait belt, sit-to-stand, lift, walker, cane, etc )  - [place] fall precautions as indicated by assessment  - Record patient progress and toleration of activity level on Mobility SBAR; progress patient to next Phase/Stage  - Instruct patient to call for assistance with activity based on assessment  - Consider rehabilitation consult to assist with strengthening/weightbearing, etc   Outcome: Progressing     Problem: Knowledge Deficit  Goal: Patient/family/caregiver demonstrates understanding of disease process, treatment plan, medications, and discharge instructions  Description: Complete learning assessment and assess knowledge base    Interventions:  - Provide teaching at level of understanding  - Provide teaching via preferred learning methods  Outcome: Progressing     Problem: DISCHARGE PLANNING  Goal: Discharge to home or other facility with appropriate resources  Description: INTERVENTIONS:  - Identify barriers to discharge w/patient and caregiver  - Arrange for needed discharge resources and transportation as appropriate  - Identify discharge learning needs (meds, wound care, etc )  - Arrange for interpretive services to assist at discharge as needed  - Refer to Case Management Department for coordinating discharge planning if the patient needs post-hospital services based on physician/advanced practitioner order or complex needs related to functional status, cognitive ability, or social support system  Outcome: Progressing     Problem: Potential for Falls  Goal: Patient will remain free of falls  Description: INTERVENTIONS:  - Assess patient frequently for physical needs  -  Identify cognitive and physical deficits and behaviors that affect risk of falls    -  [place] fall precautions as indicated by assessment   - Educate patient/family on patient safety including physical limitations  - Instruct patient to call for assistance with activity based on assessment  - Modify environment to reduce risk of injury  - Consider OT/PT consult to assist with strengthening/mobility  Outcome: Progressing     Problem: POSTPARTUM  Goal: Experiences normal postpartum course  Description: INTERVENTIONS:  - Monitor maternal vital signs  - Assess uterine involution and lochia  Outcome: Progressing  Goal: Appropriate maternal -  bonding  Description: INTERVENTIONS:  - Identify family support  - Assess for appropriate maternal/infant bonding   -Encourage maternal/infant bonding opportunities  - Referral to  or  as needed  Outcome: Progressing  Goal: Establishment of infant feeding pattern  Description: INTERVENTIONS:  - Assess breast/bottle feeding  - Refer to lactation as needed  Outcome: Progressing  Goal: Incision(s), wounds(s) or drain site(s) healing without S/S of infection  Description: INTERVENTIONS  - Assess and document risk factors for skin impairment   - Assess and document dressing, incision, wound bed, drain sites and surrounding tissue  - Consider nutrition services referral as needed  - Oral mucous membranes remain intact  - Provide patient/ family education  Outcome: Progressing

## 2020-08-09 NOTE — PLAN OF CARE
Problem: PAIN - ADULT  Goal: Verbalizes/displays adequate comfort level or baseline comfort level  Description: Interventions:  - Encourage patient to monitor pain and request assistance  - Assess pain using appropriate pain scale  - Administer analgesics based on type and severity of pain and evaluate response  - Implement non-pharmacological measures as appropriate and evaluate response  - Consider cultural and social influences on pain and pain management  - Notify physician/advanced practitioner if interventions unsuccessful or patient reports new pain  Outcome: Progressing     Problem: INFECTION - ADULT  Goal: Absence or prevention of progression during hospitalization  Description: INTERVENTIONS:  - Assess and monitor for signs and symptoms of infection  - Monitor lab/diagnostic results  - Monitor all insertion sites, i e  indwelling lines, tubes, and drains  - Monitor endotracheal if appropriate and nasal secretions for changes in amount and color  - [place] appropriate cooling/warming therapies per order  - Administer medications as ordered  - Instruct and encourage patient and family to use good hand hygiene technique  - Identify and instruct in appropriate isolation precautions for identified infection/condition  Outcome: Progressing  Goal: Absence of fever/infection during neutropenic period  Description: INTERVENTIONS:  - Monitor WBC    Outcome: Progressing     Problem: SAFETY ADULT  Goal: Patient will remain free of falls  Description: INTERVENTIONS:  - Assess patient frequently for physical needs  -  Identify cognitive and physical deficits and behaviors that affect risk of falls    -  [place] fall precautions as indicated by assessment   - Educate patient/family on patient safety including physical limitations  - Instruct patient to call for assistance with activity based on assessment  - Modify environment to reduce risk of injury  - Consider OT/PT consult to assist with strengthening/mobility  Outcome: Progressing  Goal: Maintain or return to baseline ADL function  Description: INTERVENTIONS:  -  Assess patient's ability to carry out ADLs; assess patient's baseline for ADL function and identify physical deficits which impact ability to perform ADLs (bathing, care of mouth/teeth, toileting, grooming, dressing, etc )  - Assess/evaluate cause of self-care deficits   - Assess range of motion  - Assess patient's mobility; develop plan if impaired  - Assess patient's need for assistive devices and provide as appropriate  - Encourage maximum independence but intervene and supervise when necessary  - Involve family in performance of ADLs  - Assess for home care needs following discharge   - Consider OT consult to assist with ADL evaluation and planning for discharge  - Provide patient education as appropriate  Outcome: Progressing  Goal: Maintain or return mobility status to optimal level  Description: INTERVENTIONS:  - Assess patient's baseline mobility status (ambulation, transfers, stairs, etc )    - Identify cognitive and physical deficits and behaviors that affect mobility  - Identify mobility aids required to assist with transfers and/or ambulation (gait belt, sit-to-stand, lift, walker, cane, etc )  - [place] fall precautions as indicated by assessment  - Record patient progress and toleration of activity level on Mobility SBAR; progress patient to next Phase/Stage  - Instruct patient to call for assistance with activity based on assessment  - Consider rehabilitation consult to assist with strengthening/weightbearing, etc   Outcome: Progressing     Problem: Knowledge Deficit  Goal: Patient/family/caregiver demonstrates understanding of disease process, treatment plan, medications, and discharge instructions  Description: Complete learning assessment and assess knowledge base    Interventions:  - Provide teaching at level of understanding  - Provide teaching via preferred learning methods  Outcome: Progressing     Problem: DISCHARGE PLANNING  Goal: Discharge to home or other facility with appropriate resources  Description: INTERVENTIONS:  - Identify barriers to discharge w/patient and caregiver  - Arrange for needed discharge resources and transportation as appropriate  - Identify discharge learning needs (meds, wound care, etc )  - Arrange for interpretive services to assist at discharge as needed  - Refer to Case Management Department for coordinating discharge planning if the patient needs post-hospital services based on physician/advanced practitioner order or complex needs related to functional status, cognitive ability, or social support system  Outcome: Progressing     Problem: Potential for Falls  Goal: Patient will remain free of falls  Description: INTERVENTIONS:  - Assess patient frequently for physical needs  -  Identify cognitive and physical deficits and behaviors that affect risk of falls    -  [place] fall precautions as indicated by assessment   - Educate patient/family on patient safety including physical limitations  - Instruct patient to call for assistance with activity based on assessment  - Modify environment to reduce risk of injury  - Consider OT/PT consult to assist with strengthening/mobility  Outcome: Progressing     Problem: POSTPARTUM  Goal: Experiences normal postpartum course  Description: INTERVENTIONS:  - Monitor maternal vital signs  - Assess uterine involution and lochia  Outcome: Progressing  Goal: Appropriate maternal -  bonding  Description: INTERVENTIONS:  - Identify family support  - Assess for appropriate maternal/infant bonding   -Encourage maternal/infant bonding opportunities  - Referral to  or  as needed  Outcome: Progressing  Goal: Establishment of infant feeding pattern  Description: INTERVENTIONS:  - Assess breast/bottle feeding  - Refer to lactation as needed  Outcome: Progressing  Goal: Incision(s), wounds(s) or drain site(s) healing without S/S of infection  Description: INTERVENTIONS  - Assess and document risk factors for skin impairment   - Assess and document dressing, incision, wound bed, drain sites and surrounding tissue  - Consider nutrition services referral as needed  - Oral mucous membranes remain intact  - Provide patient/ family education  Outcome: Progressing     Problem: Prexisting or High Potential for Compromised Skin Integrity  Goal: Skin integrity is maintained or improved  Description: INTERVENTIONS:  - Identify patients at risk for skin breakdown  - Assess and monitor skin integrity  - Assess and monitor nutrition and hydration status  - Monitor labs   - Assess for incontinence   - Turn and reposition patient  - Assist with mobility/ambulation  - Relieve pressure over bony prominences  - Avoid friction and shearing  - Provide appropriate hygiene as needed including keeping skin clean and dry  - Evaluate need for skin moisturizer/barrier cream  - Collaborate with interdisciplinary team   - Patient/family teaching  - Consider wound care consult   Outcome: Progressing

## 2020-08-10 LAB
GLUCOSE SERPL-MCNC: 105 MG/DL (ref 65–140)
GLUCOSE SERPL-MCNC: 106 MG/DL (ref 65–140)
GLUCOSE SERPL-MCNC: 108 MG/DL (ref 65–140)
GLUCOSE SERPL-MCNC: 109 MG/DL (ref 65–140)
GLUCOSE SERPL-MCNC: 114 MG/DL (ref 65–140)
GLUCOSE SERPL-MCNC: 126 MG/DL (ref 65–140)
GLUCOSE SERPL-MCNC: 130 MG/DL (ref 65–140)
GLUCOSE SERPL-MCNC: 37 MG/DL (ref 65–140)
GLUCOSE SERPL-MCNC: 44 MG/DL (ref 65–140)
GLUCOSE SERPL-MCNC: 49 MG/DL (ref 65–140)
GLUCOSE SERPL-MCNC: 61 MG/DL (ref 65–140)
GLUCOSE SERPL-MCNC: 61 MG/DL (ref 65–140)
GLUCOSE SERPL-MCNC: 63 MG/DL (ref 65–140)
GLUCOSE SERPL-MCNC: 64 MG/DL (ref 65–140)
GLUCOSE SERPL-MCNC: 72 MG/DL (ref 65–140)
GLUCOSE SERPL-MCNC: 76 MG/DL (ref 65–140)
GLUCOSE SERPL-MCNC: 77 MG/DL (ref 65–140)
GLUCOSE SERPL-MCNC: 80 MG/DL (ref 65–140)
GLUCOSE SERPL-MCNC: 83 MG/DL (ref 65–140)
GLUCOSE SERPL-MCNC: 87 MG/DL (ref 65–140)
GLUCOSE SERPL-MCNC: 88 MG/DL (ref 65–140)
GLUCOSE SERPL-MCNC: 96 MG/DL (ref 65–140)
RPR SER QL: NORMAL

## 2020-08-10 PROCEDURE — 99024 POSTOP FOLLOW-UP VISIT: CPT | Performed by: OBSTETRICS & GYNECOLOGY

## 2020-08-10 PROCEDURE — 99254 IP/OBS CNSLTJ NEW/EST MOD 60: CPT | Performed by: INTERNAL MEDICINE

## 2020-08-10 PROCEDURE — 82948 REAGENT STRIP/BLOOD GLUCOSE: CPT

## 2020-08-10 RX ADMIN — CALCIUM CARBONATE (ANTACID) CHEW TAB 500 MG 1000 MG: 500 CHEW TAB at 08:53

## 2020-08-10 RX ADMIN — DOCUSATE SODIUM 100 MG: 100 CAPSULE, LIQUID FILLED ORAL at 08:24

## 2020-08-10 RX ADMIN — DOCUSATE SODIUM 100 MG: 100 CAPSULE, LIQUID FILLED ORAL at 18:36

## 2020-08-10 RX ADMIN — OXYCODONE HYDROCHLORIDE 5 MG: 5 TABLET ORAL at 00:01

## 2020-08-10 RX ADMIN — ACETAMINOPHEN 650 MG: 325 TABLET, FILM COATED ORAL at 17:58

## 2020-08-10 RX ADMIN — IBUPROFEN 600 MG: 600 TABLET ORAL at 06:16

## 2020-08-10 RX ADMIN — OXYCODONE HYDROCHLORIDE 5 MG: 5 TABLET ORAL at 17:58

## 2020-08-10 RX ADMIN — OXYCODONE HYDROCHLORIDE 5 MG: 5 TABLET ORAL at 11:25

## 2020-08-10 RX ADMIN — IBUPROFEN 600 MG: 600 TABLET ORAL at 13:00

## 2020-08-10 RX ADMIN — IBUPROFEN 600 MG: 600 TABLET ORAL at 21:36

## 2020-08-10 RX ADMIN — IBUPROFEN 600 MG: 600 TABLET ORAL at 00:02

## 2020-08-10 RX ADMIN — OXYCODONE HYDROCHLORIDE 5 MG: 5 TABLET ORAL at 06:15

## 2020-08-10 RX ADMIN — ACETAMINOPHEN 650 MG: 325 TABLET, FILM COATED ORAL at 11:24

## 2020-08-10 NOTE — QUICK NOTE
Received call from RN that patient's blood sugars was in the 30s after transitioning to her home pump  Patient drinking juice  15 min recheck was 47  Patient is not experiencing any symptoms  Will drink more juice and eat peanut butter crackers   Will recheck in 15 min    Miquel Beckman Gynecology PGY-1  8/10/2020  6:27 AM

## 2020-08-10 NOTE — QUICK NOTE
Reviewed w Dr Camilla Copeland pt's pump settings from 1/30/20 which is what can be used to resume back to, now that she is delivered, understanding that she was poorly controlled (A1C 8+% at that time) and she will not likely be euglycemic on this resumed regimen  Other option is to continue drip until the morning and consult endocrine about settings  Dr Camilla Copeland to discuss w pt her wishes; favor continuing drip for now but pump also reasonable      Jonah Fontaine MD

## 2020-08-10 NOTE — PROGRESS NOTES
Progress Note - OB/GYN   Caretha Precise 30 y o  female MRN: [id number]  Unit/Bed#: -01 Encounter: [id number]    Assessment:  Postop Day #2 s/p primary LTCS, stable, baby in NICU    Plan:  1) Postoperative care   Hgb 12 2g/dL --> 7 8   Freely voiding   Encourage ambulation   Encourage breastfeeding   Anticipate discharge 8/12/20  2) Type 1 DM   Poorly controlled    Episodes of hypoglycemia in the 30-40s with resumption of home pump settings   Endocrinology consult ordered    Subjective/Objective   Chief Complaint:     Post delivery  Patient is feeling well  Lochia WNL  Pain well controlled      Subjective:     Pain: yes, incisional, improved with current pain regimen  Tolerating PO: yes  Voiding: yes  Flatus: yes  Bowel Movement: no  Ambulating: yes  Chest pain: no  Shortness of breath: no  Leg pain: no  Lochia: minimal  Infant feeding: breast (pumping)      Objective:     Vitals: /74   Pulse 88   Temp 98 1 °F (36 7 °C) (Oral)   Resp 18   Ht 4' 11" (1 499 m)   Wt 72 6 kg (160 lb)   LMP 12/04/2019 (Exact Date)   SpO2 95%   Breastfeeding Yes   BMI 32 32 kg/m²       Intake/Output Summary (Last 24 hours) at 8/10/2020 0645  Last data filed at 8/10/2020 0100  Gross per 24 hour   Intake --   Output 3000 ml   Net -3000 ml       Physical Exam:     General: AAOx3, NAD  Cardiovascular: CV, RRR  Respiratory: CTA b/l, no WRR  Abdomen: soft, non-tender, non-distended, no rebound or guarding, no CVA tenderness  Pelvis: Uterine fundus firm and non-tender, -1 cm below the umbilicus   Incision clean/dry/intact without signs of inflammation   sutures: clean, dry, and intact      Lab Results   Component Value Date    WBC 16 26 (H) 08/09/2020    HGB 7 8 (L) 08/09/2020    HCT 24 1 (L) 08/09/2020    MCV 91 08/09/2020     08/09/2020         Phoenix Hull MD  8/10/2020  6:45 AM

## 2020-08-10 NOTE — CONSULTS
Consultation - Shazia Mello 30 y o  female MRN: [id number]    Unit/Bed#: -01 Encounter: [id number]      Assessment/Plan     Assessment: This is a 30y o -year-old female with type 1 diabetes after delivery  Plan:  Uncontrolled type 1 diabetes: I will adjust omnipod pump settings as follows:  12am-5am: 0 80, 5am-4pm: 0 80, 4pm-12am: 0 80  ICR - 12, ISF 70, Target   - patient was advised to increase ICR to 15 if she experiences postmeal hypoglycemias  - she will call office if she continues to have hypoglycemias  - she will follow up with [facility] office       CC: Diabetes Consult    History of Present Illness     HPI: Shazia Mello is a 30y o  year old female with type 1 diabetes for 17 years  She is on insulin pump at home  She denies any polyuria, polydipsia, nocturia, numbness and tingling in her hands or feet, blurry vision  She denies neuropathy, nephropathy, retinopathy, heart attack and stroke   Patient experiences both hyperglycemias and hypoglycemias  She uses dexcom - CGM  She currently had multiple episodes of hypoglycemia since she was started on omnipod insulin pump  She previously followed at [facility] office  When she found out she is pregnant she followed up with Leticia Frazier  Last A1C - 7 5 on 6/30  She admits to family history of type 1 diabetes in the sister of her grandmother  She denies smoking, alcohol  Current omnipod insulin pump settings are:    Basal rates: 12am-5am: 0 90, 5am-4pm: 1 30, 4pm-12am: 1 10  ICR -6, ISF 50, Target BG 90          Inpatient consult to Endocrinology     Performed by  Tash Bucio MD     Authorized by Fei Ochoa MD              Review of Systems   Constitutional: Negative for chills, fatigue and fever  HENT: Negative for congestion, postnasal drip and sore throat  Eyes: Negative for pain  Respiratory: Negative for cough and shortness of breath      Cardiovascular: Negative for chest pain, palpitations and leg swelling  Gastrointestinal: Positive for abdominal pain  Negative for blood in stool, constipation, diarrhea and nausea  Endocrine: Negative for polydipsia and polyuria  Genitourinary: Negative for dysuria  Musculoskeletal: Negative for arthralgias and back pain  Neurological: Negative for dizziness, light-headedness and headaches  Psychiatric/Behavioral: Negative for behavioral problems  The patient is not nervous/anxious  All other systems reviewed and are negative  Historical Information   Past Medical History:   Diagnosis Date    Allergic dermatitis     Resolved 3/2/2016     Anxiety     Autoimmune disorder ([place] [address] )     Diabetes mellitus type 1 ([place] [address] )     Diabetic ketoacidosis without coma associated with type 1 diabetes mellitus ([place] [address] ) 2016    Hypoglycemia due to type 1 diabetes mellitus ([place] [address] )     Type 1 diabetes mellitus during pregnancy     Urinary tract infection     last episode 2 yrs ago    Varicella     childhood chickenpox     Past Surgical History:   Procedure Laterality Date    FL INJECTION LEFT SHOULDER (ARTHROGRAM)  2018    MA  DELIVERY ONLY N/A 2020    Procedure:  SECTION ();   Surgeon: Radha Ren MD;  Location: [facility] ;  Service: Obstetrics    MA ESOPHAGOGASTRODUODENOSCOPY TRANSORAL DIAGNOSTIC N/A 2019    Procedure: ESOPHAGOGASTRODUODENOSCOPY (EGD) with biopsy;  Surgeon: Izabel Nunn DO;  Location:  MAIN OR;  Service: Gastroenterology    WISDOM TOOTH EXTRACTION Bilateral      Social History   Social History     Substance and Sexual Activity   Alcohol Use Not Currently    Comment: rare      Social History     Substance and Sexual Activity   Drug Use No     Social History     Tobacco Use   Smoking Status Former Smoker    Packs/day: 0 50    Years: 4 00    Pack years: 2 00    Types: Cigarettes    Last attempt to quit: 2017    Years since quitting: 3 3   Smokeless Tobacco Never Used     Family History:   Family History   Problem Relation Age of Onset    Anxiety disorder Mother     No Known Problems Father     Anxiety disorder Sister     Anxiety disorder Maternal Grandmother     Cancer Maternal Grandmother     Anxiety disorder Maternal Aunt     Hyperlipidemia Family     Diabetes Family     No Known Problems Maternal Grandfather     No Known Problems Paternal Grandmother     No Known Problems Paternal Grandfather        Meds/Allergies   Current Facility-Administered Medications   Medication Dose Route Frequency Provider Last Rate Last Dose    acetaminophen (TYLENOL) tablet 650 mg  650 mg Oral Q6H PRN Jazmine Garcia MD   650 mg at 08/10/20 1124    betamethasone acetate-betamethasone sodium phosphate (CELESTONE) injection 12 mg  12 mg Intramuscular Q24H Anne Nuñez MD        calcium carbonate (TUMS) chewable tablet 1,000 mg  1,000 mg Oral TID PRN Jazmine Garcia MD   1,000 mg at 08/10/20 0853    diphenhydrAMINE (BENADRYL) injection 12 5 mg  12 5 mg Intravenous Once PRN Jamel Gallagher CRNA        diphenhydrAMINE (BENADRYL) injection 25 mg  25 mg Intravenous Q6H PRN Sudha Beltre MD        docusate sodium (COLACE) capsule 100 mg  100 mg Oral BID Sudha Beltre MD   100 mg at 08/10/20 0824    fentaNYL (SUBLIMAZE) injection 50 mcg  50 mcg Intravenous Q3 min PRN Jamel Gallagher CRNA   50 mcg at 08/08/20 1512    ibuprofen (MOTRIN) tablet 600 mg  600 mg Oral Q6H PRN Jazmine Garcia MD   600 mg at 08/10/20 0616    insulin lispro (HumaLOG) FOR PUMP REFILLS 100 Units  100 Units Subcutaneous Insulin Pump Daily PRN Jazmine Garcia MD        ondansetron [facility]) injection 4 mg  4 mg Intravenous Q8H PRN Sudha Beltre MD        ondansetron [facility]) injection 4 mg  4 mg Intravenous Once PRN Jamel Gallagher CRNA        oxyCODONE (ROXICODONE) immediate release tablet 10 mg  10 mg Oral Q4H PRN Jazmine Garcia MD        oxyCODONE (ROXICODONE) IR tablet 5 mg  5 mg Oral Q4H PRN Kirstie Valencia MD   5 mg at 08/10/20 1125    PATIENT MAINTAINED INSULIN PUMP 1 each  1 each Subcutaneous Q8H Thi Monty Davis MD   6 05 each at 08/10/20 0800     Allergies   Allergen Reactions    Reglan [Metoclopramide] Anxiety       Objective   Vitals: Blood pressure 132/80, pulse 88, temperature 98 2 °F (36 8 °C), temperature source Oral, resp  rate 20, height 4' 11" (1 499 m), weight 72 6 kg (160 lb), last menstrual period 12/04/2019, SpO2 95 %, currently breastfeeding  Intake/Output Summary (Last 24 hours) at 8/10/2020 1216  Last data filed at 8/10/2020 1110  Gross per 24 hour   Intake --   Output 4500 ml   Net -4500 ml     Invasive Devices     Peripheral Intravenous Line            Peripheral IV 08/08/20 Left 1 day    Peripheral IV 08/08/20 Right Hand 1 day          Line            Pump Device Insulin pump Anterior;Right Thigh less than 1 day                Physical Exam  Constitutional:       Appearance: She is well-developed  HENT:      Head: Normocephalic and atraumatic  Eyes:      General: No scleral icterus  Right eye: No discharge  Left eye: No discharge  Conjunctiva/sclera: Conjunctivae normal       Pupils: Pupils are equal, round, and reactive to light  Neck:      Musculoskeletal: Neck supple  Thyroid: No thyromegaly  Cardiovascular:      Rate and Rhythm: Normal rate and regular rhythm  Heart sounds: No murmur  Pulmonary:      Effort: Pulmonary effort is normal  No respiratory distress  Breath sounds: Normal breath sounds  Abdominal:      General: There is no distension  Palpations: Abdomen is soft  Tenderness: There is abdominal tenderness  Skin:     General: Skin is warm and dry  Neurological:      General: No focal deficit present  Mental Status: She is alert and oriented to person, place, and time  Psychiatric:         Mood and Affect: Mood normal          The history was obtained from the review of the chart, patient      Lab Results:       Lab Results   Component Value Date    WBC 16 26 (H) 08/09/2020    HGB 7 8 (L) 08/09/2020    HCT 24 1 (L) 08/09/2020    MCV 91 08/09/2020     08/09/2020     Lab Results   Component Value Date/Time    BUN 8 08/08/2020 01:15 PM    BUN 14 05/15/2014 12:20 PM     05/15/2014 12:20 PM    K 4 5 08/08/2020 01:15 PM    K 3 8 05/15/2014 12:20 PM    CL 98 (L) 08/08/2020 01:15 PM     05/15/2014 12:20 PM    CO2 17 (L) 08/08/2020 02:22 PM    CREATININE 0 78 08/08/2020 01:15 PM    CREATININE 0 54 (L) 05/15/2014 12:20 PM    AST 18 08/08/2020 01:15 PM    AST 14 05/15/2014 12:20 PM    ALT 11 (L) 08/08/2020 01:15 PM    ALT 23 05/15/2014 12:20 PM    ALB 2 7 (L) 08/08/2020 01:15 PM    ALB 4 3 05/15/2014 12:20 PM     No results for input(s): CHOL, HDL, LDL, TRIG, VLDL in the last 72 hours  No results found for: Cheryl Royce  POC Glucose (mg/dl)   Date Value   08/10/2020 61 (L)   08/10/2020 63 (L)   08/10/2020 49 (L)   08/10/2020 77   08/10/2020 63 (L)   08/10/2020 80   08/10/2020 108   08/10/2020 44 (L)   08/10/2020 37 (LL)   08/10/2020 87       Imaging Studies: I have personally reviewed pertinent reports  Portions of the record may have been created with voice recognition software

## 2020-08-10 NOTE — PLAN OF CARE
Problem: PAIN - ADULT  Goal: Verbalizes/displays adequate comfort level or baseline comfort level  Description: Interventions:  - Encourage patient to monitor pain and request assistance  - Assess pain using appropriate pain scale  - Administer analgesics based on type and severity of pain and evaluate response  - Implement non-pharmacological measures as appropriate and evaluate response  - Consider cultural and social influences on pain and pain management  - Notify physician/advanced practitioner if interventions unsuccessful or patient reports new pain  Outcome: Progressing     Problem: INFECTION - ADULT  Goal: Absence or prevention of progression during hospitalization  Description: INTERVENTIONS:  - Assess and monitor for signs and symptoms of infection  - Monitor lab/diagnostic results  - Monitor all insertion sites, i e  indwelling lines, tubes, and drains  - Monitor endotracheal if appropriate and nasal secretions for changes in amount and color  - [place] appropriate cooling/warming therapies per order  - Administer medications as ordered  - Instruct and encourage patient and family to use good hand hygiene technique  - Identify and instruct in appropriate isolation precautions for identified infection/condition  Outcome: Progressing  Goal: Absence of fever/infection during neutropenic period  Description: INTERVENTIONS:  - Monitor WBC    Outcome: Progressing     Problem: SAFETY ADULT  Goal: Patient will remain free of falls  Description: INTERVENTIONS:  - Assess patient frequently for physical needs  -  Identify cognitive and physical deficits and behaviors that affect risk of falls    -  [place] fall precautions as indicated by assessment   - Educate patient/family on patient safety including physical limitations  - Instruct patient to call for assistance with activity based on assessment  - Modify environment to reduce risk of injury  - Consider OT/PT consult to assist with strengthening/mobility  Outcome: Progressing  Goal: Maintain or return to baseline ADL function  Description: INTERVENTIONS:  -  Assess patient's ability to carry out ADLs; assess patient's baseline for ADL function and identify physical deficits which impact ability to perform ADLs (bathing, care of mouth/teeth, toileting, grooming, dressing, etc )  - Assess/evaluate cause of self-care deficits   - Assess range of motion  - Assess patient's mobility; develop plan if impaired  - Assess patient's need for assistive devices and provide as appropriate  - Encourage maximum independence but intervene and supervise when necessary  - Involve family in performance of ADLs  - Assess for home care needs following discharge   - Consider OT consult to assist with ADL evaluation and planning for discharge  - Provide patient education as appropriate  Outcome: Progressing  Goal: Maintain or return mobility status to optimal level  Description: INTERVENTIONS:  - Assess patient's baseline mobility status (ambulation, transfers, stairs, etc )    - Identify cognitive and physical deficits and behaviors that affect mobility  - Identify mobility aids required to assist with transfers and/or ambulation (gait belt, sit-to-stand, lift, walker, cane, etc )  - [place] fall precautions as indicated by assessment  - Record patient progress and toleration of activity level on Mobility SBAR; progress patient to next Phase/Stage  - Instruct patient to call for assistance with activity based on assessment  - Consider rehabilitation consult to assist with strengthening/weightbearing, etc   Outcome: Progressing     Problem: Knowledge Deficit  Goal: Patient/family/caregiver demonstrates understanding of disease process, treatment plan, medications, and discharge instructions  Description: Complete learning assessment and assess knowledge base    Interventions:  - Provide teaching at level of understanding  - Provide teaching via preferred learning methods  Outcome: Progressing     Problem: DISCHARGE PLANNING  Goal: Discharge to home or other facility with appropriate resources  Description: INTERVENTIONS:  - Identify barriers to discharge w/patient and caregiver  - Arrange for needed discharge resources and transportation as appropriate  - Identify discharge learning needs (meds, wound care, etc )  - Arrange for interpretive services to assist at discharge as needed  - Refer to Case Management Department for coordinating discharge planning if the patient needs post-hospital services based on physician/advanced practitioner order or complex needs related to functional status, cognitive ability, or social support system  Outcome: Progressing     Problem: Potential for Falls  Goal: Patient will remain free of falls  Description: INTERVENTIONS:  - Assess patient frequently for physical needs  -  Identify cognitive and physical deficits and behaviors that affect risk of falls    -  [place] fall precautions as indicated by assessment   - Educate patient/family on patient safety including physical limitations  - Instruct patient to call for assistance with activity based on assessment  - Modify environment to reduce risk of injury  - Consider OT/PT consult to assist with strengthening/mobility  Outcome: Progressing     Problem: POSTPARTUM  Goal: Experiences normal postpartum course  Description: INTERVENTIONS:  - Monitor maternal vital signs  - Assess uterine involution and lochia  Outcome: Progressing  Goal: Appropriate maternal -  bonding  Description: INTERVENTIONS:  - Identify family support  - Assess for appropriate maternal/infant bonding   -Encourage maternal/infant bonding opportunities  - Referral to  or  as needed  Outcome: Progressing  Goal: Establishment of infant feeding pattern  Description: INTERVENTIONS:  - Assess breast/bottle feeding  - Refer to lactation as needed  Outcome: Progressing  Goal: Incision(s), wounds(s) or drain site(s) healing without S/S of infection  Description: INTERVENTIONS  - Assess and document risk factors for skin impairment   - Assess and document dressing, incision, wound bed, drain sites and surrounding tissue  - Consider nutrition services referral as needed  - Oral mucous membranes remain intact  - Provide patient/ family education  Outcome: Progressing     Problem: Prexisting or High Potential for Compromised Skin Integrity  Goal: Skin integrity is maintained or improved  Description: INTERVENTIONS:  - Identify patients at risk for skin breakdown  - Assess and monitor skin integrity  - Assess and monitor nutrition and hydration status  - Monitor labs   - Assess for incontinence   - Turn and reposition patient  - Assist with mobility/ambulation  - Relieve pressure over bony prominences  - Avoid friction and shearing  - Provide appropriate hygiene as needed including keeping skin clean and dry  - Evaluate need for skin moisturizer/barrier cream  - Collaborate with interdisciplinary team   - Patient/family teaching  - Consider wound care consult   Outcome: Progressing

## 2020-08-10 NOTE — PLAN OF CARE
Problem: PAIN - ADULT  Goal: Verbalizes/displays adequate comfort level or baseline comfort level  Description: Interventions:  - Encourage patient to monitor pain and request assistance  - Assess pain using appropriate pain scale  - Administer analgesics based on type and severity of pain and evaluate response  - Implement non-pharmacological measures as appropriate and evaluate response  - Consider cultural and social influences on pain and pain management  - Notify physician/advanced practitioner if interventions unsuccessful or patient reports new pain  Outcome: Progressing     Problem: INFECTION - ADULT  Goal: Absence or prevention of progression during hospitalization  Description: INTERVENTIONS:  - Assess and monitor for signs and symptoms of infection  - Monitor lab/diagnostic results  - Monitor all insertion sites, i e  indwelling lines, tubes, and drains  - Monitor endotracheal if appropriate and nasal secretions for changes in amount and color  - [place] appropriate cooling/warming therapies per order  - Administer medications as ordered  - Instruct and encourage patient and family to use good hand hygiene technique  - Identify and instruct in appropriate isolation precautions for identified infection/condition  Outcome: Progressing  Goal: Absence of fever/infection during neutropenic period  Description: INTERVENTIONS:  - Monitor WBC    Outcome: Progressing     Problem: SAFETY ADULT  Goal: Patient will remain free of falls  Description: INTERVENTIONS:  - Assess patient frequently for physical needs  -  Identify cognitive and physical deficits and behaviors that affect risk of falls    -  [place] fall precautions as indicated by assessment   - Educate patient/family on patient safety including physical limitations  - Instruct patient to call for assistance with activity based on assessment  - Modify environment to reduce risk of injury  - Consider OT/PT consult to assist with strengthening/mobility  Outcome: Progressing  Goal: Maintain or return to baseline ADL function  Description: INTERVENTIONS:  -  Assess patient's ability to carry out ADLs; assess patient's baseline for ADL function and identify physical deficits which impact ability to perform ADLs (bathing, care of mouth/teeth, toileting, grooming, dressing, etc )  - Assess/evaluate cause of self-care deficits   - Assess range of motion  - Assess patient's mobility; develop plan if impaired  - Assess patient's need for assistive devices and provide as appropriate  - Encourage maximum independence but intervene and supervise when necessary  - Involve family in performance of ADLs  - Assess for home care needs following discharge   - Consider OT consult to assist with ADL evaluation and planning for discharge  - Provide patient education as appropriate  Outcome: Progressing  Goal: Maintain or return mobility status to optimal level  Description: INTERVENTIONS:  - Assess patient's baseline mobility status (ambulation, transfers, stairs, etc )    - Identify cognitive and physical deficits and behaviors that affect mobility  - Identify mobility aids required to assist with transfers and/or ambulation (gait belt, sit-to-stand, lift, walker, cane, etc )  - [place] fall precautions as indicated by assessment  - Record patient progress and toleration of activity level on Mobility SBAR; progress patient to next Phase/Stage  - Instruct patient to call for assistance with activity based on assessment  - Consider rehabilitation consult to assist with strengthening/weightbearing, etc   Outcome: Progressing     Problem: Knowledge Deficit  Goal: Patient/family/caregiver demonstrates understanding of disease process, treatment plan, medications, and discharge instructions  Description: Complete learning assessment and assess knowledge base    Interventions:  - Provide teaching at level of understanding  - Provide teaching via preferred learning methods  Outcome: Progressing     Problem: DISCHARGE PLANNING  Goal: Discharge to home or other facility with appropriate resources  Description: INTERVENTIONS:  - Identify barriers to discharge w/patient and caregiver  - Arrange for needed discharge resources and transportation as appropriate  - Identify discharge learning needs (meds, wound care, etc )  - Arrange for interpretive services to assist at discharge as needed  - Refer to Case Management Department for coordinating discharge planning if the patient needs post-hospital services based on physician/advanced practitioner order or complex needs related to functional status, cognitive ability, or social support system  Outcome: Progressing     Problem: Potential for Falls  Goal: Patient will remain free of falls  Description: INTERVENTIONS:  - Assess patient frequently for physical needs  -  Identify cognitive and physical deficits and behaviors that affect risk of falls    -  [place] fall precautions as indicated by assessment   - Educate patient/family on patient safety including physical limitations  - Instruct patient to call for assistance with activity based on assessment  - Modify environment to reduce risk of injury  - Consider OT/PT consult to assist with strengthening/mobility  Outcome: Progressing     Problem: POSTPARTUM  Goal: Experiences normal postpartum course  Description: INTERVENTIONS:  - Monitor maternal vital signs  - Assess uterine involution and lochia  Outcome: Progressing  Goal: Appropriate maternal -  bonding  Description: INTERVENTIONS:  - Identify family support  - Assess for appropriate maternal/infant bonding   -Encourage maternal/infant bonding opportunities  - Referral to  or  as needed  Outcome: Progressing  Goal: Establishment of infant feeding pattern  Description: INTERVENTIONS:  - Assess breast/bottle feeding  - Refer to lactation as needed  Outcome: Progressing  Goal: Incision(s), wounds(s) or drain site(s) healing without S/S of infection  Description: INTERVENTIONS  - Assess and document risk factors for skin impairment   - Assess and document dressing, incision, wound bed, drain sites and surrounding tissue  - Consider nutrition services referral as needed  - Oral mucous membranes remain intact  - Provide patient/ family education  Outcome: Progressing     Problem: Prexisting or High Potential for Compromised Skin Integrity  Goal: Skin integrity is maintained or improved  Description: INTERVENTIONS:  - Identify patients at risk for skin breakdown  - Assess and monitor skin integrity  - Assess and monitor nutrition and hydration status  - Monitor labs   - Assess for incontinence   - Turn and reposition patient  - Assist with mobility/ambulation  - Relieve pressure over bony prominences  - Avoid friction and shearing  - Provide appropriate hygiene as needed including keeping skin clean and dry  - Evaluate need for skin moisturizer/barrier cream  - Collaborate with interdisciplinary team   - Patient/family teaching  - Consider wound care consult   Outcome: Progressing

## 2020-08-11 DIAGNOSIS — D64.9 ANEMIA: ICD-10-CM

## 2020-08-11 LAB
GLUCOSE SERPL-MCNC: 103 MG/DL (ref 65–140)
GLUCOSE SERPL-MCNC: 107 MG/DL (ref 65–140)
GLUCOSE SERPL-MCNC: 110 MG/DL (ref 65–140)
GLUCOSE SERPL-MCNC: 112 MG/DL (ref 65–140)
GLUCOSE SERPL-MCNC: 119 MG/DL (ref 65–140)
GLUCOSE SERPL-MCNC: 125 MG/DL (ref 65–140)
GLUCOSE SERPL-MCNC: 136 MG/DL (ref 65–140)
GLUCOSE SERPL-MCNC: 142 MG/DL (ref 65–140)
GLUCOSE SERPL-MCNC: 162 MG/DL (ref 65–140)
GLUCOSE SERPL-MCNC: 96 MG/DL (ref 65–140)

## 2020-08-11 PROCEDURE — 82948 REAGENT STRIP/BLOOD GLUCOSE: CPT

## 2020-08-11 PROCEDURE — 99024 POSTOP FOLLOW-UP VISIT: CPT | Performed by: STUDENT IN AN ORGANIZED HEALTH CARE EDUCATION/TRAINING PROGRAM

## 2020-08-11 PROCEDURE — 99232 SBSQ HOSP IP/OBS MODERATE 35: CPT | Performed by: INTERNAL MEDICINE

## 2020-08-11 RX ORDER — DEXTROSE MONOHYDRATE 25 G/50ML
25 INJECTION, SOLUTION INTRAVENOUS ONCE
Status: COMPLETED | OUTPATIENT
Start: 2020-08-11 | End: 2020-08-11

## 2020-08-11 RX ADMIN — DOCUSATE SODIUM 100 MG: 100 CAPSULE, LIQUID FILLED ORAL at 17:41

## 2020-08-11 RX ADMIN — OXYCODONE HYDROCHLORIDE 5 MG: 5 TABLET ORAL at 00:21

## 2020-08-11 RX ADMIN — OXYCODONE HYDROCHLORIDE 5 MG: 5 TABLET ORAL at 22:21

## 2020-08-11 RX ADMIN — DOCUSATE SODIUM 100 MG: 100 CAPSULE, LIQUID FILLED ORAL at 08:41

## 2020-08-11 RX ADMIN — OXYCODONE HYDROCHLORIDE 5 MG: 5 TABLET ORAL at 08:41

## 2020-08-11 RX ADMIN — ONDANSETRON 4 MG: 2 INJECTION INTRAMUSCULAR; INTRAVENOUS at 01:05

## 2020-08-11 RX ADMIN — IBUPROFEN 600 MG: 600 TABLET ORAL at 08:41

## 2020-08-11 RX ADMIN — ACETAMINOPHEN 650 MG: 325 TABLET, FILM COATED ORAL at 13:45

## 2020-08-11 RX ADMIN — DEXTROSE MONOHYDRATE 25 ML: 500 INJECTION PARENTERAL at 00:57

## 2020-08-11 RX ADMIN — IBUPROFEN 600 MG: 600 TABLET ORAL at 22:21

## 2020-08-11 NOTE — PROGRESS NOTES
Progress Note - OB/GYN   Cari Tse 30 y o  female MRN: [id number]  Unit/Bed#: -01 Encounter: [id number]    Assessment:  Postop Day #3 s/p primary LTCS, stable, baby in NICU    Plan:  1) Postoperative care   Hgb 12 2g/dL --> 7 8   Freely voiding Encourage ambulation   Encourage breastfeeding   Anticipate discharge tomorrow  2) Type 1 Diabetes   Insulin pump, with insulin basal rate doses recently adjusted by Endocrinology for episodes of hypoglycemia to the 30s   POC glucose  overnight    Subjective/Objective   Chief Complaint:     Post delivery  Patient is feeling well  Lochia WNL  Pain well controlled      Subjective:     Pain: yes, incisional, improved with current pain regimen  Tolerating PO: yes  Voiding: yes  Flatus: yes  Bowel Movement: no  Ambulating: yes  Chest pain: no  Shortness of breath: no  Leg pain: no  Lochia: minimal  Infant feeding: pumping      Objective:     Vitals: /78   Pulse 77   Temp 98 4 °F (36 9 °C) (Oral)   Resp 16   Ht 4' 11" (1 499 m)   Wt 72 6 kg (160 lb)   LMP 12/04/2019 (Exact Date)   SpO2 95%   Breastfeeding Yes   BMI 32 32 kg/m²       Intake/Output Summary (Last 24 hours) at 8/11/2020 7881  Last data filed at 8/10/2020 1110  Gross per 24 hour   Intake --   Output 1900 ml   Net -1900 ml       Physical Exam:     General: AAOx3, NAD  Cardiovascular: CV, RRR  Respiratory: CTA b/l, no WRR  Abdomen: soft, non-tender, non-distended, no rebound or guarding, no CVA tenderness  Pelvis: Uterine fundus firm and non-tender, -1 cm below the umbilicus   Incision clean/dry/intact without signs of inflammation   sutures: clean, dry, and intact      Lab Results   Component Value Date    WBC 16 26 (H) 08/09/2020    HGB 7 8 (L) 08/09/2020    HCT 24 1 (L) 08/09/2020    MCV 91 08/09/2020     08/09/2020         James Miranda MD  8/11/2020  6:32 AM

## 2020-08-11 NOTE — LACTATION NOTE
CONSULT - LACTATION  Alisson Akins 30 y o  female MRN: [id number]    [address] [facility] Room / Bed:  320/ 320-01 Encounter: [id number]    Maternal Information     MOTHER:  N/A  Maternal Age: This patient's mother is not on file  OB History: This patient's mother is not on file  Previouse breast reduction surgery? No    Lactation history:   Has patient previously breast fed: How long had patient previously breast fed:     Previous breast feeding complications: This patient's mother is not on file  Birth information:  YOB: 1995   Time of birth:     Sex: female   Delivery type:     Birth Weight: No birth weight on file  Percent of Weight Change: Birth weight not on file     Gestational Age: <None>   [unfilled]    Assessment     Breast and nipple assessment: no clinical assessment was completed at this time     Assessment: no clinical assessment was completed at this time    Feeding assessment: no latch assessment was completed at this time  LATCH:  Latch: Audible Swallowing:     Type of Nipple:     Comfort (Breast/Nipple):     Hold (Positioning):     LATCH Score:            Feeding recommendations:  pump every 2-3 hours and supplement with expressed colostrum via bottle and nipple  Fidelina He has been pumping and providing expressed colostrum to her infant in the NICU  Fidelina He was able to express 1 oz during this consult  Hand expression was demonstrated, taught, explained  Met with mother  Provided mother with Ready, Set, Baby booklet  Discussed Skin to Skin contact an benefits to mom and baby  Talked about the delay of the first bath until baby has adjusted  Spoke about the benefits of rooming in  Feeding on cue and what that means for recognizing infant's hunger  Avoidance of pacifiers for the first month discussed  Talked about exclusive breastfeeding for the first 6 months      Positioning and latch reviewed as well as showing images of other feeding positions  Discussed the properties of a good latch in any position  Reviewed hand/manual expression  Discussed s/s that baby is getting enough milk and some s/s that breastfeeding dyad may need further help  Gave information on common concerns, what to expect the first few weeks after delivery, preparing for other caregivers, and how partners can help  Resources for support also provided  Met with mother to go over discharge breastfeeding booklet including the feeding log  Emphasized 8 or more (12) feedings in a 24 hour period, what to expect for the number of diapers per day of life and the progression of properties of the  stooling pattern  Reviewed breastfeeding and your lifestyle, storage and preparation of breast milk, how to keep you breast pump clean, the employed breastfeeding mother and paced bottle feeding handouts  Booklet included Breastfeeding Resources for after discharge including access to the number for the [address]  Given education on Increasing Breast Milk Supply  Demonstrated how to use the pumping log to accommodate expectations on production of breast milk  Instructions given on pumping  Discussed when to start, frequency, different pumps available versus manual expression  Discussed hygiene of hands and supplies as well as assembly, placement of flanges, size of flanged, preparing the breast and cycles and suction settings on pump  Demonstrated use of hand pump  Discussed labeling of milk, storage, and preparation of stored milk  Encouraged pumping every 2-3 hours while awake  Encouraged setting an alarm to remember when to pump to accomplish 8-10 pumping sessions in a 24 hour time period  Encouraged hand expression          Yusuf, [address] [place] 2020 4:29 PM

## 2020-08-11 NOTE — PROGRESS NOTES
Progress Note - Yadira Salguero 30 y o  female MRN: [id number]    Unit/Bed#: -01 Encounter: [id number]      CC: diabetes f/u    Subjective:   Yadira Salguero is a 30 y o   female with type 1 diabetes  Feels well  No complaints  Patient had overnight episode of hypoglycemia at midnight blood sugar 43, she also had low sugar at 5 pm - 55  She is currently using dexcom  Objective:     Vitals: Blood pressure 136/76, pulse 77, temperature 98 8 °F (37 1 °C), temperature source Oral, resp  rate 20, height 4' 11" (1 499 m), weight 72 6 kg (160 lb), last menstrual period 12/04/2019, SpO2 95 %, currently breastfeeding  ,Body mass index is 32 32 kg/m²  No intake or output data in the 24 hours ending 08/11/20 1505    Physical Exam:  General Appearance: awake, appears stated age and cooperative  Head: Normocephalic, without obvious abnormality, atraumatic  Extremities: moves all extremities  Skin: Skin color and temperature normal    Pulm: no labored breathing    Lab, Imaging and other studies: I have personally reviewed pertinent reports  POC Glucose (mg/dl)   Date Value   08/11/2020 142 (H)   08/11/2020 136   08/11/2020 119   08/11/2020 112   08/11/2020 107   08/11/2020 96   08/11/2020 103   08/11/2020 125   08/11/2020 162 (H)   08/10/2020 63 (L)       Assessment:  Type 1 diabetes mellitus  Status post delivery    Plan:  Uncontrolled type 1 diabetes: I will adjust omnipod pump settings as follows:   Basal rates: 12am - 12pm: 0 5  ICR - 20, , [employer]     - she will call office if she continues to have hypoglycemias  - she will follow up at [facility] [place] office      Portions of the record may have been created with voice recognition software

## 2020-08-12 VITALS
TEMPERATURE: 98.7 F | OXYGEN SATURATION: 95 % | RESPIRATION RATE: 18 BRPM | WEIGHT: 160 LBS | SYSTOLIC BLOOD PRESSURE: 154 MMHG | HEART RATE: 62 BPM | DIASTOLIC BLOOD PRESSURE: 70 MMHG | HEIGHT: 59 IN | BODY MASS INDEX: 32.25 KG/M2

## 2020-08-12 LAB
GLUCOSE SERPL-MCNC: 101 MG/DL (ref 65–140)
GLUCOSE SERPL-MCNC: 276 MG/DL (ref 65–140)
GLUCOSE SERPL-MCNC: 56 MG/DL (ref 65–140)

## 2020-08-12 PROCEDURE — 99024 POSTOP FOLLOW-UP VISIT: CPT | Performed by: STUDENT IN AN ORGANIZED HEALTH CARE EDUCATION/TRAINING PROGRAM

## 2020-08-12 PROCEDURE — 82948 REAGENT STRIP/BLOOD GLUCOSE: CPT

## 2020-08-12 RX ORDER — ACETAMINOPHEN 325 MG/1
650 TABLET ORAL EVERY 6 HOURS PRN
Qty: 30 TABLET | Refills: 0 | Status: SHIPPED | OUTPATIENT
Start: 2020-08-12 | End: 2022-03-07

## 2020-08-12 RX ORDER — OXYCODONE HYDROCHLORIDE 5 MG/1
5 TABLET ORAL EVERY 4 HOURS PRN
Qty: 12 TABLET | Refills: 0 | Status: SHIPPED | OUTPATIENT
Start: 2020-08-12 | End: 2020-08-22

## 2020-08-12 RX ORDER — IBUPROFEN 600 MG/1
600 TABLET ORAL EVERY 6 HOURS PRN
Qty: 30 TABLET | Refills: 0 | Status: SHIPPED | OUTPATIENT
Start: 2020-08-12 | End: 2022-03-07

## 2020-08-12 RX ADMIN — DOCUSATE SODIUM 100 MG: 100 CAPSULE, LIQUID FILLED ORAL at 09:23

## 2020-08-12 RX ADMIN — OXYCODONE HYDROCHLORIDE 5 MG: 5 TABLET ORAL at 09:23

## 2020-08-12 RX ADMIN — IBUPROFEN 600 MG: 600 TABLET ORAL at 09:23

## 2020-08-12 NOTE — PLAN OF CARE
Problem: PAIN - ADULT  Goal: Verbalizes/displays adequate comfort level or baseline comfort level  Description: Interventions:  - Encourage patient to monitor pain and request assistance  - Assess pain using appropriate pain scale  - Administer analgesics based on type and severity of pain and evaluate response  - Implement non-pharmacological measures as appropriate and evaluate response  - Consider cultural and social influences on pain and pain management  - Notify physician/advanced practitioner if interventions unsuccessful or patient reports new pain  Outcome: Progressing     Problem: INFECTION - ADULT  Goal: Absence or prevention of progression during hospitalization  Description: INTERVENTIONS:  - Assess and monitor for signs and symptoms of infection  - Monitor lab/diagnostic results  - Monitor all insertion sites, i e  indwelling lines, tubes, and drains  - Monitor endotracheal if appropriate and nasal secretions for changes in amount and color  - Walker appropriate cooling/warming therapies per order  - Administer medications as ordered  - Instruct and encourage patient and family to use good hand hygiene technique  - Identify and instruct in appropriate isolation precautions for identified infection/condition  Outcome: Progressing  Goal: Absence of fever/infection during neutropenic period  Description: INTERVENTIONS:  - Monitor WBC    Outcome: Progressing     Problem: SAFETY ADULT  Goal: Patient will remain free of falls  Description: INTERVENTIONS:  - Assess patient frequently for physical needs  -  Identify cognitive and physical deficits and behaviors that affect risk of falls    -  [place] fall precautions as indicated by assessment   - Educate patient/family on patient safety including physical limitations  - Instruct patient to call for assistance with activity based on assessment  - Modify environment to reduce risk of injury  - Consider OT/PT consult to assist with strengthening/mobility  Outcome: Progressing  Goal: Maintain or return to baseline ADL function  Description: INTERVENTIONS:  -  Assess patient's ability to carry out ADLs; assess patient's baseline for ADL function and identify physical deficits which impact ability to perform ADLs (bathing, care of mouth/teeth, toileting, grooming, dressing, etc )  - Assess/evaluate cause of self-care deficits   - Assess range of motion  - Assess patient's mobility; develop plan if impaired  - Assess patient's need for assistive devices and provide as appropriate  - Encourage maximum independence but intervene and supervise when necessary  - Involve family in performance of ADLs  - Assess for home care needs following discharge   - Consider OT consult to assist with ADL evaluation and planning for discharge  - Provide patient education as appropriate  Outcome: Progressing  Goal: Maintain or return mobility status to optimal level  Description: INTERVENTIONS:  - Assess patient's baseline mobility status (ambulation, transfers, stairs, etc )    - Identify cognitive and physical deficits and behaviors that affect mobility  - Identify mobility aids required to assist with transfers and/or ambulation (gait belt, sit-to-stand, lift, walker, cane, etc )  - [place] fall precautions as indicated by assessment  - Record patient progress and toleration of activity level on Mobility SBAR; progress patient to next Phase/Stage  - Instruct patient to call for assistance with activity based on assessment  - Consider rehabilitation consult to assist with strengthening/weightbearing, etc   Outcome: Progressing     Problem: Knowledge Deficit  Goal: Patient/family/caregiver demonstrates understanding of disease process, treatment plan, medications, and discharge instructions  Description: Complete learning assessment and assess knowledge base    Interventions:  - Provide teaching at level of understanding  - Provide teaching via preferred learning methods  Outcome: Progressing     Problem: DISCHARGE PLANNING  Goal: Discharge to home or other facility with appropriate resources  Description: INTERVENTIONS:  - Identify barriers to discharge w/patient and caregiver  - Arrange for needed discharge resources and transportation as appropriate  - Identify discharge learning needs (meds, wound care, etc )  - Arrange for interpretive services to assist at discharge as needed  - Refer to Case Management Department for coordinating discharge planning if the patient needs post-hospital services based on physician/advanced practitioner order or complex needs related to functional status, cognitive ability, or social support system  Outcome: Progressing     Problem: Potential for Falls  Goal: Patient will remain free of falls  Description: INTERVENTIONS:  - Assess patient frequently for physical needs  -  Identify cognitive and physical deficits and behaviors that affect risk of falls    -  [place] fall precautions as indicated by assessment   - Educate patient/family on patient safety including physical limitations  - Instruct patient to call for assistance with activity based on assessment  - Modify environment to reduce risk of injury  - Consider OT/PT consult to assist with strengthening/mobility  Outcome: Progressing     Problem: POSTPARTUM  Goal: Experiences normal postpartum course  Description: INTERVENTIONS:  - Monitor maternal vital signs  - Assess uterine involution and lochia  Outcome: Progressing  Goal: Appropriate maternal -  bonding  Description: INTERVENTIONS:  - Identify family support  - Assess for appropriate maternal/infant bonding   -Encourage maternal/infant bonding opportunities  - Referral to  or  as needed  Outcome: Progressing  Goal: Establishment of infant feeding pattern  Description: INTERVENTIONS:  - Assess breast/bottle feeding  - Refer to lactation as needed  Outcome: Progressing  Goal: Incision(s), wounds(s) or drain site(s) healing without S/S of infection  Description: INTERVENTIONS  - Assess and document risk factors for skin impairment   - Assess and document dressing, incision, wound bed, drain sites and surrounding tissue  - Consider nutrition services referral as needed  - Oral mucous membranes remain intact  - Provide patient/ family education  Outcome: Progressing     Problem: Prexisting or High Potential for Compromised Skin Integrity  Goal: Skin integrity is maintained or improved  Description: INTERVENTIONS:  - Identify patients at risk for skin breakdown  - Assess and monitor skin integrity  - Assess and monitor nutrition and hydration status  - Monitor labs   - Assess for incontinence   - Turn and reposition patient  - Assist with mobility/ambulation  - Relieve pressure over bony prominences  - Avoid friction and shearing  - Provide appropriate hygiene as needed including keeping skin clean and dry  - Evaluate need for skin moisturizer/barrier cream  - Collaborate with interdisciplinary team   - Patient/family teaching  - Consider wound care consult   Outcome: Progressing

## 2020-08-12 NOTE — NURSING NOTE
Pt called out at 0255  Woke up feeling jittery  BS was 49 (reported from her insulin pump)  She drank apple juice and was feeling better  Advised I would do repeat BS in 15 min  Repeat was 56  I gave her crackers and peanut butter and BS came back to 110   Advised pt to call if symptomatic so we can use our glucometer while in the hospital

## 2020-08-12 NOTE — PROGRESS NOTES
Progress Note - OB/GYN   Shazia Mello 30 y o  female MRN: [id number]  Unit/Bed#: -01 Encounter: [id number]    Assessment:  Postop Day #4 s/p primary LTCS, stable, baby in NICU    Plan:  1) Postoperative care   Hgb 12 2g/dL --> 7 8   Freely voiding   Encourage ambulation   Encourage breastfeeding   Anticipate discharge today  2) Type 1 Diabetes on insulin pump   POCT  overnight   Endocrine following    Subjective/Objective   Chief Complaint:     Post delivery  Patient is feeling well  Lochia WNL  Pain well controlled      Subjective:     Pain: yes, incisional, improved with current pain regimen  Tolerating PO: yes  Voiding: yes  Flatus: yes  Bowel Movement: yes  Ambulating: yes  Chest pain: no  Shortness of breath: no  Leg pain: no  Lochia: minimal  Infant feeding: pumping      Objective:     Vitals: /79 (BP Location: Right arm)   Pulse 73   Temp 98 5 °F (36 9 °C) (Oral)   Resp 18   Ht 4' 11" (1 499 m)   Wt 72 6 kg (160 lb)   LMP 12/04/2019 (Exact Date)   SpO2 95%   Breastfeeding Yes   BMI 32 32 kg/m²     No intake or output data in the 24 hours ending 08/12/20 0541    Physical Exam:     General: AAOx3, NAD  Cardiovascular: CV, RRR  Respiratory: CTA b/l, no WRR  Abdomen: soft, non-tender, non-distended, no rebound or guarding, no CVA tenderness  Pelvis: Uterine fundus firm and non-tender, -2 cm below the umbilicus   Incision clean/dry/intact without signs of inflammation   sutures: clean, dry, and intact      Lab Results   Component Value Date    WBC 16 26 (H) 08/09/2020    HGB 7 8 (L) 08/09/2020    HCT 24 1 (L) 08/09/2020    MCV 91 08/09/2020     08/09/2020         Luis Miguel Guardado MD  8/12/2020  5:41 AM

## 2020-08-13 RX ORDER — FERROUS SULFATE TAB EC 324 MG (65 MG FE EQUIVALENT) 324 (65 FE) MG
324 TABLET DELAYED RESPONSE ORAL
Qty: 30 TABLET | Refills: 1 | OUTPATIENT
Start: 2020-08-13

## 2020-08-20 ENCOUNTER — TELEPHONE (OUTPATIENT)
Dept: OBGYN CLINIC | Facility: CLINIC | Age: 25
End: 2020-08-20

## 2020-08-20 NOTE — TELEPHONE ENCOUNTER
Returned pts' p c - pt states "since she's been pumping , her breast feels better"  Pt states"she felt a lump in her right bottom breast "  Denies fever, body aches, redness at site, -encouraged pt to continue pumping every 2 hrs & apply warm soaks to breast in between  - most likely is clogged milk duct  Pt is 2 wks PP- she is exclusively pumping , as baby was delivered at 34 wks & is in NICU  Advised pt to call with any change in symptoms

## 2020-08-24 ENCOUNTER — TELEPHONE (OUTPATIENT)
Dept: OBGYN CLINIC | Facility: CLINIC | Age: 25
End: 2020-08-24

## 2020-08-24 DIAGNOSIS — N64.4 BREAST PAIN: Primary | ICD-10-CM

## 2020-08-24 NOTE — TELEPHONE ENCOUNTER
----- Message from Dennsi Cobb sent at 8/24/2020  2:10 PM EDT -----  Regarding: Non-Urgent Medical Question  Contact: [phone]  I called Friday an left the nurse a message but have not received a call back  I called previously on on Thursday about having possible mastitis  I had no fever but chills, a lump in the right breast an it's extremely painful  She advised me it was most likely a clogged duct and to do warm soaks, massage the area and pump every 2 hours   Later that night I got a 102 5 fever  This subsided after two days  I still have the lump which is increasing in size and it is still very very painful  I've continued with Tylenol, the compresses because I had a c section an I can't submerge the area, massaging an pumping  I really need help

## 2020-08-24 NOTE — TELEPHONE ENCOUNTER
Dicloxacillin sent to pharmacy to start in the even this is mastitis  Recommend office visit for exam and evaluation as soon as able  Can start antibiotics in the mean time

## 2020-08-24 NOTE — TELEPHONE ENCOUNTER
Returned Pats call-and reviewed Dr Garcia Borjas recommendations  She is still running fever and is alternating tylenol and Ibuprofen with temps 102-99 6  She has no other aliments except this breast pain  We reviewed her starting the [address] today and appt made for tomorrow in the [facility] office  She will continue the warm compresses, hot showers and pumping ever 2-3 hours with breast massage

## 2020-08-25 ENCOUNTER — OFFICE VISIT (OUTPATIENT)
Dept: OBGYN CLINIC | Facility: CLINIC | Age: 25
End: 2020-08-25
Payer: COMMERCIAL

## 2020-08-25 VITALS — DIASTOLIC BLOOD PRESSURE: 54 MMHG | BODY MASS INDEX: 25.53 KG/M2 | SYSTOLIC BLOOD PRESSURE: 98 MMHG | WEIGHT: 126.4 LBS

## 2020-08-25 DIAGNOSIS — N61.0 MASTITIS, RIGHT, ACUTE: Primary | ICD-10-CM

## 2020-08-25 PROBLEM — O12.03 GESTATIONAL EDEMA IN THIRD TRIMESTER: Status: RESOLVED | Noted: 2020-07-24 | Resolved: 2020-08-25

## 2020-08-25 PROBLEM — Z3A.34 34 WEEKS GESTATION OF PREGNANCY: Status: RESOLVED | Noted: 2020-08-03 | Resolved: 2020-08-25

## 2020-08-25 PROBLEM — Z34.03 ENCOUNTER FOR SUPERVISION OF NORMAL FIRST PREGNANCY IN THIRD TRIMESTER: Status: RESOLVED | Noted: 2020-06-25 | Resolved: 2020-08-25

## 2020-08-25 PROBLEM — O09.93 HIGH-RISK PREGNANCY IN THIRD TRIMESTER: Status: RESOLVED | Noted: 2020-04-29 | Resolved: 2020-08-25

## 2020-08-25 PROCEDURE — 3051F HG A1C>EQUAL 7.0%<8.0%: CPT | Performed by: NURSE PRACTITIONER

## 2020-08-25 PROCEDURE — 99213 OFFICE O/P EST LOW 20 MIN: CPT | Performed by: NURSE PRACTITIONER

## 2020-08-25 PROCEDURE — 3060F POS MICROALBUMINURIA REV: CPT | Performed by: NURSE PRACTITIONER

## 2020-08-25 PROCEDURE — 1036F TOBACCO NON-USER: CPT | Performed by: NURSE PRACTITIONER

## 2020-08-25 PROCEDURE — 1111F DSCHRG MED/CURRENT MED MERGE: CPT | Performed by: NURSE PRACTITIONER

## 2020-08-25 PROCEDURE — 2022F DILAT RTA XM EVC RTNOPTHY: CPT | Performed by: NURSE PRACTITIONER

## 2020-08-25 NOTE — PROGRESS NOTES
Assessment/Plan:    Mastitis, right, acute  Recommend patient continue with Dicloxacillin until completion  Will continue with Ibuprofen to reduce pain and swelling  Encouarged patient to pump/latch baby every 1-3 hours, different latch/pumping positions,  warm compress and massage, [place] pump with warm water and epsom salt  Also discussed f/u with [facility] for lactation support  Will RTO next week for already scheduled postpartum visit or sooner prn  Diagnoses and all orders for this visit:    Mastitis, right, acute          Subjective:      Patient ID: Alisson Akins is a 30 y o  female  Fidelina Neri presents today for a problem visit  Fidelina He is s/p primary LTCS at 34w3d on 8/8/20  She c/o a clogged duct of the right breast x 5 days  Patient is currently pumping and baby occasionally latches on now that she is home from NICU  Started with a fever yesterday of 102 5  Was alternating between Tylenol and Ibuprofen d/t fever and pain  She was started on Dicloxacillin yesterday and has taken 4 doses so far  Still has clogged duct, but has not had any further fevers and is feeling better  The following portions of the patient's history were reviewed and updated as appropriate: allergies, current medications, past family history, past medical history, past social history, past surgical history and problem list     Review of Systems   Constitutional: Negative  HENT: Negative  Eyes: Negative  Respiratory: Negative  Cardiovascular: Negative  Gastrointestinal: Negative  Endocrine: Negative  Genitourinary: Negative  Clogged duct of the right breast   Musculoskeletal: Negative  Skin: Negative  Allergic/Immunologic: Negative  Neurological: Negative  Hematological: Negative  Psychiatric/Behavioral: Negative  Objective:      BP 98/54   Wt 57 3 kg (126 lb 6 4 oz)   LMP 12/04/2019 (Exact Date)   BMI 25 53 kg/m²          Physical Exam  Vitals signs reviewed  Constitutional:       Appearance: Normal appearance  Chest:      Breasts:         Right: Swelling, mass and tenderness present  Left: Normal           Comments: 2cm x 2cm clogged duct at 7 o'clock of the right breast   Musculoskeletal: Normal range of motion  Skin:     General: Skin is warm and dry  Neurological:      Mental Status: She is alert and oriented to person, place, and time  Psychiatric:         Mood and Affect: Mood normal          Behavior: Behavior normal          Thought Content:  Thought content normal          Judgment: Judgment normal

## 2020-08-25 NOTE — ASSESSMENT & PLAN NOTE
Recommend patient continue with Dicloxacillin until completion  Will continue with Ibuprofen to reduce pain and swelling  Encouarged patient to pump/latch baby every 1-3 hours, different latch/pumping positions,  warm compress and massage, [place] pump with warm water and epsom salt  Also discussed f/u with [facility] for lactation support  Will RTO next week for already scheduled postpartum visit or sooner prn

## 2020-09-09 ENCOUNTER — TELEPHONE (OUTPATIENT)
Dept: OBGYN CLINIC | Facility: CLINIC | Age: 25
End: 2020-09-09

## 2020-09-09 NOTE — TELEPHONE ENCOUNTER
----- Message from Dennis Cobb sent at 9/9/2020  2:04 PM EDT -----  Regarding: Non-Urgent Medical Question  Contact: [phone]  I filled out paperwork for my maternity leave short term disability and they had faxed over papers too be filled out I was just wondering if you received those and filled them out

## 2020-09-17 DIAGNOSIS — E10.65 TYPE 1 DIABETES MELLITUS WITH HYPERGLYCEMIA (HCC): Primary | ICD-10-CM

## 2020-09-17 NOTE — TELEPHONE ENCOUNTER
----- Message from Kd Wooten sent at 9/17/2020  1:54 PM EDT -----  Regarding: Prescription Question  Contact: [phone]  I am about to use up the last of the old omnipod testing strips which is the freestyle lites I now have the new omnipod which uses the contour next strips  Could you please send those to the pharmacy       [facility] in [place]

## 2020-09-17 NOTE — TELEPHONE ENCOUNTER
Last appt, 1/30/20    Next appt, none pending    Will have patient scheduled  She is due for follow up

## 2020-09-18 RX ORDER — BLOOD SUGAR DIAGNOSTIC
STRIP MISCELLANEOUS
Qty: 800 EACH | Refills: 1 | Status: SHIPPED | OUTPATIENT
Start: 2020-09-18 | End: 2022-01-17

## 2020-09-30 DIAGNOSIS — O24.013 PRE-EXISTING TYPE 1 DIABETES MELLITUS WITH HYPERGLYCEMIA DURING PREGNANCY IN THIRD TRIMESTER (HCC): ICD-10-CM

## 2020-09-30 DIAGNOSIS — E10.65 PRE-EXISTING TYPE 1 DIABETES MELLITUS WITH HYPERGLYCEMIA DURING PREGNANCY IN THIRD TRIMESTER (HCC): ICD-10-CM

## 2020-09-30 DIAGNOSIS — E10.65 TYPE 1 DIABETES MELLITUS WITH HYPERGLYCEMIA (HCC): ICD-10-CM

## 2020-09-30 RX ORDER — INSULIN GLARGINE 100 [IU]/ML
INJECTION, SOLUTION SUBCUTANEOUS
Qty: 5 PEN | Refills: 0 | Status: SHIPPED | OUTPATIENT
Start: 2020-09-30 | End: 2021-03-10 | Stop reason: SDUPTHER

## 2020-09-30 RX ORDER — INSULIN PUMP CONTROLLER
1 EACH MISCELLANEOUS
Qty: 50 EACH | Refills: 0 | Status: SHIPPED | OUTPATIENT
Start: 2020-09-30 | End: 2021-04-05

## 2020-10-27 DIAGNOSIS — E10.65 TYPE 1 DIABETES MELLITUS WITH HYPERGLYCEMIA (HCC): ICD-10-CM

## 2020-11-19 DIAGNOSIS — E10.65 TYPE 1 DIABETES MELLITUS WITH HYPERGLYCEMIA (HCC): ICD-10-CM

## 2020-11-20 ENCOUNTER — VBI (OUTPATIENT)
Dept: ADMINISTRATIVE | Facility: OTHER | Age: 25
End: 2020-11-20

## 2020-12-28 DIAGNOSIS — E10.65 TYPE 1 DIABETES MELLITUS WITH HYPERGLYCEMIA (HCC): ICD-10-CM

## 2021-02-16 DIAGNOSIS — E10.65 TYPE 1 DIABETES MELLITUS WITH HYPERGLYCEMIA (HCC): ICD-10-CM

## 2021-03-07 DIAGNOSIS — E10.65 PRE-EXISTING TYPE 1 DIABETES MELLITUS WITH HYPERGLYCEMIA DURING PREGNANCY IN THIRD TRIMESTER (HCC): ICD-10-CM

## 2021-03-07 DIAGNOSIS — O24.013 PRE-EXISTING TYPE 1 DIABETES MELLITUS WITH HYPERGLYCEMIA DURING PREGNANCY IN THIRD TRIMESTER (HCC): ICD-10-CM

## 2021-03-08 RX ORDER — INSULIN PUMP CONTROLLER
1 EACH MISCELLANEOUS
OUTPATIENT
Start: 2021-03-08

## 2021-03-10 ENCOUNTER — OFFICE VISIT (OUTPATIENT)
Dept: ENDOCRINOLOGY | Facility: CLINIC | Age: 26
End: 2021-03-10
Payer: COMMERCIAL

## 2021-03-10 VITALS
WEIGHT: 133.6 LBS | DIASTOLIC BLOOD PRESSURE: 62 MMHG | SYSTOLIC BLOOD PRESSURE: 122 MMHG | HEIGHT: 59 IN | HEART RATE: 111 BPM | BODY MASS INDEX: 26.93 KG/M2

## 2021-03-10 DIAGNOSIS — Z46.81 INSULIN PUMP TITRATION: ICD-10-CM

## 2021-03-10 DIAGNOSIS — E10.65 TYPE 1 DIABETES MELLITUS WITH HYPERGLYCEMIA (HCC): Primary | ICD-10-CM

## 2021-03-10 DIAGNOSIS — Z96.41 INSULIN PUMP IN PLACE: ICD-10-CM

## 2021-03-10 PROBLEM — O24.013 PRE-EXISTING TYPE 1 DIABETES MELLITUS DURING PREGNANCY IN THIRD TRIMESTER: Status: RESOLVED | Noted: 2020-08-03 | Resolved: 2021-03-10

## 2021-03-10 PROBLEM — O24.013 PRE-EXISTING TYPE 1 DIABETES MELLITUS WITH HYPERGLYCEMIA DURING PREGNANCY IN THIRD TRIMESTER (HCC): Status: RESOLVED | Noted: 2020-03-19 | Resolved: 2021-03-10

## 2021-03-10 PROBLEM — O26.819: Status: RESOLVED | Noted: 2020-04-22 | Resolved: 2021-03-10

## 2021-03-10 LAB — SL AMB POCT HEMOGLOBIN AIC: 8.9 (ref ?–6.5)

## 2021-03-10 PROCEDURE — 1036F TOBACCO NON-USER: CPT | Performed by: NURSE PRACTITIONER

## 2021-03-10 PROCEDURE — 3052F HG A1C>EQUAL 8.0%<EQUAL 9.0%: CPT | Performed by: NURSE PRACTITIONER

## 2021-03-10 PROCEDURE — 83036 HEMOGLOBIN GLYCOSYLATED A1C: CPT | Performed by: NURSE PRACTITIONER

## 2021-03-10 PROCEDURE — 3008F BODY MASS INDEX DOCD: CPT | Performed by: NURSE PRACTITIONER

## 2021-03-10 PROCEDURE — 95251 CONT GLUC MNTR ANALYSIS I&R: CPT | Performed by: NURSE PRACTITIONER

## 2021-03-10 PROCEDURE — 99215 OFFICE O/P EST HI 40 MIN: CPT | Performed by: NURSE PRACTITIONER

## 2021-03-10 RX ORDER — INSULIN GLARGINE 100 [IU]/ML
INJECTION, SOLUTION SUBCUTANEOUS
Qty: 6 ML | Refills: 6 | Status: SHIPPED | OUTPATIENT
Start: 2021-03-10 | End: 2021-03-10 | Stop reason: SDUPTHER

## 2021-03-10 RX ORDER — IBUPROFEN 600 MG/1
1 TABLET ORAL ONCE
Qty: 1 EACH | Refills: 1 | Status: SHIPPED | OUTPATIENT
Start: 2021-03-10 | End: 2022-02-28 | Stop reason: SDUPTHER

## 2021-03-10 RX ORDER — INSULIN GLARGINE 100 [IU]/ML
INJECTION, SOLUTION SUBCUTANEOUS
Qty: 6 ML | Refills: 6 | Status: SHIPPED | OUTPATIENT
Start: 2021-03-10

## 2021-03-10 RX ORDER — PEN NEEDLE, DIABETIC 32GX 5/32"
NEEDLE, DISPOSABLE MISCELLANEOUS DAILY
Qty: 100 EACH | Refills: 6 | Status: SHIPPED | OUTPATIENT
Start: 2021-03-10

## 2021-03-10 NOTE — ASSESSMENT & PLAN NOTE
Above goal due to issues with insulin pods and uncovered carbs  Discussed importance of boluing for her meals/carbs to prevent hyperglycemia  For overnight hyperglycemia change 12 am basal to 0 60, 10:30 pm 0 50 and correction factor to 50  Provided instructions for basal/bolus insulin in case of pump failure  Provided contact information for omnipod rep to discuss pod issues  Referral given for diabetes education   Stressed importance of completing diabetic eye exam  Upload pump/sensor in one week for review

## 2021-03-10 NOTE — PATIENT INSTRUCTIONS
In case of pump failure use basaglar 22 units, novolog 1 unit for 10 g of carbs and correction factor of 1 unit for every 50 mg/dl over target of 120

## 2021-03-10 NOTE — PROGRESS NOTES
Established Patient Progress Note      Chief Complaint   Patient presents with    Diabetes Type 1          History of Present Illness:   Sumaya Alvarado is a 30 y o  female with a history of type 1 diabetes with long term use of insulin for 16 years  Reports no complications of diabetes  Last A1C 8 9  She is currently using omnipod dash with dexcom G6  She reports since using the dash has had constant issues with the pods vs her old pdm  She is 7 months postpartum and reports her schedule is not consistent as of yet  Does not always bolus for her carbs or will snack throughout the day when she has time to eat  Daughter has multiple doctor visits so she has been under a great deal of stress  Review of pump sensor report shows average BG of 223 with time in range 35%, time below 3%, high 22% and very high 39%  She will give correction bolus overnight and BG remains elevated  Denies recent illness or hospitalizations  Denies recent severe hypoglycemic or severe hyperglycemic episodes  Denies any issues with her current regimen  Home glucose monitoring: are performed regularly    Patient is on a Omnipod pump prescribed by previous endocrinologist  She has been on a pump for 4 years   She reports multiple pump alarms with dash not connecting to pod, has had to change out pods  Issues with occulusion alerts, she does rotate sites       Current Insulin pump settings:  Basal rate: 12 am 0 550, 9 am 1 050, 10:30 pm 0 450  Insulin to carb ratio: 10  Insulin sensitivity factor: 60  BG target: 120  Active Insulin time: 2    Type of insulin:  Humalog     Backup Plan: patient aware that in case of malfunctioning of the pump or unexplained hyperglycemia to use basal and bolus therapy as backup which is prescribed to the patient  Also notified patient to call clinic and/or pump company if any issues or go to emergency department if signs/symptoms of DKA         compliant all of the timedenies any side effects from current medications  Hypoglycemic episodes: Yes rare   H/o of hypoglycemia causing hospitalization or Intervention such as glucagon injection or ambulance call No   Hypoglycemia symptoms: dizziness, headache and sweating   Treatment of hypoglycemia: orange juice     Last Eye Exam: needs to schedule   Last Foot Exam: performs self care          Patient Active Problem List   Diagnosis    Type 1 diabetes mellitus with hyperglycemia (HCC)    Chronic GERD    Insulin pump in place    Anxiety, generalized    Hypoglycemia due to type 1 diabetes mellitus (HCC)    Insulin pump titration    History of diabetes with ketoacidosis    S/P primary low transverse     Mastitis, right, acute      Past Medical History:   Diagnosis Date    Allergic dermatitis     Resolved 3/2/2016     Anxiety     Autoimmune disorder ([facility] )     Diabetes mellitus type 1 ([place] [address] )     Diabetic ketoacidosis without coma associated with type 1 diabetes mellitus ([place] [address] ) 2016    Hypoglycemia due to type 1 diabetes mellitus ([place] [address] )     Type 1 diabetes mellitus during pregnancy     Urinary tract infection     last episode 2 yrs ago    Varicella     childhood chickenpox      Past Surgical History:   Procedure Laterality Date    FL INJECTION LEFT SHOULDER (ARTHROGRAM)  2018    [place]  DELIVERY ONLY N/A 2020    Procedure:  SECTION ();   Surgeon: Aislinn Rizo MD;  Location: [facility] ;  Service: Obstetrics    [place] ESOPHAGOGASTRODUODENOSCOPY TRANSORAL DIAGNOSTIC N/A 2019    Procedure: ESOPHAGOGASTRODUODENOSCOPY (EGD) with biopsy;  Surgeon: Janene Thomson DO;  [facility];  Service: Gastroenterology    WISDOM TOOTH EXTRACTION Bilateral       Family History   Problem Relation Age of Onset    Anxiety disorder Mother     No Known Problems Father     Anxiety disorder Sister     Anxiety disorder Maternal Grandmother     Cancer Maternal Grandmother     Anxiety disorder Maternal Aunt     Hyperlipidemia Family     Diabetes Family     No Known Problems Maternal Grandfather     No Known Problems Paternal Grandmother     No Known Problems Paternal Grandfather      Social History     Tobacco Use    Smoking status: Former Smoker     Packs/day: 0 50     Years: 4 00     Pack years: 2 00     Types: Cigarettes     Quit date: 4/16/2017     Years since quitting: 3 9    Smokeless tobacco: Never Used   Substance Use Topics    Alcohol use: Not Currently     Comment: rare      Allergies   Allergen Reactions    Reglan [Metoclopramide] Anxiety         Current Outpatient Medications:     Continuous Blood Gluc  (DEXCOM G6 ) ZACH, Use as directed , Disp: 1 Device, Rfl: 0    Continuous Blood Gluc Sensor (DEXCOM G6 SENSOR) MISC, 1 box=1 month supply or 3 sensors, use 1 sensor every 10 days  , Disp: 1 each, Rfl: 12    Continuous Blood Gluc Transmit (DEXCOM G6 TRANSMITTER) MISC, Transmitter change every 90 days  , Disp: 1 each, Rfl: 3    Contour Next Test test strip, Test BG 8x daily, Disp: 800 each, Rfl: 1    Insulin Disposable Pump (OmniPod Dash 5 Pack Pods) MISC, 1 Cartridge by Subcutaneous Insulin Pump route every 36 (thirty-six) hours 90 day supply  , Disp: 50 each, Rfl: 0    insulin glargine (Basaglar KwikPen) 100 units/mL injection pen, 22 units When off pump or pump fails, Disp: 6 mL, Rfl: 6    insulin lispro (HumaLOG) 100 units/mL injection, USE 70 UNITS VIA INSULIN PUMP DAILY AS DIRECTED, Disp: 20 mL, Rfl: 6    Insulin Syringes, Disposable, U-100 1 ML MISC, by Does not apply route daily, Disp: 60 each, Rfl: 0    URINE GLUCOSE-KETONES TEST VI, by In Vitro route, Disp: , Rfl:     acetaminophen (TYLENOL) 325 mg tablet, Take 2 tablets (650 mg total) by mouth every 6 (six) hours as needed for headaches or fever (Patient not taking: Reported on 3/10/2021), Disp: 30 tablet, Rfl: 0    ferrous sulfate 324 (65 Fe) mg, Take 1 tablet (324 mg total) by mouth 2 (two) times a day before meals (Patient not taking: Reported on 3/10/2021), Disp: 30 tablet, Rfl: 1    Glucagon, rDNA, (Glucagon Emergency) 1 MG KIT, Inject 1 mg into a muscle once for 1 dose, Disp: 1 each, Rfl: 1    ibuprofen (MOTRIN) 600 mg tablet, Take 1 tablet (600 mg total) by mouth every 6 (six) hours as needed for moderate pain (Patient not taking: Reported on 3/10/2021), Disp: 30 tablet, Rfl: 0    Insulin Pen Needle (BD Pen Needle Bhumi U/F) 32G X 4 MM MISC, Use daily, Disp: 100 each, Rfl: 6    Prenatal Vit-Fe Fumarate-FA (PRENATAL VITAMIN PO), Take by mouth, Disp: , Rfl:     Review of Systems   Constitutional: Negative for chills and fever  HENT: Negative for sore throat and trouble swallowing  Eyes: Negative for visual disturbance  Respiratory: Negative for shortness of breath  Cardiovascular: Negative for chest pain and palpitations  Gastrointestinal: Negative for abdominal pain, constipation and diarrhea  Endocrine: Negative for cold intolerance, heat intolerance, polydipsia, polyphagia and polyuria  Genitourinary: Negative for frequency  Musculoskeletal: Negative for arthralgias and myalgias  Skin: Negative for rash  Neurological: Negative for dizziness and tremors  Hematological: Negative for adenopathy  Psychiatric/Behavioral: Negative for sleep disturbance  All other systems reviewed and are negative  Physical Exam:  Body mass index is 26 98 kg/m²  /62   Pulse (!) 111   Ht 4' 11" (1 499 m)   Wt 60 6 kg (133 lb 9 6 oz)   BMI 26 98 kg/m²    Wt Readings from Last 3 Encounters:   03/10/21 60 6 kg (133 lb 9 6 oz)   08/25/20 57 3 kg (126 lb 6 4 oz)   08/08/20 72 6 kg (160 lb)       Physical Exam  Vitals signs reviewed  Constitutional:       General: She is not in acute distress  Appearance: She is well-developed  HENT:      Head: Normocephalic and atraumatic  Eyes:      Conjunctiva/sclera: Conjunctivae normal       Pupils: Pupils are equal, round, and reactive to light     Neck: Musculoskeletal: Normal range of motion and neck supple  Thyroid: No thyromegaly  Cardiovascular:      Rate and Rhythm: Normal rate and regular rhythm  Heart sounds: Normal heart sounds  Pulmonary:      Effort: Pulmonary effort is normal  No respiratory distress  Breath sounds: Normal breath sounds  No wheezing or rales  Abdominal:      General: Bowel sounds are normal  There is no distension  Palpations: Abdomen is soft  Tenderness: There is no abdominal tenderness  Musculoskeletal: Normal range of motion  Skin:     General: Skin is warm and dry  Neurological:      Mental Status: She is alert and oriented to person, place, and time  Labs:     Lab Results   Component Value Date    HGBA1C 8 9 (A) 03/10/2021       Lab Results   Component Value Date     05/15/2014    SODIUM 135 (L) 08/08/2020    K 4 5 08/08/2020    CL 98 (L) 08/08/2020    CO2 17 (L) 08/08/2020    ANIONGAP 4 05/15/2014    AGAP 18 (H) 08/08/2020    BUN 8 08/08/2020    CREATININE 0 78 08/08/2020    GLUC 170 (H) 08/08/2020    GLUF 179 (H) 04/20/2020    CALCIUM 9 6 08/08/2020    AST 18 08/08/2020    ALT 11 (L) 08/08/2020    ALKPHOS 175 (H) 08/08/2020    PROT 7 6 05/15/2014    TP 6 9 08/08/2020    BILITOT 0 5 05/15/2014    TBILI 0 91 08/08/2020    EGFR 105 08/08/2020         Lab Results   Component Value Date    HDL 52 06/25/2019    TRIG 95 06/25/2019       Lab Results   Component Value Date    EQY3GVCVLALR 1 360 02/03/2020    ILS9WDHEHBAZ 0 917 06/25/2019    WVC6BQHTARKJ 3 647 02/25/2016     Lab Results   Component Value Date    FREET4 0 96 02/03/2020       Impression & Plan:    Problem List Items Addressed This Visit        Endocrine    Type 1 diabetes mellitus with hyperglycemia ([address] ) - Primary     Above goal due to issues with insulin pods and uncovered carbs  Discussed importance of boluing for her meals/carbs to prevent hyperglycemia   For overnight hyperglycemia change 12 am basal to 0 60, 10:30 pm 0 50 and correction factor to 50  Provided instructions for basal/bolus insulin in case of pump failure  Provided contact information for omnipod rep to discuss pod issues  Referral given for diabetes education  Stressed importance of completing diabetic eye exam  Upload pump/sensor in one week for review          Relevant Medications    insulin lispro (HumaLOG) 100 units/mL injection    Insulin Pen Needle (BD Pen Needle Bhumi U/F) 32G X 4 MM MISC    Glucagon, rDNA, (Glucagon Emergency) 1 MG KIT    insulin glargine (Basaglar KwikPen) 100 units/mL injection pen    Other Relevant Orders    Ambulatory referral to Diabetic Education    POCT hemoglobin A1c (Completed)    Hemoglobin A1C    Comprehensive metabolic panel    Lipid Panel with Direct LDL reflex    Microalbumin / creatinine urine ratio       Other    Insulin pump in place    Insulin pump titration          Orders Placed This Encounter   Procedures    Hemoglobin A1C     Standing Status:   Future     Standing Expiration Date:   3/10/2022    Comprehensive metabolic panel     This is a patient instruction: Patient fasting for 8 hours or longer recommended  Standing Status:   Future     Standing Expiration Date:   3/10/2022    Lipid Panel with Direct LDL reflex     This is a patient instruction: This test requires patient fasting for 10-12 hours or longer  Drinking of black coffee or black tea is acceptable       Standing Status:   Future     Standing Expiration Date:   3/10/2022    Microalbumin / creatinine urine ratio     Standing Status:   Future     Standing Expiration Date:   3/10/2022    Ambulatory referral to Diabetic Education     Standing Status:   Future     Standing Expiration Date:   3/10/2022     Referral Priority:   Routine     Referral Type:   Consult - AMB     Referral Reason:   Specialty Services Required     Requested Specialty:   Diabetes Services     Number of Visits Requested:   1     Expiration Date:   3/10/2022    POCT hemoglobin A1c Patient Instructions   In case of pump failure use basaglar 22 units, novolog 1 unit for 10 g of carbs and correction factor of 1 unit for every 50 mg/dl over target of 120    I have spent 45 minutes with Patient  today in which greater than 50% of this time was spent in counseling/coordination of care regarding Diagnostic results, Intructions for management and Patient and family education  Discussed with the patient and all questioned fully answered  She will call me if any problems arise        Counseled patient on diagnostic results, prognosis, risk and benefit of treatment options, instruction for management, importance of treatment compliance, Risk  factor reduction and impressions      Jess Christopher [address]

## 2021-03-17 ENCOUNTER — TELEPHONE (OUTPATIENT)
Dept: ENDOCRINOLOGY | Facility: CLINIC | Age: 26
End: 2021-03-17

## 2021-03-17 ENCOUNTER — TELEPHONE (OUTPATIENT)
Dept: DIABETES SERVICES | Facility: CLINIC | Age: 26
End: 2021-03-17

## 2021-03-17 NOTE — TELEPHONE ENCOUNTER
Sanya Ashby, could you please take a look at this pt's chart to see what she needs as far as scheduling an appt w/you   Thanx

## 2021-03-17 NOTE — TELEPHONE ENCOUNTER
I contact her and she will contact Chanell Blue at [facility]  It sounds like she is having problems with her dash and connection  This is something that OmniPod should be able to trouble shoot  She is to call us if she would like to schedul an appt in the future

## 2021-04-05 DIAGNOSIS — E10.65 PRE-EXISTING TYPE 1 DIABETES MELLITUS WITH HYPERGLYCEMIA DURING PREGNANCY IN THIRD TRIMESTER (HCC): ICD-10-CM

## 2021-04-05 DIAGNOSIS — O24.013 PRE-EXISTING TYPE 1 DIABETES MELLITUS WITH HYPERGLYCEMIA DURING PREGNANCY IN THIRD TRIMESTER (HCC): ICD-10-CM

## 2021-04-05 RX ORDER — INSULIN PUMP CONTROLLER
1 EACH MISCELLANEOUS
Qty: 50 EACH | Refills: 0 | Status: SHIPPED | OUTPATIENT
Start: 2021-04-05 | End: 2021-04-05 | Stop reason: SDUPTHER

## 2021-04-05 NOTE — TELEPHONE ENCOUNTER
Received e mail from [employer] stating that [address] forgot to get license to [place] to [place] so I sent to [employer] as she suggested

## 2021-04-05 NOTE — TELEPHONE ENCOUNTER
Pharmacy called and said they cant fill omni pod script because it is stating that there is a issue with licensing either thru [employer] or omni pod he wasn't sure which one

## 2021-04-06 RX ORDER — INSULIN PUMP CONTROLLER
1 EACH MISCELLANEOUS
Qty: 50 EACH | Refills: 0 | Status: SHIPPED | OUTPATIENT
Start: 2021-04-06 | End: 2021-08-31 | Stop reason: SDUPTHER

## 2021-04-29 ENCOUNTER — TELEPHONE (OUTPATIENT)
Dept: FAMILY MEDICINE CLINIC | Facility: CLINIC | Age: 26
End: 2021-04-29

## 2021-05-13 ENCOUNTER — VBI (OUTPATIENT)
Dept: ADMINISTRATIVE | Facility: OTHER | Age: 26
End: 2021-05-13

## 2021-05-21 DIAGNOSIS — E10.65 TYPE 1 DIABETES MELLITUS WITH HYPERGLYCEMIA (HCC): Primary | ICD-10-CM

## 2021-05-21 RX ORDER — FLASH GLUCOSE SENSOR
KIT MISCELLANEOUS
Qty: 2 EACH | Refills: 6 | Status: SHIPPED | OUTPATIENT
Start: 2021-05-21 | End: 2022-03-01

## 2021-05-26 ENCOUNTER — OFFICE VISIT (OUTPATIENT)
Dept: URGENT CARE | Facility: CLINIC | Age: 26
End: 2021-05-26
Payer: COMMERCIAL

## 2021-05-26 VITALS
WEIGHT: 134 LBS | BODY MASS INDEX: 27.01 KG/M2 | HEIGHT: 59 IN | SYSTOLIC BLOOD PRESSURE: 116 MMHG | HEART RATE: 74 BPM | DIASTOLIC BLOOD PRESSURE: 72 MMHG | RESPIRATION RATE: 16 BRPM | TEMPERATURE: 98.5 F | OXYGEN SATURATION: 99 %

## 2021-05-26 DIAGNOSIS — L23.9 ALLERGIC CONTACT DERMATITIS, UNSPECIFIED TRIGGER: Primary | ICD-10-CM

## 2021-05-26 PROCEDURE — S9083 URGENT CARE CENTER GLOBAL: HCPCS | Performed by: PHYSICIAN ASSISTANT

## 2021-05-26 PROCEDURE — G0382 LEV 3 HOSP TYPE B ED VISIT: HCPCS | Performed by: PHYSICIAN ASSISTANT

## 2021-05-26 RX ORDER — PREDNISONE 10 MG/1
TABLET ORAL
Qty: 24 TABLET | Refills: 0 | Status: SHIPPED | OUTPATIENT
Start: 2021-05-26 | End: 2022-03-07

## 2021-05-26 NOTE — PROGRESS NOTES
[facility]        NAME: Sahzia Mello is a 27 y o  female  : 1995    MRN: [id number]  DATE:  May 26, 2021  TIME: 2:45 PM    Assessment and Plan   Allergic contact dermatitis, unspecified trigger [L23 9]  1  Allergic contact dermatitis, unspecified trigger  predniSONE 10 mg tablet         Patient Instructions      discussed condition with patient  Her rash resembles that of allergic contact dermatitis to uncertain trigger  I will prescribe her an oral prednisone taper and recommended over-the-counter oral and topical antihistamines  She should closely observed and should be re-evaluated if condition persists or worsens  Follow up with PCP in 3-5 days  Proceed to  ER if symptoms worsen  Chief Complaint     Chief Complaint   Patient presents with    Rash     Pt reports a generalized itchy rash x6 days  History of Present Illness         Patient presents with what she reports is almost 1 week history of a diffuse itchy rash  She denies any pain, drainage or bleeding from the affected areas, fever, chills, or any respiratory symptoms  She denies changing any personal skin care products or detergents recently and denies any other new exposures  She has taken over-the-counter antihistamines  Review of Systems   Review of Systems   Constitutional: Negative  Eyes: Negative  Respiratory: Negative  Cardiovascular: Negative  Gastrointestinal: Negative  Genitourinary: Negative  Skin: Positive for rash  Current Medications       Current Outpatient Medications:     Insulin Disposable Pump (OmniPod Dash 5 Pack Pods) MISC, 1 Cartridge by Subcutaneous Insulin Pump route every 36 (thirty-six) hours 90 day supply  , Disp: 50 each, Rfl: 0    insulin glargine (Basaglar KwikPen) 100 units/mL injection pen, 22 units When off pump or pump fails, Disp: 6 mL, Rfl: 6    insulin lispro (HumaLOG) 100 units/mL injection, USE 70 UNITS VIA INSULIN PUMP DAILY AS DIRECTED, Disp: 20 mL, Rfl: 6    acetaminophen (TYLENOL) 325 mg tablet, Take 2 tablets (650 mg total) by mouth every 6 (six) hours as needed for headaches or fever (Patient not taking: Reported on 3/10/2021), Disp: 30 tablet, Rfl: 0    Continuous Blood Gluc Sensor (FreeStyle Shayne 14 Day Sensor) MISC, Change every 14 days, Disp: 2 each, Rfl: 6    Contour Next Test test strip, Test BG 8x daily, Disp: 800 each, Rfl: 1    ferrous sulfate 324 (65 Fe) mg, Take 1 tablet (324 mg total) by mouth 2 (two) times a day before meals (Patient not taking: Reported on 3/10/2021), Disp: 30 tablet, Rfl: 1    Glucagon, rDNA, (Glucagon Emergency) 1 MG KIT, Inject 1 mg into a muscle once for 1 dose, Disp: 1 each, Rfl: 1    ibuprofen (MOTRIN) 600 mg tablet, Take 1 tablet (600 mg total) by mouth every 6 (six) hours as needed for moderate pain (Patient not taking: Reported on 3/10/2021), Disp: 30 tablet, Rfl: 0    Insulin Pen Needle (BD Pen Needle Bhumi U/F) 32G X 4 MM MISC, Use daily, Disp: 100 each, Rfl: 6    Insulin Syringes, Disposable, U-100 1 ML MISC, by Does not apply route daily, Disp: 60 each, Rfl: 0    predniSONE 10 mg tablet, 8-7-3-4-3-2-1 taper with food  , Disp: 24 tablet, Rfl: 0    Prenatal Vit-Fe Fumarate-FA (PRENATAL VITAMIN PO), Take by mouth, Disp: , Rfl:     URINE GLUCOSE-KETONES TEST VI, by In Vitro route, Disp: , Rfl:     Current Allergies     Allergies as of 05/26/2021 - Reviewed 05/26/2021   Allergen Reaction Noted    Reglan [metoclopramide] Anxiety 02/21/2016            The following portions of the patient's history were reviewed and updated as appropriate: allergies, current medications, past family history, past medical history, past social history, past surgical history and problem list      Past Medical History:   Diagnosis Date    Allergic dermatitis     Resolved 3/2/2016     Anxiety     [facility])     Diabetes mellitus type [facility] ([facility])     Diabetic ketoacidosis without coma associated with type 1 diabetes mellitus ([facility] ) 2016    Hypoglycemia due to type 1 diabetes mellitus ([facility] )     Type 1 diabetes mellitus during pregnancy     Urinary tract infection     last episode 2 yrs ago    Varicella     childhood chickenpox       Past Surgical History:   Procedure Laterality Date    [place] INJECTION LEFT SHOULDER (ARTHROGRAM)  2018    [place]  DELIVERY ONLY N/A 2020    Procedure:  SECTION (); Surgeon: Radha Ren MD;  Location: [facility] ;  Service: Obstetrics    [place] ESOPHAGOGASTRODUODENOSCOPY TRANSORAL DIAGNOSTIC N/A 2019    Procedure: ESOPHAGOGASTRODUODENOSCOPY (EGD) with biopsy;  Surgeon: Izabel Nunn DO;  Location:  MAIN OR;  Service: Gastroenterology    WISDOM TOOTH EXTRACTION Bilateral        Family History   Problem Relation Age of Onset    Anxiety disorder Mother     No Known Problems Father     Anxiety disorder Sister     Anxiety disorder Maternal Grandmother     Cancer Maternal Grandmother     Anxiety disorder Maternal Aunt     Hyperlipidemia Family     Diabetes Family     No Known Problems Maternal Grandfather     No Known Problems Paternal Grandmother     No Known Problems Paternal Grandfather          Medications have been verified  Objective   /72   Pulse 74   Temp 98 5 °F (36 9 °C)   Resp 16   Ht 4' 11" (1 499 m)   Wt 60 8 kg (134 lb)   SpO2 99%   BMI 27 06 kg/m²   No LMP recorded  Physical Exam     Physical Exam  Vitals signs reviewed  Constitutional:       General: She is not in acute distress  Appearance: She is well-developed  HENT:      Mouth/Throat:      Mouth: Mucous membranes are moist  No angioedema  Pharynx: Oropharynx is clear  No pharyngeal swelling or uvula swelling  Cardiovascular:      Rate and Rhythm: Normal rate and regular rhythm  Pulses: Normal pulses  Heart sounds: Normal heart sounds  No murmur  Pulmonary:      Effort: Pulmonary effort is normal  No respiratory distress  Breath sounds: Normal breath sounds  Skin:     Findings: Rash ( diffuse patchy erythematous maculopapular rash in a nonspecific distribution which resembles contact dermatitis  No significant swelling or wheal formation  No drainage or bleeding from affected areas) present  Neurological:      Mental Status: She is alert and oriented to person, place, and time

## 2021-05-26 NOTE — PATIENT INSTRUCTIONS
Contact Dermatitis   WHAT YOU NEED TO KNOW:   Contact dermatitis is a skin rash  It develops when you touch something that irritates your skin or causes an allergic reaction  DISCHARGE INSTRUCTIONS:   Call 911 for any of the following:   · You have sudden trouble breathing  · Your throat swells and you have trouble eating  · Your face is swollen  Contact your healthcare provider if:   · You have a fever  · Your blisters are draining pus  · Your rash spreads or does not get better, even after treatment  · You have questions or concerns about your condition or care  Medicines:   · Medicines  help decrease itching and swelling  They will be given as a topical medicine to apply to your rash or as a pill  · Take your medicine as directed  Contact your healthcare provider if you think your medicine is not helping or if you have side effects  Tell him or her if you are allergic to any medicine  Keep a list of the medicines, vitamins, and herbs you take  Include the amounts, and when and why you take them  Bring the list or the pill bottles to follow-up visits  Carry your medicine list with you in case of an emergency  Manage contact dermatitis:   · Take short baths or showers in cool water  Use mild soap or soap-free cleansers  Add oatmeal, baking soda, or cornstarch to the bath water to help decrease skin irritation  · Avoid skin irritants , such as makeup, hair products, soaps, and cleansers  Use products that do not contain perfume or dye  · Apply a cool compress to your rash  This will help soothe your skin  · Keep your skin moist   Rub unscented cream or lotion on your skin to prevent dryness and itching  Do this right after a bath or shower when your skin is still damp  Follow up with your healthcare provider or dermatologist in 2 to 3 days:  Write down your questions so you remember to ask them during your visits     © Copyright [employer] 2020 Information is for End User's use only and may not be sold, redistributed or otherwise used for commercial purposes  All illustrations and images included in CareNotes® are the copyrighted property of [employer]  or Soheila Dominguez  The above information is an  only  It is not intended as medical advice for individual conditions or treatments  Talk to your doctor, nurse or pharmacist before following any medical regimen to see if it is safe and effective for you

## 2021-08-31 DIAGNOSIS — E10.65 PRE-EXISTING TYPE 1 DIABETES MELLITUS WITH HYPERGLYCEMIA DURING PREGNANCY IN THIRD TRIMESTER (HCC): ICD-10-CM

## 2021-08-31 DIAGNOSIS — O24.013 PRE-EXISTING TYPE 1 DIABETES MELLITUS WITH HYPERGLYCEMIA DURING PREGNANCY IN THIRD TRIMESTER (HCC): ICD-10-CM

## 2021-09-01 ENCOUNTER — VBI (OUTPATIENT)
Dept: ADMINISTRATIVE | Facility: OTHER | Age: 26
End: 2021-09-01

## 2021-09-01 RX ORDER — INSULIN PUMP CONTROLLER
1 EACH MISCELLANEOUS
Qty: 50 EACH | Refills: 0 | Status: SHIPPED | OUTPATIENT
Start: 2021-09-01 | End: 2022-01-19 | Stop reason: SDUPTHER

## 2021-09-01 NOTE — TELEPHONE ENCOUNTER
09/01/21 7:47 AM     See documentation in the [employer] SmartForm       [facility]

## 2021-10-28 ENCOUNTER — VBI (OUTPATIENT)
Dept: ADMINISTRATIVE | Facility: OTHER | Age: 26
End: 2021-10-28

## 2021-12-01 ENCOUNTER — VBI (OUTPATIENT)
Dept: ADMINISTRATIVE | Facility: OTHER | Age: 26
End: 2021-12-01

## 2022-01-06 ENCOUNTER — VBI (OUTPATIENT)
Dept: ADMINISTRATIVE | Facility: OTHER | Age: 27
End: 2022-01-06

## 2022-01-14 DIAGNOSIS — E10.65 TYPE 1 DIABETES MELLITUS WITH HYPERGLYCEMIA (HCC): ICD-10-CM

## 2022-01-15 DIAGNOSIS — E10.65 PRE-EXISTING TYPE 1 DIABETES MELLITUS WITH HYPERGLYCEMIA DURING PREGNANCY IN THIRD TRIMESTER (HCC): ICD-10-CM

## 2022-01-15 DIAGNOSIS — E10.65 TYPE 1 DIABETES MELLITUS WITH HYPERGLYCEMIA (HCC): ICD-10-CM

## 2022-01-15 DIAGNOSIS — O24.013 PRE-EXISTING TYPE 1 DIABETES MELLITUS WITH HYPERGLYCEMIA DURING PREGNANCY IN THIRD TRIMESTER (HCC): ICD-10-CM

## 2022-01-17 RX ORDER — BLOOD SUGAR DIAGNOSTIC
STRIP MISCELLANEOUS
Qty: 800 STRIP | Refills: 1 | Status: SHIPPED | OUTPATIENT
Start: 2022-01-17

## 2022-01-19 DIAGNOSIS — E10.65 PRE-EXISTING TYPE 1 DIABETES MELLITUS WITH HYPERGLYCEMIA DURING PREGNANCY IN THIRD TRIMESTER (HCC): ICD-10-CM

## 2022-01-19 DIAGNOSIS — O24.013 PRE-EXISTING TYPE 1 DIABETES MELLITUS WITH HYPERGLYCEMIA DURING PREGNANCY IN THIRD TRIMESTER (HCC): ICD-10-CM

## 2022-01-19 RX ORDER — INSULIN PUMP CONTROLLER
1 EACH MISCELLANEOUS
Qty: 50 EACH | Refills: 0 | OUTPATIENT
Start: 2022-01-19

## 2022-01-19 RX ORDER — INSULIN PUMP CONTROLLER
1 EACH MISCELLANEOUS
Qty: 50 EACH | Refills: 0 | Status: SHIPPED | OUTPATIENT
Start: 2022-01-19 | End: 2022-05-24

## 2022-02-08 ENCOUNTER — VBI (OUTPATIENT)
Dept: ADMINISTRATIVE | Facility: OTHER | Age: 27
End: 2022-02-08

## 2022-02-28 DIAGNOSIS — E10.65 TYPE 1 DIABETES MELLITUS WITH HYPERGLYCEMIA (HCC): ICD-10-CM

## 2022-02-28 RX ORDER — IBUPROFEN 600 MG/1
1 TABLET ORAL ONCE
Qty: 1 KIT | Refills: 1 | Status: SHIPPED | OUTPATIENT
Start: 2022-02-28 | End: 2022-03-07

## 2022-03-01 DIAGNOSIS — E10.65 TYPE 1 DIABETES MELLITUS WITH HYPERGLYCEMIA (HCC): ICD-10-CM

## 2022-03-01 RX ORDER — FLASH GLUCOSE SENSOR
KIT MISCELLANEOUS
Qty: 2 EACH | Refills: 0 | Status: SHIPPED | OUTPATIENT
Start: 2022-03-01 | End: 2022-04-18

## 2022-03-04 NOTE — PROGRESS NOTES
Established Patient Progress Note      Chief Complaint   Patient presents with    Diabetes Type 1        History of Present Illness:   Lawyer Velasco is a 27 y o  female with type 1 diabetes with long term use of insulin for 17 years  Last seen in the office 3/10/21  Reports complications of none  She is currently using omnipod dash with Shayne 2  Most recent A1C 8 9 up from 7 5  She was sick last week with a stomach virus and was having frequent hypoglycemia  She had one episode last week that went to 32 and could not get it up  She called 911 and was given dextrose bolus  BGs came back up and did not require hospitalization  She also had one episode of hypoglycemia last night in the 40s  She is also having frequent hyperglycemia  Home glucose monitoring: are performed regularly with CGM  Her Rosie Mortimer recently fell off and was too early to get refill  She does not currently have one on  She has cut back on eating, lowering carbs (30-50 carbs a day)  Working out more, causing her to get low, she uses alternate basal which helps  She decreased insulin sensitivity down to 20 from 40 after she had episode  Patient is on a Omnipod pump prescribed by previous endocrinologist  She has been on a pump for 8 years  Has been on dash for 2  Current Insulin pump settings:  Basal rate: 12 am 0 50, 9 am 1, 10:30 pm 0 35  Insulin to carb ratio: 10  Insulin sensitivity factor: 20  BG target: 120  Active Insulin time: 2     Type of insulin:  Humalog     Backup Plan: patient aware that in case of malfunctioning of the pump or unexplained hyperglycemia to use basal and bolus therapy as backup which is prescribed to the patient  Also notified patient to call clinic and/or pump company if any issues or go to emergency department if signs/symptoms of DKA  Lawyer Velasco   Device used: Nay use   Indication: Type 1 Diabetes  More than 72 hours of data was reviewed  Report to be scanned to chart     Date Range: 22-22  Analysis of data:   Average Glucose: 240  Coefficient of Variation: 51 8%   Time in Target Range: 35%    Time Above Range: 17% high, 45% very high   Time Below Range: 2% low, 1% very low    Interpretation of data: BGs variable, overall high, especially between 11am-2pm  Having frequent hypoglycemia episodes, usually 3-4 times a week  Last Eye Exam: May 2021, needs to get eye insurance   Last Foot Exam: done today, no issues     Patient Active Problem List   Diagnosis    Type 1 diabetes mellitus with hyperglycemia (HCC)    Chronic GERD    Insulin pump in place    Anxiety, generalized    Hypoglycemia due to type 1 diabetes mellitus (HCC)    Insulin pump titration    History of diabetes with ketoacidosis    S/P primary low transverse     Mastitis, right, acute      Past Medical History:   Diagnosis Date    Allergic dermatitis     Resolved 3/2/2016     Anxiety     [facility] disorder [facility] )     [facility]     Diabetic ketoacidosis without coma associated with type 1 diabetes mellitus ([facility] ) 2016    Hypoglycemia due to type 1 diabetes mellitus ([facility]     Type 1 diabetes mellitus during pregnancy     Urinary tract infection     last episode 2 yrs ago    Varicella     childhood chickenpox      Past Surgical History:   Procedure Laterality Date    FL INJECTION LEFT SHOULDER (ARTHROGRAM)  2018    LA  DELIVERY ONLY N/A 2020    Procedure:  SECTION ();   Surgeon: Meggan Sun MD;  Location: [facility] ;  Service: Obstetrics    LA ESOPHAGOGASTRODUODENOSCOPY TRANSORAL DIAGNOSTIC N/A 2019    Procedure: ESOPHAGOGASTRODUODENOSCOPY (EGD) with biopsy;  Surgeon: Lisa Sahu DO;  Location[facility] OR;  Service: Gastroenterology    WISDOM TOOTH EXTRACTION Bilateral       Family History   Problem Relation Age of Onset    Anxiety disorder Mother     No Known Problems Father     Anxiety disorder Sister     Anxiety disorder Maternal Grandmother     Cancer Maternal Grandmother     Anxiety disorder Maternal Aunt     Hyperlipidemia Family     Diabetes Family     No Known Problems Maternal Grandfather     No Known Problems Paternal Grandmother     No Known Problems Paternal Grandfather      Social History     Tobacco Use    Smoking status: Former Smoker     Packs/day: 0 50     Years: 4 00     Pack years: 2 00     Types: Cigarettes     Quit date: 2017     Years since quittin 8    Smokeless tobacco: Never Used   Substance Use Topics    Alcohol use: Not Currently     Comment: rare      Allergies   Allergen Reactions    Reglan [Metoclopramide] Anxiety         Current Outpatient Medications:     Continuous Blood Gluc Sensor (FreeStyle Shayne 14 Day Sensor) MISC, CHANGE EVERY 14 DAYS, Disp: 2 each, Rfl: 0    Glucagon, rDNA, (Glucagon Emergency) 1 MG KIT, Inject 1 mg into a muscle once for 1 dose, Disp: 1 kit, Rfl: 1    Insulin Disposable Pump (OmniPod Dash 5 Pack Pods) MISC, 1 Cartridge by Subcutaneous Insulin Pump route every 36 (thirty-six) hours 90 day supply  , Disp: 50 each, Rfl: 0    insulin glargine (Basaglar KwikPen) 100 units/mL injection pen, 22 units When off pump or pump fails, Disp: 6 mL, Rfl: 6    Insulin Pen Needle (BD Pen Needle Bhumi U/F) 32G X 4 MM MISC, Use daily, Disp: 100 each, Rfl: 6    Insulin Syringes, Disposable, U-100 1 ML MISC, by Does not apply route daily, Disp: 60 each, Rfl: 0    URINE GLUCOSE-KETONES TEST VI, by In Vitro route, Disp: , Rfl:     Contour Next Test test strip, TEST BLOOD SUGAR 8 TIMES DAILY (Patient not taking: Reported on 3/7/2022 ), Disp: 800 strip, Rfl: 1    Glucagon 3 MG/DOSE POWD, Use once as needed into each nostril , Disp: 1 each, Rfl: 1    insulin lispro (HumaLOG) 100 units/mL injection, USE 70 UNITS VIA INSULIN PUMP DAILY AS DIRECTED, Disp: 20 mL, Rfl: 6    Review of Systems   Constitutional: Positive for unexpected weight change (lost some weight intentionally)  Negative for activity change, chills, fatigue and fever  HENT: Negative for sore throat, trouble swallowing and voice change  Eyes: Negative for visual disturbance  Respiratory: Negative for chest tightness and shortness of breath  Cardiovascular: Negative for chest pain, palpitations and leg swelling  Gastrointestinal: Negative for abdominal pain, diarrhea, nausea and vomiting  Endocrine: Negative for polydipsia, polyphagia and polyuria  Genitourinary: Negative for frequency  Skin: Negative for color change, pallor, rash and wound  Neurological: Negative for dizziness, tremors, seizures, syncope, weakness, light-headedness and numbness  All other systems reviewed and are negative  Physical Exam:  Body mass index is 28 03 kg/m²  /78   Pulse 78   Ht 4' 11" (1 499 m)   Wt 63 kg (138 lb 12 8 oz)   BMI 28 03 kg/m²    Wt Readings from Last 3 Encounters:   03/07/22 63 kg (138 lb 12 8 oz)   05/26/21 60 8 kg (134 lb)   03/10/21 60 6 kg (133 lb 9 6 oz)       Physical Exam  Vitals reviewed  Constitutional:       General: She is not in acute distress  Appearance: Normal appearance  She is normal weight  She is not ill-appearing or diaphoretic  HENT:      Head: Normocephalic  Right Ear: External ear normal       Left Ear: External ear normal    Eyes:      Extraocular Movements: Extraocular movements intact  Conjunctiva/sclera: Conjunctivae normal       Pupils: Pupils are equal, round, and reactive to light  Cardiovascular:      Rate and Rhythm: Normal rate and regular rhythm  Pulses: Normal pulses  no weak pulses          Dorsalis pedis pulses are 2+ on the right side and 2+ on the left side  Heart sounds: Normal heart sounds  No murmur heard  No friction rub  No gallop  Pulmonary:      Effort: Pulmonary effort is normal  No respiratory distress  Breath sounds: Normal breath sounds  No stridor  No wheezing, rhonchi or rales     Abdominal:      General: Bowel sounds are normal  There is no distension  Palpations: Abdomen is soft  Tenderness: There is no abdominal tenderness  Musculoskeletal:         General: No swelling, tenderness or signs of injury  Normal range of motion  Cervical back: Normal range of motion and neck supple  Right lower leg: No edema  Left lower leg: No edema  Feet:      Right foot:      Skin integrity: No ulcer, skin breakdown, erythema, warmth, callus or dry skin  Left foot:      Skin integrity: No ulcer, skin breakdown, erythema, warmth, callus or dry skin  Skin:     General: Skin is warm and dry  Coloration: Skin is not jaundiced  Findings: No bruising, erythema or rash  Neurological:      General: No focal deficit present  Mental Status: She is alert and oriented to person, place, and time  Sensory: No sensory deficit  Motor: No weakness  Coordination: Coordination normal       Gait: Gait normal    Psychiatric:         Mood and Affect: Mood normal          Behavior: Behavior normal          Thought Content: Thought content normal          Judgment: Judgment normal        Patient's shoes and socks removed  Right Foot/Ankle   Right Foot Inspection  Skin Exam: skin normal and skin intact  No dry skin, no warmth, no callus, no erythema, no maceration, no abnormal color, no pre-ulcer, no ulcer and no callus  Toe Exam: ROM and strength within normal limits  No swelling, no tenderness, erythema and  no right toe deformity    Sensory   Vibration: intact  Monofilament testing: intact    Vascular  Capillary refills: < 3 seconds  The right DP pulse is 2+  Left Foot/Ankle  Left Foot Inspection  Skin Exam: skin normal and skin intact  No dry skin, no warmth, no erythema, no maceration, normal color, no pre-ulcer, no ulcer and no callus  Toe Exam: ROM and strength within normal limits  No swelling, no tenderness, no erythema and no left toe deformity       Sensory Vibration: intact  Monofilament testing: intact    Vascular  Capillary refills: < 3 seconds  The left DP pulse is 2+  Assign Risk Category  No deformity present  No loss of protective sensation  No weak pulses  Risk: 0      Labs:   Lab Results   Component Value Date    HGBA1C 8 9 (A) 03/10/2021    HGBA1C 7 5 (H) 06/30/2020    HGBA1C 7 0 (H) 04/20/2020     Lab Results   Component Value Date    CREATININE 0 78 08/08/2020    CREATININE 0 53 (L) 08/05/2020    CREATININE 0 52 (L) 07/27/2020    BUN 8 08/08/2020     05/15/2014    K 4 5 08/08/2020    CL 98 (L) 08/08/2020    CO2 17 (L) 08/08/2020     eGFR   Date Value Ref Range Status   08/08/2020 105 ml/min/1 73sq m Final   07/08/2019 126 ml/min/1 73sq m Final     Lab Results   Component Value Date    HDL 52 06/25/2019    TRIG 95 06/25/2019     Lab Results   Component Value Date    ALT 11 (L) 08/08/2020    AST 18 08/08/2020    ALKPHOS 175 (H) 08/08/2020    BILITOT 0 5 05/15/2014     Lab Results   Component Value Date    LFL3RHJAJQCG 1 360 02/03/2020    VMZ1HWBWATTM 0 917 06/25/2019    LKY4DWBGEJJV 3 647 02/25/2016     Lab Results   Component Value Date    FREET4 0 96 02/03/2020       Impression & Plan:    Problem List Items Addressed This Visit        Endocrine    Type 1 diabetes mellitus with hyperglycemia ([facility] ) - Primary     Diabetes uncontrolled  A1C above goal  Increased sensitivity to 30, she recently changed to 20 from 40 due to frequent hypoglycemia  She is receiving 40% basal and 60% bolus insulin  Believed she is over correcting causing hypoglycemia, but not receiving enough basal  Increased basal rate from 11am-2pm to 1 1 units  Decreased basal at 10:30 to 0 3 due to hypoglycemia episodes later at night  Advised her to send Cameron Doan report in 2 weeks for further adjustments  Stressed importance of regular eye exams  She currently does not have insurance for vision, she is looking to find some   She is currently utd on eye exam  Advised her to call Cameorn Doan if sensors have issues or falling off frequently so they can supply her with more so she is not without sensors  Stressed importance of completing labs, A1C, CMP, microalbumin, and lipids panel ordered  Lab Results   Component Value Date    HGBA1C 8 9 (A) 03/10/2021            Relevant Medications    Glucagon 3 MG/DOSE POWD    Other Relevant Orders    Comprehensive metabolic panel Lab Collect    HEMOGLOBIN A1C W/ EAG ESTIMATION Lab Collect    Lipid panel Lab Collect Lab Collect    Microalbumin / creatinine urine ratio Lab Collect    Hypoglycemia due to type 1 diabetes mellitus (HCC)     Ordered Baqsimi nasal spray and reviewed how it administered  Reviewed signs of hypoglycemia and how to treat  Advised her to call office if still having frequent hypoglycemia  Lab Results   Component Value Date    HGBA1C 8 9 (A) 03/10/2021            Relevant Medications    Glucagon 3 MG/DOSE POWD       Other    Insulin pump in place     She denies any issues with Omnipod dash  She would like to continue with current pump  Will discuss possible upgrade to omnipod 5 next visit  Orders Placed This Encounter   Procedures    Comprehensive metabolic panel Lab Collect     This is a patient instruction: Patient fasting for 8 hours or longer recommended  Standing Status:   Future     Standing Expiration Date:   3/7/2023    HEMOGLOBIN A1C W/ EAG ESTIMATION Lab Collect     Standing Status:   Future     Standing Expiration Date:   3/7/2023    Lipid panel Lab Collect Lab Collect     This is a patient instruction: This test requires patient fasting for 10-12 hours or longer  Drinking of black coffee or black tea is acceptable  Standing Status:   Future     Standing Expiration Date:   3/7/2023    Microalbumin / creatinine urine ratio Lab Collect     Standing Status:   Future     Standing Expiration Date:   3/7/2023       There are no Patient Instructions on file for this visit      Discussed with the patient and all questioned fully answered  She will call me if any problems arise      Baby Litten, CRNP

## 2022-03-07 ENCOUNTER — OFFICE VISIT (OUTPATIENT)
Dept: ENDOCRINOLOGY | Facility: CLINIC | Age: 27
End: 2022-03-07
Payer: COMMERCIAL

## 2022-03-07 VITALS
BODY MASS INDEX: 27.98 KG/M2 | SYSTOLIC BLOOD PRESSURE: 124 MMHG | WEIGHT: 138.8 LBS | HEIGHT: 59 IN | HEART RATE: 78 BPM | DIASTOLIC BLOOD PRESSURE: 78 MMHG

## 2022-03-07 DIAGNOSIS — E10.649 HYPOGLYCEMIA DUE TO TYPE 1 DIABETES MELLITUS (HCC): ICD-10-CM

## 2022-03-07 DIAGNOSIS — E10.65 TYPE 1 DIABETES MELLITUS WITH HYPERGLYCEMIA (HCC): Primary | ICD-10-CM

## 2022-03-07 DIAGNOSIS — Z96.41 INSULIN PUMP IN PLACE: ICD-10-CM

## 2022-03-07 PROCEDURE — 1036F TOBACCO NON-USER: CPT

## 2022-03-07 PROCEDURE — 3008F BODY MASS INDEX DOCD: CPT

## 2022-03-07 PROCEDURE — 3078F DIAST BP <80 MM HG: CPT

## 2022-03-07 PROCEDURE — 3074F SYST BP LT 130 MM HG: CPT

## 2022-03-07 PROCEDURE — 99214 OFFICE O/P EST MOD 30 MIN: CPT

## 2022-03-07 PROCEDURE — 95251 CONT GLUC MNTR ANALYSIS I&R: CPT

## 2022-03-07 NOTE — ASSESSMENT & PLAN NOTE
Diabetes uncontrolled  A1C above goal  Increased sensitivity to 30, she recently changed to 20 from 40 due to frequent hypoglycemia  She is receiving 40% basal and 60% bolus insulin  Believed she is over correcting causing hypoglycemia, but not receiving enough basal  Increased basal rate from 11am-2pm to 1 1 units  Decreased basal at 10:30 to 0 3 due to hypoglycemia episodes later at night  Advised her to send Cruz Villatoro report in 2 weeks for further adjustments  Stressed importance of regular eye exams  She currently does not have insurance for vision, she is looking to find some  She is currently utd on eye exam  Advised her to call Cruz Villatoro if sensors have issues or falling off frequently so they can supply her with more so she is not without sensors  Stressed importance of completing labs, A1C, CMP, microalbumin, and lipids panel ordered     Lab Results   Component Value Date    HGBA1C 8 9 (A) 03/10/2021

## 2022-03-07 NOTE — ASSESSMENT & PLAN NOTE
Ordered Baqsimi nasal spray and reviewed how it administered  Reviewed signs of hypoglycemia and how to treat  Advised her to call office if still having frequent hypoglycemia     Lab Results   Component Value Date    HGBA1C 8 9 (A) 03/10/2021

## 2022-03-07 NOTE — ASSESSMENT & PLAN NOTE
She denies any issues with Omnipod dash  She would like to continue with current pump  Will discuss possible upgrade to omnipod 5 next visit

## 2022-03-23 ENCOUNTER — TELEPHONE (OUTPATIENT)
Dept: ENDOCRINOLOGY | Facility: CLINIC | Age: 27
End: 2022-03-23

## 2022-04-16 DIAGNOSIS — E10.65 TYPE 1 DIABETES MELLITUS WITH HYPERGLYCEMIA (HCC): ICD-10-CM

## 2022-04-18 RX ORDER — FLASH GLUCOSE SENSOR
KIT MISCELLANEOUS
Qty: 2 EACH | Refills: 3 | Status: SHIPPED | OUTPATIENT
Start: 2022-04-18

## 2022-05-04 ENCOUNTER — VBI (OUTPATIENT)
Dept: ADMINISTRATIVE | Facility: OTHER | Age: 27
End: 2022-05-04

## 2022-05-23 DIAGNOSIS — E10.65 PRE-EXISTING TYPE 1 DIABETES MELLITUS WITH HYPERGLYCEMIA DURING PREGNANCY IN THIRD TRIMESTER (HCC): ICD-10-CM

## 2022-05-23 DIAGNOSIS — O24.013 PRE-EXISTING TYPE 1 DIABETES MELLITUS WITH HYPERGLYCEMIA DURING PREGNANCY IN THIRD TRIMESTER (HCC): ICD-10-CM

## 2022-05-24 RX ORDER — INSULIN PUMP CONTROLLER
1 EACH MISCELLANEOUS
Qty: 30 EACH | Refills: 1 | Status: SHIPPED | OUTPATIENT
Start: 2022-05-24

## 2022-06-20 DIAGNOSIS — E10.65 TYPE 1 DIABETES MELLITUS WITH HYPERGLYCEMIA (HCC): Primary | ICD-10-CM

## 2022-06-20 RX ORDER — BLOOD-GLUCOSE SENSOR
EACH MISCELLANEOUS
Qty: 3 EACH | Refills: 1 | Status: SHIPPED | OUTPATIENT
Start: 2022-06-20

## 2022-06-20 RX ORDER — BLOOD-GLUCOSE TRANSMITTER
EACH MISCELLANEOUS
Qty: 1 EACH | Refills: 1 | Status: SHIPPED | OUTPATIENT
Start: 2022-06-20

## 2022-06-29 ENCOUNTER — VBI (OUTPATIENT)
Dept: ADMINISTRATIVE | Facility: OTHER | Age: 27
End: 2022-06-29

## 2022-08-02 DIAGNOSIS — E10.65 TYPE 1 DIABETES MELLITUS WITH HYPERGLYCEMIA (HCC): ICD-10-CM

## 2022-08-02 RX ORDER — INSULIN LISPRO 100 [IU]/ML
INJECTION, SOLUTION INTRAVENOUS; SUBCUTANEOUS
Qty: 60 ML | Refills: 3 | Status: SHIPPED | OUTPATIENT
Start: 2022-08-02

## 2022-08-17 DIAGNOSIS — E10.65 TYPE 1 DIABETES MELLITUS WITH HYPERGLYCEMIA (HCC): ICD-10-CM

## 2022-08-17 DIAGNOSIS — E10.65 TYPE 1 DIABETES MELLITUS WITH HYPERGLYCEMIA (HCC): Primary | ICD-10-CM

## 2022-08-17 RX ORDER — INSULIN PMP CART,AUT,G6/7,CNTR
EACH SUBCUTANEOUS
Qty: 1 KIT | Refills: 1 | Status: SHIPPED | OUTPATIENT
Start: 2022-08-17 | End: 2022-08-26 | Stop reason: SDUPTHER

## 2022-08-17 RX ORDER — BLOOD-GLUCOSE SENSOR
EACH MISCELLANEOUS
Qty: 3 EACH | Refills: 1 | Status: SHIPPED | OUTPATIENT
Start: 2022-08-17 | End: 2022-10-26 | Stop reason: SDUPTHER

## 2022-08-17 RX ORDER — INSULIN PMP CART,AUT,G6/7,CNTR
EACH SUBCUTANEOUS
Qty: 5 EACH | Refills: 1 | Status: SHIPPED | OUTPATIENT
Start: 2022-08-17 | End: 2022-08-26 | Stop reason: SDUPTHER

## 2022-08-17 RX ORDER — INSULIN GLARGINE 100 [IU]/ML
INJECTION, SOLUTION SUBCUTANEOUS
Qty: 6 ML | Refills: 6 | Status: SHIPPED | OUTPATIENT
Start: 2022-08-17

## 2022-08-17 RX ORDER — PEN NEEDLE, DIABETIC 32GX 5/32"
NEEDLE, DISPOSABLE MISCELLANEOUS DAILY
Qty: 100 EACH | Refills: 6 | Status: SHIPPED | OUTPATIENT
Start: 2022-08-17

## 2022-08-17 RX ORDER — INSULIN LISPRO 100 [IU]/ML
INJECTION, SOLUTION INTRAVENOUS; SUBCUTANEOUS
Qty: 15 ML | Refills: 0 | Status: SHIPPED | OUTPATIENT
Start: 2022-08-17 | End: 2022-09-12 | Stop reason: SDUPTHER

## 2022-08-26 ENCOUNTER — APPOINTMENT (OUTPATIENT)
Dept: RADIOLOGY | Facility: CLINIC | Age: 27
End: 2022-08-26
Payer: COMMERCIAL

## 2022-08-26 ENCOUNTER — OFFICE VISIT (OUTPATIENT)
Dept: URGENT CARE | Facility: CLINIC | Age: 27
End: 2022-08-26
Payer: COMMERCIAL

## 2022-08-26 VITALS — RESPIRATION RATE: 18 BRPM | HEART RATE: 68 BPM | OXYGEN SATURATION: 99 % | TEMPERATURE: 97.6 F

## 2022-08-26 DIAGNOSIS — E10.65 TYPE 1 DIABETES MELLITUS WITH HYPERGLYCEMIA (HCC): ICD-10-CM

## 2022-08-26 DIAGNOSIS — S99.921A INJURY OF RIGHT FOOT, INITIAL ENCOUNTER: ICD-10-CM

## 2022-08-26 DIAGNOSIS — S92.324A CLOSED NONDISPLACED FRACTURE OF SECOND METATARSAL BONE OF RIGHT FOOT, INITIAL ENCOUNTER: Primary | ICD-10-CM
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
## 2022-08-26 PROCEDURE — 73630 X-RAY EXAM OF FOOT: CPT

## 2022-08-26 PROCEDURE — 99213 OFFICE O/P EST LOW 20 MIN: CPT | Performed by: PHYSICIAN ASSISTANT

## 2022-08-26 RX ORDER — INSULIN PMP CART,AUT,G6/7,CNTR
EACH SUBCUTANEOUS
Qty: 1 KIT | Refills: 1 | Status: SHIPPED | OUTPATIENT
Start: 2022-08-26

## 2022-08-26 RX ORDER — INSULIN PMP CART,AUT,G6/7,CNTR
EACH SUBCUTANEOUS
Qty: 5 EACH | Refills: 1 | Status: SHIPPED | OUTPATIENT
Start: 2022-08-26

## 2022-08-26 NOTE — PATIENT INSTRUCTIONS
Foot Fracture in Adults   WHAT YOU NEED TO KNOW:   A foot fracture is a break in a bone in your foot  DISCHARGE INSTRUCTIONS:   Call your local emergency number (911 in the [address]) if:   You suddenly feel lightheaded and short of breath  You have chest pain when you take a deep breath or cough  You cough up blood  Return to the emergency department if:   The pain in your injured foot gets worse even after you rest and take pain medicine  The skin or toes of your foot become numb, swollen, cold, white, or blue  You have more pain or swelling than you did before a cast was put on  Your leg feels warm, tender, and painful  It may look swollen and red  Call your doctor if:   You have a fever  You have new sores around your boot, cast, or splint  You have new or worsening trouble moving your foot  You notice a foul smell coming from under your cast     Your boot, cast, or splint gets damaged  You have questions or concerns about your condition or care  Medicines: You may need any of the following:  Antibiotics: This medicine is given to help treat or prevent an infection caused by bacteria  NSAIDs , such as ibuprofen, help decrease swelling, pain, and fever  NSAIDs can cause stomach bleeding or kidney problems in certain people  If you take blood thinner medicine, always ask your healthcare provider if NSAIDs are safe for you  Always read the medicine label and follow directions  Prescription pain medicine  may be given  Ask your healthcare provider how to take this medicine safely  Some prescription pain medicines contain acetaminophen  Do not take other medicines that contain acetaminophen without talking to your healthcare provider  Too much acetaminophen may cause liver damage  Prescription pain medicine may cause constipation  Ask your healthcare provider how to prevent or treat constipation  Take your medicine as directed    Contact your healthcare provider if you think your medicine is not helping or if you have side effects  Tell him or her if you are allergic to any medicine  Keep a list of the medicines, vitamins, and herbs you take  Include the amounts, and when and why you take them  Bring the list or the pill bottles to follow-up visits  Carry your medicine list with you in case of an emergency  Self-care:   Rest  your foot and avoid activities that cause pain  Apply ice  to decrease swelling and pain, and to prevent tissue damage  Use an ice pack, or put crushed ice in a plastic bag  Cover it with a towel before you apply it  Use ice for 15 to 20 minutes every hour or as directed  Elevate your foot  above the level of your heart as often as you can  This will help decrease swelling and pain  Prop your foot on pillows or blankets to keep it elevated comfortably  Physical therapy  may be needed when your foot has healed  A physical therapist can teach you exercises to help improve movement and strength, and to decrease pain  Cast or splint care: Ask when it is okay to take a bath or shower  Do not let your cast or splint get wet  Before you bathe, cover the cast or splint with a plastic bag  Tape the bag to your skin above the splint to seal out water  Keep your foot out of the water in case the bag leaks  Check the skin around your splint daily for any redness or open areas  Do not use a sharp or pointed object to scratch your skin under the splint  Do not remove your splint unless your healthcare provider or orthopedic surgeon says it is okay  Assistive devices: You may be given a hard-soled shoe to wear while your foot is healing  You also may need to use crutches to help you walk while your foot heals  It is important to use your crutches correctly  Ask for more information about how to use crutches  Follow up with your doctor or bone specialist as directed: You may need to return to have your splint or stitches removed   You may also need to return for tests to make sure your foot is healing  Write down your questions so you remember to ask them during your visits  © Copyright [employer] 2022 Information is for End User's use only and may not be sold, redistributed or otherwise used for commercial purposes  All illustrations and images included in CareNotes® are the copyrighted property of A "[employer]  or [facility] Yunior Kinsey   The above information is an  only  It is not intended as medical advice for individual conditions or treatments  Talk to your doctor, nurse or pharmacist before following any medical regimen to see if it is safe and effective for you

## 2022-08-29 ENCOUNTER — TELEPHONE (OUTPATIENT)
Dept: OBGYN CLINIC | Facility: HOSPITAL | Age: 27
End: 2022-08-29

## 2022-08-29 NOTE — TELEPHONE ENCOUNTER
Patient called and LMOM to schedule an appointment for her foot fracture  I returned her call and left a message for her to call back

## 2022-09-08 ENCOUNTER — OFFICE VISIT (OUTPATIENT)
Dept: PODIATRY | Facility: CLINIC | Age: 27
End: 2022-09-08
Payer: COMMERCIAL

## 2022-09-08 VITALS
SYSTOLIC BLOOD PRESSURE: 124 MMHG | WEIGHT: 138 LBS | HEART RATE: 78 BPM | DIASTOLIC BLOOD PRESSURE: 78 MMHG | HEIGHT: 59 IN | BODY MASS INDEX: 27.82 KG/M2
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
## 2022-09-08 DIAGNOSIS — S92.324A CLOSED NONDISPLACED FRACTURE OF SECOND METATARSAL BONE OF RIGHT FOOT, INITIAL ENCOUNTER: ICD-10-CM

## 2022-09-08 DIAGNOSIS — S92.324A NONDISPLACED FRACTURE OF SECOND METATARSAL BONE, RIGHT FOOT, INITIAL ENCOUNTER FOR CLOSED FRACTURE: Primary | ICD-10-CM

## 2022-09-08 PROCEDURE — 99243 OFF/OP CNSLTJ NEW/EST LOW 30: CPT | Performed by: PODIATRIST

## 2022-09-08 NOTE — PROGRESS NOTES
Assessment/Plan:       Diagnoses and all orders for this visit:    Nondisplaced fracture of second metatarsal bone, right foot, initial encounter for closed fracture    Closed nondisplaced fracture of second metatarsal bone of right foot, initial encounter  -     Ambulatory Referral to Orthopedic Surgery    Injury due to impact of moving object with stationary subject  -     Ambulatory Referral to Orthopedic Surgery      Diagnosis and options discussed with patient  Patient agreeable to the plan as stated below  XR and urgent care visit reviewed      Patient has nondisplaced fracture of her central distal 2nd metatarsal   She may weight-bearing Cam boot should but should rest as much as possible  Serial x-ray in 4 weeks  She is a type 1 diabetic shoes she does have higher risk of delayed healing  I stressed the need to rest and stay off the foot as much as possible  Subjective:      Patient ID: Oleg Salmeron is a 32 y o  female  Patient dropped a cabinet door on her right foot 2 week ago  She had severe midfoot pain  She got an xray and had broken her 2nd metatarsal  She was given a CAM boot  The following portions of the patient's history were reviewed and updated as appropriate:   She  has a past medical history of Allergic dermatitis, Anxiety, Autoimmune disorder ([address] ), Diabetes mellitus type 1 ([address] ), Diabetic ketoacidosis without coma associated with type 1 diabetes mellitus ([address] ) (2016), Hypoglycemia due to type 1 diabetes mellitus ([address] ), Type 1 diabetes mellitus during pregnancy, Urinary tract infection, and Varicella    She   Patient Active Problem List    Diagnosis Date Noted    Mastitis, right, acute 2020    S/P primary low transverse  2020    Hypoglycemia due to type 1 diabetes mellitus ([address] ) 2020    Insulin pump titration 2020    History of diabetes with ketoacidosis 2020    Insulin pump in place 2019    Chronic GERD 2019    Type 1 diabetes mellitus with hyperglycemia (HCC)     Anxiety, generalized 11/10/2014     She  has a past surgical history that includes [place] tooth extraction (Bilateral); FL injection left shoulder (arthrogram) (2018); pr esophagogastroduodenoscopy transoral diagnostic (N/A, 2019); and pr  delivery only (N/A, 2020)  Her family history includes Anxiety disorder in her maternal aunt, maternal grandmother, mother, and sister; Cancer in her maternal grandmother; Diabetes in her family; Hyperlipidemia in her family; No Known Problems in her father, maternal grandfather, paternal grandfather, and paternal grandmother  She  reports that she quit smoking about 5 years ago  Her smoking use included cigarettes  She has a 2 00 pack-year smoking history  She has never used smokeless tobacco  She reports previous alcohol use  She reports that she does not use drugs  Current Outpatient Medications   Medication Sig Dispense Refill    Continuous Blood Gluc Sensor (Dexcom G6 Sensor) MISC Use once every 10 days 3 each 1    Continuous Blood Gluc Transmit (Dexcom G6 Transmitter) MISC Use once every 3 months 1 each 1    Glucagon 3 MG/DOSE POWD Use once as needed into each nostril  1 each 1    HumaLOG 100 UNIT/ML injection USE 70 UNITS VIA INSULIN PUMP DAILY AS DIRECTED 60 mL 3    Insulin Disposable Pump (Omnipod 5 G6 Intro, Gen 5,) KIT Connect to Omnipod 5 1 kit 1    Insulin Disposable Pump (Omnipod 5 G6 Pod, Gen 5,) MISC Change every 3 days  5 each 1    Insulin Disposable Pump (Omnipod DASH Pods, Gen 4,) MISC 1 CARTRIDGE BY SUBCUTANEOUS INSULIN PUMP ROUTE EVERY 36 (THIRTY-SIX) HOURS 90 DAY SUPPLY  30 each 1    Insulin Glargine Solostar (Basaglar KwikPen) 100 UNIT/ML SOPN 20 units When off pump or pump fails 6 mL 6    insulin lispro (HumaLOG KwikPen) 100 units/mL injection pen 1 unit for every 10g of carb + 1 unit for every 40 above 120  Use incase of pump failure   15 mL 0    Insulin Pen Needle (BD Pen Needle Bhumi U/F) 32G X 4 MM MISC Use daily 100 each 6    Insulin Syringes, Disposable, U-100 1 ML MISC by Does not apply route daily 60 each 0    URINE GLUCOSE-KETONES TEST VI by In Vitro route      Contour Next Test test strip TEST BLOOD SUGAR 8 TIMES DAILY (Patient not taking: No sig reported) 800 strip 1    Glucagon, rDNA, (Glucagon Emergency) 1 MG KIT Inject 1 mg into a muscle once for 1 dose 1 kit 1     No current facility-administered medications for this visit  Current Outpatient Medications on File Prior to Visit   Medication Sig    Continuous Blood Gluc Sensor (Dexcom G6 Sensor) MISC Use once every 10 days    Continuous Blood Gluc Transmit (Dexcom G6 Transmitter) MISC Use once every 3 months    Glucagon 3 MG/DOSE POWD Use once as needed into each nostril   HumaLOG 100 UNIT/ML injection USE 70 UNITS VIA INSULIN PUMP DAILY AS DIRECTED    Insulin Disposable Pump (Omnipod 5 G6 Intro, Gen 5,) KIT Connect to Omnipod 5    Insulin Disposable Pump (Omnipod 5 G6 Pod, Gen 5,) MISC Change every 3 days   Insulin Disposable Pump (Omnipod DASH Pods, Gen 4,) MISC 1 CARTRIDGE BY SUBCUTANEOUS INSULIN PUMP ROUTE EVERY 36 (THIRTY-SIX) HOURS 90 DAY SUPPLY   Insulin Glargine Solostar (Basaglar KwikPen) 100 UNIT/ML SOPN 20 units When off pump or pump fails    insulin lispro (HumaLOG KwikPen) 100 units/mL injection pen 1 unit for every 10g of carb + 1 unit for every 40 above 120  Use incase of pump failure   Insulin Pen Needle (BD Pen Needle Bhumi U/F) 32G X 4 MM MISC Use daily    Insulin Syringes, Disposable, U-100 1 ML MISC by Does not apply route daily    URINE GLUCOSE-KETONES TEST VI by In Vitro route    Contour Next Test test strip TEST BLOOD SUGAR 8 TIMES DAILY (Patient not taking: No sig reported)    Glucagon, rDNA, (Glucagon Emergency) 1 MG KIT Inject 1 mg into a muscle once for 1 dose     No current facility-administered medications on file prior to visit       She is allergic to reglan [metoclopramide]       Review of Systems   Constitutional: Negative  Respiratory: Negative for shortness of breath  Cardiovascular: Negative for leg swelling  Musculoskeletal: Positive for arthralgias, gait problem and joint swelling  Skin: Positive for color change  Negative for wound  Neurological: Negative for weakness and numbness  Objective:      /78   Pulse 78   Ht 4' 11" (1 499 m)   Wt 62 6 kg (138 lb)   BMI 27 87 kg/m²          Physical Exam  Vitals reviewed  Constitutional:       Appearance: She is normal weight  Cardiovascular:      Rate and Rhythm: Normal rate  Pulses: Normal pulses  Dorsalis pedis pulses are 2+ on the right side  Posterior tibial pulses are 2+ on the right side  Pulmonary:      Effort: Pulmonary effort is normal  No respiratory distress  Musculoskeletal:      Right ankle: No swelling  No tenderness  Normal range of motion  Normal pulse  Right Achilles Tendon: No tenderness or defects  Madison's test negative  Right foot: Normal capillary refill  Swelling, tenderness and bony tenderness present  Normal pulse  Feet:    Feet:      Right foot:      Protective Sensation: 10 sites tested  10 sites sensed  Skin integrity: Skin integrity normal    Neurological:      Mental Status: She is alert

## 2022-09-08 NOTE — LETTER
September 8, 2022     Mere Wilson, [address] [place] [address]    Patient: Enrico Roach   YOB: 1995   Date of Visit: 9/8/2022       Dear Dr Gordon Ada: Thank you for referring Enrico Roach to me for evaluation  Below are my notes for this consultation  If you have questions, please do not hesitate to call me  I look forward to following your patient along with you  Sincerely,        Beverly Bethea DPM        CC: No Recipients  Sue Zimmerman  9/8/2022  9:15 AM  Signed  Assessment/Plan:       Diagnoses and all orders for this visit:    Nondisplaced fracture of second metatarsal bone, right foot, initial encounter for closed fracture    Closed nondisplaced fracture of second metatarsal bone of right foot, initial encounter  -     Ambulatory Referral to Orthopedic Surgery    Injury due to impact of moving object with stationary subject  -     Ambulatory Referral to Orthopedic Surgery      Diagnosis and options discussed with patient  Patient agreeable to the plan as stated below  XR and urgent care visit reviewed      Patient has nondisplaced fracture of her central distal 2nd metatarsal   She may weight-bearing Cam boot should but should rest as much as possible  Serial x-ray in 4 weeks  She is a type 1 diabetic shoes she does have higher risk of delayed healing  I stressed the need to rest and stay off the foot as much as possible  Subjective:      Patient ID: Enrico Roach is a 32 y o  female  Patient dropped a cabinet door on her right foot 2 week ago  She had severe midfoot pain  She got an xray and had broken her 2nd metatarsal  She was given a CAM boot         The following portions of the patient's history were reviewed and updated as appropriate:   She  has a past medical history of Allergic dermatitis, Anxiety, Autoimmune disorder ([facility] ), Diabetes mellitus type 1 ([facility] ), Diabetic ketoacidosis without coma associated with type 1 diabetes mellitus ([facility] ) (2016), Hypoglycemia due to type 1 diabetes mellitus ([facility] ), Type 1 diabetes mellitus during pregnancy, Urinary tract infection, and Varicella  She   Patient Active Problem List    Diagnosis Date Noted    Mastitis, right, acute 2020    S/P primary low transverse  2020    Hypoglycemia due to type 1 diabetes mellitus ([facility] ) 2020    Insulin pump titration 2020    History of diabetes with ketoacidosis 2020    Insulin pump in place 2019    Chronic GERD 2019    Type 1 diabetes mellitus with hyperglycemia ([facility] )     Anxiety, generalized 11/10/2014     She  has a past surgical history that includes [place] tooth extraction (Bilateral); FL injection left shoulder (arthrogram) (2018); pr esophagogastroduodenoscopy transoral diagnostic (N/A, 2019); and pr  delivery only (N/A, 2020)  Her family history includes Anxiety disorder in her maternal aunt, maternal grandmother, mother, and sister; Cancer in her maternal grandmother; Diabetes in her family; Hyperlipidemia in her family; No Known Problems in her father, maternal grandfather, paternal grandfather, and paternal grandmother  She  reports that she quit smoking about 5 years ago  Her smoking use included cigarettes  She has a 2 00 pack-year smoking history  She has never used smokeless tobacco  She reports previous alcohol use  She reports that she does not use drugs  Current Outpatient Medications   Medication Sig Dispense Refill    Continuous Blood Gluc Sensor (Dexcom G6 Sensor) MISC Use once every 10 days 3 each 1    Continuous Blood Gluc Transmit (Dexcom G6 Transmitter) MISC Use once every 3 months 1 each 1    Glucagon 3 MG/DOSE POWD Use once as needed into each nostril   1 each 1    HumaLOG 100 UNIT/ML injection USE 70 UNITS VIA INSULIN PUMP DAILY AS DIRECTED 60 mL 3    Insulin Disposable Pump (Omnipod 5 G6 Intro, Gen 5,) KIT Connect to Omnipod 5 1 kit 1    Insulin Disposable Pump (Omnipod 5 G6 Pod, Gen 5,) MISC Change every 3 days  5 each 1    Insulin Disposable Pump (Omnipod DASH Pods, Gen 4,) MISC 1 CARTRIDGE BY SUBCUTANEOUS INSULIN PUMP ROUTE EVERY 36 (THIRTY-SIX) HOURS 90 DAY SUPPLY  30 each 1    Insulin Glargine Solostar (Basaglar KwikPen) 100 UNIT/ML SOPN 20 units When off pump or pump fails 6 mL 6    insulin lispro (HumaLOG KwikPen) 100 units/mL injection pen 1 unit for every 10g of carb + 1 unit for every 40 above 120  Use incase of pump failure  15 mL 0    Insulin Pen Needle (BD Pen Needle Bhumi U/F) 32G X 4 MM MISC Use daily 100 each 6    Insulin Syringes, Disposable, U-100 1 ML MISC by Does not apply route daily 60 each 0    URINE GLUCOSE-KETONES TEST VI by In Vitro route      Contour Next Test test strip TEST BLOOD SUGAR 8 TIMES DAILY (Patient not taking: No sig reported) 800 strip 1    Glucagon, rDNA, (Glucagon Emergency) 1 MG KIT Inject 1 mg into a muscle once for 1 dose 1 kit 1     No current facility-administered medications for this visit  Current Outpatient Medications on File Prior to Visit   Medication Sig    Continuous Blood Gluc Sensor (Dexcom G6 Sensor) MISC Use once every 10 days    Continuous Blood Gluc Transmit (Dexcom G6 Transmitter) MISC Use once every 3 months    Glucagon 3 MG/DOSE POWD Use once as needed into each nostril   HumaLOG 100 UNIT/ML injection USE 70 UNITS VIA INSULIN PUMP DAILY AS DIRECTED    Insulin Disposable Pump (Omnipod 5 G6 Intro, Gen 5,) KIT Connect to Omnipod 5    Insulin Disposable Pump (Omnipod 5 G6 Pod, Gen 5,) MISC Change every 3 days   Insulin Disposable Pump (Omnipod DASH Pods, Gen 4,) MISC 1 CARTRIDGE BY SUBCUTANEOUS INSULIN PUMP ROUTE EVERY 36 (THIRTY-SIX) HOURS 90 DAY SUPPLY      Insulin Glargine Solostar (Basaglar KwikPen) 100 UNIT/ML SOPN 20 units When off pump or pump fails    insulin lispro (HumaLOG KwikPen) 100 units/mL injection pen 1 unit for every 10g of carb + 1 unit for every 40 above 120  Use incase of pump failure   Insulin Pen Needle (BD Pen Needle Bhumi U/F) 32G X 4 MM MISC Use daily    Insulin Syringes, Disposable, U-100 1 ML MISC by Does not apply route daily    URINE GLUCOSE-KETONES TEST VI by In Vitro route    Contour Next Test test strip TEST BLOOD SUGAR 8 TIMES DAILY (Patient not taking: No sig reported)    Glucagon, rDNA, (Glucagon Emergency) 1 MG KIT Inject 1 mg into a muscle once for 1 dose     No current facility-administered medications on file prior to visit  She is allergic to reglan [metoclopramide]       Review of Systems   Constitutional: Negative  Respiratory: Negative for shortness of breath  Cardiovascular: Negative for leg swelling  Musculoskeletal: Positive for arthralgias, gait problem and joint swelling  Skin: Positive for color change  Negative for wound  Neurological: Negative for weakness and numbness  Objective:      /78   Pulse 78   Ht 4' 11" (1 499 m)   Wt 62 6 kg (138 lb)   BMI 27 87 kg/m²          Physical Exam  Vitals reviewed  Constitutional:       Appearance: She is normal weight  Cardiovascular:      Rate and Rhythm: Normal rate  Pulses: Normal pulses  Dorsalis pedis pulses are 2+ on the right side  Posterior tibial pulses are 2+ on the right side  Pulmonary:      Effort: Pulmonary effort is normal  No respiratory distress  Musculoskeletal:      Right ankle: No swelling  No tenderness  Normal range of motion  Normal pulse  Right Achilles Tendon: No tenderness or defects  Madison's test negative  Right foot: Normal capillary refill  Swelling, tenderness and bony tenderness present  Normal pulse  Feet:    Feet:      Right foot:      Protective Sensation: 10 sites tested  10 sites sensed  Skin integrity: Skin integrity normal    Neurological:      Mental Status: She is alert

## 2022-09-12 ENCOUNTER — TELEPHONE (OUTPATIENT)
Dept: ENDOCRINOLOGY | Facility: CLINIC | Age: 27
End: 2022-09-12

## 2022-09-12 DIAGNOSIS — E10.65 TYPE 1 DIABETES MELLITUS WITH HYPERGLYCEMIA (HCC): ICD-10-CM

## 2022-09-12 RX ORDER — INSULIN LISPRO 100 [IU]/ML
INJECTION, SOLUTION INTRAVENOUS; SUBCUTANEOUS
Qty: 15 ML | Refills: 0 | Status: SHIPPED | OUTPATIENT
Start: 2022-09-12

## 2022-09-12 NOTE — TELEPHONE ENCOUNTER
[facility] faxed # 90 day refill request for pt's Humalog kwikpen      Please call to schedule f/u appt  Last seen 3/7/22 and was told to f/u in [place]  Currently has no appts pending

## 2022-09-12 NOTE — TELEPHONE ENCOUNTER
Request has been sent to [facility] Quality 431: Preventive Care And Screening: Unhealthy Alcohol Use - Screening: Patient not identified as an unhealthy alcohol user when screened for unhealthy alcohol use using a systematic screening method Detail Level: Detailed Quality 226: Preventive Care And Screening: Tobacco Use: Screening And Cessation Intervention: Patient screened for tobacco use and is an ex/non-smoker

## 2022-09-22 NOTE — PROGRESS NOTES
[address]        NAME: Richy Landers is a 32 y o  female  : 1995    MRN: [id number]  DATE:  2022  TIME: 6:28 PM    Assessment and Plan   Closed nondisplaced fracture of second metatarsal bone of right foot, initial encounter [S92 [id number]]  1  Closed nondisplaced fracture of second metatarsal bone of right foot, initial encounter  XR foot 3+ vw right    Ambulatory Referral to Orthopedic Surgery   2  Injury due to impact of moving object with stationary subject  Ambulatory Referral to Orthopedic Surgery         Patient Instructions     Patient has sustained fracture of the right 2nd metatarsal seen on x-ray  She was given a Cam boot and referred to Orthopedics for consult  Recommended ice, elevation, limited weight-bearing, NSAIDs in the interim  Follow up with PCP in 3-5 days  Proceed to  ER if symptoms worsen  Chief Complaint     Chief Complaint   Patient presents with    Foot Injury     Right foot injury  Today a cabinet door fell on her bare right foot  History of Present Illness       Patient presents after sustaining a right foot injury which occurred earlier today  She was at home and was barefoot when a cabinet door fell on top of her right foot now she has pain and swelling worse with walking and weight-bearing  Denies any other area of injury sustained  Denies any numbness or paresthesias  Denies open wounds  Review of Systems   Review of Systems   Constitutional: Negative  Respiratory: Negative  Cardiovascular: Negative  Gastrointestinal: Negative  Genitourinary: Negative  Musculoskeletal:        Right foot pain and swelling status post injury   Neurological: Negative            Current Medications       Current Outpatient Medications:     Continuous Blood Gluc Sensor (Dexcom G6 Sensor) MISC, Use once every 10 days, Disp: 3 each, Rfl: 1    Continuous Blood Gluc Transmit (Dexcom G6 Transmitter) MISC, Use once every 3 months, Disp: 1 each, Rfl: 1    Glucagon 3 MG/DOSE POWD, Use once as needed into each nostril , Disp: 1 each, Rfl: 1    HumaLOG 100 UNIT/ML injection, USE 70 UNITS VIA INSULIN PUMP DAILY AS DIRECTED, Disp: 60 mL, Rfl: 3    Insulin Disposable Pump (Omnipod 5 G6 Intro, Gen 5,) KIT, Connect to Omnipod 5, Disp: 1 kit, Rfl: 1    Insulin Disposable Pump (Omnipod 5 G6 Pod, Gen 5,) MISC, Change every 3 days  , Disp: 5 each, Rfl: 1    Insulin Disposable Pump (Omnipod DASH Pods, Gen 4,) MISC, 1 CARTRIDGE BY SUBCUTANEOUS INSULIN PUMP ROUTE EVERY 36 (THIRTY-SIX) HOURS 90 DAY SUPPLY  , Disp: 30 each, Rfl: 1    Insulin Glargine Solostar (Basaglar KwikPen) 100 UNIT/ML SOPN, 20 units When off pump or pump fails, Disp: 6 mL, Rfl: 6    Insulin Pen Needle (BD Pen Needle Bhumi U/F) 32G X 4 MM MISC, Use daily, Disp: 100 each, Rfl: 6    Insulin Syringes, Disposable, U-100 1 ML MISC, by Does not apply route daily, Disp: 60 each, Rfl: 0    URINE GLUCOSE-KETONES TEST VI, by In Vitro route, Disp: , Rfl:     Contour Next Test test strip, TEST BLOOD SUGAR 8 TIMES DAILY (Patient not taking: No sig reported), Disp: 800 strip, Rfl: 1    Glucagon, rDNA, (Glucagon Emergency) 1 MG KIT, Inject 1 mg into a muscle once for 1 dose, Disp: 1 kit, Rfl: 1    insulin lispro (HumaLOG KwikPen) 100 units/mL injection pen, 1 unit for every 10g of carb + 1 unit for every 40 above 120   Use incase of pump failure , Disp: 15 mL, Rfl: 0    Current Allergies     Allergies as of 08/26/2022 - Reviewed 08/26/2022   Allergen Reaction Noted    Reglan [metoclopramide] Anxiety 02/21/2016            The following portions of the patient's history were reviewed and updated as appropriate: allergies, current medications, past family history, past medical history, past social history, past surgical history and problem list      Past Medical History:   Diagnosis Date    Allergic dermatitis     Resolved 3/2/2016     Anxiety     [facility]     Diabetes mellitus type 1 ([facility] )  Diabetic ketoacidosis without coma associated with type 1 diabetes mellitus ([facility] ) 2016    Hypoglycemia due to type 1 diabetes mellitus ([facility] )     Type 1 diabetes mellitus during pregnancy     Urinary tract infection     last episode 2 yrs ago    Varicella     childhood chickenpox       Past Surgical History:   Procedure Laterality Date    FL INJECTION LEFT SHOULDER (ARTHROGRAM)  2018    MA  DELIVERY ONLY N/A 2020    Procedure:  SECTION (); Surgeon: Radha Johnson MD;  Location: [facility] ;  Service: Obstetrics    MA ESOPHAGOGASTRODUODENOSCOPY TRANSORAL DIAGNOSTIC N/A 2019    Procedure: ESOPHAGOGASTRODUODENOSCOPY (EGD) with biopsy;  Surgeon: April Moore DO;  Location:  MAIN OR;  Service: Gastroenterology    WISDOM TOOTH EXTRACTION Bilateral        Family History   Problem Relation Age of Onset    Anxiety disorder Mother     No Known Problems Father     Anxiety disorder Sister     Anxiety disorder Maternal Grandmother     Cancer Maternal Grandmother     Anxiety disorder Maternal Aunt     Hyperlipidemia Family     Diabetes Family     No Known Problems Maternal Grandfather     No Known Problems Paternal Grandmother     No Known Problems Paternal Grandfather          Medications have been verified  Objective   Pulse 68   Temp 97 6 °F (36 4 °C)   Resp 18   SpO2 99%   No LMP recorded  Physical Exam     Physical Exam  Vitals reviewed  Constitutional:       General: She is not in acute distress  Appearance: She is well-developed  Musculoskeletal:      Comments: Dorsal aspect of distal right foot with localized tenderness to palpation, soft tissue swelling, ecchymosis most prominently over the metatarsals  Range of motion is overall intact with some difficulty due to the pain  Skin:     Comments: No open wounds   Neurological:      Mental Status: She is alert and oriented to person, place, and time        Sensory: No sensory deficit

## 2022-10-04 ENCOUNTER — TELEPHONE (OUTPATIENT)
Dept: DIABETES SERVICES | Facility: CLINIC | Age: 27
End: 2022-10-04

## 2022-10-04 NOTE — TELEPHONE ENCOUNTER
Dustin Maizes from [facility] [address] did Omnipod upgrade from [place] to

## 2022-10-11 ENCOUNTER — APPOINTMENT (OUTPATIENT)
Dept: RADIOLOGY | Facility: CLINIC | Age: 27
End: 2022-10-11
Payer: COMMERCIAL

## 2022-10-11 ENCOUNTER — OFFICE VISIT (OUTPATIENT)
Dept: PODIATRY | Facility: CLINIC | Age: 27
End: 2022-10-11
Payer: COMMERCIAL

## 2022-10-11 VITALS
HEIGHT: 59 IN | DIASTOLIC BLOOD PRESSURE: 79 MMHG | BODY MASS INDEX: 27.82 KG/M2 | WEIGHT: 138 LBS | SYSTOLIC BLOOD PRESSURE: 120 MMHG | HEART RATE: 84 BPM

## 2022-10-11 DIAGNOSIS — S92.324A NONDISPLACED FRACTURE OF SECOND METATARSAL BONE, RIGHT FOOT, INITIAL ENCOUNTER FOR CLOSED FRACTURE: Primary | ICD-10-CM

## 2022-10-11 DIAGNOSIS — S92.324A NONDISPLACED FRACTURE OF SECOND METATARSAL BONE, RIGHT FOOT, INITIAL ENCOUNTER FOR CLOSED FRACTURE: ICD-10-CM

## 2022-10-11 PROCEDURE — 99213 OFFICE O/P EST LOW 20 MIN: CPT | Performed by: PODIATRIST

## 2022-10-11 PROCEDURE — 73630 X-RAY EXAM OF FOOT: CPT

## 2022-10-11 NOTE — PROGRESS NOTES
Assessment/Plan:      Diagnoses and all orders for this visit:    Nondisplaced fracture of second metatarsal bone, right foot, initial encounter for closed fracture  -     XR foot 3+ vw right; Future      serial x-ray taken today shows bone callus at 2nd metatarsal shaft fracture site  No displacement  Patient has no clinical pain or discomfort to this area  The fracture is healing well  She may slowly advance from boot to sneaker and slowly increase activity  I would suggest for 1 more month no running jumping or squatting  She should not be barefoot for another month  Reappoint as needed    Subjective:     Patient ID: Chandrika Art is a 32 y o  female  Patient returns to check her right foot fracture  She was diagnosed with a 2nd metatarsal shaft fracture  It was nondisplaced  She was put in a Cam boot  She states the pain is essentially gone but there is still minor swelling  She reports no other changes      Review of Systems    Constitutional: Negative  HENT: Negative for sinus pressure and sinus pain  Respiratory: Negative for cough and shortness of breath  Cardiovascular: Negative for chest pain and leg swelling  Gastrointestinal: Negative for diarrhea, nausea and vomiting  Musculoskeletal: minor foot swelling  Skin: Negative for rash or wound  Neurological: Negative for weakness, numbness and headaches  Psychiatric/Behavioral: The patient is not nervous/anxious  Objective:     Physical Exam    Vitals reviewed  Constitutional:       Appearance: Patient is not ill-appearing or diaphoretic  Cardiovascular:        Dorsalis pedis pulses are 2+ on the right side and 2+ on the left side  Posterior tibial pulses are 2+ on the right side and 2+ on the left side  Musculoskeletal:      Right lower leg: No edema  Left lower leg: No edema  No calf pain with compression           Right foot:      Normal range of motion to ankle, STJ, midfoot        Deformity: none     Pain: no pain with 2nd metatarsal palpation or stress  MTPJ normal      Strength (MMT)   Achilles 5/5   PT  5/5   Peroneal 5/5   TA  5/5   EHL/EDL 5/5     Skin:     Capillary Refill: Capillary refill takes less than 2 seconds  Findings: No bruising, erythema, lesion or rash  Neurological:      Mental Status: Alert and oriented to person, place, and time  Gait: Gait normal        Right Protective Sensation: 10 sites tested  10 sites sensed  Left  Protective Sensation: 10 sites tested  10 sites sensed                XRay 3 views of the right foot personally read by Dr Taran Pearson in office today and discussed with patient:  1  2nd met shaft bone callus noted, no displacement

## 2022-10-26 DIAGNOSIS — E10.65 TYPE 1 DIABETES MELLITUS WITH HYPERGLYCEMIA (HCC): ICD-10-CM

## 2022-10-27 RX ORDER — BLOOD-GLUCOSE SENSOR
EACH MISCELLANEOUS
Qty: 3 EACH | Refills: 0 | Status: SHIPPED | OUTPATIENT
Start: 2022-10-27

## 2022-11-30 DIAGNOSIS — E10.65 TYPE 1 DIABETES MELLITUS WITH HYPERGLYCEMIA (HCC): ICD-10-CM

## 2022-11-30 RX ORDER — BLOOD-GLUCOSE SENSOR
EACH MISCELLANEOUS
Qty: 3 EACH | Refills: 0 | Status: SHIPPED | OUTPATIENT
Start: 2022-11-30

## 2022-12-30 ENCOUNTER — OFFICE VISIT (OUTPATIENT)
Dept: FAMILY MEDICINE CLINIC | Facility: CLINIC | Age: 27
End: 2022-12-30

## 2022-12-30 VITALS
SYSTOLIC BLOOD PRESSURE: 116 MMHG | RESPIRATION RATE: 14 BRPM | HEIGHT: 59 IN | TEMPERATURE: 98 F | DIASTOLIC BLOOD PRESSURE: 78 MMHG | BODY MASS INDEX: 27.78 KG/M2 | WEIGHT: 137.8 LBS | HEART RATE: 68 BPM | OXYGEN SATURATION: 100 %

## 2022-12-30 DIAGNOSIS — R14.0 BLOATING: Primary | ICD-10-CM

## 2022-12-30 DIAGNOSIS — F41.9 ANXIETY: ICD-10-CM

## 2022-12-30 DIAGNOSIS — E10.10 TYPE 1 DIABETES MELLITUS WITH KETOACIDOSIS WITHOUT COMA (HCC): ICD-10-CM

## 2022-12-30 RX ORDER — FLUOXETINE 10 MG/1
10 CAPSULE ORAL DAILY
Qty: 30 CAPSULE | Refills: 1 | Status: SHIPPED | OUTPATIENT
Start: 2022-12-30

## 2022-12-30 RX ORDER — PANTOPRAZOLE SODIUM 20 MG/1
20 TABLET, DELAYED RELEASE ORAL
Qty: 30 TABLET | Refills: 2 | Status: SHIPPED | OUTPATIENT
Start: 2022-12-30

## 2022-12-30 NOTE — PROGRESS NOTES
Name: Perla Stewart      : 1995      MRN: [id number]  Encounter Provider: ADILENE Rivera  Encounter Date: 2022   Encounter department: [facility]– [place][note 2[place] Tricia Brown     1  Bloating  -     pantoprazole (PROTONIX) 20 mg tablet; Take 1 tablet (20 mg total) by mouth daily before breakfast  -     Ambulatory Referral to Gastroenterology; Future    2  Anxiety  -     FLUoxetine (PROzac) 10 mg capsule; Take 1 capsule (10 mg total) by mouth daily    3  Type 1 diabetes mellitus with ketoacidosis without coma (HCC)           Subjective      Complaints of abdominal bloating - feels full as soon as begins eating   History of same in the past, protonix was initiated for gastroparesis - will restart protonix and referral to GI       Will initiate prozac and titrate for effect    Anxiety  Presents for initial (Patient was on anxiety medications in the past but stopped due to feeling better, would like to begin again) visit  Onset was 1 to 6 months ago  The problem has been gradually worsening  Symptoms include decreased concentration, excessive worry and nervous/anxious behavior  Patient reports no depressed mood, feeling of choking, insomnia, irritability, muscle tension, nausea, palpitations, panic, restlessness or suicidal ideas  Symptoms occur most days  The severity of symptoms is moderate  The symptoms are aggravated by family issues  The patient sleeps 7 hours per night  The quality of sleep is fair  Nighttime awakenings: occasional      Risk factors include family history and a major life event  Her past medical history is significant for anxiety/panic attacks  Past treatments include SSRIs  The treatment provided moderate relief  Compliance with prior treatments has been good  Review of Systems   Constitutional: Negative  Negative for irritability  HENT: Negative  Eyes: Negative  Respiratory: Negative  Cardiovascular: Negative  Negative for palpitations  Gastrointestinal: Positive for abdominal distention  Negative for blood in stool, constipation and nausea  Endocrine: Negative  Genitourinary: Negative  Musculoskeletal: Negative  Allergic/Immunologic: Negative  Neurological: Negative  Hematological: Negative  Psychiatric/Behavioral: Positive for decreased concentration  Negative for suicidal ideas  The patient is nervous/anxious  The patient does not have insomnia  Current Outpatient Medications on File Prior to Visit   Medication Sig   • Continuous Blood Gluc Sensor (Dexcom G6 Sensor) MISC USE ONCE EVERY 10 DAYS   • Continuous Blood Gluc Transmit (Dexcom G6 Transmitter) MISC Use once every 3 months   • Contour Next Test test strip TEST BLOOD SUGAR 8 TIMES DAILY   • Glucagon 3 MG/DOSE POWD Use once as needed into each nostril  • Glucagon, rDNA, (Glucagon Emergency) 1 MG KIT Inject 1 mg into a muscle once for 1 dose   • HumaLOG 100 UNIT/ML injection USE 70 UNITS VIA INSULIN PUMP DAILY AS DIRECTED   • Insulin Disposable Pump (Omnipod 5 G6 Intro, Gen 5,) KIT Connect to Omnipod 5   • Insulin Disposable Pump (Omnipod 5 G6 Pod, Gen 5,) MISC Change every 3 days  • Insulin Disposable Pump (Omnipod DASH Pods, Gen 4,) MISC 1 CARTRIDGE BY SUBCUTANEOUS INSULIN PUMP ROUTE EVERY 36 (THIRTY-SIX) HOURS 90 DAY SUPPLY  • Insulin Glargine Solostar (Basaglar KwikPen) 100 UNIT/ML SOPN 20 units When off pump or pump fails   • insulin lispro (HumaLOG KwikPen) 100 units/mL injection pen 1 unit for every 10g of carb + 1 unit for every 40 above 120  Use incase of pump failure     • Insulin Pen Needle (BD Pen Needle Bhumi U/F) 32G X 4 MM MISC Use daily   • Insulin Syringes, Disposable, U-100 1 ML MISC by Does not apply route daily   • URINE GLUCOSE-KETONES TEST VI by In Vitro route       Objective     /78 (BP Location: Left arm, Patient Position: Sitting, Cuff Size: Standard)   Pulse 68   Temp 98 °F (36 7 °C)   Resp 14   Ht 4' 11" (1 499 m)   Wt 62 5 kg (137 lb 12 8 oz)   LMP 12/27/2022   SpO2 100%   BMI 27 83 kg/m²     Physical Exam  Vitals and nursing note reviewed  Constitutional:       Appearance: She is not ill-appearing  HENT:      Head: Normocephalic and atraumatic  Nose: Nose normal  No congestion  Eyes:      General:         Right eye: No discharge  Left eye: No discharge  Extraocular Movements: Extraocular movements intact  Conjunctiva/sclera: Conjunctivae normal    Cardiovascular:      Rate and Rhythm: Normal rate and regular rhythm  Pulses: Normal pulses  Heart sounds: Normal heart sounds  No murmur heard  Pulmonary:      Effort: Pulmonary effort is normal  No respiratory distress  Breath sounds: Normal breath sounds  Abdominal:      General: Bowel sounds are normal       Palpations: Abdomen is soft  Tenderness: There is no abdominal tenderness  There is no right CVA tenderness or left CVA tenderness  Musculoskeletal:         General: No deformity  Normal range of motion  Cervical back: Normal range of motion  Right lower leg: No edema  Left lower leg: No edema  Skin:     General: Skin is warm and dry  Capillary Refill: Capillary refill takes less than 2 seconds  Neurological:      Mental Status: She is alert and oriented to person, place, and time  Psychiatric:         Mood and Affect: Mood normal          Behavior: Behavior normal          Thought Content:  Thought content normal          Judgment: Judgment normal        ADILENE Iverson

## 2022-12-30 NOTE — PATIENT INSTRUCTIONS
Prozac 10 mg x's 2 weeks then may increase to 20 mg if no improvement - virtual in 3-4 weeks  Protonix 20 mg daily   Referral to GI

## 2023-01-01 DIAGNOSIS — E10.65 TYPE 1 DIABETES MELLITUS WITH HYPERGLYCEMIA (HCC): ICD-10-CM

## 2023-01-03 RX ORDER — BLOOD-GLUCOSE TRANSMITTER
EACH MISCELLANEOUS
Qty: 1 EACH | Refills: 3 | Status: SHIPPED | OUTPATIENT
Start: 2023-01-03

## 2023-01-03 RX ORDER — BLOOD-GLUCOSE SENSOR
EACH MISCELLANEOUS
Qty: 3 EACH | Refills: 3 | Status: SHIPPED | OUTPATIENT
Start: 2023-01-03

## 2023-01-20 ENCOUNTER — RA CDI HCC (OUTPATIENT)
Dept: OTHER | Facility: HOSPITAL | Age: 28
End: 2023-01-20

## 2023-01-20 NOTE — PROGRESS NOTES
[facility]  coding opportunities          Chart Reviewed number of suggestions sent to Provider: 1   E10 65    This is a reminder to address ALL HCC (risk adjustment) codes as found on active problem list for 2023 as patient scores reset to zero BRE    Patients Insurance        Commercial Insurance: [address] [place] [address]

## 2023-01-23 DIAGNOSIS — R14.0 BLOATING: ICD-10-CM

## 2023-01-23 DIAGNOSIS — F41.9 ANXIETY: ICD-10-CM

## 2023-01-23 RX ORDER — FLUOXETINE 10 MG/1
CAPSULE ORAL
Qty: 90 CAPSULE | Refills: 1 | Status: SHIPPED | OUTPATIENT
Start: 2023-01-23

## 2023-01-23 RX ORDER — PANTOPRAZOLE SODIUM 20 MG/1
TABLET, DELAYED RELEASE ORAL
Qty: 90 TABLET | Refills: 1 | Status: SHIPPED | OUTPATIENT
Start: 2023-01-23

## 2023-01-27 ENCOUNTER — TELEMEDICINE (OUTPATIENT)
Dept: FAMILY MEDICINE CLINIC | Facility: CLINIC | Age: 28
End: 2023-01-27

## 2023-01-27 VITALS — BODY MASS INDEX: 26.61 KG/M2 | HEIGHT: 59 IN | WEIGHT: 132 LBS

## 2023-01-27 DIAGNOSIS — F41.9 ANXIETY: Primary | ICD-10-CM

## 2023-01-27 NOTE — PROGRESS NOTES
Virtual Regular Visit    Verification of patient location:    Patient is located in the following state in which I hold an active license [place]      Assessment/Plan:    Problem List Items Addressed This Visit    None  Visit Diagnoses     Anxiety    -  Primary               Reason for visit is   Chief Complaint   Patient presents with   • Follow-up     4 week   • Virtual Regular Visit        Encounter provider ADILENE Donnelly    Provider located at [address]  [place] [address]  [address] , [address] [id number] [phone]      Recent Visits  No visits were found meeting these conditions  Showing recent visits within past 7 days and meeting all other requirements  Today's Visits  Date Type Provider Dept   01/27/23 Telemedicine ADILENE Donnelly [facility]   Showing today's visits and meeting all other requirements  Future Appointments  No visits were found meeting these conditions  Showing future appointments within next 150 days and meeting all other requirements       The patient was identified by name and date of birth  Madisyn Resendiz was informed that this is a telemedicine visit and that the visit is being conducted through Telephone  My office door was closed  No one else was in the room  She acknowledged consent and understanding of privacy and security of the video platform  The patient has agreed to participate and understands they can discontinue the visit at any time  Patient is aware this is a billable service  Subjective  Madisyn Resendiz is a 32 y o  female    Unable to connect vis video - phone visit completed    Anxiety  Presents for follow-up (prozac 10 mg was initiated 4 weeks ago for a combination of stress -anxiety and depression at times but admits that she is feeling much better and will remain on this dose- followup in 3 months or sooner if status changes) visit   Patient reports no chest pain, decreased concentration, depressed mood, dizziness, impotence, nervous/anxious behavior or suicidal ideas  Symptoms occur rarely  The severity of symptoms is mild  The quality of sleep is fair  Nighttime awakenings: occasional      Compliance with medications is %  Treatment side effects: none  Past Medical History:   Diagnosis Date   • Allergic dermatitis     Resolved 3/2/2016    • Anxiety    • Autoimmune disorder ([facility] )    • Diabetes mellitus type 1 ([facility] )    • Diabetic ketoacidosis without coma associated with type 1 diabetes mellitus ([facility] ) 2016   • Hypoglycemia due to type 1 diabetes mellitus ([facility] )    • Type 1 diabetes mellitus during pregnancy    • Urinary tract infection     last episode 2 yrs ago   • Varicella     childhood chickenpox       Past Surgical History:   Procedure Laterality Date   • [place] INJECTION LEFT SHOULDER (ARTHROGRAM)  2018   • [place]  DELIVERY ONLY N/A 2020    Procedure:  SECTION (); Surgeon: Herbie Burleson MD;  Location: [facility] ;  Service: Obstetrics   • NC ESOPHAGOGASTRODUODENOSCOPY TRANSORAL DIAGNOSTIC N/A 2019    Procedure: ESOPHAGOGASTRODUODENOSCOPY (EGD) with biopsy;  Surgeon: Giulia Domínguez DO;  Location: [facility] OR;  Service: Gastroenterology   • WISDOM TOOTH EXTRACTION Bilateral        Current Outpatient Medications   Medication Sig Dispense Refill   • Continuous Blood Gluc Sensor (Dexcom G6 Sensor) MISC Change every 10 days 3 each 3   • Continuous Blood Gluc Transmit (Dexcom G6 Transmitter) MISC Use once every 3 months 1 each 3   • Contour Next Test test strip TEST BLOOD SUGAR 8 TIMES DAILY 800 strip 1   • FLUoxetine (PROzac) 10 mg capsule TAKE 1 CAPSULE BY MOUTH EVERY DAY 90 capsule 1   • Glucagon 3 MG/DOSE POWD Use once as needed into each nostril   1 each 1   • Glucagon, rDNA, (Glucagon Emergency) 1 MG KIT Inject 1 mg into a muscle once for 1 dose 1 kit 1   • HumaLOG 100 UNIT/ML injection USE 70 UNITS VIA INSULIN PUMP DAILY AS DIRECTED 60 mL 3   • Insulin Disposable Pump (Omnipod 5 G6 Intro, Gen 5,) KIT Connect to Omnipod 5 1 kit 1   • Insulin Disposable Pump (Omnipod 5 G6 Pod, Gen 5,) MISC Change every 3 days  5 each 1   • Insulin Disposable Pump (Omnipod DASH Pods, Gen 4,) MISC 1 CARTRIDGE BY SUBCUTANEOUS INSULIN PUMP ROUTE EVERY 36 (THIRTY-SIX) HOURS 90 DAY SUPPLY  30 each 1   • Insulin Glargine Solostar (Basaglar KwikPen) 100 UNIT/ML SOPN 20 units When off pump or pump fails 6 mL 6   • insulin lispro (HumaLOG KwikPen) 100 units/mL injection pen 1 unit for every 10g of carb + 1 unit for every 40 above 120  Use incase of pump failure  15 mL 0   • Insulin Pen Needle (BD Pen Needle Bhumi U/F) 32G X 4 MM MISC Use daily 100 each 6   • Insulin Syringes, Disposable, U-100 1 ML MISC by Does not apply route daily 60 each 0   • pantoprazole (PROTONIX) 20 mg tablet TAKE 1 TABLET BY MOUTH DAILY BEFORE BREAKFAST  90 tablet 1   • URINE GLUCOSE-KETONES TEST VI by In Vitro route       No current facility-administered medications for this visit  Allergies   Allergen Reactions   • Reglan [Metoclopramide] Anxiety       Review of Systems   Constitutional: Negative  HENT: Negative  Eyes: Negative  Respiratory: Negative  Cardiovascular: Negative  Negative for chest pain  Gastrointestinal: Negative  Endocrine: Negative  Genitourinary: Negative  Negative for impotence  Musculoskeletal: Negative  Allergic/Immunologic: Negative  Neurological: Negative for dizziness  Hematological: Negative  Psychiatric/Behavioral: Negative  Negative for decreased concentration and suicidal ideas  The patient is not nervous/anxious  Video Exam    Vitals:    01/27/23 0901   Weight: 59 9 kg (132 lb)   Height: 4' 11" (1 499 m)       Physical Exam  Nursing note reviewed  HENT:      Nose: No congestion  Pulmonary:      Effort: Pulmonary effort is normal  No respiratory distress     Neurological:      Mental Status: She is alert and oriented to person, place, and time  Psychiatric:         Mood and Affect: Mood normal          Behavior: Behavior normal          Thought Content:  Thought content normal          Judgment: Judgment normal           I spent 10 minutes directly with the patient during this visit

## 2023-02-01 ENCOUNTER — HOSPITAL ENCOUNTER (EMERGENCY)
Facility: HOSPITAL | Age: 28
Discharge: HOME | End: 2023-02-01
Attending: EMERGENCY MEDICINE

## 2023-02-01 VITALS
WEIGHT: 128 LBS | BODY MASS INDEX: 25.8 KG/M2 | OXYGEN SATURATION: 99 % | HEART RATE: 89 BPM | TEMPERATURE: 97.6 F | SYSTOLIC BLOOD PRESSURE: 109 MMHG | DIASTOLIC BLOOD PRESSURE: 70 MMHG | HEIGHT: 59 IN | RESPIRATION RATE: 18 BRPM

## 2023-02-01 DIAGNOSIS — R11.2 NAUSEA VOMITING AND DIARRHEA: Primary | ICD-10-CM

## 2023-02-01 DIAGNOSIS — R19.7 NAUSEA VOMITING AND DIARRHEA: Primary | ICD-10-CM

## 2023-02-01 LAB
ALBUMIN SERPL BCP-MCNC: 4.1 G/DL (ref 3.5–5)
ALP SERPL-CCNC: 75 U/L (ref 46–116)
ALT SERPL W P-5'-P-CCNC: 20 U/L (ref 12–78)
ANION GAP SERPL CALCULATED.3IONS-SCNC: 7 MMOL/L (ref 4–13)
AST SERPL W P-5'-P-CCNC: 16 U/L (ref 5–45)
BACTERIA UR QL AUTO: ABNORMAL /HPF
BASOPHILS # BLD AUTO: 0.04 THOUSANDS/ÂΜL (ref 0–0.1)
BASOPHILS NFR BLD AUTO: 1 % (ref 0–1)
BILIRUB SERPL-MCNC: 0.6 MG/DL (ref 0.2–1)
BILIRUB UR QL STRIP: NEGATIVE
BUN SERPL-MCNC: 13 MG/DL (ref 5–25)
CALCIUM SERPL-MCNC: 8.9 MG/DL (ref 8.3–10.1)
CHLORIDE SERPL-SCNC: 101 MMOL/L (ref 96–108)
CLARITY UR: CLEAR
CO2 SERPL-SCNC: 28 MMOL/L (ref 21–32)
COLOR UR: YELLOW
CREAT SERPL-MCNC: 0.68 MG/DL (ref 0.6–1.3)
EOSINOPHIL # BLD AUTO: 0.08 THOUSAND/ÂΜL (ref 0–0.61)
EOSINOPHIL NFR BLD AUTO: 1 % (ref 0–6)
ERYTHROCYTE [DISTWIDTH] IN BLOOD BY AUTOMATED COUNT: 11.9 % (ref 11.6–15.1)
EXT PREGNANCY TEST URINE: NEGATIVE
EXT. CONTROL: NORMAL
FLUAV RNA RESP QL NAA+PROBE: NEGATIVE
FLUBV RNA RESP QL NAA+PROBE: NEGATIVE
GFR SERPL CREATININE-BSD FRML MDRD: 118 ML/MIN/1.73SQ M
GLUCOSE SERPL-MCNC: 122 MG/DL (ref 65–140)
GLUCOSE SERPL-MCNC: 147 MG/DL (ref 65–140)
GLUCOSE UR STRIP-MCNC: NEGATIVE MG/DL
HCT VFR BLD AUTO: 47.6 % (ref 34.8–46.1)
HGB BLD-MCNC: 15.7 G/DL (ref 11.5–15.4)
HGB UR QL STRIP.AUTO: NEGATIVE
IMM GRANULOCYTES # BLD AUTO: 0.01 THOUSAND/UL (ref 0–0.2)
IMM GRANULOCYTES NFR BLD AUTO: 0 % (ref 0–2)
KETONES UR STRIP-MCNC: ABNORMAL MG/DL
LEUKOCYTE ESTERASE UR QL STRIP: ABNORMAL
LYMPHOCYTES # BLD AUTO: 0.62 THOUSANDS/ÂΜL (ref 0.6–4.47)
LYMPHOCYTES NFR BLD AUTO: 8 % (ref 14–44)
MCH RBC QN AUTO: 31 PG (ref 26.8–34.3)
MCHC RBC AUTO-ENTMCNC: 33 G/DL (ref 31.4–37.4)
MCV RBC AUTO: 94 FL (ref 82–98)
MONOCYTES # BLD AUTO: 0.34 THOUSAND/ÂΜL (ref 0.17–1.22)
MONOCYTES NFR BLD AUTO: 4 % (ref 4–12)
MUCOUS THREADS UR QL AUTO: ABNORMAL
NEUTROPHILS # BLD AUTO: 6.9 THOUSANDS/ÂΜL (ref 1.85–7.62)
NEUTS SEG NFR BLD AUTO: 86 % (ref 43–75)
NITRITE UR QL STRIP: NEGATIVE
NON-SQ EPI CELLS URNS QL MICRO: ABNORMAL /HPF
NRBC BLD AUTO-RTO: 0 /100 WBCS
PH UR STRIP.AUTO: 5.5 [PH]
PLATELET # BLD AUTO: 261 THOUSANDS/UL (ref 149–390)
PMV BLD AUTO: 9.9 FL (ref 8.9–12.7)
POTASSIUM SERPL-SCNC: 4 MMOL/L (ref 3.5–5.3)
PROT SERPL-MCNC: 8.1 G/DL (ref 6.4–8.4)
PROT UR STRIP-MCNC: ABNORMAL MG/DL
RBC # BLD AUTO: 5.06 MILLION/UL (ref 3.81–5.12)
RBC #/AREA URNS AUTO: ABNORMAL /HPF
RSV RNA RESP QL NAA+PROBE: NEGATIVE
SARS-COV-2 RNA RESP QL NAA+PROBE: NEGATIVE
SODIUM SERPL-SCNC: 136 MMOL/L (ref 135–147)
SP GR UR STRIP.AUTO: >=1.03 (ref 1–1.03)
UROBILINOGEN UR STRIP-ACNC: <2 MG/DL
WBC # BLD AUTO: 7.99 THOUSAND/UL (ref 4.31–10.16)
WBC #/AREA URNS AUTO: ABNORMAL /HPF

## 2023-02-01 RX ORDER — ONDANSETRON 4 MG/1
4 TABLET, FILM COATED ORAL EVERY 6 HOURS PRN
Qty: 12 TABLET | Refills: 0 | Status: SHIPPED | OUTPATIENT
Start: 2023-02-01

## 2023-02-01 RX ORDER — ONDANSETRON 4 MG/1
4 TABLET, ORALLY DISINTEGRATING ORAL ONCE
Status: COMPLETED | OUTPATIENT
Start: 2023-02-01 | End: 2023-02-01

## 2023-02-01 RX ADMIN — ONDANSETRON 4 MG: 4 TABLET, ORALLY DISINTEGRATING ORAL at 20:16

## 2023-02-02 NOTE — ED PROVIDER NOTES
History  Chief Complaint   Patient presents with   • Vomiting     Pt c/o vomiting and diarrhea since 0800 this morning, denies fevers  Has dexcom and reports last BG of 124  DM type 1     Patient is a 49-year-old female with type 1 diabetes who presents with nausea, vomiting, diarrhea that started at 8 AM this morning  Patient reports that her child had the same symptoms for the last 3 days  She states that this morning at 8 AM she developed nausea and then subsequently throughout today has had multiple episodes of nonbloody, none bilious emesis as well as nonbloody, nonmucoid diarrhea  She states that she has been trying to drink fluids, but does vomit frequently after attempting to drink  She has not been able to eat  Denies any fevers, chills, abdominal pain, blood in the stools  Prior to Admission Medications   Prescriptions Last Dose Informant Patient Reported? Taking? Continuous Blood Gluc Sensor (Dexcom G6 Sensor) MISC   No No   Sig: Change every 10 days   Continuous Blood Gluc Transmit (Dexcom G6 Transmitter) MISC   No No   Sig: Use once every 3 months   Contour Next Test test strip   No No   Sig: TEST BLOOD SUGAR 8 TIMES DAILY   FLUoxetine (PROzac) 10 mg capsule   No No   Sig: TAKE 1 CAPSULE BY MOUTH EVERY DAY   Glucagon 3 MG/DOSE POWD   No No   Sig: Use once as needed into each nostril  Glucagon, rDNA, (Glucagon Emergency) 1 MG KIT   No No   Sig: Inject 1 mg into a muscle once for 1 dose   HumaLOG 100 UNIT/ML injection   No No   Sig: USE 70 UNITS VIA INSULIN PUMP DAILY AS DIRECTED   Insulin Disposable Pump (Omnipod 5 G6 Intro, Gen 5,) KIT   No No   Sig: Connect to Omnipod 5   Insulin Disposable Pump (Omnipod 5 G6 Pod, Gen 5,) MISC   No No   Sig: Change every 3 days  Insulin Disposable Pump (Omnipod DASH Pods, Gen 4,) MISC   No No   Si CARTRIDGE BY SUBCUTANEOUS INSULIN PUMP ROUTE EVERY 36 (THIRTY-SIX) HOURS 90 DAY SUPPLY     Insulin Glargine Solostar (Basaglar NeoPen) 100 UNIT/ML SOPN   No No   Si units When off pump or pump fails   Insulin Pen Needle (BD Pen Needle Bhumi U/F) 32G X 4 MM MISC   No No   Sig: Use daily   Insulin Syringes, Disposable, U-100 1 ML MISC   No No   Sig: by Does not apply route daily   URINE GLUCOSE-KETONES TEST VI   Yes No   Sig: by In Vitro route   insulin lispro (HumaLOG KwikPen) 100 units/mL injection pen   No No   Si unit for every 10g of carb + 1 unit for every 40 above 120  Use incase of pump failure  pantoprazole (PROTONIX) 20 mg tablet   No No   Sig: TAKE 1 TABLET BY MOUTH DAILY BEFORE BREAKFAST  Facility-Administered Medications: None       Past Medical History:   Diagnosis Date   • Allergic dermatitis     Resolved 3/2/2016    • Anxiety    • Autoimmune disorder ([facility] )    • Diabetes mellitus type 1 ([facility] )    • Diabetic ketoacidosis without coma associated with type 1 diabetes mellitus ([facility] ) 2016   • Hypoglycemia due to type 1 diabetes mellitus (HCC)    • Type 1 diabetes mellitus during pregnancy    • Urinary tract infection     last episode 2 yrs ago   • Varicella     childhood chickenpox       Past Surgical History:   Procedure Laterality Date   • FL INJECTION LEFT SHOULDER (ARTHROGRAM)  2018   • CT  DELIVERY ONLY N/A 2020    Procedure:  SECTION ();   Surgeon: Yamila Carrizales MD;  Location: [facility] ;  Service: Obstetrics   • CT ESOPHAGOGASTRODUODENOSCOPY TRANSORAL DIAGNOSTIC N/A 2019    Procedure: ESOPHAGOGASTRODUODENOSCOPY (EGD) with biopsy;  Surgeon: Alexandria Magaña DO;  Location: [facility];  Service: Gastroenterology   • WISDOM TOOTH EXTRACTION Bilateral        Family History   Problem Relation Age of Onset   • Anxiety disorder Mother    • No Known Problems Father    • Anxiety disorder Sister    • Anxiety disorder Maternal Grandmother    • Cancer Maternal Grandmother    • Anxiety disorder Maternal Aunt    • Hyperlipidemia Family    • Diabetes Family    • No Known Problems Maternal Grandfather    • No Known Problems Paternal Grandmother    • No Known Problems Paternal Grandfather      I have reviewed and agree with the history as documented  E-Cigarette/Vaping   • E-Cigarette Use Never User      E-Cigarette/Vaping Substances   • Nicotine No    • THC No    • CBD No    • Flavoring No    • Other No    • Unknown No      Social History     Tobacco Use   • Smoking status: Former     Packs/day: 0 50     Years: 4 00     Pack years: 2 00     Types: Cigarettes     Quit date: 2017     Years since quittin 8   • Smokeless tobacco: Never   Vaping Use   • Vaping Use: Never used   Substance Use Topics   • Alcohol use: Not Currently     Comment: rare    • Drug use: No       Review of Systems   Constitutional: Negative for chills and fever  Cardiovascular: Negative for chest pain and palpitations  Gastrointestinal: Positive for diarrhea, nausea and vomiting  Negative for abdominal distention, abdominal pain, blood in stool and constipation  Genitourinary: Negative for decreased urine volume, dysuria, flank pain, frequency, hematuria and urgency  Skin: Negative for color change and pallor  Neurological: Negative for dizziness and light-headedness  Physical Exam  Physical Exam  Vitals and nursing note reviewed  Constitutional:       General: She is not in acute distress  Appearance: Normal appearance  She is not ill-appearing, toxic-appearing or diaphoretic  HENT:      Head: Normocephalic and atraumatic  Mouth/Throat:      Mouth: Mucous membranes are moist    Eyes:      Conjunctiva/sclera: Conjunctivae normal       Pupils: Pupils are equal, round, and reactive to light  Cardiovascular:      Rate and Rhythm: Normal rate and regular rhythm  Pulmonary:      Effort: Pulmonary effort is normal  No respiratory distress  Breath sounds: Normal breath sounds  No stridor  No wheezing, rhonchi or rales  Chest:      Chest wall: No tenderness     Abdominal:      General: Bowel sounds are normal  There is no distension  Palpations: Abdomen is soft  Tenderness: There is no abdominal tenderness  There is no right CVA tenderness, left CVA tenderness, guarding or rebound  Musculoskeletal:      Cervical back: Neck supple  Skin:     General: Skin is warm and dry  Neurological:      General: No focal deficit present  Mental Status: She is alert and oriented to person, place, and time  Mental status is at baseline  Psychiatric:         Mood and Affect: Mood normal          Behavior: Behavior normal          Vital Signs  ED Triage Vitals [02/01/23 2000]   Temperature Pulse Respirations Blood Pressure SpO2   97 6 °F (36 4 °C) 89 18 109/70 99 %      Temp Source Heart Rate Source Patient Position - Orthostatic VS BP Location FiO2 (%)   Temporal Monitor -- Left arm --      Pain Score       7           Vitals:    02/01/23 2000   BP: 109/70   Pulse: 89         Visual Acuity      ED Medications  Medications   ondansetron (ZOFRAN-ODT) dispersible tablet 4 mg (4 mg Oral Given 2/1/23 2016)       Diagnostic Studies  Results Reviewed     Procedure Component Value Units Date/Time    Urine culture [[id number]] Collected: 02/01/23 2025    Lab Status: In process Specimen: Urine, Clean Catch Updated: 02/01/23 2219    FLU/RSV/COVID - if FLU/RSV clinically relevant [[id number]]  (Normal) Collected: 02/01/23 2015    Lab Status: Final result Specimen: Nares from Nose Updated: 02/01/23 2102     SARS-CoV-2 Negative     INFLUENZA A PCR Negative     INFLUENZA B PCR Negative     RSV PCR Negative    Narrative:      FOR PEDIATRIC PATIENTS - copy/paste COVID Guidelines URL to browser: [email or web address]  [email or web address]    SARS-CoV-2 assay is a Nucleic Acid Amplification assay intended for the  qualitative detection of nucleic acid from SARS-CoV-2 in nasopharyngeal  swabs  Results are for the presumptive identification of SARS-CoV-2 RNA      Positive results are indicative of infection with SARS-CoV-2, the virus  causing COVID-19, but do not rule out bacterial infection or co-infection  with other viruses  Laboratories within the [place] and its  territories are required to report all positive results to the appropriate  public health authorities  Negative results do not preclude SARS-CoV-2  infection and should not be used as the sole basis for treatment or other  patient management decisions  Negative results must be combined with  clinical observations, patient history, and epidemiological information  This test has not been FDA cleared or approved  This test has been authorized by FDA under an Emergency Use Authorization  (EUA)  This test is only authorized for the duration of time the  declaration that circumstances exist justifying the authorization of the  emergency use of an in vitro diagnostic tests for detection of SARS-CoV-2  virus and/or diagnosis of COVID-19 infection under section 564(b)(1) of  the Act, 21 U  S C  570TWF-7(Q)(0), unless the authorization is terminated  or revoked sooner  The test has been validated but independent review by FDA  and CLIA is pending  Test performed using "[employer] GeneXpert: This RT-PCR assay targets N2,  a region unique to SARS-CoV-2  A conserved region in the E-gene was chosen  for pan-Sarbecovirus detection which includes SARS-CoV-2  According to CMS-2020-01-R, this platform meets the definition of high-throughput technology      Comprehensive metabolic panel [[id number]]  (Abnormal) Collected: 02/01/23 2013    Lab Status: Final result Specimen: Blood from Arm, Left Updated: 02/01/23 2101     Sodium 136 mmol/L      Potassium 4 0 mmol/L      Chloride 101 mmol/L      CO2 28 mmol/L      ANION GAP 7 mmol/L      BUN 13 mg/dL      Creatinine 0 68 mg/dL      Glucose 147 mg/dL      Calcium 8 9 mg/dL      AST 16 U/L      ALT 20 U/L      Alkaline Phosphatase 75 U/L      Total Protein 8 1 g/dL      Albumin 4 1 g/dL      Total Bilirubin 0 60 mg/dL      eGFR 118 ml/min/1 73sq m     Narrative:      [place] guidelines for Chronic Kidney Disease (CKD):   •  Stage 1 with normal or high GFR (GFR > 90 mL/min/1 73 square meters)  •  Stage 2 Mild CKD (GFR = 60-89 mL/min/1 73 square meters)  •  Stage 3A Moderate CKD (GFR = 45-59 mL/min/1 73 square meters)  •  Stage 3B Moderate CKD (GFR = 30-44 mL/min/1 73 square meters)  •  Stage 4 Severe CKD (GFR = 15-29 mL/min/1 73 square meters)  •  Stage 5 End Stage CKD (GFR <15 mL/min/1 73 square meters)  Note: GFR calculation is accurate only with a steady state creatinine    Urine Microscopic [[id number]]  (Abnormal) Collected: 02/01/23 2025    Lab Status: Final result Specimen: Urine, Clean Catch Updated: 02/01/23 2055     RBC, UA None Seen /hpf      WBC, UA 4-10 /hpf      Epithelial Cells Moderate /hpf      Bacteria, UA None Seen /hpf      MUCUS THREADS Moderate    UA w Reflex to Microscopic w Reflex to Culture [[id number]]  (Abnormal) Collected: 02/01/23 2025    Lab Status: Final result Specimen: Urine, Clean Catch Updated: 02/01/23 2045     Color, UA Yellow     Clarity, UA Clear     Specific Gravity, UA >=1 030     pH, UA 5 5     Leukocytes, UA Trace     Nitrite, UA Negative     Protein, UA Trace mg/dl      Glucose, UA Negative mg/dl      Ketones, UA 40 (2+) mg/dl      Urobilinogen, UA <2 0 mg/dl      Bilirubin, UA Negative     Occult Blood, UA Negative    POCT pregnancy, urine [[id number]]  (Normal) Resulted: 02/01/23 2026    Lab Status: Final result Updated: 02/01/23 2026     EXT Preg Test, Ur Negative     Control Valid    CBC and differential [[id number]]  (Abnormal) Collected: 02/01/23 2013    Lab Status: Final result Specimen: Blood from Arm, Left Updated: 02/01/23 2023     WBC 7 99 Thousand/uL      RBC 5 06 Million/uL      Hemoglobin 15 7 g/dL      Hematocrit 47 6 %      MCV 94 fL      MCH 31 0 pg      MCHC 33 0 g/dL      RDW 11 9 %      MPV 9 9 fL      Platelets 360 Thousands/uL nRBC 0 /100 WBCs      Neutrophils Relative 86 %      Immat GRANS % 0 %      Lymphocytes Relative 8 %      Monocytes Relative 4 %      Eosinophils Relative 1 %      Basophils Relative 1 %      Neutrophils Absolute 6 90 Thousands/µL      Immature Grans Absolute 0 01 Thousand/uL      Lymphocytes Absolute 0 62 Thousands/µL      Monocytes Absolute 0 34 Thousand/µL      Eosinophils Absolute 0 08 Thousand/µL      Basophils Absolute 0 04 Thousands/µL     Fingerstick Glucose (POCT) [[id number]]  (Normal) Collected: 02/01/23 2008    Lab Status: Final result Updated: 02/01/23 2009     POC Glucose 122 mg/dl                  No orders to display              Procedures  Procedures         ED Course                                             Medical Decision Making  Assessment and Plan:   Nausea, vomiting, diarrhea  Differential includes gastroenteritis v  gastritis v  DKA in setting of type 1 diabetes v  Dehydration  Will check labs to assess  On exam, patient is well appearing, non-toxic, no acute distress, benign abdomen without tenderness  Labs are wnl  No DKA  Urine has 4-10 WBCs but no bacteria  Sent for culture  Patient not having any symptoms of UTI  WIll defer treatment unless culture is positive- which I discussed with patient  Patient felt improved with zofran  Counseled patient regarding discharge instructions and strict follow-up precautions  Patient verbalized understanding  Nausea vomiting and diarrhea: acute illness or injury  Amount and/or Complexity of Data Reviewed  Labs: ordered  Risk  Prescription drug management            Disposition  Final diagnoses:   Nausea vomiting and diarrhea     Time reflects when diagnosis was documented in both MDM as applicable and the Disposition within this note     Time User Action Codes Description Comment    2/1/2023 10:12 PM Imer Feng Add [R11 2,  R19 7] Nausea vomiting and diarrhea       ED Disposition     ED Disposition   Discharge Condition   Stable    Date/Time   Wed Feb 1, 2023 10:12 PM    Comment   Chandrika Solo discharge to home/self care  Follow-up Information     Follow up With Specialties Details Why Contact Info Additional [address], [address], Nurse Practitioner Schedule an appointment as soon as possible for a visit in 3 days for re-evaluation [address]  [place] [address] [facility] [phone]        [address] Emergency Department Emergency Medicine Go to  As needed, If symptoms worsen, for re-evaluation [address] [place],[phone]  [address] [facility], [address] [facility], [address] [facility], [address]          Discharge Medication List as of 2/1/2023 10:15 PM      START taking these medications    Details   ondansetron (ZOFRAN) 4 mg tablet Take 1 tablet (4 mg total) by mouth every 6 (six) hours as needed for nausea or vomiting, Starting Wed 2/1/2023, Normal         CONTINUE these medications which have NOT CHANGED    Details   Continuous Blood Gluc Sensor (Dexcom G6 Sensor) MISC Change every 10 days, Normal      Continuous Blood Gluc Transmit (Dexcom G6 Transmitter) MISC Use once every 3 months, Normal      Contour Next Test test strip TEST BLOOD SUGAR 8 TIMES DAILY, Normal      FLUoxetine (PROzac) 10 mg capsule TAKE 1 CAPSULE BY MOUTH EVERY DAY, Normal      Glucagon 3 MG/DOSE POWD Use once as needed into each nostril , Normal      Glucagon, rDNA, (Glucagon Emergency) 1 MG KIT Inject 1 mg into a muscle once for 1 dose, Starting Mon 2/28/2022, Normal      HumaLOG 100 UNIT/ML injection USE 70 UNITS VIA INSULIN PUMP DAILY AS DIRECTED, Normal      Insulin Disposable Pump (Omnipod 5 G6 Intro, Gen 5,) KIT Connect to Omnipod 5, Normal      !! Insulin Disposable Pump (Omnipod 5 G6 Pod, Gen 5,) MISC Change every 3 days  , Normal      !!  Insulin Disposable Pump (Omnipod DASH Pods, Gen 4,) MISC 1 CARTRIDGE BY SUBCUTANEOUS INSULIN PUMP ROUTE EVERY 36 (THIRTY-SIX) HOURS 90 DAY SUPPLY  , Starting Tue 5/24/2022, Normal      Insulin Glargine Solostar (Basaglar KwikPen) 100 UNIT/ML SOPN 20 units When off pump or pump fails, Normal      insulin lispro (HumaLOG KwikPen) 100 units/mL injection pen 1 unit for every 10g of carb + 1 unit for every 40 above 120  Use incase of pump failure , Normal      Insulin Pen Needle (BD Pen Needle Bhumi U/F) 32G X 4 MM MISC Use daily, Starting Wed 8/17/2022, Normal      Insulin Syringes, Disposable, U-100 1 ML MISC by Does not apply route daily, Starting Wed 9/30/2020, Normal      pantoprazole (PROTONIX) 20 mg tablet TAKE 1 TABLET BY MOUTH DAILY BEFORE BREAKFAST , Normal      URINE GLUCOSE-KETONES TEST VI by In Vitro route, Historical Med       !! - Potential duplicate medications found  Please discuss with provider  No discharge procedures on file      PDMP Review     None          ED Provider  Electronically Signed by           Shira Prince DO  02/01/23 0310

## 2023-02-03 LAB — BACTERIA UR CULT: NORMAL

## 2023-02-05 DIAGNOSIS — E10.65 TYPE 1 DIABETES MELLITUS WITH HYPERGLYCEMIA (HCC): ICD-10-CM

## 2023-02-08 ENCOUNTER — OFFICE VISIT (OUTPATIENT)
Dept: ENDOCRINOLOGY | Facility: CLINIC | Age: 28
End: 2023-02-08

## 2023-02-08 VITALS
SYSTOLIC BLOOD PRESSURE: 122 MMHG | HEART RATE: 95 BPM | WEIGHT: 136 LBS | DIASTOLIC BLOOD PRESSURE: 80 MMHG | BODY MASS INDEX: 27.42 KG/M2 | HEIGHT: 59 IN

## 2023-02-08 DIAGNOSIS — Z96.41 INSULIN PUMP IN PLACE: ICD-10-CM

## 2023-02-08 DIAGNOSIS — E10.65 TYPE 1 DIABETES MELLITUS WITH HYPERGLYCEMIA (HCC): Primary | ICD-10-CM

## 2023-02-08 LAB — SL AMB POCT HEMOGLOBIN AIC: 8.4 (ref ?–6.5)

## 2023-02-08 RX ORDER — GLUCAGON 3 MG/1
POWDER NASAL
Qty: 1 EACH | Refills: 1 | Status: SHIPPED | OUTPATIENT
Start: 2023-02-08

## 2023-02-08 NOTE — ASSESSMENT & PLAN NOTE
HGA1C uncontrolled, BGs elevated, with occasional hypoglycemia between 2-6pm    Treatment regimen:   - She is not bolusing for carbs, sometimes once a day she will bolus  Stressed importance of regular meals and bolusing for carbs and highs for correction  - Work on timing of bolusing, she many times will bolus after eating causing high spikes and then lows  - Reviewed importance of changing sites of Omnipod to decrease risk of site reactions - try using abdomen  Discussed risks/complications associated with uncontrolled diabetes  Advised to adhere to diabetic diet, and recommended staying active/exercising routinely  Keep carbohydrates consistent to limit blood glucose fluctuations  Advised to call if blood sugars less than 70 mg/dl or over 300 mg/dl  Discussed symptoms and treatment of hypoglycemia  Recommended routine follow-up with podiatry and ophthalmology  Ordered blood work to complete prior to next visit    Lab Results   Component Value Date    HGBA1C 8 4 (A) 02/08/2023

## 2023-02-08 NOTE — PROGRESS NOTES
Established Patient Progress Note      Chief Complaint   Patient presents with   • Diabetes Type 1        History of Present Illness:   Madisyn Resendiz is a 32 y o  female with type 1 diabetes with long term use of insulin for 18 years  Last seen in the office 3/7/22  Reports complications of none  Last A1C was 8 4  Denies recent illness or hospitalizations  Denies recent severe hypoglycemic or severe hyperglycemic episodes  Denies any issues with her current regimen  Home glucose monitoring: are performed regularly with CGM  Was in ED with N/V 2/1/23  She had stomach virus  She denies any recent DKA episodes  Patient is on a Omnipod 5 pump prescribed by our office  She has been on insulin pump for many years  She recent started on Omnipod 5 in October 2022  She does report site reaction at [facility]  Current Insulin pump settings:  Basal rate: 12am: 0 50, 9am: 1, 11am: 1 1, 2pm: 1, 10:30pm: 0 5  Insulin to carb ratio: 10  Insulin sensitivity factor: 40  BG target: 120  Active Insulin time: 2 5     Type of insulin:  Humalog      Backup Plan: patient aware that in case of malfunctioning of the pump or unexplained hyperglycemia to use basal and bolus therapy as backup which is prescribed to the patient  Also notified patient to call clinic and/or pump company if any issues or go to emergency department if signs/symptoms of DKA      Madisyn Resendiz   Device used: Dexcom   Home use   Indication: Type 1 Diabetes  More than 72 hours of data was reviewed  Report to be scanned to chart  Date Range: 1/26/23-2/8/23  Analysis of data:   Average Glucose: 193  Coefficient of Variation: 42 1%   SD : 81   Time in Target Range: 45%   Time Above Range: 27% high, 24% very high    Time Below Range: 4% low   Interpretation of data: Hyperglycemia mainly during the day between 9am-12pm and again at night   Some hypoglycemia between 2pm-6pm      Last Eye Exam: needs to schedule  Last Foot Exam: done today     Patient Active Problem List   Diagnosis   • Type 1 diabetes mellitus with hyperglycemia (HCC)   • Chronic GERD   • Insulin pump in place   • Anxiety, generalized   • Hypoglycemia due to type 1 diabetes mellitus (HCC)   • Insulin pump titration   • History of diabetes with ketoacidosis   • S/P primary low transverse    • Mastitis, right, acute      Past Medical History:   Diagnosis Date   • Allergic dermatitis     Resolved 3/2/2016    • Anxiety    • Autoimmune disorder ([facility] )    • Diabetes mellitus type 1 ([facility] )    • Diabetic ketoacidosis without coma associated with type 1 diabetes mellitus ([facility] ) 2016   • Hypoglycemia due to type 1 diabetes mellitus ([facility] )    • Type 1 diabetes mellitus during pregnancy    • Urinary tract infection     last episode 2 yrs ago   • Varicella     childhood chickenpox      Past Surgical History:   Procedure Laterality Date   • [place] INJECTION LEFT SHOULDER (ARTHROGRAM)  2018   • [place]  DELIVERY ONLY N/A 2020    Procedure:  SECTION ();   Surgeon: Ish Garza MD;  Location: [facility] ;  Service: Obstetrics   • [place] ESOPHAGOGASTRODUODENOSCOPY TRANSORAL DIAGNOSTIC N/A 2019    Procedure: ESOPHAGOGASTRODUODENOSCOPY (EGD) with biopsy;  Surgeon: Ning Loo DO;  Location: [facility] OR;  Service: Gastroenterology   • WISDOM TOOTH EXTRACTION Bilateral       Family History   Problem Relation Age of Onset   • Anxiety disorder Mother    • No Known Problems Father    • Anxiety disorder Sister    • Anxiety disorder Maternal Grandmother    • Cancer Maternal Grandmother    • Anxiety disorder Maternal Aunt    • Hyperlipidemia Family    • Diabetes Family    • No Known Problems Maternal Grandfather    • No Known Problems Paternal Grandmother    • No Known Problems Paternal Grandfather    • Diabetes type I Family         Great aunt     Social History     Tobacco Use   • Smoking status: Former     Packs/day: 0 50     Years: 4 00     Pack years: 2 00     Types: Cigarettes     Quit date: 2017     Years since quittin 8   • Smokeless tobacco: Never   Substance Use Topics   • Alcohol use: Yes     Alcohol/week: 2 0 standard drinks     Types: 2 Glasses of wine per week     Comment: rare      Allergies   Allergen Reactions   • Reglan [Metoclopramide] Anxiety         Current Outpatient Medications:   •  Continuous Blood Gluc Sensor (Dexcom G6 Sensor) MISC, Change every 10 days, Disp: 3 each, Rfl: 3  •  Continuous Blood Gluc Transmit (Dexcom G6 Transmitter) MISC, Use once every 3 months, Disp: 1 each, Rfl: 3  •  Contour Next Test test strip, TEST BLOOD SUGAR 8 TIMES DAILY, Disp: 800 strip, Rfl: 1  •  FLUoxetine (PROzac) 10 mg capsule, TAKE 1 CAPSULE BY MOUTH EVERY DAY, Disp: 90 capsule, Rfl: 1  •  HumaLOG 100 UNIT/ML injection, USE 70 UNITS VIA INSULIN PUMP DAILY AS DIRECTED, Disp: 60 mL, Rfl: 3  •  Insulin Disposable Pump (Omnipod 5 G6 Intro, Gen 5,) KIT, Connect to Omnipod 5, Disp: 1 kit, Rfl: 1  •  Insulin Disposable Pump (Omnipod 5 G6 Pod, Gen 5,) MISC, Change every 3 days  , Disp: 5 each, Rfl: 1  •  pantoprazole (PROTONIX) 20 mg tablet, TAKE 1 TABLET BY MOUTH DAILY BEFORE BREAKFAST , Disp: 90 tablet, Rfl: 1  •  URINE GLUCOSE-KETONES TEST VI, by In Vitro route, Disp: , Rfl:   •  Baqsimi One Pack 3 MG/DOSE POWD, USE ONCE AS NEEDED INTO EACH NOSTRIL , Disp: 1 each, Rfl: 1  •  Insulin Glargine Solostar (Basaglar KwikPen) 100 UNIT/ML SOPN, 20 units When off pump or pump fails (Patient not taking: Reported on 2023), Disp: 6 mL, Rfl: 6  •  insulin lispro (HumaLOG KwikPen) 100 units/mL injection pen, 1 unit for every 10g of carb + 1 unit for every 40 above 120  Use incase of pump failure   (Patient not taking: Reported on 2023), Disp: 15 mL, Rfl: 0  •  Insulin Pen Needle (BD Pen Needle Bhumi U/F) 32G X 4 MM MISC, Use daily (Patient not taking: Reported on 2023), Disp: 100 each, Rfl: 6  •  Insulin Syringes, Disposable, U-100 1 ML MISC, by Does not apply route daily (Patient not taking: Reported on 2/8/2023), Disp: 60 each, Rfl: 0  •  ondansetron (ZOFRAN) 4 mg tablet, Take 1 tablet (4 mg total) by mouth every 6 (six) hours as needed for nausea or vomiting (Patient not taking: Reported on 2/8/2023), Disp: 12 tablet, Rfl: 0    Review of Systems   Constitutional: Negative for activity change, appetite change, chills, diaphoresis, fatigue, fever and unexpected weight change  Eyes: Negative for visual disturbance  Respiratory: Negative for chest tightness and shortness of breath  Cardiovascular: Negative for chest pain, palpitations and leg swelling  Gastrointestinal: Negative for abdominal pain, constipation, diarrhea, nausea and vomiting  Endocrine: Negative for polydipsia, polyphagia and polyuria  Skin: Negative for color change, pallor, rash and wound  Neurological: Negative for dizziness, tremors, weakness, light-headedness and numbness  All other systems reviewed and are negative  Physical Exam:  Body mass index is 27 47 kg/m²  /80 (BP Location: Left arm, Patient Position: Sitting, Cuff Size: Standard)   Pulse 95   Ht 4' 11" (1 499 m)   Wt 61 7 kg (136 lb)   LMP 01/22/2023 (Exact Date)   BMI 27 47 kg/m²    Wt Readings from Last 3 Encounters:   02/08/23 61 7 kg (136 lb)   02/01/23 58 1 kg (128 lb)   01/27/23 59 9 kg (132 lb)       Physical Exam  Vitals reviewed  Constitutional:       Appearance: Normal appearance  HENT:      Head: Normocephalic  Cardiovascular:      Rate and Rhythm: Normal rate and regular rhythm  Pulses: Normal pulses  no weak pulses          Dorsalis pedis pulses are 2+ on the right side and 2+ on the left side  Heart sounds: Normal heart sounds  No murmur heard  Pulmonary:      Effort: Pulmonary effort is normal  No respiratory distress  Breath sounds: Normal breath sounds  No wheezing, rhonchi or rales  Musculoskeletal:      Right lower leg: No edema  Left lower leg: No edema     Feet:      Right foot:      Skin integrity: No ulcer, skin breakdown, erythema, warmth, callus or dry skin  Left foot:      Skin integrity: No ulcer, skin breakdown, erythema, warmth, callus or dry skin  Skin:     General: Skin is warm and dry  Neurological:      Mental Status: She is alert and oriented to person, place, and time  Psychiatric:         Mood and Affect: Mood normal          Behavior: Behavior normal          Thought Content: Thought content normal          Judgment: Judgment normal        Patient's shoes and socks removed  Right Foot/Ankle   Right Foot Inspection  Skin Exam: skin normal and skin intact  No dry skin, no warmth, no callus, no erythema, no maceration, no abnormal color, no pre-ulcer, no ulcer and no callus  Toe Exam: ROM and strength within normal limits  No swelling, no tenderness, erythema and  no right toe deformity    Sensory   Vibration: intact  Monofilament testing: intact    Vascular  Capillary refills: < 3 seconds  The right DP pulse is 2+  Left Foot/Ankle  Left Foot Inspection  Skin Exam: skin normal and skin intact  No dry skin, no warmth, no erythema, no maceration, normal color, no pre-ulcer, no ulcer and no callus  Toe Exam: ROM and strength within normal limits  No swelling, no tenderness, no erythema and no left toe deformity  Sensory   Vibration: intact  Monofilament testing: intact    Vascular  Capillary refills: < 3 seconds  The left DP pulse is 2+       Assign Risk Category  No deformity present  No loss of protective sensation  No weak pulses  Risk: 0      Labs:   Lab Results   Component Value Date    HGBA1C 8 4 (A) 02/08/2023    HGBA1C 8 9 (A) 03/10/2021    HGBA1C 7 5 (H) 06/30/2020     Lab Results   Component Value Date    CREATININE 0 68 02/01/2023    CREATININE 0 78 08/08/2020    CREATININE 0 53 (L) 08/05/2020    BUN 13 02/01/2023     05/15/2014    K 4 0 02/01/2023     02/01/2023    CO2 28 02/01/2023     eGFR   Date Value Ref Range Status   02/01/2023 118 ml/min/1 73sq m Final   07/08/2019 126 ml/min/1 73sq m Final     Lab Results   Component Value Date    HDL 52 06/25/2019    TRIG 95 06/25/2019     Lab Results   Component Value Date    ALT 20 02/01/2023    AST 16 02/01/2023    ALKPHOS 75 02/01/2023    BILITOT 0 5 05/15/2014     Lab Results   Component Value Date    RPZ3WDUQYJTW 1 360 02/03/2020    GBY2TSGIWUTR 0 917 06/25/2019    GZA1VCEREJLM 3 647 02/25/2016     Lab Results   Component Value Date    FREET4 0 96 02/03/2020       Impression & Plan:    Problem List Items Addressed This Visit        Endocrine    Type 1 diabetes mellitus with hyperglycemia ([facility] [address] ) - Primary     HGA1C uncontrolled, BGs elevated, with occasional hypoglycemia between 2-6pm    Treatment regimen:   - She is not bolusing for carbs, sometimes once a day she will bolus  Stressed importance of regular meals and bolusing for carbs and highs for correction  - Work on timing of bolusing, she many times will bolus after eating causing high spikes and then lows  - Reviewed importance of changing sites of Omnipod to decrease risk of site reactions - try using abdomen  Discussed risks/complications associated with uncontrolled diabetes  Advised to adhere to diabetic diet, and recommended staying active/exercising routinely  Keep carbohydrates consistent to limit blood glucose fluctuations  Advised to call if blood sugars less than 70 mg/dl or over 300 mg/dl  Discussed symptoms and treatment of hypoglycemia  Recommended routine follow-up with podiatry and ophthalmology  Ordered blood work to complete prior to next visit    Lab Results   Component Value Date    HGBA1C 8 4 (A) 02/08/2023            Relevant Orders    POCT hemoglobin A1c (Completed)    Comprehensive metabolic panel    Microalbumin / creatinine urine ratio    Lipid panel       Other    Insulin pump in place       Orders Placed This Encounter   Procedures   • Comprehensive metabolic panel     This is a patient instruction: Patient fasting for 8 hours or longer recommended  Standing Status:   Future     Standing Expiration Date:   2/8/2024   • Microalbumin / creatinine urine ratio     Standing Status:   Future     Standing Expiration Date:   2/8/2024   • Lipid panel     This is a patient instruction: This test requires patient fasting for 10-12 hours or longer  Drinking of black coffee or black tea is acceptable  Standing Status:   Future     Standing Expiration Date:   2/8/2024   • POCT hemoglobin A1c       There are no Patient Instructions on file for this visit  Discussed with the patient and all questioned fully answered  She will call me if any problems arise      ADILENE Rodriguez

## 2023-02-12 DIAGNOSIS — E10.65 TYPE 1 DIABETES MELLITUS WITH HYPERGLYCEMIA (HCC): ICD-10-CM

## 2023-02-13 RX ORDER — INSULIN PMP CART,AUT,G6/7,CNTR
EACH SUBCUTANEOUS
Qty: 10 EACH | Refills: 1 | Status: SHIPPED | OUTPATIENT
Start: 2023-02-13

## 2023-03-15 DIAGNOSIS — E10.65 TYPE 1 DIABETES MELLITUS WITH HYPERGLYCEMIA (HCC): ICD-10-CM

## 2023-03-16 RX ORDER — INSULIN PMP CART,AUT,G6/7,CNTR
EACH SUBCUTANEOUS
Qty: 10 EACH | Refills: 1 | Status: SHIPPED | OUTPATIENT
Start: 2023-03-16

## 2023-03-22 ENCOUNTER — OFFICE VISIT (OUTPATIENT)
Dept: FAMILY MEDICINE CLINIC | Facility: CLINIC | Age: 28
End: 2023-03-22

## 2023-03-22 VITALS
BODY MASS INDEX: 28.02 KG/M2 | SYSTOLIC BLOOD PRESSURE: 128 MMHG | DIASTOLIC BLOOD PRESSURE: 82 MMHG | RESPIRATION RATE: 17 BRPM | HEIGHT: 59 IN | TEMPERATURE: 97.8 F | WEIGHT: 139 LBS | OXYGEN SATURATION: 99 % | HEART RATE: 75 BPM

## 2023-03-22 DIAGNOSIS — S05.01XA ABRASION OF RIGHT CORNEA, INITIAL ENCOUNTER: Primary | ICD-10-CM

## 2023-03-22 RX ORDER — OFLOXACIN 3 MG/ML
2 SOLUTION/ DROPS OPHTHALMIC 4 TIMES DAILY
Qty: 5 ML | Refills: 0 | Status: SHIPPED | OUTPATIENT
Start: 2023-03-22

## 2023-03-22 NOTE — PROGRESS NOTES
Name: Chiara Hernandez      : 1995      MRN: [id number]  Encounter Provider: Savi Serrano PA-C  Encounter Date: 3/22/2023   Encounter department: [facility]    Assessment & Plan     1  Abrasion of right cornea, initial encounter  -     ofloxacin (OCUFLOX) 0 3 % ophthalmic solution; Administer 2 drops to the right eye 4 (four) times a day      Patient will complete course of ofloxacin antibiotic drops  Patient states symptoms are gradually improving  Will call with any questions, concerns persistent or worsening symptoms  Subjective      HPI   29-year-old female here today with complaint of right eye redness due to working on a chicken coop yesterday using an air compressor getting dirt into her eye  Patient states irrigated her eye several times and symptoms are gradually improving  Did have some significant photophobia which is improving as well  Denies any eye pain and the blurry vision she had yesterday is improving  Review of Systems  As per HPI  All other systems negative  Current Outpatient Medications on File Prior to Visit   Medication Sig   • Baqsimi One Pack 3 MG/DOSE POWD USE ONCE AS NEEDED INTO EACH NOSTRIL  • Continuous Blood Gluc Sensor (Dexcom G6 Sensor) MISC Change every 10 days   • Continuous Blood Gluc Transmit (Dexcom G6 Transmitter) MISC Use once every 3 months   • Contour Next Test test strip TEST BLOOD SUGAR 8 TIMES DAILY   • FLUoxetine (PROzac) 10 mg capsule TAKE 1 CAPSULE BY MOUTH EVERY DAY   • HumaLOG 100 UNIT/ML injection USE 70 UNITS VIA INSULIN PUMP DAILY AS DIRECTED   • Insulin Disposable Pump (Omnipod 5 G6 Intro, Gen 5,) KIT Connect to Omnipod 5   • Insulin Disposable Pump (Omnipod 5 G6 Pod, Gen 5,) MISC CHANGE EVERY 3 DAYS     • Insulin Glargine Solostar (Basaglar KwikPen) 100 UNIT/ML SOPN 20 units When off pump or pump fails   • insulin lispro (HumaLOG KwikPen) 100 units/mL injection pen 1 unit for every 10g of carb + 1 unit for every 40 above 120  Use incase of pump failure  • Insulin Pen Needle (BD Pen Needle Bhumi U/F) 32G X 4 MM MISC Use daily   • Insulin Syringes, Disposable, U-100 1 ML MISC by Does not apply route daily   • ondansetron (ZOFRAN) 4 mg tablet Take 1 tablet (4 mg total) by mouth every 6 (six) hours as needed for nausea or vomiting   • pantoprazole (PROTONIX) 20 mg tablet TAKE 1 TABLET BY MOUTH DAILY BEFORE BREAKFAST  • URINE GLUCOSE-KETONES TEST VI by In Vitro route       Objective     /82 (BP Location: Right arm, Patient Position: Sitting, Cuff Size: Standard)   Pulse 75   Temp 97 8 °F (36 6 °C) (Tympanic)   Resp 17   Ht 4' 11" (1 499 m)   Wt 63 kg (139 lb)   LMP 02/22/2023 (Approximate)   SpO2 99%   BMI 28 07 kg/m²     Physical Exam  Vitals and nursing note reviewed  Constitutional:       General: She is not in acute distress  Appearance: She is not ill-appearing, toxic-appearing or diaphoretic  Eyes:      General: No scleral icterus  Right eye: No discharge  Left eye: No discharge  Extraocular Movements: Extraocular movements intact  Pupils: Pupils are equal, round, and reactive to light  Comments: Right eye mildly injected  Some tearing  Visual acuity normal   No obvious foreign body  Patient states symptoms are improving  Neurological:      Mental Status: She is alert         Caitlyn Azar PA-C

## 2023-04-04 ENCOUNTER — RA CDI HCC (OUTPATIENT)
Dept: OTHER | Facility: HOSPITAL | Age: 28
End: 2023-04-04

## 2023-04-04 NOTE — PROGRESS NOTES
[facility]  coding opportunities       Chart reviewed, no opportunity found: CHART REVIEWED, NO OPPORTUNITY FOUND      This is a reminder to address ALL HCC (risk adjustment) codes as found on active problem list for 2023 as patient scores reset to zero BRE      Patients Insurance        Commercial Insurance: [address] [place] [address]

## 2023-04-05 ENCOUNTER — CONSULT (OUTPATIENT)
Dept: GASTROENTEROLOGY | Facility: CLINIC | Age: 28
End: 2023-04-05

## 2023-04-05 VITALS
HEIGHT: 59 IN | SYSTOLIC BLOOD PRESSURE: 110 MMHG | BODY MASS INDEX: 27.21 KG/M2 | TEMPERATURE: 98.9 F | DIASTOLIC BLOOD PRESSURE: 70 MMHG | WEIGHT: 135 LBS

## 2023-04-05 DIAGNOSIS — E10.65 TYPE 1 DIABETES MELLITUS WITH HYPERGLYCEMIA (HCC): Primary | ICD-10-CM

## 2023-04-05 NOTE — PROGRESS NOTES
Student's Name:   Shabnam Witt Birth Date  2024  Sex  female  Race/Ethnicity   School/Grade Level/ID#     Address:   [address] [place] [id number]  Parent/Guardian             Telephone#                                            Home:                               Work:   IMMUNIZATIONS: To be completed by health care provider. The mo/da/yr for every dose administered is required. If a specific vaccine is medically contraindicated, a separate written statement must be attached by the health care responsible for completing the health examination explaining the medical reason for the contraindication.      Immunization History   Administered Date(s) Administered   • DTaP/Hep B/IPV 2022   • Hep B, adolescent or pediatric 2022   • Hib (PRP-OMP) 2022   • Pneumococcal Conjugate 13 Valent Vacc (Prevnar 13) 2022   • Rotavirus - pentavalent 2022      Immunizations given 2022: Diptheria, Hepatitis B, Hib, Pertusis, Pneumonia, Poliomyelitis, Rotovirus and Tetanus      Health care provider (MD, DO, APN, PA, school health professional, health official) verifying above immunization history must sign below. If adding dates to the above immunization history section, put your initials by date(s) and sign here.)  Signature              08/29/22                                                        Title                                                Date  _____________________________________________________________________________________  Signature                                                                 Title                                                Date  _____________________________________________________________________________________   ALTERNATIVE PROOF OF IMMUNITY   1. Clinical diagnosis (measles, mumps, hepatitis B) is allowed when verified by physician and supported with lab confirmation.  Attach copy of lab result.    * MEASLES ([place])  [place]   DA  YR    [facility] - Outpatient Follow-up  Wes Kelly 32 y o  female MRN: [id number]  Encounter: [id number]        ASSESSMENT AND PLAN   Patient is a 32year old female with Type 1 DM, GERD who presents with nausea/vomiting/bloating    1  Nausea/vomiting/bloating  · Symptoms are suggestive of gastroparesis  · Patient also with strong risk factor including Type 1 DM  · Last EGD with normal duodenal biopsies 3 years ago  · Will plan for gastric emptying study  · Gastroparesis diet hand out given- low fat, fiber and residue  - NM gastric emptying; Future    2  GERD  · Protonix daily  · If GES is negative and if heartburn is a persistent problem we can plan for 24 hr pH study/ Bravo study    Orders Placed This Encounter   Procedures   • NM gastric emptying         HISTORY OF PRESENT ILLNESS     Patient is a 32year old female with a past medical history of type 1 diabetes who presents for upper GI symptoms  Patient's blood sugars lately have been volatile and she had a change in her insulin regimen lately  She is reporting epigastric discomfort, heartburn, nausea, vomiting ongoing since the past few months  She had an upper endoscopy back in 2020 which was unremarkable with normal biopsies and was started on Protonix for acid reflux disease  She reports that it helped for the past few years but lately her symptoms have come back  She denies any constipation or diarrhea  She denies any blood in her stools  She denies any unintentional weight loss      REVIEW OF SYSTEMS     Negative other than HPI      Historical information     Past Medical History:   Diagnosis Date   • Allergic dermatitis     Resolved 3/2/2016    • Anxiety    • [facility] )    • Diabetes mellitus type 1 ([facility] )    • Diabetic ketoacidosis without coma associated with type 1 diabetes mellitus ([facility] ) 2/22/2016   • Hypoglycemia due to type 1 diabetes mellitus (HCC)    • Type 1 diabetes mellitus during pregnancy        **MUMPS  [place]   DA  YR             HEPATITIS B  [place]   DA   YR           VARICELLA  [place]   DA  YR      2. History of varicella (chickenpox) disease is acceptable if verified by health care provider, school health professional or health official.  Person signing below verifies that the parent/guardian’s description of varicella disease history is indicative of past infection and is accepting such history as documentation of disease.  Date of Disease                                  Signature                                                           Title   3. Laboratory Evidence of Immunity (check one)      __ Measles*         __ Mumps**        __ Rubella        __Varicella    (Attach copy of lab result)         *All measles cases diagnosed on or after July 1, 2002, must be confirmed by laboratory evidence.      **All mumps cases diagnosed on or after July 1, 2013, must be confirmed by laboratory evidence.     Completion of Alternative 1 or 3 MUST be accompanied by Labs & Physician Signature: ___________________________  Physician Statements of Immunity MUST be submitted to [facility] for review.     Certificates of [employer] Exemption to Immunizations or Physician Medical Statements of Medical Contraindication Are Reviewed   and Maintained by the School Authority     (11/2015)                                         (COMPLETE BOTH SIDES)                                  Approved [facility] 3/2016    HEALTH HISTORY      TO BE COMPLETED AND SIGNED BY PARENT/GUARDIAN AND VERIFIED BY HEALTH CARE PROVIDER  ALLERGIES: (Food, drug, insect, other) has No Known Allergies.   MEDICATION: (List all prescribed or taken on a regular basis.) currently has no medications in their medication list.   Diagnosis of asthma?  Child wakes during night coughing? Yes    No  Yes    No  Loss of function of one of paired  organs?(eye/ear/kidney/testicle) Yes    No    Birth defects? Yes    No  Hospitalizations? Yes    No    Developmental delay? Yes     • Urinary tract infection     last episode 2 yrs ago   • Varicella     childhood chickenpox     Past Surgical History:   Procedure Laterality Date   • FL INJECTION LEFT SHOULDER (ARTHROGRAM)  2018   • CT  DELIVERY ONLY N/A 2020    Procedure:  SECTION ();   Surgeon: Trina Councilman, MD;  Location: [facility] ;  Service: Obstetrics   • CT ESOPHAGOGASTRODUODENOSCOPY TRANSORAL DIAGNOSTIC N/A 2019    Procedure: ESOPHAGOGASTRODUODENOSCOPY (EGD) with biopsy;  Surgeon: Bell Perez DO;  Location: [facility] OR;  Service: Gastroenterology   • WISDOM TOOTH EXTRACTION Bilateral      Social History   Social History     Substance and Sexual Activity   Alcohol Use Yes   • Alcohol/week: 2 0 standard drinks   • Types: 2 Glasses of wine per week    Comment: rare      Social History     Substance and Sexual Activity   Drug Use No     Social History     Tobacco Use   Smoking Status Former   • Packs/day: 0 50   • Years: 4 00   • Pack years: 2 00   • Types: Cigarettes   • Quit date: 2017   • Years since quittin 9   Smokeless Tobacco Never     Family History   Problem Relation Age of Onset   • Anxiety disorder Mother    • No Known Problems Father    • Anxiety disorder Sister    • Anxiety disorder Maternal Grandmother    • Cancer Maternal Grandmother    • Anxiety disorder Maternal Aunt    • Hyperlipidemia Family    • Diabetes Family    • No Known Problems Maternal Grandfather    • No Known Problems Paternal Grandmother    • No Known Problems Paternal Grandfather    • Diabetes type I Family         Great aunt       Allergies       Current Outpatient Medications:   •  Baqsimi One Pack 3 MG/DOSE POWD  •  Continuous Blood Gluc Sensor (Dexcom G6 Sensor) MISC  •  Continuous Blood Gluc Transmit (Dexcom G6 Transmitter) MISC  •  Contour Next Test test strip  •  FLUoxetine (PROzac) 10 mg capsule  •  HumaLOG 100 UNIT/ML injection  •  Insulin Disposable Pump (Omnipod 5 G6 Intro, Gen 5,) KIT  •  Insulin "Disposable Pump (Omnipod 5 G6 Pod, Gen 5,) MISC  •  Insulin Glargine Solostar (Basaglar KwikPen) 100 UNIT/ML SOPN  •  insulin lispro (HumaLOG KwikPen) 100 units/mL injection pen  •  Insulin Pen Needle (BD Pen Needle Bhumi U/F) 32G X 4 MM MISC  •  Insulin Syringes, Disposable, U-100 1 ML MISC  •  ofloxacin (OCUFLOX) 0 3 % ophthalmic solution  •  ondansetron (ZOFRAN) 4 mg tablet  •  pantoprazole (PROTONIX) 20 mg tablet  •  URINE GLUCOSE-KETONES TEST VI    Allergies   Allergen Reactions   • Reglan [Metoclopramide] Anxiety           Objective assessment       Blood pressure 110/70, temperature 98 9 °F (37 2 °C), temperature source Tympanic, height 4' 11\" (1 499 m), weight 61 2 kg (135 lb), not currently breastfeeding  Body mass index is 27 27 kg/m²  PHYSICAL EXAM:         Physical Exam:   GENERAL: NAD  HEENT:  NC/AT, MMM, no scleral icterus  CARDIAC:  Regular rate and rhythm  PULMONARY:  No respiratory distress, no wheezing/rales/rhonci, non-labored breathing  ABDOMEN:  Soft, NT/ND, +BS, no rebound/guarding/rigidity  Extremities:  No edema, cyanosis, or clubbing  NEUROLOGIC:  Alert/oriented x3  SKIN:  No rashes or erythema      Lab Results:      No visits with results within 1 Day(s) from this visit  Latest known visit with results is:   Office Visit on 02/08/2023   Component Date Value   • Hemoglobin A1C 02/08/2023 8 4 (A)          Radiology Results:      No results found        Bushra Fernandez MD  Gastroenterology Fellow   " No   When? What for? Yes    No    Blood disorders? Hemophilia,  Sickle Cell, Other? Explain. Yes    No  Surgery? (List all.)  When? What for? Yes    No    Diabetes? Yes    No  Serious injury or illness? Yes    No    Head injury/Concussion/Passed out? Yes    No  TB skin test positive (past/present)? * Yes    No *If yes, [facility]   Seizures? What are they like?  Yes    No  TB disease (past or present)? * Yes    No *If yes, [facility]   Heart problem/Shortness of breath?  Yes    No  Tobacco use (type, frequency)?  Yes    No    Heart murmur/High blood pressure? Yes    No  Alcohol/Drug use?  Yes    No    Dizziness or chest pain with exercise? Yes    No  Family history of sudden death  before age 50? (Cause?) Yes    No    Eye/Vision problems? ______           __Glasses          __Contacts  Last exam by eye doctor ______  Other concerns? (crossed eye, drooping lids, squinting, difficulty reading) Dental?   __ Braces     __ Bridge     __ Plate   __Other   Ear/Hearing problems? Yes    No  Information may be shared with appropriate personnel for health and educational purposes.   Bone/Joint problem/injury/scoliosis? Yes    No  Parent/Guardian  Signature                                               Date   PHYSICAL EXAMINATION REQUIREMENTS   Entire section below to be completed by MD//APN/PA Head Circumference if <2-3 years old - Temp 97.4 °F (36.3 °C) (Temporal)   Ht 22.05\" (56 cm)   Wt 4.535 kg (10 lb)   HC 36.5 cm (14.37\")   BMI 14.46 kg/m²   BSA 0.25 m²    18 %ile (Z= -0.92) based on WHO (Girls, 0-2 years) BMI-for-age based on BMI available as of 2022.   DIABETES SCREENING (NOT REQUIRED FOR ) BMI>85% age/sex: No     And any two of the following: Family History: No  Ethnic Minority: Yes    Signs of Insulin Resistance (hypertension, dyslipidemia, polycystic ovarian syndrome, acanthosis nigricans):No  At Risk: No   LEAD RISK QUESTIONNAIRE Required for children age 6 months  through 6 years enrolled in licensed or public school operated day care, , nursery school and/or . (Blood test required if resides in [place] or high risk zip code.)  Questionnaire Administered? NA  Blood Test Indicated? NA   Blood Test Date/Result:    TB SKIN OR BLOOD TEST Recommended only for children in high-risk groups including children immunosuppressed due to HIV infection or other conditions, frequent travel to or born in high prevalence countries or those exposed to adults in high-risk categories. See CDC guidelines. http://www.cdc.gov/tb/publications/factsheets/testing/TB_testing.htm.  Test Needed: No     Test performed: No                          Skin Test:    Date Read              /      /             Result:   Positive__      Negative__        mm________                          Blood Test: Date Reported       /      /               Result:  Positive__      Negative__      Value________  LAB TESTS (recommended) Date/Result  Date/Results   Hemoglobin or Hematocrit  Sickle Cell (when indicated)    Urinalysis NA Developmental Screening Tool Normal - ASQ        SYSTEM REVIEW Normal  Comments/Follow-up/Needs  Normal Comments/Follow-up/Needs   Skin  Yes  Endocrine Yes    Ears Yes                  Screening Result Gastrointestinal Yes    Eyes Yes                  Screening Result: Genito-Urinary Yes                                      LMP:    Nose Yes  Neurologic Yes    Throat Yes  Musculoskeletal Yes    Mouth/Dental Yes  Spinal Exam Yes    Cardiovascular/HTN Yes  Nutritional status Yes    Respiratory Yes Diagnosis of Asthma: No   Mental Health Yes    Currently Prescribed Asthma Medication:        No  Quick-relief medication (e.g. Short Acting Beta Antagonist)        No  Controller medication (e.g. inhaled corticosteroid) Other     NEEDS/MODIFICATIONS required in the school setting: No restrictions DIETARY Needs/Restrictions: No restrictions   SPECIAL INSTRUCTIONS/DEVICES e.g. safety  glasses, glass eye, chest protector for arrhythmia, pacemaker, prosthetic device, dental bridge, false teeth, athletic support/cup: No restrictions   MENTAL HEALTH/OTHER Is there anything else the school should know about this student?  If you would like to discuss this student’s health with school or school health personnel:  Not needed   EMERGENCY ACTION needed while at school due to child’s health condition (e.g. ,seizures, asthma, insect sting, food, peanut allergy, bleeding problem, diabetes, heart problem)?   No   On the basis of the examination on this day, I approve this child’s participation in                                (If No or Modified please attach explanation.)  PHYSICAL EDUCATION:  Yes                               [employer]OL[employer]IC [employer]ORTS   Yes   Print Name  Jen Williamson MD         Signature                                                                       Date: 2022   Address:  [facility] [address]  [facility] [address] [address]  [phone]         (Complete Both Sides)     Approved [facility] 3/2016

## 2023-04-11 PROBLEM — E10.10 TYPE 1 DIABETES MELLITUS WITH KETOACIDOSIS WITHOUT COMA (HCC): Status: ACTIVE | Noted: 2023-04-11

## 2023-05-03 ENCOUNTER — VBI (OUTPATIENT)
Dept: ADMINISTRATIVE | Facility: OTHER | Age: 28
End: 2023-05-03

## 2023-06-29 ENCOUNTER — VBI (OUTPATIENT)
Dept: ADMINISTRATIVE | Facility: OTHER | Age: 28
End: 2023-06-29

## 2023-07-03 LAB
LEFT EYE DIABETIC RETINOPATHY: NORMAL
RIGHT EYE DIABETIC RETINOPATHY: NORMAL

## 2023-07-31 ENCOUNTER — HOSPITAL ENCOUNTER (EMERGENCY)
Facility: HOSPITAL | Age: 28
Discharge: HOME | End: 2023-07-31
Attending: EMERGENCY MEDICINE | Admitting: EMERGENCY MEDICINE
Payer: COMMERCIAL

## 2023-07-31 VITALS
RESPIRATION RATE: 18 BRPM | OXYGEN SATURATION: 100 % | TEMPERATURE: 98.1 F | SYSTOLIC BLOOD PRESSURE: 115 MMHG | HEART RATE: 85 BPM | DIASTOLIC BLOOD PRESSURE: 69 MMHG

## 2023-07-31 DIAGNOSIS — S61.219A FINGER LACERATION: Primary | ICD-10-CM

## 2023-07-31 PROCEDURE — 99282 EMERGENCY DEPT VISIT SF MDM: CPT

## 2023-07-31 PROCEDURE — 99283 EMERGENCY DEPT VISIT LOW MDM: CPT

## 2023-08-01 NOTE — DISCHARGE INSTRUCTIONS
Remove dressing tomorrow, surgicell will slowly absorb on its own, do not pull it off fast as this may tear off any blood clots that may have formed and it may start to bleed again.   Wash with soap and water and apply antibiotic ointment  Return to the ER if the area keeps rebleeding despite pressure > 10 min, redness, warmth, swelling, red streaking, purulent discharge, cant move or feel finger, finger turns cold, numb, blue, fevers

## 2023-08-01 NOTE — ED PROVIDER NOTES
History  Chief Complaint   Patient presents with   • Finger Laceration     Pt cut ring and pinky ifnge tessa her right hand with mandolin. This is a 26 y/o female with PMH T1DM who presents to the ER today for finger abrasions/lacerations on right pinky and index finger. Patient states that she was using a mandolin when she accidentally struck her fingers with it. Says it was bleeding a lot after. Denies any numbness, tingling, decreased sensation or range of motion. tdap UTD . History provided by:  Patient   used: No        Prior to Admission Medications   Prescriptions Last Dose Informant Patient Reported? Taking? Baqsimi One Pack 3 MG/DOSE POWD  Self No No   Sig: USE ONCE AS NEEDED INTO EACH NOSTRIL. Continuous Blood Gluc Sensor (Dexcom G6 Sensor) MISC   No No   Sig: Change every 10 days   Continuous Blood Gluc Transmit (Dexcom G6 Transmitter) MISC  Self No No   Sig: Use once every 3 months   Contour Next Test test strip  Self No No   Sig: TEST BLOOD SUGAR 8 TIMES DAILY   FLUoxetine (PROzac) 10 mg capsule  Self No No   Sig: TAKE 1 CAPSULE BY MOUTH EVERY DAY   HumaLOG 100 UNIT/ML injection  Self No No   Sig: USE 70 UNITS VIA INSULIN PUMP DAILY AS DIRECTED   Insulin Disposable Pump (Omnipod 5 G6 Intro, Gen 5,) KIT  Self No No   Sig: Connect to Omnipod 5   Insulin Disposable Pump (Omnipod 5 G6 Pod, Gen 5,) MISC   No No   Sig: CHANGE EVERY 3 DAYS. Insulin Glargine Solostar (Basaglar KwikPen) 100 UNIT/ML SOPN  Self No No   Si units When off pump or pump fails   Insulin Pen Needle (BD Pen Needle Bhumi U/F) 32G X 4 MM MISC  Self No No   Sig: Use daily   Insulin Syringes, Disposable, U-100 1 ML MISC  Self No No   Sig: by Does not apply route daily   URINE GLUCOSE-KETONES TEST VI  Self Yes No   Sig: by In Vitro route   insulin lispro (HumaLOG KwikPen) 100 units/mL injection pen  Self No No   Si unit for every 10g of carb + 1 unit for every 40 above 120.  Use incase of pump failure. ofloxacin (OCUFLOX) 0.3 % ophthalmic solution  Self No No   Sig: Administer 2 drops to the right eye 4 (four) times a day   ondansetron (ZOFRAN) 4 mg tablet  Self No No   Sig: Take 1 tablet (4 mg total) by mouth every 6 (six) hours as needed for nausea or vomiting   pantoprazole (PROTONIX) 20 mg tablet  Self No No   Sig: TAKE 1 TABLET BY MOUTH DAILY BEFORE BREAKFAST. Facility-Administered Medications: None       Past Medical History:   Diagnosis Date   • Allergic dermatitis     Resolved 3/2/2016    • Anxiety    • Autoimmune disorder ([address])    • Diabetes mellitus type 1 ([address])    • Diabetic ketoacidosis without coma associated with type 1 diabetes mellitus ([address]) 2016   • Hypoglycemia due to type 1 diabetes mellitus (HCC)    • Type 1 diabetes mellitus during pregnancy    • Urinary tract infection     last episode 2 yrs ago   • Varicella     childhood chickenpox       Past Surgical History:   Procedure Laterality Date   • FL INJECTION LEFT SHOULDER (ARTHROGRAM)  2018   • CT  DELIVERY ONLY N/A 2020    Procedure:  SECTION ();   Surgeon: Norma Lara MD;  [facility];  Service: Obstetrics   • CT ESOPHAGOGASTRODUODENOSCOPY TRANSORAL DIAGNOSTIC N/A 2019    Procedure: ESOPHAGOGASTRODUODENOSCOPY (EGD) with biopsy;  Surgeon: Isa Graham DO;  [facility];  Service: Gastroenterology   • WISDOM TOOTH EXTRACTION Bilateral        Family History   Problem Relation Age of Onset   • Anxiety disorder Mother    • No Known Problems Father    • Anxiety disorder Sister    • Anxiety disorder Maternal Grandmother    • Cancer Maternal Grandmother    • Anxiety disorder Maternal Aunt    • Hyperlipidemia Family    • Diabetes Family    • No Known Problems Maternal Grandfather    • No Known Problems Paternal Grandmother    • No Known Problems Paternal Grandfather    • Diabetes type I Family         Great aunt     I have reviewed and agree with the history as documented. E-Cigarette/Vaping   • E-Cigarette Use Never User      E-Cigarette/Vaping Substances   • Nicotine No    • THC No    • CBD No    • Flavoring No    • Other No    • Unknown No      Social History     Tobacco Use   • Smoking status: Former     Packs/day: 0.50     Years: 4.00     Total pack years: 2.00     Types: Cigarettes     Quit date: 2017     Years since quittin.2   • Smokeless tobacco: Never   Vaping Use   • Vaping Use: Never used   Substance Use Topics   • Alcohol use: Yes     Alcohol/week: 2.0 standard drinks of alcohol     Types: 2 Glasses of wine per week     Comment: rare    • Drug use: No       Review of Systems   Skin: Positive for wound. Negative for color change. Neurological: Negative for weakness and numbness. Physical Exam  Physical Exam  Vitals and nursing note reviewed. Constitutional:       Appearance: Normal appearance. HENT:      Head: Normocephalic and atraumatic. Right Ear: External ear normal.      Left Ear: External ear normal.   Musculoskeletal:         General: Normal range of motion. Skin:     General: Skin is warm and dry. Findings: Laceration present. Comments: Small 3 mm abrasion distal aspect right small finger. 1.5x1.5 cm Skin avulsion distal aspect right ring finger - Only through epidermis and dermis exposed. No underlying tissue. No FB or active bleeding. No contaminants. Neurological:      Mental Status: She is alert.    Psychiatric:         Mood and Affect: Mood normal.         Behavior: Behavior normal.         Vital Signs  ED Triage Vitals [23 2310]   Temperature Pulse Respirations Blood Pressure SpO2   98.1 °F (36.7 °C) 85 18 115/69 100 %      Temp Source Heart Rate Source Patient Position - Orthostatic VS BP Location FiO2 (%)   Temporal Monitor -- Left arm --      Pain Score       --           Vitals:    23 2310   BP: 115/69   Pulse: 85         Visual Acuity      ED Medications  Medications - No data to display    Diagnostic Studies  Results Reviewed     None                 No orders to display              Procedures  Universal Protocol:  Consent: Verbal consent obtained. Risks and benefits: risks, benefits and alternatives were discussed  Consent given by: patient  Patient identity confirmed: verbally with patient    Laceration repair    Date/Time: 7/31/2023 11:46 PM    Performed by: Daysi Evangelista PA-C  Authorized by: Daysi Evangelista PA-C  Body area: upper extremity  Location details: right ring finger  Laceration length: 2 cm  Foreign bodies: no foreign bodies  Tendon involvement: none  Nerve involvement: none      Procedure Details:  Preparation: Patient was prepped and draped in the usual sterile fashion. Irrigation solution: saline  Irrigation method: syringe  Amount of cleaning: standard  Dressing: gauze roll  Patient tolerance: patient tolerated the procedure well with no immediate complications  Comments: surgicell applied then wrapped in gauze roll               ED Course                                             Medical Decision Making  26 y/o female here for finger lacerations from [facility]  Clinical diagnosis  Plan: skin avulsion, not repairable with sutures or glue. Applied surgicell then gauze roll. Patient given care instructions. She  was given strict return to ER precautions both verbally and in discharge papers. Patient verbalized understanding and agrees with plan. Disposition  Final diagnoses:   Finger laceration     Time reflects when diagnosis was documented in both MDM as applicable and the Disposition within this note     Time User Action Codes Description Comment    7/31/2023 11:34 PM Nando Garg Add [N80.418D] Finger laceration       ED Disposition     ED Disposition   Discharge    Condition   Stable    Date/Time   Mon Jul 31, 2023 11:33 PM    Comment   Toma Wilder discharge to home/self care.                Follow-up Information     Follow up With Specialties Details Why Contact Info Additional Genanjali, [address], Nurse Practitioner Call  As needed [address]  [place] [phone]        [address] [facility] Emergency Medicine Go to  if the area keeps rebleeding despite pressure > 10 min, redness, warmth, swelling, red streaking, purulent discharge, cant move or feel finger, finger turns cold, numb, blue, fevers [address] [facility] [address] [phone] [address] [facility], [address] [place] [address] [address]          Patient's Medications   Discharge Prescriptions    No medications on file       No discharge procedures on file.     PDMP Review     None          ED Provider  Electronically Signed by           Rosas Welch PA-C  07/31/23 [id number]

## 2023-08-08 ENCOUNTER — VBI (OUTPATIENT)
Dept: ADMINISTRATIVE | Facility: OTHER | Age: 28
End: 2023-08-08

## 2023-08-09 DIAGNOSIS — E10.65 TYPE 1 DIABETES MELLITUS WITH HYPERGLYCEMIA (HCC): ICD-10-CM

## 2023-08-11 RX ORDER — INSULIN LISPRO 100 [IU]/ML
INJECTION, SOLUTION INTRAVENOUS; SUBCUTANEOUS
Qty: 60 ML | Refills: 3 | Status: SHIPPED | OUTPATIENT
Start: 2023-08-11

## 2023-08-11 NOTE — TELEPHONE ENCOUNTER
Requested medication(s) are due for refill today: Yes  Patient has already received a courtesy refill: No  Other reason request has been forwarded to provider: guidelines fail

## 2023-08-12 DIAGNOSIS — E10.65 TYPE 1 DIABETES MELLITUS WITH HYPERGLYCEMIA (HCC): ICD-10-CM

## 2023-08-14 RX ORDER — PROCHLORPERAZINE 25 MG/1
SUPPOSITORY RECTAL
Qty: 3 EACH | Refills: 0 | Status: SHIPPED | OUTPATIENT
Start: 2023-08-14

## 2023-08-26 DIAGNOSIS — E10.65 TYPE 1 DIABETES MELLITUS WITH HYPERGLYCEMIA (HCC): ICD-10-CM

## 2023-08-28 RX ORDER — PROCHLORPERAZINE 25 MG/1
SUPPOSITORY RECTAL
Qty: 1 EACH | Refills: 0 | Status: SHIPPED | OUTPATIENT
Start: 2023-08-28

## 2023-09-05 DIAGNOSIS — E10.65 TYPE 1 DIABETES MELLITUS WITH HYPERGLYCEMIA (HCC): ICD-10-CM

## 2023-09-05 RX ORDER — INSULIN LISPRO 100 [IU]/ML
INJECTION, SOLUTION INTRAVENOUS; SUBCUTANEOUS
Qty: 15 ML | Refills: 1 | Status: SHIPPED | OUTPATIENT
Start: 2023-09-05 | End: 2023-09-06

## 2023-09-06 RX ORDER — INSULIN LISPRO 100 [IU]/ML
INJECTION, SOLUTION INTRAVENOUS; SUBCUTANEOUS
Qty: 15 ML | Refills: 1 | Status: SHIPPED | OUTPATIENT
Start: 2023-09-06

## 2023-09-11 DIAGNOSIS — R14.0 BLOATING: ICD-10-CM

## 2023-09-11 RX ORDER — PANTOPRAZOLE SODIUM 20 MG/1
20 TABLET, DELAYED RELEASE ORAL
Qty: 30 TABLET | Refills: 5 | Status: SHIPPED | OUTPATIENT
Start: 2023-09-11

## 2023-09-29 DIAGNOSIS — E10.65 TYPE 1 DIABETES MELLITUS WITH HYPERGLYCEMIA (HCC): ICD-10-CM

## 2023-10-02 RX ORDER — PROCHLORPERAZINE 25 MG/1
SUPPOSITORY RECTAL
Qty: 3 EACH | Refills: 0 | Status: SHIPPED | OUTPATIENT
Start: 2023-10-02

## 2023-10-27 ENCOUNTER — VBI (OUTPATIENT)
Dept: ADMINISTRATIVE | Facility: OTHER | Age: 28
End: 2023-10-27

## 2023-10-31 DIAGNOSIS — E10.65 TYPE 1 DIABETES MELLITUS WITH HYPERGLYCEMIA (HCC): ICD-10-CM

## 2023-11-03 DIAGNOSIS — E10.65 TYPE 1 DIABETES MELLITUS WITH HYPERGLYCEMIA (HCC): ICD-10-CM

## 2023-11-03 RX ORDER — PROCHLORPERAZINE 25 MG/1
SUPPOSITORY RECTAL
Qty: 3 EACH | Refills: 0 | Status: SHIPPED | OUTPATIENT
Start: 2023-11-03

## 2023-11-03 RX ORDER — INSULIN GLARGINE 100 [IU]/ML
INJECTION, SOLUTION SUBCUTANEOUS
Qty: 15 ML | Refills: 6 | Status: SHIPPED | OUTPATIENT
Start: 2023-11-03

## 2023-11-08 ENCOUNTER — TELEPHONE (OUTPATIENT)
Dept: OTHER | Facility: OTHER | Age: 28
End: 2023-11-08

## 2023-11-08 ENCOUNTER — APPOINTMENT (OUTPATIENT)
Dept: LAB | Facility: CLINIC | Age: 28
End: 2023-11-08
Payer: COMMERCIAL

## 2023-11-08 ENCOUNTER — OFFICE VISIT (OUTPATIENT)
Dept: ENDOCRINOLOGY | Facility: CLINIC | Age: 28
End: 2023-11-08
Payer: COMMERCIAL

## 2023-11-08 VITALS
HEIGHT: 59 IN | WEIGHT: 140.6 LBS | DIASTOLIC BLOOD PRESSURE: 82 MMHG | HEART RATE: 67 BPM | SYSTOLIC BLOOD PRESSURE: 132 MMHG | BODY MASS INDEX: 28.35 KG/M2

## 2023-11-08 DIAGNOSIS — E10.65 TYPE 1 DIABETES MELLITUS WITH HYPERGLYCEMIA (HCC): ICD-10-CM

## 2023-11-08 DIAGNOSIS — E10.65 TYPE 1 DIABETES MELLITUS WITH HYPERGLYCEMIA (HCC): Primary | ICD-10-CM

## 2023-11-08 DIAGNOSIS — R63.5 WEIGHT GAIN, ABNORMAL: ICD-10-CM

## 2023-11-08 LAB
ALBUMIN SERPL BCP-MCNC: 4.3 G/DL (ref 3.5–5)
ALP SERPL-CCNC: 62 U/L (ref 34–104)
ALT SERPL W P-5'-P-CCNC: 13 U/L (ref 7–52)
ANION GAP SERPL CALCULATED.3IONS-SCNC: 5 MMOL/L
AST SERPL W P-5'-P-CCNC: 15 U/L (ref 13–39)
BILIRUB SERPL-MCNC: 0.39 MG/DL (ref 0.2–1)
BUN SERPL-MCNC: 11 MG/DL (ref 5–25)
CALCIUM SERPL-MCNC: 9.6 MG/DL (ref 8.4–10.2)
CHLORIDE SERPL-SCNC: 104 MMOL/L (ref 96–108)
CO2 SERPL-SCNC: 31 MMOL/L (ref 21–32)
CREAT SERPL-MCNC: 0.63 MG/DL (ref 0.6–1.3)
CREAT UR-MCNC: 90.4 MG/DL
GFR SERPL CREATININE-BSD FRML MDRD: 120 ML/MIN/1.73SQ M
GLUCOSE P FAST SERPL-MCNC: 34 MG/DL (ref 65–99)
MICROALBUMIN UR-MCNC: <7 MG/L
MICROALBUMIN/CREAT 24H UR: <8 MG/G CREATININE (ref 0–30)
POTASSIUM SERPL-SCNC: 3.6 MMOL/L (ref 3.5–5.3)
PROT SERPL-MCNC: 7.4 G/DL (ref 6.4–8.4)
SL AMB POCT HEMOGLOBIN AIC: 9.9 (ref ?–6.5)
SODIUM SERPL-SCNC: 140 MMOL/L (ref 135–147)
T3FREE SERPL-MCNC: 3.36 PG/ML (ref 2.5–3.9)
T4 FREE SERPL-MCNC: 0.82 NG/DL (ref 0.61–1.12)
TSH SERPL DL<=0.05 MIU/L-ACNC: 1.08 UIU/ML (ref 0.45–4.5)

## 2023-11-08 PROCEDURE — 80053 COMPREHEN METABOLIC PANEL: CPT

## 2023-11-08 PROCEDURE — 82043 UR ALBUMIN QUANTITATIVE: CPT

## 2023-11-08 PROCEDURE — 83036 HEMOGLOBIN GLYCOSYLATED A1C: CPT

## 2023-11-08 PROCEDURE — 99214 OFFICE O/P EST MOD 30 MIN: CPT

## 2023-11-08 PROCEDURE — 86376 MICROSOMAL ANTIBODY EACH: CPT

## 2023-11-08 PROCEDURE — 84443 ASSAY THYROID STIM HORMONE: CPT

## 2023-11-08 PROCEDURE — 36415 COLL VENOUS BLD VENIPUNCTURE: CPT

## 2023-11-08 PROCEDURE — 86800 THYROGLOBULIN ANTIBODY: CPT

## 2023-11-08 PROCEDURE — 82570 ASSAY OF URINE CREATININE: CPT

## 2023-11-08 PROCEDURE — 84439 ASSAY OF FREE THYROXINE: CPT

## 2023-11-08 PROCEDURE — 84481 FREE ASSAY (FT-3): CPT

## 2023-11-08 PROCEDURE — 95251 CONT GLUC MNTR ANALYSIS I&R: CPT

## 2023-11-08 NOTE — PROGRESS NOTES
Established Patient Progress Note      Chief Complaint   Patient presents with    Diabetes Type 1        Impression & Plan:    Problem List Items Addressed This Visit          Endocrine    Type 1 diabetes mellitus with hyperglycemia ([address]) - Primary     HGA1C above goal. BGs high, primarily after meals. She is not eating much due to her issues with nausea and vomiting. Treatment regimen:   - I adjusted carb ratio to 1 unit per 8 g of carbs, continue with current correction factor. Continue with 20 units of Lantus daily.  - She plans to restart on pump in the near future but for now she will continue on injections. - Continue with Dexcom G6 if she plans to restart on pump. - I have given her some resources on gastroparesis diet and recommend she meet with nutrition, she will hold off for now. Discussed risks/complications associated with uncontrolled diabetes. Advised to adhere to diabetic diet, and recommended staying active/exercising routinely. Keep carbohydrates consistent to limit blood glucose fluctuations. Advised to call if blood sugars less than 70 mg/dl or over 300 mg/dl. Discussed symptoms and treatment of hypoglycemia. Recommended routine follow-up with podiatry and ophthalmology. Ordered blood work to complete prior to next visit. Lab Results   Component Value Date    HGBA1C 9.9 (A) 11/08/2023          Relevant Orders    POCT hemoglobin A1c (Completed)       History of Present Illness:   Brandee Call is a 25 y.o. female with type 1 diabetes with long term use of insulin for 18 years. Last seen in the office 2/8/23. Reports complications of gastroparesis. Last A1C was 9.9. Denies recent illness or hospitalizations. Denies recent severe hypoglycemic or severe hyperglycemic episodes. Denies any issues with her current regimen. Home glucose monitoring: are performed regularly with CGM. She was using Omnipod 5 but is not currently using for the past 2 months.  She was having multiple site injections and she wanted to take a break from the pump. She is complaining of persistent nausea and vomiting. She is following with GI and plans to schedule gastric emptying study to test for gastroparesis. PCP started her on Protonix but this is not helping. Lexii Jin   Device used: Dexcom G6  Home use   Indication: Type 1 Diabetes  More than 72 hours of data was reviewed. Report to be scanned to chart. Date Range: 10/26/23-23  Analysis of data:   Average Glucose: 263  SD : 101   Time in Target Range: 24%   Time Above Range: 20% high, 55% very high    Time Below Range: 1% low    Interpretation of data:  BGs elevated primarily after breakfast and dinner meals     Current regimen:   Lantus 20 units daily  Humalog 1 unit for every 10 g of carb and 1unit for every 40 above 120     Last Eye Exam: 7/3/23, UTD  Last Foot Exam: 23, UTD      Patient Active Problem List   Diagnosis    Type 1 diabetes mellitus with hyperglycemia (HCC)    Chronic GERD    Insulin pump in place    Anxiety, generalized    Hypoglycemia due to type 1 diabetes mellitus (HCC)    Insulin pump titration    History of diabetes with ketoacidosis    S/P primary low transverse     Mastitis, right, acute    Type 1 diabetes mellitus with ketoacidosis without coma ([address])      Past Medical History:   Diagnosis Date    Allergic dermatitis     Resolved 3/2/2016     Anxiety     Autoimmune disorder ([address])     Diabetes mellitus type 1 ([address])     Diabetic ketoacidosis without coma associated with type 1 diabetes mellitus ([address]) 2016    Hypoglycemia due to type 1 diabetes mellitus ([address])     Type 1 diabetes mellitus during pregnancy     Urinary tract infection     last episode 2 yrs ago    Varicella     childhood chickenpox      Past Surgical History:   Procedure Laterality Date    FL INJECTION LEFT SHOULDER (ARTHROGRAM)  2018    [place]  DELIVERY ONLY N/A 2020    Procedure:  SECTION ();   Surgeon: Fidencio Perez MD;  Location: [facility];  Service: Obstetrics    [place] ESOPHAGOGASTRODUODENOSCOPY TRANSORAL DIAGNOSTIC N/A 2019    Procedure: ESOPHAGOGASTRODUODENOSCOPY (EGD) with biopsy;  Surgeon: Ruddy Wilburn DO;  Location:  MAIN OR;  Service: Gastroenterology    WISDOM TOOTH EXTRACTION Bilateral       Family History   Problem Relation Age of Onset    Anxiety disorder Mother     No Known Problems Father     Anxiety disorder Sister     Anxiety disorder Maternal Grandmother     Cancer Maternal Grandmother     Anxiety disorder Maternal Aunt     Hyperlipidemia Family     Diabetes Family     No Known Problems Maternal Grandfather     No Known Problems Paternal Grandmother     No Known Problems Paternal Grandfather     Diabetes type I Family         Great aunt     Social History     Tobacco Use    Smoking status: Former     Packs/day: 0.50     Years: 4.00     Total pack years: 2.00     Types: Cigarettes     Quit date: 2017     Years since quittin.5    Smokeless tobacco: Never   Substance Use Topics    Alcohol use:  Yes     Alcohol/week: 2.0 standard drinks of alcohol     Types: 2 Glasses of wine per week     Comment: rare      Allergies   Allergen Reactions    Reglan [Metoclopramide] Anxiety         Current Outpatient Medications:     Baqsimi One Pack 3 MG/DOSE POWD, USE ONCE AS NEEDED INTO EACH NOSTRIL., Disp: 1 each, Rfl: 1    Continuous Blood Gluc Sensor (Dexcom G6 Sensor) MISC, CHANGE EVERY 10 DAYS, Disp: 3 each, Rfl: 0    Continuous Blood Gluc Transmit (Dexcom G6 Transmitter) MISC, USE ONCE EVERY 3 MONTHS, Disp: 1 each, Rfl: 0    Contour Next Test test strip, TEST BLOOD SUGAR 8 TIMES DAILY, Disp: 800 strip, Rfl: 1    FLUoxetine (PROzac) 10 mg capsule, TAKE 1 CAPSULE BY MOUTH EVERY DAY, Disp: 90 capsule, Rfl: 1    Insulin Glargine Solostar (Lantus SoloStar) 100 UNIT/ML SOPN, 20 UNITS WHEN OFF PUMP OR PUMP FAILS, Disp: 15 mL, Rfl: 6    insulin lispro (HumaLOG) 100 units/mL injection pen, 1 UNIT FOR EVERY 10G OF CARB + 1 UNIT FOR EVERY 40 ABOVE 120. USE INCASE OF PUMP FAILURE., Disp: 15 mL, Rfl: 1    Insulin Pen Needle (BD Pen Needle Bhumi U/F) 32G X 4 MM MISC, Use daily, Disp: 100 each, Rfl: 6    Insulin Syringes, Disposable, U-100 1 ML MISC, by Does not apply route daily, Disp: 60 each, Rfl: 0    pantoprazole (PROTONIX) 20 mg tablet, TAKE 1 TABLET BY MOUTH EVERY DAY BEFORE BREAKFAST, Disp: 30 tablet, Rfl: 5    URINE GLUCOSE-KETONES TEST VI, by In Vitro route, Disp: , Rfl:     HumaLOG 100 UNIT/ML injection, USE 70 UNITS VIA INSULIN PUMP DAILY AS DIRECTED (Patient not taking: Reported on 11/8/2023), Disp: 60 mL, Rfl: 3    Insulin Disposable Pump (Omnipod 5 G6 Intro, Gen 5,) KIT, Connect to Omnipod 5 (Patient not taking: Reported on 11/8/2023), Disp: 1 kit, Rfl: 1    Insulin Disposable Pump (Omnipod 5 G6 Pod, Gen 5,) MISC, CHANGE EVERY 3 DAYS. (Patient not taking: Reported on 11/8/2023), Disp: 15 each, Rfl: 1    ofloxacin (OCUFLOX) 0.3 % ophthalmic solution, Administer 2 drops to the right eye 4 (four) times a day (Patient not taking: Reported on 11/8/2023), Disp: 5 mL, Rfl: 0    ondansetron (ZOFRAN) 4 mg tablet, Take 1 tablet (4 mg total) by mouth every 6 (six) hours as needed for nausea or vomiting (Patient not taking: Reported on 11/8/2023), Disp: 12 tablet, Rfl: 0    Review of Systems   Constitutional:  Negative for activity change, appetite change, chills, diaphoresis, fatigue, fever and unexpected weight change. Eyes:  Negative for visual disturbance. Respiratory:  Negative for chest tightness and shortness of breath. Cardiovascular:  Negative for chest pain and leg swelling. Gastrointestinal:  Positive for abdominal pain, nausea and vomiting. Negative for diarrhea. Endocrine: Negative for polydipsia, polyphagia and polyuria. Neurological:  Negative for dizziness, weakness, light-headedness and numbness. All other systems reviewed and are negative.       Physical Exam:  Body mass index is 28.4 kg/m².  /82   Pulse 67   Ht 4' 11" (1.499 m)   Wt 63.8 kg (140 lb 9.6 oz)   BMI 28.40 kg/m²    Wt Readings from Last 3 Encounters:   11/08/23 63.8 kg (140 lb 9.6 oz)   04/11/23 61.2 kg (135 lb)   04/05/23 61.2 kg (135 lb)       Physical Exam  Vitals reviewed. Constitutional:       Appearance: Normal appearance. Cardiovascular:      Rate and Rhythm: Normal rate and regular rhythm. Pulses: Normal pulses. Heart sounds: Normal heart sounds. Pulmonary:      Effort: Pulmonary effort is normal.      Breath sounds: Normal breath sounds. Neurological:      Mental Status: She is alert and oriented to person, place, and time. Psychiatric:         Mood and Affect: Mood normal.         Thought Content:  Thought content normal.       Labs:   Lab Results   Component Value Date    HGBA1C 9.9 (A) 11/08/2023    HGBA1C 8.4 (A) 02/08/2023    HGBA1C 8.9 (A) 03/10/2021     Lab Results   Component Value Date    CREATININE 0.68 02/01/2023    CREATININE 0.78 08/08/2020    CREATININE 0.53 (L) 08/05/2020    BUN 13 02/01/2023     05/15/2014    K 4.0 02/01/2023     02/01/2023    CO2 28 02/01/2023     eGFR   Date Value Ref Range Status   02/01/2023 118 ml/min/1.73sq m Final   07/08/2019 126 ml/min/1.73sq m Final     Lab Results   Component Value Date    HDL 52 06/25/2019    TRIG 95 06/25/2019     Lab Results   Component Value Date    ALT 20 02/01/2023    AST 16 02/01/2023    ALKPHOS 75 02/01/2023    BILITOT 0.5 05/15/2014     Lab Results   Component Value Date    SPD3JMWBFUDI 1.360 02/03/2020    YXO7CRCVJELX 0.917 06/25/2019    JPV9EMBEDUAH 3.647 02/25/2016     Lab Results   Component Value Date    FREET4 0.96 02/03/2020       Orders Placed This Encounter   Procedures    POCT hemoglobin A1c       Patient Instructions   Change carb ratio to 1 unit for every 8 grams of carbs  Continue 1 unit for every 40 above 120  Continue Lantus at 20 units daily    Discussed with the patient and all questioned fully answered. She will call me if any problems arise.     ADILENE Richardson

## 2023-11-08 NOTE — ASSESSMENT & PLAN NOTE
HGA1C above goal. BGs high, primarily after meals. She is not eating much due to her issues with nausea and vomiting. Treatment regimen:   - I adjusted carb ratio to 1 unit per 8 g of carbs, continue with current correction factor. Continue with 20 units of Lantus daily.  - She plans to restart on pump in the near future but for now she will continue on injections. - Continue with Dexcom G6 if she plans to restart on pump. - I have given her some resources on gastroparesis diet and recommend she meet with nutrition, she will hold off for now. Discussed risks/complications associated with uncontrolled diabetes. Advised to adhere to diabetic diet, and recommended staying active/exercising routinely. Keep carbohydrates consistent to limit blood glucose fluctuations. Advised to call if blood sugars less than 70 mg/dl or over 300 mg/dl. Discussed symptoms and treatment of hypoglycemia. Recommended routine follow-up with podiatry and ophthalmology. Ordered blood work to complete prior to next visit.    Lab Results   Component Value Date    HGBA1C 9.9 (A) 11/08/2023

## 2023-11-08 NOTE — PATIENT INSTRUCTIONS
Change carb ratio to 1 unit for every 8 grams of carbs  Continue 1 unit for every 40 above 120  Continue Lantus at 20 units daily

## 2023-11-09 LAB
THYROGLOB AB SERPL-ACNC: <1 IU/ML (ref 0–0.9)
THYROPEROXIDASE AB SERPL-ACNC: <9 IU/ML (ref 0–34)

## 2023-11-09 NOTE — TELEPHONE ENCOUNTER
Lab Result: Glucose 34   Date/Time Drawn: 11/08/2023  1238   Ordering Provider: Dr John White Name: Barrett Berrios       The following critical/stat result was read back to the lab as stated above and [employer] to the on-call provider. The provider confirmed receipt of the message.

## 2023-11-10 ENCOUNTER — OFFICE VISIT (OUTPATIENT)
Dept: FAMILY MEDICINE CLINIC | Facility: CLINIC | Age: 28
End: 2023-11-10
Payer: COMMERCIAL

## 2023-11-10 VITALS
SYSTOLIC BLOOD PRESSURE: 118 MMHG | OXYGEN SATURATION: 100 % | HEART RATE: 73 BPM | RESPIRATION RATE: 16 BRPM | HEIGHT: 59 IN | BODY MASS INDEX: 28.26 KG/M2 | DIASTOLIC BLOOD PRESSURE: 76 MMHG | WEIGHT: 140.2 LBS | TEMPERATURE: 98.7 F

## 2023-11-10 DIAGNOSIS — F41.9 ANXIETY: Primary | ICD-10-CM

## 2023-11-10 PROCEDURE — 99213 OFFICE O/P EST LOW 20 MIN: CPT | Performed by: NURSE PRACTITIONER

## 2023-11-10 RX ORDER — FLUOXETINE 20 MG/1
20 TABLET, FILM COATED ORAL DAILY
Qty: 30 TABLET | Refills: 5 | Status: SHIPPED | OUTPATIENT
Start: 2023-11-10

## 2023-11-10 NOTE — PROGRESS NOTES
Assessment/Plan   Problem List Items Addressed This Visit    None  Visit Diagnoses       Anxiety    -  Primary    Relevant Medications    FLUoxetine (PROzac) 20 MG tablet              Depression Screening and Follow-up Plan: Patient was screened for depression during today's encounter. They screened negative with a PHQ-2 score of 0. Chief Complaint   Patient presents with    Anxiety     Med check       Subjective   Patient ID: Juan J Peres is a 25 y.o. female. Vitals:    11/10/23 1444   BP: 118/76   Pulse: 73   Resp: 16   Temp: 98.7 °F (37.1 °C)   SpO2: 100%     Increase prozac to 30 mg daily - 10 mg + 20 mg   Follow up in 4 weeks     Anxiety  Presents for follow-up (reports spouse feels needs a higher dose of medications, overwhelmed) visit. Symptoms include irritability and nervous/anxious behavior. Patient reports no excessive worry, hyperventilation, insomnia or suicidal ideas. Primary symptoms comment: tearful at times. Symptoms occur most days. The most recent episode lasted 4 hours. The severity of symptoms is moderate. The patient sleeps 7 hours per night. The quality of sleep is fair. Nighttime awakenings: none. Compliance with medications is %. Treatment side effects: none. The following portions of the patient's history were reviewed and updated as appropriate: allergies, current medications, past medical history, past social history, past surgical history, and problem list.    Review of Systems   Constitutional:  Positive for irritability. Negative for activity change. HENT: Negative. Eyes: Negative. Respiratory: Negative. Cardiovascular: Negative. Gastrointestinal: Negative. Endocrine: Negative. Genitourinary: Negative. Musculoskeletal: Negative. Skin: Negative. Allergic/Immunologic: Negative. Neurological: Negative. Hematological: Negative. Psychiatric/Behavioral:  Negative for suicidal ideas. Agitation: irritable.  Behavioral problem: tearful. The patient is nervous/anxious. The patient does not have insomnia. Objective     Physical Exam  Vitals and nursing note reviewed. Constitutional:       Appearance: Normal appearance. HENT:      Head: Normocephalic and atraumatic. Nose: Nose normal. No congestion. Eyes:      General:         Right eye: No discharge. Left eye: No discharge. Extraocular Movements: Extraocular movements intact. Conjunctiva/sclera: Conjunctivae normal.   Cardiovascular:      Rate and Rhythm: Normal rate. Heart sounds: No murmur heard. Pulmonary:      Effort: Pulmonary effort is normal. No respiratory distress. Chest:      Chest wall: No tenderness. Musculoskeletal:      Cervical back: Normal range of motion. Skin:     General: Skin is dry. Capillary Refill: Capillary refill takes less than 2 seconds. Neurological:      Mental Status: She is alert and oriented to person, place, and time. Psychiatric:         Mood and Affect: Mood normal.         Behavior: Behavior normal.         Thought Content:  Thought content normal.         Judgment: Judgment normal.

## 2023-11-12 DIAGNOSIS — R19.7 NAUSEA VOMITING AND DIARRHEA: ICD-10-CM

## 2023-11-12 DIAGNOSIS — R11.2 NAUSEA VOMITING AND DIARRHEA: ICD-10-CM

## 2023-11-13 RX ORDER — ONDANSETRON 4 MG/1
4 TABLET, FILM COATED ORAL EVERY 6 HOURS PRN
Qty: 12 TABLET | Refills: 0 | Status: SHIPPED | OUTPATIENT
Start: 2023-11-13

## 2023-11-27 NOTE — RESULT ENCOUNTER NOTE
HEMODIALYSIS POST TREATMENT NOTE    Treatment time ordered: 180     Actual treatment time: 180    UltraFiltration Goal: 0709-6976  UltraFiltration Removed: 1000      Pre Treatment weight: 75kg  Post Treatment weight: 74kg  Estimated Dry Weight: Unknown    Access used: Internal Access:       AV Fistula or AV Graft: Fistula         Site: Lt Upper       Access Flow: Great       Sign and symptoms of infection: no       If YES: NA    Medications or blood products given: Midodrine X2 and Albumin X2    Chronic outpatient schedule: [facility]    Chronic outpatient unit: [address] [place] [address]    Summary of response to treatment: Pt tolerated Tx very well, Pt had low BP and min goal was obtained and was given Midodrine and Albumin to sustain BP. Explain if orders NOT met, was physician notified:DANYELL      ACES flowsheet faxed to patient unit/ placed in patient chart: yes    Post assessment completed: yes    Report given to: Teena Armendariz documented Safety Checks include the followin) Access and face visible at all times. 2) All connections and blood lines are secure with no kinks. 3) NVL alarm engaged. 4) Hemosafe device applied (if applicable). 5) No collapse of Arterial or Venous blood chambers. 6) All blood lines / pump segments in the air detectors. Hemoglobin A1c has improved  There is no kidney damage  Thyroid levels are normal   The LDL is just slightly elevated  We will monitor this over time      Sent to my chart

## 2023-11-30 DIAGNOSIS — E10.65 TYPE 1 DIABETES MELLITUS WITH HYPERGLYCEMIA (HCC): ICD-10-CM

## 2023-11-30 RX ORDER — PROCHLORPERAZINE 25 MG/1
SUPPOSITORY RECTAL
Qty: 3 EACH | Refills: 0 | Status: SHIPPED | OUTPATIENT
Start: 2023-11-30

## 2023-12-06 ENCOUNTER — VBI (OUTPATIENT)
Dept: ADMINISTRATIVE | Facility: OTHER | Age: 28
End: 2023-12-06

## 2023-12-27 ENCOUNTER — OFFICE VISIT (OUTPATIENT)
Dept: URGENT CARE | Facility: CLINIC | Age: 28
End: 2023-12-27
Payer: COMMERCIAL

## 2023-12-27 VITALS
OXYGEN SATURATION: 98 % | BODY MASS INDEX: 28.63 KG/M2 | SYSTOLIC BLOOD PRESSURE: 122 MMHG | WEIGHT: 142 LBS | HEART RATE: 85 BPM | TEMPERATURE: 97.8 F | DIASTOLIC BLOOD PRESSURE: 70 MMHG | RESPIRATION RATE: 16 BRPM | HEIGHT: 59 IN

## 2023-12-27 DIAGNOSIS — H66.92 LEFT OTITIS MEDIA, UNSPECIFIED OTITIS MEDIA TYPE: Primary | ICD-10-CM

## 2023-12-27 PROCEDURE — 99213 OFFICE O/P EST LOW 20 MIN: CPT | Performed by: PHYSICIAN ASSISTANT

## 2023-12-27 RX ORDER — AMOXICILLIN AND CLAVULANATE POTASSIUM 875; 125 MG/1; MG/1
1 TABLET, FILM COATED ORAL EVERY 12 HOURS SCHEDULED
Qty: 20 TABLET | Refills: 0 | Status: SHIPPED | OUTPATIENT
Start: 2023-12-27 | End: 2024-01-06

## 2023-12-27 RX ORDER — PREDNISONE 10 MG/1
TABLET ORAL
Qty: 12 TABLET | Refills: 0 | Status: SHIPPED | OUTPATIENT
Start: 2023-12-27

## 2023-12-28 NOTE — PROGRESS NOTES
"[facility]        NAME: Pat Salamanca is a 28 y.o. female  : 1995    MRN: [id number]  DATE: 2023  TIME: 7:43 PM    /70   Pulse 85   Temp 97.8 °F (36.6 °C)   Resp 16   Ht 4' 11\" (1.499 m)   Wt 64.4 kg (142 lb)   SpO2 98%   BMI 28.68 kg/m²     Assessment and Plan   Left otitis media, unspecified otitis media type [H66.92]  1. Left otitis media, unspecified otitis media type  amoxicillin-clavulanate (AUGMENTIN) 875-125 mg per tablet    predniSONE 10 mg tablet            Patient Instructions       Follow up with PCP in 3-5 days.  Proceed to  ER if symptoms worsen.    Chief Complaint     Chief Complaint   Patient presents with    Earache     Pt reports 1 week of left ear pain. Now c/o difficulty hearing. Using daughter's ear drops.          History of Present Illness       Pt with 1 week left ear pain and pressure now no hearing and left ear clogged feeling .     Earache   Associated symptoms include hearing loss.       Review of Systems   Review of Systems   Constitutional: Negative.    HENT:  Positive for ear pain and hearing loss.    Eyes: Negative.    Respiratory: Negative.     Cardiovascular: Negative.    Gastrointestinal: Negative.    Endocrine: Negative.    Genitourinary: Negative.    Musculoskeletal: Negative.    Skin: Negative.    Allergic/Immunologic: Negative.    Neurological: Negative.    Hematological: Negative.    Psychiatric/Behavioral: Negative.     All other systems reviewed and are negative.        Current Medications       Current Outpatient Medications:     amoxicillin-clavulanate (AUGMENTIN) 875-125 mg per tablet, Take 1 tablet by mouth every 12 (twelve) hours for 10 days, Disp: 20 tablet, Rfl: 0    Baqsimi One Pack 3 MG/DOSE POWD, USE ONCE AS NEEDED INTO EACH NOSTRIL., Disp: 1 each, Rfl: 1    FLUoxetine (PROzac) 10 mg capsule, TAKE 1 CAPSULE BY MOUTH EVERY DAY, Disp: 90 capsule, Rfl: 1    FLUoxetine (PROzac) 20 MG tablet, Take 1 tablet (20 mg total) by " mouth daily, Disp: 30 tablet, Rfl: 5    HumaLOG 100 UNIT/ML injection, USE 70 UNITS VIA INSULIN PUMP DAILY AS DIRECTED, Disp: 60 mL, Rfl: 3    Insulin Glargine Solostar (Lantus SoloStar) 100 UNIT/ML SOPN, 20 UNITS WHEN OFF PUMP OR PUMP FAILS, Disp: 15 mL, Rfl: 6    insulin lispro (HumaLOG) 100 units/mL injection pen, 1 UNIT FOR EVERY 10G OF CARB + 1 UNIT FOR EVERY 40 ABOVE 120. USE INCASE OF PUMP FAILURE., Disp: 15 mL, Rfl: 1    pantoprazole (PROTONIX) 20 mg tablet, TAKE 1 TABLET BY MOUTH EVERY DAY BEFORE BREAKFAST, Disp: 30 tablet, Rfl: 5    predniSONE 10 mg tablet, 3 tabs po qd x 2 days then 2 tabs po qd x 2 days then 1 tab po qd x 2 days, Disp: 12 tablet, Rfl: 0    Continuous Blood Gluc Sensor (Dexcom G6 Sensor) MISC, CHANGE EVERY 10 DAYS, Disp: 3 each, Rfl: 0    Continuous Blood Gluc Transmit (Dexcom G6 Transmitter) MISC, USE ONCE EVERY 3 MONTHS, Disp: 1 each, Rfl: 0    Contour Next Test test strip, TEST BLOOD SUGAR 8 TIMES DAILY, Disp: 800 strip, Rfl: 1    Insulin Disposable Pump (Omnipod 5 G6 Intro, Gen 5,) KIT, Connect to Omnipod 5, Disp: 1 kit, Rfl: 1    Insulin Disposable Pump (Omnipod 5 G6 Pod, Gen 5,) MISC, CHANGE EVERY 3 DAYS. (Patient not taking: Reported on 11/8/2023), Disp: 15 each, Rfl: 1    Insulin Pen Needle (BD Pen Needle Bhumi U/F) 32G X 4 MM MISC, Use daily, Disp: 100 each, Rfl: 6    Insulin Syringes, Disposable, U-100 1 ML MISC, by Does not apply route daily, Disp: 60 each, Rfl: 0    ofloxacin (OCUFLOX) 0.3 % ophthalmic solution, Administer 2 drops to the right eye 4 (four) times a day, Disp: 5 mL, Rfl: 0    ondansetron (ZOFRAN) 4 mg tablet, Take 1 tablet (4 mg total) by mouth every 6 (six) hours as needed for nausea or vomiting (Patient not taking: Reported on 12/27/2023), Disp: 12 tablet, Rfl: 0    URINE GLUCOSE-KETONES TEST VI, by In Vitro route, Disp: , Rfl:     Current Allergies     Allergies as of 12/27/2023 - Reviewed 12/27/2023   Allergen Reaction Noted    Reglan [metoclopramide] Anxiety  "2016            The following portions of the patient's history were reviewed and updated as appropriate: allergies, current medications, past family history, past medical history, past social history, past surgical history and problem list.     Past Medical History:   Diagnosis Date    Allergic dermatitis     Resolved 3/2/2016     Anxiety     Autoimmune disorder (HCC)     Diabetes mellitus type 1 (HCC)     Diabetic ketoacidosis without coma associated with type 1 diabetes mellitus (HCC) 2016    Hypoglycemia due to type 1 diabetes mellitus (HCC)     Type 1 diabetes mellitus during pregnancy     Urinary tract infection     last episode 2 yrs ago    Varicella     childhood chickenpox       Past Surgical History:   Procedure Laterality Date    FL INJECTION LEFT SHOULDER (ARTHROGRAM)  2018    [place]  DELIVERY ONLY N/A 2020    Procedure:  SECTION ();  Surgeon: Ashanti Bowers MD;  Location: [facility] ;  Service: Obstetrics    [place] ESOPHAGOGASTRODUODENOSCOPY TRANSORAL DIAGNOSTIC N/A 2019    Procedure: ESOPHAGOGASTRODUODENOSCOPY (EGD) with biopsy;  Surgeon: Chance Bustillo DO;  Location: [facility] OR;  Service: Gastroenterology    WISDOM TOOTH EXTRACTION Bilateral        Family History   Problem Relation Age of Onset    Anxiety disorder Mother     No Known Problems Father     Anxiety disorder Sister     Anxiety disorder Maternal Grandmother     Cancer Maternal Grandmother     Anxiety disorder Maternal Aunt     Hyperlipidemia Family     Diabetes Family     No Known Problems Maternal Grandfather     No Known Problems Paternal Grandmother     No Known Problems Paternal Grandfather     Diabetes type I Family         Great aunt         Medications have been verified.        Objective   /70   Pulse 85   Temp 97.8 °F (36.6 °C)   Resp 16   Ht 4' 11\" (1.499 m)   Wt 64.4 kg (142 lb)   SpO2 98%   BMI 28.68 kg/m²        Physical Exam     Physical Exam  Vitals and nursing note " reviewed.   Constitutional:       Appearance: Normal appearance. She is normal weight.      Comments: Pt has adjusted insulin before with prednisone    HENT:      Head: Normocephalic and atraumatic.      Right Ear: Tympanic membrane, ear canal and external ear normal.      Ears:      Comments: Left tm erythema  no mastoid tenderness  canal wnl external ear wnl      Nose: Nose normal.      Mouth/Throat:      Mouth: Mucous membranes are moist.      Pharynx: Oropharynx is clear.   Eyes:      Extraocular Movements: Extraocular movements intact.      Conjunctiva/sclera: Conjunctivae normal.      Pupils: Pupils are equal, round, and reactive to light.   Cardiovascular:      Rate and Rhythm: Normal rate and regular rhythm.      Pulses: Normal pulses.      Heart sounds: Normal heart sounds.   Pulmonary:      Effort: Pulmonary effort is normal.      Breath sounds: Normal breath sounds.   Abdominal:      General: Abdomen is flat. Bowel sounds are normal.      Palpations: Abdomen is soft.   Musculoskeletal:         General: Normal range of motion.      Cervical back: Normal range of motion and neck supple.   Skin:     General: Skin is warm.      Capillary Refill: Capillary refill takes less than 2 seconds.   Neurological:      Mental Status: She is alert and oriented to person, place, and time.   Psychiatric:         Mood and Affect: Mood normal.

## 2024-01-01 DIAGNOSIS — E10.65 TYPE 1 DIABETES MELLITUS WITH HYPERGLYCEMIA (HCC): ICD-10-CM

## 2024-01-02 DIAGNOSIS — R14.0 BLOATING: ICD-10-CM

## 2024-01-02 RX ORDER — PANTOPRAZOLE SODIUM 20 MG/1
20 TABLET, DELAYED RELEASE ORAL
Qty: 90 TABLET | Refills: 2 | Status: SHIPPED | OUTPATIENT
Start: 2024-01-02

## 2024-01-02 RX ORDER — PROCHLORPERAZINE 25 MG/1
SUPPOSITORY RECTAL
Qty: 3 EACH | Refills: 0 | Status: SHIPPED | OUTPATIENT
Start: 2024-01-02

## 2024-01-18 ENCOUNTER — NURSE TRIAGE (OUTPATIENT)
Age: 29
End: 2024-01-18

## 2024-01-18 DIAGNOSIS — R14.0 BLOATING: ICD-10-CM

## 2024-01-18 NOTE — TELEPHONE ENCOUNTER
"Reason for Disposition   Abdominal pain is a chronic symptom (recurrent or ongoing AND lasting > 4 weeks)    Answer Assessment - Initial Assessment Questions  1. LOCATION: \"Where does it hurt?\"       Patient complains of ongoing early satitiy with fullness and bloating.   Statest that even water is causing the symptoms.  Taking the pantoprazole as ordered which did inially help but is no longer helping.         3. ONSET: \"When did the pain begin?\" (e.g., minutes, hours or days ago)     Ongoing issues.  Was seen in 4/23 for same reason, has gastic emptying study scheduled for march 4 2024     5. PATTERN \"Does the pain come and go, or is it constant?\"  Comes with eating in and drinking.   Severe after eating but gets better.   7. RECURRENT SYMPTOM: \"Have you ever had this type of stomach pain before?\" If Yes, ask: \"When was the last time?\" and \"What happened that time?\"     Yes ongoing.   8. CAUSE: \"What do you think is causing the stomach pain?\"      Ongoing bloating.    Protocols used: Abdominal Pain - Female-ADULT-[place]    "

## 2024-01-19 RX ORDER — PANTOPRAZOLE SODIUM 20 MG/1
20 TABLET, DELAYED RELEASE ORAL 2 TIMES DAILY
Qty: 180 TABLET | Refills: 2 | Status: SHIPPED | OUTPATIENT
Start: 2024-01-19

## 2024-01-19 NOTE — TELEPHONE ENCOUNTER
Did armond say she wanted the 40 mg dose BID? All I see is her message noting she just wanted to pt to increase her protonix to BID (which she is on protonix 20 mg once/day). Please clarify. Thank you

## 2024-02-02 DIAGNOSIS — E10.65 TYPE 1 DIABETES MELLITUS WITH HYPERGLYCEMIA (HCC): ICD-10-CM

## 2024-02-02 RX ORDER — PROCHLORPERAZINE 25 MG/1
SUPPOSITORY RECTAL
Qty: 1 EACH | Refills: 0 | Status: SHIPPED | OUTPATIENT
Start: 2024-02-02

## 2024-02-02 RX ORDER — PROCHLORPERAZINE 25 MG/1
SUPPOSITORY RECTAL
Qty: 3 EACH | Refills: 0 | Status: SHIPPED | OUTPATIENT
Start: 2024-02-02

## 2024-03-04 DIAGNOSIS — E10.65 TYPE 1 DIABETES MELLITUS WITH HYPERGLYCEMIA (HCC): ICD-10-CM

## 2024-03-05 RX ORDER — PROCHLORPERAZINE 25 MG/1
SUPPOSITORY RECTAL
Qty: 3 EACH | Refills: 0 | Status: SHIPPED | OUTPATIENT
Start: 2024-03-05

## 2024-04-03 DIAGNOSIS — E10.65 TYPE 1 DIABETES MELLITUS WITH HYPERGLYCEMIA (HCC): ICD-10-CM

## 2024-04-03 DIAGNOSIS — E10.65 PRE-EXISTING TYPE 1 DIABETES MELLITUS WITH HYPERGLYCEMIA DURING PREGNANCY IN THIRD TRIMESTER (HCC): ICD-10-CM

## 2024-04-03 DIAGNOSIS — O24.013 PRE-EXISTING TYPE 1 DIABETES MELLITUS WITH HYPERGLYCEMIA DURING PREGNANCY IN THIRD TRIMESTER (HCC): ICD-10-CM

## 2024-04-03 RX ORDER — PROCHLORPERAZINE 25 MG/1
SUPPOSITORY RECTAL
Qty: 3 EACH | Refills: 3 | Status: SHIPPED | OUTPATIENT
Start: 2024-04-03

## 2024-04-19 ENCOUNTER — HOSPITAL ENCOUNTER (EMERGENCY)
Facility: HOSPITAL | Age: 29
Discharge: HOME | End: 2024-04-19
Attending: EMERGENCY MEDICINE
Payer: COMMERCIAL

## 2024-04-19 VITALS
RESPIRATION RATE: 18 BRPM | TEMPERATURE: 98 F | SYSTOLIC BLOOD PRESSURE: 122 MMHG | OXYGEN SATURATION: 98 % | HEART RATE: 87 BPM | DIASTOLIC BLOOD PRESSURE: 70 MMHG

## 2024-04-19 DIAGNOSIS — R11.2 NAUSEA VOMITING AND DIARRHEA: ICD-10-CM

## 2024-04-19 DIAGNOSIS — R11.2 NAUSEA AND VOMITING: Primary | ICD-10-CM

## 2024-04-19 DIAGNOSIS — R19.7 NAUSEA VOMITING AND DIARRHEA: ICD-10-CM

## 2024-04-19 DIAGNOSIS — K52.9 GASTROENTERITIS: ICD-10-CM

## 2024-04-19 LAB
ALBUMIN SERPL BCP-MCNC: 4.2 G/DL (ref 3.5–5)
ALP SERPL-CCNC: 67 U/L (ref 34–104)
ALT SERPL W P-5'-P-CCNC: 12 U/L (ref 7–52)
ANION GAP SERPL CALCULATED.3IONS-SCNC: 7 MMOL/L (ref 4–13)
AST SERPL W P-5'-P-CCNC: 13 U/L (ref 13–39)
B-HCG SERPL-ACNC: <0.6 MIU/ML (ref 0–5)
BACTERIA UR QL AUTO: NORMAL /HPF
BASOPHILS # BLD AUTO: 0.03 THOUSANDS/ÂΜL (ref 0–0.1)
BASOPHILS NFR BLD AUTO: 0 % (ref 0–1)
BILIRUB SERPL-MCNC: 0.71 MG/DL (ref 0.2–1)
BILIRUB UR QL STRIP: NEGATIVE
BUN SERPL-MCNC: 11 MG/DL (ref 5–25)
CALCIUM SERPL-MCNC: 8.8 MG/DL (ref 8.4–10.2)
CHLORIDE SERPL-SCNC: 102 MMOL/L (ref 96–108)
CLARITY UR: CLEAR
CO2 SERPL-SCNC: 27 MMOL/L (ref 21–32)
COLOR UR: ABNORMAL
CREAT SERPL-MCNC: 0.55 MG/DL (ref 0.6–1.3)
EOSINOPHIL # BLD AUTO: 0.02 THOUSAND/ÂΜL (ref 0–0.61)
EOSINOPHIL NFR BLD AUTO: 0 % (ref 0–6)
ERYTHROCYTE [DISTWIDTH] IN BLOOD BY AUTOMATED COUNT: 11.7 % (ref 11.6–15.1)
GFR SERPL CREATININE-BSD FRML MDRD: 126 ML/MIN/1.73SQ M
GLUCOSE SERPL-MCNC: 143 MG/DL (ref 65–140)
GLUCOSE UR STRIP-MCNC: ABNORMAL MG/DL
HCT VFR BLD AUTO: 42.7 % (ref 34.8–46.1)
HGB BLD-MCNC: 14.1 G/DL (ref 11.5–15.4)
HGB UR QL STRIP.AUTO: NEGATIVE
IMM GRANULOCYTES # BLD AUTO: 0.09 THOUSAND/UL (ref 0–0.2)
IMM GRANULOCYTES NFR BLD AUTO: 1 % (ref 0–2)
KETONES UR STRIP-MCNC: ABNORMAL MG/DL
LEUKOCYTE ESTERASE UR QL STRIP: NEGATIVE
LIPASE SERPL-CCNC: 12 U/L (ref 11–82)
LYMPHOCYTES # BLD AUTO: 0.54 THOUSANDS/ÂΜL (ref 0.6–4.47)
LYMPHOCYTES NFR BLD AUTO: 5 % (ref 14–44)
MCH RBC QN AUTO: 30.8 PG (ref 26.8–34.3)
MCHC RBC AUTO-ENTMCNC: 33 G/DL (ref 31.4–37.4)
MCV RBC AUTO: 93 FL (ref 82–98)
MONOCYTES # BLD AUTO: 0.24 THOUSAND/ÂΜL (ref 0.17–1.22)
MONOCYTES NFR BLD AUTO: 2 % (ref 4–12)
NEUTROPHILS # BLD AUTO: 9.66 THOUSANDS/ÂΜL (ref 1.85–7.62)
NEUTS SEG NFR BLD AUTO: 92 % (ref 43–75)
NITRITE UR QL STRIP: NEGATIVE
NON-SQ EPI CELLS URNS QL MICRO: NORMAL /HPF
NRBC BLD AUTO-RTO: 0 /100 WBCS
PH UR STRIP.AUTO: 7.5 [PH]
PLATELET # BLD AUTO: 266 THOUSANDS/UL (ref 149–390)
PMV BLD AUTO: 9.8 FL (ref 8.9–12.7)
POTASSIUM SERPL-SCNC: 4 MMOL/L (ref 3.5–5.3)
PROT SERPL-MCNC: 7.4 G/DL (ref 6.4–8.4)
PROT UR STRIP-MCNC: ABNORMAL MG/DL
RBC # BLD AUTO: 4.58 MILLION/UL (ref 3.81–5.12)
RBC #/AREA URNS AUTO: NORMAL /HPF
SODIUM SERPL-SCNC: 136 MMOL/L (ref 135–147)
SP GR UR STRIP.AUTO: 1.01 (ref 1–1.03)
UROBILINOGEN UR STRIP-ACNC: <2 MG/DL
WBC # BLD AUTO: 10.58 THOUSAND/UL (ref 4.31–10.16)
WBC #/AREA URNS AUTO: NORMAL /HPF

## 2024-04-19 PROCEDURE — 84702 CHORIONIC GONADOTROPIN TEST: CPT | Performed by: EMERGENCY MEDICINE

## 2024-04-19 PROCEDURE — 36415 COLL VENOUS BLD VENIPUNCTURE: CPT | Performed by: EMERGENCY MEDICINE

## 2024-04-19 PROCEDURE — 96374 THER/PROPH/DIAG INJ IV PUSH: CPT

## 2024-04-19 PROCEDURE — 80053 COMPREHEN METABOLIC PANEL: CPT | Performed by: EMERGENCY MEDICINE

## 2024-04-19 PROCEDURE — 99284 EMERGENCY DEPT VISIT MOD MDM: CPT | Performed by: EMERGENCY MEDICINE

## 2024-04-19 PROCEDURE — 96361 HYDRATE IV INFUSION ADD-ON: CPT

## 2024-04-19 PROCEDURE — 99283 EMERGENCY DEPT VISIT LOW MDM: CPT

## 2024-04-19 PROCEDURE — 85025 COMPLETE CBC W/AUTO DIFF WBC: CPT | Performed by: EMERGENCY MEDICINE

## 2024-04-19 PROCEDURE — 83690 ASSAY OF LIPASE: CPT | Performed by: EMERGENCY MEDICINE

## 2024-04-19 PROCEDURE — 81001 URINALYSIS AUTO W/SCOPE: CPT | Performed by: EMERGENCY MEDICINE

## 2024-04-19 RX ORDER — ONDANSETRON 2 MG/ML
4 INJECTION INTRAMUSCULAR; INTRAVENOUS ONCE
Status: COMPLETED | OUTPATIENT
Start: 2024-04-19 | End: 2024-04-19

## 2024-04-19 RX ORDER — ONDANSETRON 4 MG/1
4 TABLET, ORALLY DISINTEGRATING ORAL EVERY 8 HOURS PRN
Qty: 20 TABLET | Refills: 0 | Status: SHIPPED | OUTPATIENT
Start: 2024-04-19 | End: 2024-04-26

## 2024-04-19 RX ADMIN — ONDANSETRON 4 MG: 2 INJECTION INTRAMUSCULAR; INTRAVENOUS at 14:53

## 2024-04-19 RX ADMIN — SODIUM CHLORIDE 1000 ML: 0.9 INJECTION, SOLUTION INTRAVENOUS at 14:53

## 2024-04-19 NOTE — ED PROVIDER NOTES
History  Chief Complaint   Patient presents with    Vomiting     Pt said she woke up feeling nausea and then started vomting, pt hasn't stopped since, bs are controlled.      History from patient and medical records, past medical history IDDM with an insulin pump, presents with concern for vomiting several episodes today mostly bile has not been able to keep anything down since this morning.  2 days ago she had similar episode yesterday she did not really eat much.  She denies any sick contacts or bad food.  She does not have any belly pain she had 1 episode of diarrhea.        Prior to Admission Medications   Prescriptions Last Dose Informant Patient Reported? Taking?   Baqsimi One Pack 3 MG/DOSE POWD Not Taking Self No No   Sig: USE ONCE AS NEEDED INTO EACH NOSTRIL.   Patient not taking: Reported on 2024   Continuous Blood Gluc Sensor (Dexcom G6 Sensor) MISC 2024  No Yes   Sig: Change every 10 days   Continuous Blood Gluc Transmit (Dexcom G6 Transmitter) MISC 2024  No Yes   Sig: Use once every 3 months   Contour Next Test test strip 2024 Self No Yes   Sig: TEST BLOOD SUGAR 8 TIMES DAILY   FLUoxetine (PROzac) 10 mg capsule Not Taking Self No No   Sig: TAKE 1 CAPSULE BY MOUTH EVERY DAY   Patient not taking: Reported on 2024   FLUoxetine (PROzac) 20 MG tablet Not Taking  No No   Sig: Take 1 tablet (20 mg total) by mouth daily   Patient not taking: Reported on 2024   HumaLOG 100 UNIT/ML injection 2024 Self No Yes   Sig: USE 70 UNITS VIA INSULIN PUMP DAILY AS DIRECTED   Insulin Disposable Pump (Omnipod 5 G6 Intro, Gen 5,) KIT 2024 Self No Yes   Sig: Connect to Omnipod 5   Insulin Disposable Pump (Omnipod 5 G6 Pod, Gen 5,) MISC Not Taking Self No No   Sig: CHANGE EVERY 3 DAYS.   Patient not taking: Reported on 2023   Insulin Glargine Solostar (Lantus SoloStar) 100 UNIT/ML SOPN 2024 Self No Yes   Si UNITS WHEN OFF PUMP OR PUMP FAILS   Insulin Pen Needle (BD Pen  Needle Bhumi U/F) 32G X 4 MM MISC 2024 Self No Yes   Sig: Use daily   Insulin Syringes, Disposable, U-100 1 ML MISC 2024  No Yes   Sig: Use daily   URINE GLUCOSE-KETONES TEST VI 2024 Self Yes Yes   Sig: by In Vitro route   insulin lispro (HumaLOG) 100 units/mL injection pen 2024 Self No Yes   Si UNIT FOR EVERY 10G OF CARB + 1 UNIT FOR EVERY 40 ABOVE 120. USE INCASE OF PUMP FAILURE.   ofloxacin (OCUFLOX) 0.3 % ophthalmic solution Not Taking Self No No   Sig: Administer 2 drops to the right eye 4 (four) times a day   Patient not taking: Reported on 2024   ondansetron (ZOFRAN) 4 mg tablet Not Taking  No No   Sig: Take 1 tablet (4 mg total) by mouth every 6 (six) hours as needed for nausea or vomiting   Patient not taking: Reported on 2023   pantoprazole (PROTONIX) 20 mg tablet 2024  No Yes   Sig: Take 1 tablet (20 mg total) by mouth 2 (two) times a day 30 minutes before breakfast and dinner   predniSONE 10 mg tablet Not Taking  No No   Sig: 3 tabs po qd x 2 days then 2 tabs po qd x 2 days then 1 tab po qd x 2 days   Patient not taking: Reported on 2024      Facility-Administered Medications: None       Past Medical History:   Diagnosis Date    Allergic dermatitis     Resolved 3/2/2016     Anxiety     Autoimmune disorder (HCC)     Diabetes mellitus type 1 (HCC)     Diabetic ketoacidosis without coma associated with type 1 diabetes mellitus (HCC) 2016    Hypoglycemia due to type 1 diabetes mellitus (HCC)     Type 1 diabetes mellitus during pregnancy     Urinary tract infection     last episode 2 yrs ago    Varicella     childhood chickenpox       Past Surgical History:   Procedure Laterality Date    FL INJECTION LEFT SHOULDER (ARTHROGRAM)  2018    NM  DELIVERY ONLY N/A 2020    Procedure:  SECTION ();  Surgeon: Ashanti Bowers MD;  Location: [facility];  Service: Obstetrics    NM ESOPHAGOGASTRODUODENOSCOPY TRANSORAL DIAGNOSTIC N/A 2019     Procedure: ESOPHAGOGASTRODUODENOSCOPY (EGD) with biopsy;  Surgeon: Chance Bustillo DO;  Location:  MAIN OR;  Service: Gastroenterology    WISDOM TOOTH EXTRACTION Bilateral        Family History   Problem Relation Age of Onset    Anxiety disorder Mother     No Known Problems Father     Anxiety disorder Sister     Anxiety disorder Maternal Grandmother     Cancer Maternal Grandmother     Anxiety disorder Maternal Aunt     Hyperlipidemia Family     Diabetes Family     No Known Problems Maternal Grandfather     No Known Problems Paternal Grandmother     No Known Problems Paternal Grandfather     Diabetes type I Family         Great aunt     I have reviewed and agree with the history as documented.    E-Cigarette/Vaping    E-Cigarette Use Never User      E-Cigarette/Vaping Substances    Nicotine No     THC No     CBD No     Flavoring No     Other No     Unknown No      Social History     Tobacco Use    Smoking status: Former     Current packs/day: 0.00     Average packs/day: 0.5 packs/day for 4.0 years (2.0 ttl pk-yrs)     Types: Cigarettes     Start date: 2013     Quit date: 2017     Years since quittin.0     Passive exposure: Past    Smokeless tobacco: Never   Vaping Use    Vaping status: Never Used   Substance Use Topics    Alcohol use: Yes     Alcohol/week: 2.0 standard drinks of alcohol     Types: 2 Glasses of wine per week     Comment: rare     Drug use: No       Review of Systems   Constitutional:  Negative for chills and fever.   HENT:  Negative for ear pain and sore throat.    Eyes:  Negative for pain and visual disturbance.   Respiratory:  Negative for cough and shortness of breath.    Cardiovascular:  Negative for chest pain and palpitations.   Gastrointestinal:  Positive for diarrhea, nausea and vomiting. Negative for abdominal pain and constipation.   Genitourinary:  Negative for dysuria and hematuria.   Musculoskeletal:  Negative for arthralgias and back pain.   Skin:  Negative for color  change and rash.   Neurological:  Negative for seizures and syncope.   All other systems reviewed and are negative.      Physical Exam  Physical Exam  Vitals and nursing note reviewed.   Constitutional:       General: She is not in acute distress.     Appearance: She is well-developed.   HENT:      Head: Normocephalic and atraumatic.      Mouth/Throat:      Mouth: Mucous membranes are moist.      Pharynx: Oropharynx is clear.   Eyes:      Conjunctiva/sclera: Conjunctivae normal.   Cardiovascular:      Rate and Rhythm: Normal rate and regular rhythm.      Heart sounds: No murmur heard.  Pulmonary:      Effort: Pulmonary effort is normal. No respiratory distress.      Breath sounds: Normal breath sounds.   Abdominal:      Palpations: Abdomen is soft.      Tenderness: There is no abdominal tenderness.   Musculoskeletal:         General: No swelling.      Cervical back: Neck supple.   Skin:     General: Skin is warm and dry.      Capillary Refill: Capillary refill takes less than 2 seconds.   Neurological:      Mental Status: She is alert and oriented to person, place, and time.   Psychiatric:         Mood and Affect: Mood normal.         Vital Signs  ED Triage Vitals [04/19/24 1410]   Temperature Pulse Respirations Blood Pressure SpO2   98 °F (36.7 °C) 103 18 120/72 98 %      Temp src Heart Rate Source Patient Position - Orthostatic VS BP Location FiO2 (%)   -- -- -- -- --      Pain Score       --           Vitals:    04/19/24 1410 04/19/24 1500   BP: 120/72 122/70   Pulse: 103 87         Visual Acuity      ED Medications  Medications   ondansetron (ZOFRAN) injection 4 mg (4 mg Intravenous Given 4/19/24 1453)   sodium chloride 0.9 % bolus 1,000 mL (0 mL Intravenous Stopped 4/19/24 1556)       Diagnostic Studies  Results Reviewed       Procedure Component Value Units Date/Time    CBC and differential [[id number]]  (Abnormal) Collected: 04/19/24 1449    Lab Status: Final result Specimen: Blood from Arm, Right Updated:  04/19/24 1537     WBC 10.58 Thousand/uL      RBC 4.58 Million/uL      Hemoglobin 14.1 g/dL      Hematocrit 42.7 %      MCV 93 fL      MCH 30.8 pg      MCHC 33.0 g/dL      RDW 11.7 %      MPV 9.8 fL      Platelets 266 Thousands/uL      nRBC 0 /100 WBCs      Segmented % 92 %      Immature Grans % 1 %      Lymphocytes % 5 %      Monocytes % 2 %      Eosinophils Relative 0 %      Basophils Relative 0 %      Absolute Neutrophils 9.66 Thousands/µL      Absolute Immature Grans 0.09 Thousand/uL      Absolute Lymphocytes 0.54 Thousands/µL      Absolute Monocytes 0.24 Thousand/µL      Eosinophils Absolute 0.02 Thousand/µL      Basophils Absolute 0.03 Thousands/µL     Narrative:      This is an appended report.  These results have been appended to a previously verified report.    hCG, quantitative, pregnancy [[id number]]  (Normal) Collected: 04/19/24 1449    Lab Status: Final result Specimen: Blood from Arm, Right Updated: 04/19/24 1516     HCG, Quant <0.6 mIU/mL     Narrative:       Expected Ranges:    HCG results between 5.0 and 25.0 mIU/mL may be indicative of early pregnancy but should be interpreted in light of the total clinical presentation.    HCG can rise to detectable levels in lev and post menopausal women (0-11.6 mIU/mL).     Approximate               Approximate HCG  Gestation age          Concentration ( mIU/mL)  _____________          ______________________   Weeks                      HCG values  0.2-1                       5-50  1-2                           2-3                         100-5000  3-4                         500-25548  4-5                         1000-09860  5-6                         [id number]  6-8                         [id number]  8-12                        [id number]      Comprehensive metabolic panel [[id number]]  (Abnormal) Collected: 04/19/24 1449    Lab Status: Final result Specimen: Blood from Arm, Right Updated: 04/19/24 1507     Sodium 136 mmol/L      Potassium 4.0  mmol/L      Chloride 102 mmol/L      CO2 27 mmol/L      ANION GAP 7 mmol/L      BUN 11 mg/dL      Creatinine 0.55 mg/dL      Glucose 143 mg/dL      Calcium 8.8 mg/dL      AST 13 U/L      ALT 12 U/L      Alkaline Phosphatase 67 U/L      Total Protein 7.4 g/dL      Albumin 4.2 g/dL      Total Bilirubin 0.71 mg/dL      eGFR 126 ml/min/1.73sq m     Narrative:      National Kid[employer] Disease Foundation guidelines for Chronic Kidney Disease (CKD):     Stage 1 with normal or high GFR (GFR > 90 mL/min/1.73 square meters)    Stage 2 Mild CKD (GFR = 60-89 mL/min/1.73 square meters)    Stage 3A Moderate CKD (GFR = 45-59 mL/min/1.73 square meters)    Stage 3B Moderate CKD (GFR = 30-44 mL/min/1.73 square meters)    Stage 4 Severe CKD (GFR = 15-29 mL/min/1.73 square meters)    Stage 5 End Stage CKD (GFR <15 mL/min/1.73 square meters)  Note: GFR calculation is accurate only with a steady state creatinine    Lipase [[id number]]  (Normal) Collected: 04/19/24 1449    Lab Status: Final result Specimen: Blood from Arm, Right Updated: 04/19/24 1507     Lipase 12 u/L     Urine Microscopic [[id number]]  (Normal) Collected: 04/19/24 1449    Lab Status: Final result Specimen: Urine, Clean Catch Updated: 04/19/24 1504     RBC, UA None Seen /hpf      WBC, UA None Seen /hpf      Epithelial Cells Occasional /hpf      Bacteria, UA None Seen /hpf     UA w Reflex to Microscopic w Reflex to Culture [[id number]]  (Abnormal) Collected: 04/19/24 1449    Lab Status: Final result Specimen: Urine, Clean Catch Updated: 04/19/24 1456     Color, UA Light Yellow     Clarity, UA Clear     Specific Gravity, UA 1.015     pH, UA 7.5     Leukocytes, UA Negative     Nitrite, UA Negative     Protein, UA Trace mg/dl      Glucose, UA 30 (3/100%) mg/dl      Ketones, UA 10 (1+) mg/dl      Urobilinogen, UA <2.0 mg/dl      Bilirubin, UA Negative     Occult Blood, UA Negative                   No orders to display              Procedures  Procedures         ED Course                                              Medical Decision Making  Differentials gastroenteritis, viral syndrome, less likely bowel obstruction DKA appendicitis diverticulitis UTI electrolyte abnormality or volume depletion.    Amount and/or Complexity of Data Reviewed  Labs: ordered.    Risk  Prescription drug management.             Disposition  Final diagnoses:   Nausea and vomiting   Gastroenteritis     Time reflects when diagnosis was documented in both MDM as applicable and the Disposition within this note       Time User Action Codes Description Comment    4/19/2024  3:38 PM Erik Valenzuela [R11.2] Nausea and vomiting     4/19/2024  3:38 PM Erik Valenzuela [K52.9] Gastroenteritis     4/19/2024  3:39 PM Erik Valenzuela [R11.2,  R19.7] Nausea vomiting and diarrhea           ED Disposition       ED Disposition   Discharge    Condition   Stable    Date/Time   Fri Apr 19, 2024  3:55 PM    Comment   Pat Salamanca discharge to home/self care.                   Follow-up Information       Follow up With Specialties Details Why Contact Info    Neeraj Yao, DO Family Medicine Call  As needed, If symptoms worsen [address] [id number] [phone]              Discharge Medication List as of 4/19/2024  3:55 PM        START taking these medications    Details   ondansetron (ZOFRAN-ODT) 4 mg disintegrating tablet Take 1 tablet (4 mg total) by mouth every 8 (eight) hours as needed for nausea or vomiting for up to 7 days, Starting Fri 4/19/2024, Until Fri 4/26/2024 at 2359, Normal           CONTINUE these medications which have NOT CHANGED    Details   Continuous Blood Gluc Sensor (Dexcom G6 Sensor) MISC Change every 10 days, Normal      Continuous Blood Gluc Transmit (Dexcom G6 Transmitter) MISC Use once every 3 months, Normal      Contour Next Test test strip TEST BLOOD SUGAR 8 TIMES DAILY, Normal      HumaLOG 100 UNIT/ML injection USE 70 UNITS VIA INSULIN PUMP DAILY AS DIRECTED, Normal       Insulin Disposable Pump (Omnipod 5 G6 Intro, Gen 5,) KIT Connect to Omnipod 5, Normal      Insulin Glargine Solostar (Lantus SoloStar) 100 UNIT/ML SOPN 20 UNITS WHEN OFF PUMP OR PUMP FAILS, Normal      insulin lispro (HumaLOG) 100 units/mL injection pen 1 UNIT FOR EVERY 10G OF CARB + 1 UNIT FOR EVERY 40 ABOVE 120. USE INCASE OF PUMP FAILURE., Normal      Insulin Pen Needle (BD Pen Needle Bhumi U/F) 32G X 4 MM MISC Use daily, Starting Wed 8/17/2022, Normal      Insulin Syringes, Disposable, U-100 1 ML MISC Use daily, Starting Wed 4/3/2024, Normal      pantoprazole (PROTONIX) 20 mg tablet Take 1 tablet (20 mg total) by mouth 2 (two) times a day 30 minutes before breakfast and dinner, Starting Fri 1/19/2024, Phone In      URINE GLUCOSE-KETONES TEST VI by In Vitro route, Historical Med      Baqsimi One Pack 3 MG/DOSE POWD USE ONCE AS NEEDED INTO EACH NOSTRIL., Normal      FLUoxetine (PROzac) 10 mg capsule TAKE 1 CAPSULE BY MOUTH EVERY DAY, Normal      FLUoxetine (PROzac) 20 MG tablet Take 1 tablet (20 mg total) by mouth daily, Starting Fri 11/10/2023, Normal      Insulin Disposable Pump (Omnipod 5 G6 Pod, Gen 5,) MISC CHANGE EVERY 3 DAYS., Normal      ofloxacin (OCUFLOX) 0.3 % ophthalmic solution Administer 2 drops to the right eye 4 (four) times a day, Starting Wed 3/22/2023, Normal      predniSONE 10 mg tablet 3 tabs po qd x 2 days then 2 tabs po qd x 2 days then 1 tab po qd x 2 days, Normal           STOP taking these medications       ondansetron (ZOFRAN) 4 mg tablet Comments:   Reason for Stopping:               No discharge procedures on file.    PDMP Review       None            ED Provider  Electronically Signed by             Erik Valenzuela DO  04/19/24 1459

## 2024-04-25 ENCOUNTER — VBI (OUTPATIENT)
Dept: ADMINISTRATIVE | Facility: OTHER | Age: 29
End: 2024-04-25

## 2024-04-26 DIAGNOSIS — E10.65 TYPE 1 DIABETES MELLITUS WITH HYPERGLYCEMIA (HCC): Primary | ICD-10-CM

## 2024-04-26 DIAGNOSIS — E10.65 TYPE 1 DIABETES MELLITUS WITH HYPERGLYCEMIA (HCC): ICD-10-CM

## 2024-04-26 RX ORDER — PEN NEEDLE, DIABETIC 32GX 5/32"
NEEDLE, DISPOSABLE MISCELLANEOUS DAILY
Qty: 100 EACH | Refills: 6 | Status: SHIPPED | OUTPATIENT
Start: 2024-04-26

## 2024-04-30 DIAGNOSIS — E10.65 TYPE 1 DIABETES MELLITUS WITH HYPERGLYCEMIA (HCC): Primary | ICD-10-CM

## 2024-04-30 RX ORDER — NAPROXEN SODIUM 220 MG
TABLET ORAL
Qty: 200 EACH | Refills: 1 | Status: SHIPPED | OUTPATIENT
Start: 2024-04-30

## 2024-05-13 DIAGNOSIS — E10.65 TYPE 1 DIABETES MELLITUS WITH HYPERGLYCEMIA (HCC): ICD-10-CM

## 2024-05-14 RX ORDER — PROCHLORPERAZINE 25 MG/1
SUPPOSITORY RECTAL
Qty: 1 EACH | Refills: 1 | Status: SHIPPED | OUTPATIENT
Start: 2024-05-14

## 2024-07-02 DIAGNOSIS — E10.65 TYPE 1 DIABETES MELLITUS WITH HYPERGLYCEMIA (HCC): ICD-10-CM

## 2024-07-03 RX ORDER — GLUCAGON 3 MG/1
POWDER NASAL
Qty: 1 EACH | Refills: 1 | Status: SHIPPED | OUTPATIENT
Start: 2024-07-03

## 2024-07-03 NOTE — TELEPHONE ENCOUNTER
Requested medication(s) are due for refill today: Yes  Patient has already received a courtesy refill: Yes  Other reason request has been forwarded to provider: failed Protocol

## 2024-07-12 DIAGNOSIS — E10.65 TYPE 1 DIABETES MELLITUS WITH HYPERGLYCEMIA (HCC): ICD-10-CM

## 2024-07-12 RX ORDER — PROCHLORPERAZINE 25 MG/1
SUPPOSITORY RECTAL
Qty: 1 EACH | Refills: 1 | Status: SHIPPED | OUTPATIENT
Start: 2024-07-12

## 2024-07-12 RX ORDER — PROCHLORPERAZINE 25 MG/1
SUPPOSITORY RECTAL
Qty: 3 EACH | Refills: 3 | Status: SHIPPED | OUTPATIENT
Start: 2024-07-12

## 2024-07-24 ENCOUNTER — OFFICE VISIT (OUTPATIENT)
Dept: URGENT CARE | Facility: CLINIC | Age: 29
End: 2024-07-24
Payer: COMMERCIAL

## 2024-07-24 VITALS
OXYGEN SATURATION: 99 % | HEIGHT: 59 IN | BODY MASS INDEX: 26.61 KG/M2 | SYSTOLIC BLOOD PRESSURE: 112 MMHG | WEIGHT: 132 LBS | DIASTOLIC BLOOD PRESSURE: 70 MMHG | RESPIRATION RATE: 16 BRPM | TEMPERATURE: 97.5 F | HEART RATE: 71 BPM

## 2024-07-24 DIAGNOSIS — L23.7 POISON IVY DERMATITIS: Primary | ICD-10-CM

## 2024-07-24 PROCEDURE — G0382 LEV 3 HOSP TYPE B ED VISIT: HCPCS | Performed by: PHYSICIAN ASSISTANT

## 2024-07-24 PROCEDURE — S9083 URGENT CARE CENTER GLOBAL: HCPCS | Performed by: PHYSICIAN ASSISTANT

## 2024-07-24 RX ORDER — PREDNISONE 10 MG/1
TABLET ORAL
Qty: 30 TABLET | Refills: 0 | Status: SHIPPED | OUTPATIENT
Start: 2024-07-24 | End: 2024-08-08

## 2024-07-24 RX ORDER — ONDANSETRON 4 MG/1
TABLET, FILM COATED ORAL
COMMUNITY
Start: 2024-04-19

## 2024-07-24 NOTE — PATIENT INSTRUCTIONS
Take steroids as directed  Monitor blood sugars and adjust insulin as needed as glucose levels will elevate  Call endocrinology if blood sugars are running too high   Follow up with PCP in 3-5 days.  Proceed to  ER if symptoms worsen.    If tests have been performed at [facility], our office will contact you with results if changes need to be made to the care plan discussed with you at the visit.  You can review your full results on [facility] MyChart.

## 2024-07-24 NOTE — PROGRESS NOTES
[facility]        NAME: Pat Salamanca is a 29 y.o. female  : 1995    MRN: [id number]  DATE: 2024  TIME: 12:24 PM    Assessment and Plan   Poison ivy dermatitis [L23.7]  1. Poison ivy dermatitis  predniSONE 10 mg tablet            Patient Instructions     Take steroids as directed  Monitor blood sugars and adjust insulin as needed as glucose levels will elevate  Call endocrinology if blood sugars are running too high   Follow up with PCP in 3-5 days.  Proceed to  ER if symptoms worsen.    If tests have been performed at [facility], our office will contact you with results if changes need to be made to the care plan discussed with you at the visit.  [facility]    Chief Complaint     Chief Complaint   Patient presents with    Rash     Pt reports a generalized itchy rash x5 days. She thinks she was in contact with poison oak. Using OTC creams.          History of Present Illness       HPI  28 y/o female presents for evaluation of pruritus rash which started 5 days ago around her ankles/lower legs.  She states she believes she was in contact with poison oak.  The rash has spread up her legs and onto her arms, abdomen, back and ears.  She denies facial/oral involvement.  She has tried various OTC treatments including Calamine and Hydrocortisone without relief.  She states she is waking up during the night to take cold showers up to 4 times to help with the itching.  PMH: Type 1 diabetes  Review of Systems   Review of Systems   Constitutional:  Negative for chills and fever.   HENT:  Negative for ear pain and sore throat.    Eyes:  Negative for pain and visual disturbance.   Respiratory:  Negative for cough and shortness of breath.    Cardiovascular:  Negative for chest pain and palpitations.   Gastrointestinal:  Negative for abdominal pain and vomiting.   Genitourinary:  Negative for dysuria and hematuria.   Musculoskeletal:  Negative for arthralgias  and back pain.   Skin:  Positive for rash. Negative for color change.   Neurological:  Negative for seizures and syncope.   All other systems reviewed and are negative.        Current Medications       Current Outpatient Medications:     Continuous Glucose Sensor (Dexcom G6 Sensor) MISC, CHANGE EVERY 10 DAYS, Disp: 3 each, Rfl: 3    HumaLOG 100 UNIT/ML injection, USE 70 UNITS VIA INSULIN PUMP DAILY AS DIRECTED, Disp: 60 mL, Rfl: 3    Insulin Glargine Solostar (Lantus SoloStar) 100 UNIT/ML SOPN, 20 UNITS WHEN OFF PUMP OR PUMP FAILS, Disp: 15 mL, Rfl: 6    insulin lispro (HumaLOG) 100 units/mL injection pen, 1 UNIT FOR EVERY 10G OF CARB + 1 UNIT FOR EVERY 40 ABOVE 120. USE INCASE OF PUMP FAILURE., Disp: 15 mL, Rfl: 1    ondansetron (ZOFRAN) 4 mg tablet, TAKE 1 TABLET (4 MG TOTAL) BY MOUTH EVERY 6 (SIX) HOURS AS NEEDED FOR NAUSEA OR VOMITING, Disp: , Rfl:     pantoprazole (PROTONIX) 20 mg tablet, Take 1 tablet (20 mg total) by mouth 2 (two) times a day 30 minutes before breakfast and dinner, Disp: 180 tablet, Rfl: 2    trimethobenzamide (TIGAN) 300 mg capsule, Take 300 mg by mouth, Disp: , Rfl:     Continuous Glucose Transmitter (Dexcom G6 Transmitter) MISC, USE ONCE EVERY 3 MONTHS, Disp: 1 each, Rfl: 1    Contour Next Test test strip, TEST BLOOD SUGAR 8 TIMES DAILY, Disp: 800 strip, Rfl: 1    FLUoxetine (PROzac) 10 mg capsule, TAKE 1 CAPSULE BY MOUTH EVERY DAY (Patient not taking: Reported on 4/19/2024), Disp: 90 capsule, Rfl: 1    FLUoxetine (PROzac) 20 MG tablet, Take 1 tablet (20 mg total) by mouth daily (Patient not taking: Reported on 4/19/2024), Disp: 30 tablet, Rfl: 5    Glucagon (Baqsimi One Pack) 3 MG/DOSE POWD, Use once as needed into each nostril., Disp: 1 each, Rfl: 1    Insulin Disposable Pump (Omnipod 5 G6 Intro, Gen 5,) KIT, Connect to Omnipod 5, Disp: 1 kit, Rfl: 1    Insulin Disposable Pump (Omnipod 5 G6 Pod, Gen 5,) MISC, CHANGE EVERY 3 DAYS. (Patient not taking: Reported on 11/8/2023), Disp: 15  "each, Rfl: 1    Insulin Pen Needle (BD Pen Needle Bhumi U/F) 32G X 4 MM MISC, Use daily, Disp: 100 each, Rfl: 6    Insulin Syringe-Needle U-100 (INSULIN SYRINGE .5CC/31GX5/16\") 31G X 5/16\" 0.5 ML MISC, Use 4 times daily., Disp: 200 each, Rfl: 1    Insulin Syringes, Disposable, U-100 1 ML MISC, Use daily, Disp: 60 each, Rfl: 0    ofloxacin (OCUFLOX) 0.3 % ophthalmic solution, Administer 2 drops to the right eye 4 (four) times a day (Patient not taking: Reported on 2024), Disp: 5 mL, Rfl: 0    ondansetron (ZOFRAN-ODT) 4 mg disintegrating tablet, Take 1 tablet (4 mg total) by mouth every 8 (eight) hours as needed for nausea or vomiting for up to 7 days, Disp: 20 tablet, Rfl: 0    predniSONE 10 mg tablet, Take 3 tablets (30 mg total) by mouth daily for 5 days, THEN 2 tablets (20 mg total) daily for 5 days, THEN 1 tablet (10 mg total) daily for 5 days., Disp: 30 tablet, Rfl: 0    URINE GLUCOSE-KETONES TEST VI, by In Vitro route, Disp: , Rfl:     Current Allergies     Allergies as of 2024 - Reviewed 2024   Allergen Reaction Noted    Reglan [metoclopramide] Anxiety 2016            The following portions of the patient's history were reviewed and updated as appropriate: allergies, current medications, past family history, past medical history, past social history, past surgical history and problem list.     Past Medical History:   Diagnosis Date    Allergic dermatitis     Resolved 3/2/2016     Anxiety     Autoimmune disorder (HCC)     Diabetes mellitus type 1 (HCC)     Diabetic ketoacidosis without coma associated with type 1 diabetes mellitus (HCC) 2016    Hypoglycemia due to type 1 diabetes mellitus (HCC)     Type 1 diabetes mellitus during pregnancy     Urinary tract infection     last episode 2 yrs ago    Varicella     childhood chickenpox       Past Surgical History:   Procedure Laterality Date    FL INJECTION LEFT SHOULDER (ARTHROGRAM)  2018    DC  DELIVERY ONLY N/A 2020    " "Procedure:  SECTION ();  Surgeon: Ashanti Bowers MD;  Location: [facility];  Service: Obstetrics    WI ESOPHAGOGASTRODUODENOSCOPY TRANSORAL DIAGNOSTIC N/A 2019    Procedure: ESOPHAGOGASTRODUODENOSCOPY (EGD) with biopsy;  Surgeon: Chance Bustillo DO;  Location:  MAIN OR;  Service: Gastroenterology    WISDOM TOOTH EXTRACTION Bilateral        Family History   Problem Relation Age of Onset    Anxiety disorder Mother     No Known Problems Father     Anxiety disorder Sister     Anxiety disorder Maternal Grandmother     Cancer Maternal Grandmother     Anxiety disorder Maternal Aunt     Hyperlipidemia Family     Diabetes Family     No Known Problems Maternal Grandfather     No Known Problems Paternal Grandmother     No Known Problems Paternal Grandfather     Diabetes type I Family         Great aunt         Medications have been verified.        Objective   /70   Pulse 71   Temp 97.5 °F (36.4 °C)   Resp 16   Ht 4' 11\" (1.499 m)   Wt 59.9 kg (132 lb)   LMP 2024   SpO2 99%   BMI 26.66 kg/m²   Patient's last menstrual period was 2024.       Physical Exam     Physical Exam  Vitals and nursing note reviewed.   Constitutional:       General: She is not in acute distress.     Appearance: Normal appearance.   HENT:      Head: Normocephalic and atraumatic.      Ears:      Comments: Poison ivy appearing rash in bilat ear canals     Nose: Nose normal.      Mouth/Throat:      Mouth: Mucous membranes are moist.      Pharynx: Oropharynx is clear.   Cardiovascular:      Rate and Rhythm: Normal rate and regular rhythm.      Pulses: Normal pulses.      Heart sounds: Normal heart sounds.   Pulmonary:      Effort: Pulmonary effort is normal.      Breath sounds: Normal breath sounds.   Musculoskeletal:      Comments: Bilat ankles/lower legs with vesicles and linear tracts of erythema/vesicles.  Scattered up the legs are isolated vesicles.  She also has scattered spots on bilat arms, abdomen and " neck.    Skin:     General: Skin is warm and dry.   Neurological:      Mental Status: She is alert and oriented to person, place, and time.   Psychiatric:         Mood and Affect: Mood normal.         Behavior: Behavior normal.

## 2024-07-29 DIAGNOSIS — E10.65 TYPE 1 DIABETES MELLITUS WITH HYPERGLYCEMIA (HCC): ICD-10-CM

## 2024-07-30 RX ORDER — INSULIN LISPRO 100 [IU]/ML
INJECTION, SOLUTION INTRAVENOUS; SUBCUTANEOUS
Qty: 15 ML | Refills: 1 | Status: SHIPPED | OUTPATIENT
Start: 2024-07-30

## 2024-08-02 ENCOUNTER — TELEPHONE (OUTPATIENT)
Age: 29
End: 2024-08-02

## 2024-08-02 DIAGNOSIS — E10.65 TYPE 1 DIABETES MELLITUS WITH HYPERGLYCEMIA (HCC): ICD-10-CM

## 2024-08-02 RX ORDER — INSULIN PMP CART,AUT,G6/7,CNTR
EACH SUBCUTANEOUS
Qty: 15 EACH | Refills: 2 | Status: SHIPPED | OUTPATIENT
Start: 2024-08-02

## 2024-08-02 NOTE — TELEPHONE ENCOUNTER
Martín from [facility]'s pharmacy called in stating that pt's prescription for the Omnipump might be incorrect or the instructions. Please Martín back to correct the prescription or instructions. Thank you

## 2024-08-09 NOTE — TELEPHONE ENCOUNTER
Pharmacy said instructions say change every 3 days with quantity of 15. They are asking if it should be a quantity of 10 instead. They changed it to 10.

## 2024-08-28 ENCOUNTER — VBI (OUTPATIENT)
Dept: ADMINISTRATIVE | Facility: OTHER | Age: 29
End: 2024-08-28

## 2024-08-28 NOTE — TELEPHONE ENCOUNTER
08/28/24 1:46 PM     Chart reviewed for Pap Smear (HPV) aka Cervical Cancer Screening ; nothing is submitted to the patient's insurance at this time.     ANDREE JACOBS MA   [facility]

## 2024-09-02 DIAGNOSIS — E10.65 TYPE 1 DIABETES MELLITUS WITH HYPERGLYCEMIA (HCC): ICD-10-CM

## 2024-09-02 RX ORDER — PROCHLORPERAZINE 25 MG/1
SUPPOSITORY RECTAL
Qty: 3 EACH | Refills: 2 | Status: SHIPPED | OUTPATIENT
Start: 2024-09-02

## 2024-09-20 DIAGNOSIS — E10.65 TYPE 1 DIABETES MELLITUS WITH HYPERGLYCEMIA (HCC): ICD-10-CM

## 2024-09-20 RX ORDER — PROCHLORPERAZINE 25 MG/1
SUPPOSITORY RECTAL
Qty: 1 EACH | Refills: 1 | Status: SHIPPED | OUTPATIENT
Start: 2024-09-20

## 2024-10-15 ENCOUNTER — VBI (OUTPATIENT)
Dept: ADMINISTRATIVE | Facility: OTHER | Age: 29
End: 2024-10-15

## 2024-10-15 NOTE — TELEPHONE ENCOUNTER
10/15/24 8:42 AM     Chart reviewed for Diabetic Eye Exam was/were submitted to the patient's insurance.     ANDREE JACOBS MA   [facility]

## 2024-10-20 DIAGNOSIS — E10.65 TYPE 1 DIABETES MELLITUS WITH HYPERGLYCEMIA (HCC): ICD-10-CM

## 2024-10-21 RX ORDER — INSULIN LISPRO 100 [IU]/ML
INJECTION, SOLUTION INTRAVENOUS; SUBCUTANEOUS
Qty: 60 ML | Refills: 0 | Status: SHIPPED | OUTPATIENT
Start: 2024-10-21

## 2024-10-21 NOTE — TELEPHONE ENCOUNTER
Patient needs an appointment. Please contact the patient to schedule an appointment. Last office visit:    Patient needs updated blood work. Please place orders. A courtesy refill was provided.     A1C needed

## 2024-10-21 NOTE — TELEPHONE ENCOUNTER
Patient does not have insurance cannot afford to self pay is looking at state insurances tonight and will call tomorrow

## 2025-04-27 DIAGNOSIS — E10.65 TYPE 1 DIABETES MELLITUS WITH HYPERGLYCEMIA (HCC): ICD-10-CM

## 2025-04-30 ENCOUNTER — TELEPHONE (OUTPATIENT)
Age: 30
End: 2025-04-30

## 2025-04-30 RX ORDER — INSULIN GLARGINE 100 [IU]/ML
INJECTION, SOLUTION SUBCUTANEOUS
Qty: 15 ML | Refills: 2 | Status: SHIPPED | OUTPATIENT
Start: 2025-04-30

## 2025-04-30 NOTE — TELEPHONE ENCOUNTER
Requested medication(s) are due for refill today: Yes  Patient has already received a courtesy refill: Yes  Other reason request has been forwarded to provider: failed protocol ; PT has future appt

## 2025-04-30 NOTE — TELEPHONE ENCOUNTER
Patient called in to schedule an appointment. She also advised she does not have insurance which is why she has not been in. She asked the cost of the appointment. I gave patient estimate. I advised if pays $30 at appointment she would receive 50% discount.Patient understood.

## 2025-07-30 ENCOUNTER — OFFICE VISIT (OUTPATIENT)
Dept: FAMILY MEDICINE CLINIC | Facility: CLINIC | Age: 30
End: 2025-07-30

## 2025-07-30 VITALS
RESPIRATION RATE: 16 BRPM | BODY MASS INDEX: 27.61 KG/M2 | HEART RATE: 82 BPM | TEMPERATURE: 98.5 F | WEIGHT: 140.6 LBS | HEIGHT: 60 IN | OXYGEN SATURATION: 97 % | SYSTOLIC BLOOD PRESSURE: 104 MMHG | DIASTOLIC BLOOD PRESSURE: 60 MMHG

## 2025-07-30 DIAGNOSIS — Z00.00 ANNUAL PHYSICAL EXAM: Primary | ICD-10-CM

## 2025-07-30 DIAGNOSIS — N92.6 IRREGULAR MENSTRUAL CYCLE: ICD-10-CM

## 2025-07-30 DIAGNOSIS — Z53.20 CERVICAL CANCER SCREENING DECLINED: ICD-10-CM

## 2025-07-30 DIAGNOSIS — Z28.39 UNDERIMMUNIZED: ICD-10-CM

## 2025-07-30 DIAGNOSIS — F41.9 ANXIETY: ICD-10-CM

## 2025-07-30 DIAGNOSIS — E10.65 TYPE 1 DIABETES MELLITUS WITH HYPERGLYCEMIA (HCC): ICD-10-CM

## 2025-07-30 DIAGNOSIS — R68.89 DIFFICULTY LOSING WEIGHT: ICD-10-CM

## 2025-07-30 PROBLEM — E10.10 TYPE 1 DIABETES MELLITUS WITH KETOACIDOSIS WITHOUT COMA (HCC): Status: RESOLVED | Noted: 2023-04-11 | Resolved: 2025-07-30

## 2025-07-30 RX ORDER — FLUOXETINE 10 MG/1
10 CAPSULE ORAL DAILY
Qty: 90 CAPSULE | Refills: 3 | Status: SHIPPED | OUTPATIENT
Start: 2025-07-30

## 2025-08-12 ENCOUNTER — APPOINTMENT (OUTPATIENT)
Dept: LAB | Facility: CLINIC | Age: 30
End: 2025-08-12
Payer: COMMERCIAL

## 2025-08-12 LAB — TSH SERPL DL<=0.05 MIU/L-ACNC: 1.23 UIU/ML (ref 0.45–4.5)

## 2025-08-13 ENCOUNTER — TRANSCRIBE ORDERS (OUTPATIENT)
Dept: LAB | Facility: HOSPITAL | Age: 30
End: 2025-08-13

## 2025-08-19 ENCOUNTER — TELEPHONE (OUTPATIENT)
Dept: FAMILY MEDICINE CLINIC | Facility: CLINIC | Age: 30
End: 2025-08-19

## (undated) DEVICE — CHLORAPREP HI-LITE 26ML ORANGE

## (undated) DEVICE — ABDOMINAL PAD: Brand: DERMACEA

## (undated) DEVICE — TELFA NON-ADHERENT ABSORBENT DRESSING: Brand: TELFA

## (undated) DEVICE — GLOVE INDICATOR PI UNDERGLOVE SZ 6.5 BLUE

## (undated) DEVICE — MEDI-VAC YANKAUER SUCTION HANDLE W/STRAIGHT TIP & CONTROL VENT: Brand: CARDINAL HEALTH

## (undated) DEVICE — SYRINGE 50ML LL

## (undated) DEVICE — Device

## (undated) DEVICE — SUT MONOCRYL 4-0 PS-2 27 IN Y426H

## (undated) DEVICE — GLOVE PI ULTRA TOUCH SZ.6.5

## (undated) DEVICE — GAUZE SPONGES,16 PLY: Brand: CURITY

## (undated) DEVICE — 1200CC GUARDIAN II: Brand: GUARDIAN

## (undated) DEVICE — TUBING SUCTION 5MM X 12 FT

## (undated) DEVICE — SYRINGE 30ML LL

## (undated) DEVICE — SUT VICRYL 0 CTX 36 IN J978H

## (undated) DEVICE — LARGE, DISPOSABLE ALEXIS O C-SECTION PROTECTOR - RETRACTOR: Brand: ALEXIS ® O C-SECTION PROTECTOR - RETRACTOR

## (undated) DEVICE — SINGLE-USE BIOPSY FORCEPS: Brand: RADIAL JAW 4

## (undated) DEVICE — SKIN MARKER DUAL TIP WITH RULER CAP, FLEXIBLE RULER AND LABELS: Brand: DEVON

## (undated) DEVICE — PACK C-SECTION PBDS

## (undated) DEVICE — SUT VICRYL 0 CT-1 36 IN J946H